# Patient Record
Sex: FEMALE | Race: WHITE | NOT HISPANIC OR LATINO | Employment: OTHER | URBAN - METROPOLITAN AREA
[De-identification: names, ages, dates, MRNs, and addresses within clinical notes are randomized per-mention and may not be internally consistent; named-entity substitution may affect disease eponyms.]

---

## 2017-01-02 ENCOUNTER — TREATMENT (OUTPATIENT)
Dept: CARDIAC REHAB | Facility: HOSPITAL | Age: 62
End: 2017-01-02

## 2017-01-02 DIAGNOSIS — Z95.5 S/P DRUG ELUTING CORONARY STENT PLACEMENT: Primary | ICD-10-CM

## 2017-01-02 PROCEDURE — 93798 PHYS/QHP OP CAR RHAB W/ECG: CPT

## 2017-01-04 ENCOUNTER — TREATMENT (OUTPATIENT)
Dept: CARDIAC REHAB | Facility: HOSPITAL | Age: 62
End: 2017-01-04

## 2017-01-04 ENCOUNTER — APPOINTMENT (OUTPATIENT)
Dept: CARDIAC REHAB | Facility: HOSPITAL | Age: 62
End: 2017-01-04

## 2017-01-04 DIAGNOSIS — Z95.5 S/P DRUG ELUTING CORONARY STENT PLACEMENT: Primary | ICD-10-CM

## 2017-01-04 PROCEDURE — 93798 PHYS/QHP OP CAR RHAB W/ECG: CPT

## 2017-01-06 ENCOUNTER — APPOINTMENT (OUTPATIENT)
Dept: CARDIAC REHAB | Facility: HOSPITAL | Age: 62
End: 2017-01-06

## 2017-01-06 ENCOUNTER — TREATMENT (OUTPATIENT)
Dept: CARDIAC REHAB | Facility: HOSPITAL | Age: 62
End: 2017-01-06

## 2017-01-06 DIAGNOSIS — Z95.5 S/P DRUG ELUTING CORONARY STENT PLACEMENT: Primary | ICD-10-CM

## 2017-01-06 PROCEDURE — 93798 PHYS/QHP OP CAR RHAB W/ECG: CPT

## 2017-01-06 RX ORDER — OMEPRAZOLE 40 MG/1
40 CAPSULE, DELAYED RELEASE ORAL DAILY
Qty: 90 CAPSULE | Refills: 1 | Status: SHIPPED | OUTPATIENT
Start: 2017-01-06 | End: 2017-01-09

## 2017-01-09 ENCOUNTER — TREATMENT (OUTPATIENT)
Dept: CARDIAC REHAB | Facility: HOSPITAL | Age: 62
End: 2017-01-09

## 2017-01-09 ENCOUNTER — OFFICE VISIT (OUTPATIENT)
Dept: ONCOLOGY | Facility: CLINIC | Age: 62
End: 2017-01-09
Attending: INTERNAL MEDICINE

## 2017-01-09 ENCOUNTER — LAB (OUTPATIENT)
Dept: LAB | Facility: HOSPITAL | Age: 62
End: 2017-01-09
Attending: INTERNAL MEDICINE

## 2017-01-09 ENCOUNTER — APPOINTMENT (OUTPATIENT)
Dept: CARDIAC REHAB | Facility: HOSPITAL | Age: 62
End: 2017-01-09

## 2017-01-09 VITALS
WEIGHT: 147.6 LBS | TEMPERATURE: 97.8 F | RESPIRATION RATE: 14 BRPM | OXYGEN SATURATION: 99 % | HEIGHT: 65 IN | DIASTOLIC BLOOD PRESSURE: 68 MMHG | SYSTOLIC BLOOD PRESSURE: 118 MMHG | BODY MASS INDEX: 24.59 KG/M2 | HEART RATE: 73 BPM

## 2017-01-09 DIAGNOSIS — E78.2 MIXED HYPERLIPIDEMIA: Primary | ICD-10-CM

## 2017-01-09 DIAGNOSIS — Z95.5 S/P DRUG ELUTING CORONARY STENT PLACEMENT: Primary | ICD-10-CM

## 2017-01-09 DIAGNOSIS — C50.212 MALIGNANT NEOPLASM OF UPPER-INNER QUADRANT OF LEFT FEMALE BREAST (HCC): Primary | ICD-10-CM

## 2017-01-09 LAB
BASOPHILS # BLD AUTO: 0.04 10*3/MM3 (ref 0–0.1)
BASOPHILS NFR BLD AUTO: 0.7 % (ref 0–1.1)
DEPRECATED RDW RBC AUTO: 43.2 FL (ref 37–49)
EOSINOPHIL # BLD AUTO: 0.12 10*3/MM3 (ref 0–0.36)
EOSINOPHIL NFR BLD AUTO: 2.2 % (ref 1–5)
ERYTHROCYTE [DISTWIDTH] IN BLOOD BY AUTOMATED COUNT: 13.6 % (ref 11.7–14.5)
HCT VFR BLD AUTO: 38.1 % (ref 34–45)
HGB BLD-MCNC: 12.7 G/DL (ref 11.5–14.9)
IMM GRANULOCYTES # BLD: 0.04 10*3/MM3 (ref 0–0.03)
IMM GRANULOCYTES NFR BLD: 0.7 % (ref 0–0.5)
LYMPHOCYTES # BLD AUTO: 1.24 10*3/MM3 (ref 1–3.5)
LYMPHOCYTES NFR BLD AUTO: 22.9 % (ref 20–49)
MCH RBC QN AUTO: 28.9 PG (ref 27–33)
MCHC RBC AUTO-ENTMCNC: 33.3 G/DL (ref 32–35)
MCV RBC AUTO: 86.8 FL (ref 83–97)
MONOCYTES # BLD AUTO: 0.47 10*3/MM3 (ref 0.25–0.8)
MONOCYTES NFR BLD AUTO: 8.7 % (ref 4–12)
NEUTROPHILS # BLD AUTO: 3.51 10*3/MM3 (ref 1.5–7)
NEUTROPHILS NFR BLD AUTO: 64.8 % (ref 39–75)
NRBC BLD MANUAL-RTO: 0 /100 WBC (ref 0–0)
PLATELET # BLD AUTO: 232 10*3/MM3 (ref 150–375)
PMV BLD AUTO: 9.6 FL (ref 8.9–12.1)
RBC # BLD AUTO: 4.39 10*6/MM3 (ref 3.9–5)
WBC NRBC COR # BLD: 5.42 10*3/MM3 (ref 4–10)

## 2017-01-09 PROCEDURE — 85025 COMPLETE CBC W/AUTO DIFF WBC: CPT

## 2017-01-09 PROCEDURE — 36415 COLL VENOUS BLD VENIPUNCTURE: CPT | Performed by: INTERNAL MEDICINE

## 2017-01-09 PROCEDURE — 99214 OFFICE O/P EST MOD 30 MIN: CPT | Performed by: INTERNAL MEDICINE

## 2017-01-09 PROCEDURE — 93798 PHYS/QHP OP CAR RHAB W/ECG: CPT

## 2017-01-09 NOTE — PROGRESS NOTES
Subjective:     Reason for follow up:   1. Stage 1 (T1bN0), left breast invasive ductal carcinoma, ER+ (90%), ME+, Sax0mti negative   * status post lumpectomy with SLNB    * Plan for radiation therapy   * Arimidex started 6/2016     History of Present Ilness: Debra S Sandifer presents for follow-up of breast cancer. Since I last saw her she was admitted for evaluation of chest pain that was initially felt to be GI in origin and she underwent EGD and initiated on GI medications, but chest pain returned and she had left heart cath with stenting of multiple coronary arteries and is now on lifelong aspirin and at least one year of Ticagrelor. She also was noted to have an EF of 40%. Her chest pains have resolved and she's participating in cardiac rehab. She remains on Arimidex for breast cancer and is tolerating it well. No fevers, chills, night sweats. No abnormal breast concerns.     Past Medical   Past Medical History   Diagnosis Date   • Acute sinus infection    • Arthralgia of both hands    • Arthritis      hand   • Atypical chest pain    • Breast cancer    • Chest pain    • Coronary artery disease involving native coronary artery 11/23/2016   • Cough    • Dyspareunia    • H/O bronchitis    • H/O infectious mononucleosis    • Headache    • High cholesterol    • Hot flashes    • Hot flashes, menopausal    • Hyperlipidemia    • Polyp of sigmoid colon    • Stye      LEFT EYE   • UTI (urinary tract infection)      ON ANTIBIOTICS   • Vitamin D deficiency      Patient Active Problem List   Diagnosis   • Mixed hyperlipidemia   • Vitamin D deficiency   • Malignant neoplasm of upper-inner quadrant of left female breast   • Coronary artery disease involving native coronary artery   • Ischemic cardiomyopathy     Social History   Social History     Social History   • Marital status:      Spouse name: Van   • Number of children: N/A   • Years of education: College     Occupational History   • Retired   [employer]     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 Traveled to [place] and [place] in 07/2015 and [place] [facility] 03/2016      Family History  Family History   Problem Relation Age of Onset   • Coronary artery disease Mother    • Dementia Mother    • Heart disease Mother      Heart attack w/2 stents   • Heart attack Mother      had heart attack. Heart catheter -2 stents   • Hypertension Father    • Heart disease Father      Coronary heart disease   • Stroke Father      Ischemic   • Diabetes type II Father    • Diabetes Father    • Hyperlipidemia Father    • Prostate cancer Brother 51   • Alcohol abuse Maternal Grandfather    • Rheum arthritis Paternal Grandmother    • Alcohol abuse Paternal Grandfather    • Stroke Paternal Grandfather      Ischemic   • Rheum arthritis Paternal Grandfather    • Diabetes type II Paternal Grandfather    • Diabetes Paternal Grandfather    • Hyperlipidemia Paternal Grandfather    • Hypertension Paternal Grandfather      Allergies  Allergies   Allergen Reactions   • Codeine Rash   • Latex Rash       Medications: The current medication list was reviewed in the EMR.    Review of Systems  GENERAL: No change in appetite or weight; No fevers, chills, sweats.    SKIN: No nonhealing lesions. No rashes.  HEME/LYMPH: No easy bruising, bleeding. No swollen nodes.   EYES: No vision changes or diplopia.   ENT: No  "tinnitus, hearing loss, gum bleeding, epistaxis, hoarseness or dysphagia.   RESPIRATORY: No cough, shortness of breath, hemoptysis or wheezing.   CVS: recent cardiac stents  GI: No melena or hematochezia. No abdominal pain.No nausea, vomiting, constipation, diarrhea  PSYCHIATRIC: No increased nervousness, mood changes or depression.        Objective:     Vitals:    01/09/17 0950   BP: 118/68   Pulse: 73   Resp: 14   Temp: 97.8 °F (36.6 °C)   TempSrc: Oral   SpO2: 99%   Weight: 147 lb 9.6 oz (67 kg)   Height: 65\" (165.1 cm)   PainSc: 0-No pain     Current Status 1/9/2017   ECOG score 0     GENERAL:  Well-developed, well-nourished female in no acute distress.   SKIN:  Warm, dry without rashes, purpura or petechiae.  LYMPHATICS:  No cervical, supraclavicular, axillary adenopathy.  CHEST:  Lungs clear to percussion and auscultation. Good airflow.  CARDIAC:  Regular rate and rhythm without murmurs, rubs or gallops. Normal S1,S2. No edema  EXTREMITIES:  No clubbing, cyanosis or edema.  PSYCHIATRIC:  Normal affect and mood.    BREASTS: No palpable masses, skin changes, nipple discharge, axillary or supraclavicular lymphadenopathy on left.      Labs and Imaging  Lab Results   Component Value Date    WBC 5.42 01/09/2017    HGB 12.7 01/09/2017    HCT 38.1 01/09/2017    MCV 86.8 01/09/2017     01/09/2017       Breast imaging: none      Assessment/Plan     Assessment:   1. Stage 1 (T1bN0), left breast invasive ductal carcinoma, ER+ (90%), MD+, Xie9tez negative   * status post lumpectomy with SLNB 4/13/16   * Oncotype Dx 20 (low intermediate). No chemotherapy indicated.   * status post radiation therapy   * Arimidex started June 2016.  Tolerating well. Continue.   2. Hot flashes. Tolerable.   3. CAD with recent cardiac stenting. Recovering nicely. Participating in cardiac rehab.   Plan:     1. Continue Arimidex  2. Annual mammogram due January - scheduled for later this month.   3. DEXA scan due May 2018 at Dr Morales's " office.  4. Patient was instructed on the importance of physical activity, healthy diet, and self breast exams.  Patient will continue calcium and vitamin D supplementation.      Follow-up in 4 months. I asked the patient to call for any questions, concerns, or new symptoms.

## 2017-01-11 ENCOUNTER — OFFICE VISIT (OUTPATIENT)
Dept: GASTROENTEROLOGY | Facility: CLINIC | Age: 62
End: 2017-01-11

## 2017-01-11 ENCOUNTER — APPOINTMENT (OUTPATIENT)
Dept: CARDIAC REHAB | Facility: HOSPITAL | Age: 62
End: 2017-01-11

## 2017-01-11 ENCOUNTER — TREATMENT (OUTPATIENT)
Dept: CARDIAC REHAB | Facility: HOSPITAL | Age: 62
End: 2017-01-11

## 2017-01-11 VITALS
WEIGHT: 148.8 LBS | DIASTOLIC BLOOD PRESSURE: 68 MMHG | HEIGHT: 65 IN | SYSTOLIC BLOOD PRESSURE: 118 MMHG | BODY MASS INDEX: 24.79 KG/M2

## 2017-01-11 DIAGNOSIS — Z95.5 S/P DRUG ELUTING CORONARY STENT PLACEMENT: Primary | ICD-10-CM

## 2017-01-11 DIAGNOSIS — K63.5 COLON POLYPS: Primary | ICD-10-CM

## 2017-01-11 PROCEDURE — 93798 PHYS/QHP OP CAR RHAB W/ECG: CPT

## 2017-01-11 PROCEDURE — 99213 OFFICE O/P EST LOW 20 MIN: CPT | Performed by: INTERNAL MEDICINE

## 2017-01-11 NOTE — MR AVS SNAPSHOT
Debra S Sandifer   1/11/2017 10:45 AM   Office Visit    Dept Phone:  [phone]   Encounter #:  [id number]    Provider:  Mehdi Guardado MD   Department:  [facility] GASTROENTEROLOGY                Your Full Care Plan              Your Updated Medication List          This list is accurate as of: 1/11/17 12:00 PM.  Always use your most recent med list.                anastrozole 1 MG tablet   Commonly known as:  ARIMIDEX   Take 1 tablet by mouth daily.       aspirin 81 MG EC tablet   Take 1 tablet by mouth Daily.       atorvastatin 40 MG tablet   Commonly known as:  LIPITOR   Take 1 tablet by mouth Every Night.       carvedilol 3.125 MG tablet   Commonly known as:  COREG   Take 1 tablet by mouth Every 12 (Twelve) Hours.       naproxen sodium 220 MG tablet   Commonly known as:  ALEVE       PROBIOTIC DAILY PO       ticagrelor 90 MG tablet tablet   Commonly known as:  BRILINTA   Take 1 tablet by mouth 2 (Two) Times a Day.       TYLENOL PO       vitamin D3 5000 UNITS capsule capsule               You Were Diagnosed With        Codes Comments    Colon polyps    -  Primary ICD-10-CM: K63.5  ICD-9-CM: 211.3       Instructions     None    Patient Instructions History      Upcoming Appointments     Visit Type Date Time Department    FOLLOW UP 1/11/2017 10:45 AM [facility]    PHASE II - CARD 1/11/2017  8:00 AM [facility] CARD REHAB    PHASE II - CARD 1/13/2017  8:00 AM [facility] CARD REHAB    PHASE II - CARD 1/16/2017  8:00 AM [facility] CARD REHAB    PHASE II - CARD 1/18/2017  8:00 AM [facility] CARD REHAB    PHASE II - CARD 1/20/2017  8:00 AM [facility] CARD REHAB    PHASE II - CARD 1/23/2017  8:00 AM [facility] CARD REHAB    PHASE II - CARD 1/25/2017  8:00 AM [facility] CARD REHAB    MAMMO [facility] DIAG [place] BILATERAL 1/27/2017  9:30 AM [facility] MAMMO    PHASE II - CARD 1/27/2017  8:00 AM [facility] CARD REHAB    PHASE II - CARD 1/30/2017  8:00 AM [facility] CARD REHAB    PHASE II - CARD 2/1/2017  8:00 AM [facility]  CARD REHAB    PHASE II - CARD 2/3/2017  8:00 AM [facility] CARD REHAB    PHASE II - CARD 2/6/2017  8:00 AM [facility] CARD REHAB    OFFICE VISIT 2/7/2017 10:30 AM [facility] BRE[facility]    PHASE II - CARD 2/8/2017  8:00 AM [facility] CARD REHAB    PHASE II - CARD 2/10/2017  8:00 AM [facility] CARD REHAB    PHASE II - CARD 2/13/2017  8:00 AM [facility] CARD REHAB    [facility] 2/15/2017  8:20 AM [facility] PC [facility]AVILION    PHASE II - CARD 2/15/2017  7:00 AM [facility] CARD REHAB    PHASE II - CARD 2/17/2017  8:00 AM [facility] CARD REHAB    PHASE II - CARD 2/20/2017  8:00 AM [facility] CARD REHAB    FOLLOW UP 2/22/2017  9:00 AM [facility]IL[facility]    VITALS ONLY 5/12/2017  9:00 AM [facility] ONC [facility] LAB [facility]    FOLLOW UP 1 UNIT 5/12/2017  9:20 AM [facility] ONC CBC [place]    FOLLOW UP 6/26/2017  9:20 AM [facility] [place]    [facility] 10/10/2017  8:10 AM [facility]    PHYSICAL 10/17/2017  7:45 AM [facility]      MyChart Signup     Our records indicate that you have an active [employer] account.    You can view your After Visit Summary by going to [employer] and logging in with your [facility] username and password.  If you don't have a [employer] username and password but a parent or guardian has access to your record, the parent or guardian should login with their own [employer] username and password and access your record to view the After Visit Summary.    If you have questions, you can email [email or web address] or call [phone] to talk to our [employer] staff.  Remember, [employer] is NOT to be used for urgent needs.  For medical emergencies, dial 911.               Other Info from Your Visit           Your Appointments     Jan 13, 2017  8:00 AM EST   PHASE II CARDIAC REHAB with TELEMETRY MONITOR - [facility] CARD REHAB ([place])    [address] [facility] [address]   [phone]            Jan 16, 2017  8:00 AM EST   PHASE II CARDIAC REHAB with TELEMETRY MONITOR - [facility]  [facility]    [address] [facility] [address]   [phone]            Jan 18, 2017  8:00 AM EST   [facility]    [address] [facility] [address]   [phone]            Jan 20, 2017  8:00 AM EST   PHASE II CARDIAC REHAB [facility] TE[facility]MET[facility]IT[facility]    [address] [facility] [address]   [phone]            Jan 23, 2017  8:00 AM EST   PHASE [facility]ARDI[facility] REHAB [facility] TE[facility]MET[facility]    [address] [facility] [address]   [phone]              Allergies     Codeine  Rash    Latex  Rash      Reason for Visit     Follow-up post scope      Vital Signs     Blood Pressure Height Weight Body Mass Index Smoking Status       118/68 65\" (165.1 cm) 148 lb 12.8 oz (67.5 kg) 24.76 kg/m2 Former Smoker       Problems and Diagnoses Noted     Colon polyp    -  Primary        "

## 2017-01-11 NOTE — PROGRESS NOTES
Chief Complaint   Patient presents with   • Follow-up     post scope       History of Present Illness: We reviewed the results of the 11/11/16 EGD. After the EGD she was admitted to [facility] with chest pain. She had a cath and 2 heart stents placed.  No chest pain now. No heartburn now. No abdominal pain.  No nausea or vomting. NO fevers, chills. No diarrhea or constipation. No rectal bleeding or melena. Last colonoscopy 2014, h/o colon polyps in the past.     Past Medical History   Diagnosis Date   • Acute sinus infection    • Arthralgia of both hands    • Arthritis      hand   • Atypical chest pain    • Breast cancer    • Chest pain    • Coronary artery disease involving native coronary artery 11/23/2016   • Cough    • Dyspareunia    • H/O bronchitis    • H/O infectious mononucleosis    • Headache    • High cholesterol    • Hot flashes    • Hot flashes, menopausal    • Hyperlipidemia    • Polyp of sigmoid colon    • Stye      LEFT EYE   • UTI (urinary tract infection)      ON ANTIBIOTICS   • Vitamin D deficiency        Past Surgical History   Procedure Laterality Date   • Back surgery  01/13/2005     Herniated Disk repair (Done by Dr Jurgen Reddy)   • Lumbar discectomy     • [place] tooth extraction     • Breast biopsy     • Breast lumpectomy with sentinel node biopsy Left 4/13/2016     Procedure: LEFT BREAST MAMMO NEEDLE LOC LUMPECTOMY WITH LEFT AXILLA SENTINEL NODE BIOPSY;  Surgeon: Aminata Estrella MD;  Location: [facility] OR;  Service:    • Endoscopy N/A 11/11/2016     Procedure: ESOPHAGOGASTRODUODENOSCOPY WITH BIOPSY;  Surgeon: Mehdi Guardado MD;  Location: [facility] ENDOSCOPY;  Service:    • Cardiac catheterization N/A 11/15/2016     Procedure: Left Heart Cath;  Surgeon: Sanjiv Dykes MD;  Location: [facility] CATH INVASIVE LOCATION;  Service:    • Cardiac catheterization N/A 11/15/2016     Procedure: Left ventriculography;  Surgeon: Sanjiv Dykes MD;  Location: [facility] CATH INVASIVE LOCATION;  Service:           Current Outpatient Prescriptions:   •  Acetaminophen (TYLENOL PO), Take 650 mg by mouth 4 (four) times a day as needed., Disp: , Rfl:   •  anastrozole (ARIMIDEX) 1 MG tablet, Take 1 tablet by mouth daily., Disp: 90 tablet, Rfl: 3  •  aspirin 81 MG EC tablet, Take 1 tablet by mouth Daily., Disp: 30 tablet, Rfl: 12  •  atorvastatin (LIPITOR) 40 MG tablet, Take 1 tablet by mouth Every Night., Disp: 30 tablet, Rfl: 6  •  carvedilol (COREG) 3.125 MG tablet, Take 1 tablet by mouth Every 12 (Twelve) Hours., Disp: 60 tablet, Rfl: 6  •  Cholecalciferol (VITAMIN D3) 5000 UNITS capsule capsule, Take 1,000 Units by mouth Daily., Disp: , Rfl:   •  Probiotic Product (PROBIOTIC DAILY PO), Take 1 tablet by mouth daily., Disp: , Rfl:   •  ticagrelor (BRILINTA) 90 MG tablet tablet, Take 1 tablet by mouth 2 (Two) Times a Day., Disp: 60 tablet, Rfl: 11  •  naproxen sodium (ALEVE) 220 MG tablet, Take 220 mg by mouth 2 (two) times a day as needed for mild pain (1-3)., Disp: , Rfl:     Allergies   Allergen Reactions   • Codeine Rash   • Latex Rash       Family History   Problem Relation Age of Onset   • Coronary artery disease Mother    • Dementia Mother    • Heart disease Mother      Heart attack w/2 stents   • Heart attack Mother      had heart attack. Heart catheter -2 stents   • Hypertension Father    • Heart disease Father      Coronary heart disease   • Stroke Father      Ischemic   • Diabetes type II Father    • Diabetes Father    • Hyperlipidemia Father    • Prostate cancer Brother 51   • Alcohol abuse Maternal Grandfather    • Rheum arthritis Paternal Grandmother    • Alcohol abuse Paternal Grandfather    • Stroke Paternal Grandfather      Ischemic   • Rheum arthritis Paternal Grandfather    • Diabetes type II Paternal Grandfather    • Diabetes Paternal Grandfather    • Hyperlipidemia Paternal Grandfather    • Hypertension Paternal Grandfather        Social History     Social History   • Marital status:      Spouse  name: Van   • Number of children: N/A   • Years of education: College     Occupational History   • Retired  [employer]     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 Traveled to [place] and [place] in 07/2015 and [facility] 03/2016       Review of Systems   All other systems reviewed and are negative.      Vitals:    01/11/17 1128   BP: 118/68       Physical Exam   Constitutional: She is oriented to person, place, and time. She appears well-developed and well-nourished. No distress.   HENT:   Head: Normocephalic and atraumatic. Hair is normal.   Right Ear: Hearing, tympanic membrane, external ear and ear canal normal. No drainage. No decreased hearing is noted.   Left Ear: Hearing, tympanic membrane, external ear and ear canal normal. No decreased hearing is noted.   Nose: No nasal deformity.   Mouth/Throat: Oropharynx is clear and moist.   Eyes: Conjunctivae, EOM and lids are normal. Pupils are equal, round, and reactive to light. Right eye exhibits no discharge. Left eye exhibits no discharge.   Neck: Normal range of motion. Neck supple. No JVD present. No tracheal deviation present. No thyromegaly present.   Cardiovascular: Normal rate, regular rhythm, normal heart sounds, intact distal pulses and normal pulses.  Exam reveals no gallop and no friction rub.    No murmur heard.  Pulmonary/Chest: Effort normal and  breath sounds normal. No respiratory distress. She has no wheezes. She has no rales. She exhibits no tenderness.   Abdominal: Soft. Bowel sounds are normal. She exhibits no distension and no mass. There is no tenderness. There is no rebound and no guarding. No hernia.   Musculoskeletal: Normal range of motion. She exhibits no edema, tenderness or deformity.   Lymphadenopathy:     She has no cervical adenopathy.   Neurological: She is alert and oriented to person, place, and time. She has normal reflexes. She displays normal reflexes. No cranial nerve deficit. She exhibits normal muscle tone. Coordination normal.   Skin: Skin is warm and dry. No rash noted. She is not diaphoretic. No erythema.   Psychiatric: She has a normal mood and affect. Her behavior is normal. Judgment and thought content normal.   Vitals reviewed.      There are no diagnoses linked to this encounter.   Assessment:  1) h//o colon polyps.  2) Chest pain, s/p MI - all resolved after angioplasty and stent placement.    Recommendations:  1) Repeat c/s in 2019  2) f/u prn      No Follow-up on file.

## 2017-01-13 ENCOUNTER — APPOINTMENT (OUTPATIENT)
Dept: CARDIAC REHAB | Facility: HOSPITAL | Age: 62
End: 2017-01-13

## 2017-01-13 ENCOUNTER — TREATMENT (OUTPATIENT)
Dept: CARDIAC REHAB | Facility: HOSPITAL | Age: 62
End: 2017-01-13

## 2017-01-13 DIAGNOSIS — Z95.5 S/P DRUG ELUTING CORONARY STENT PLACEMENT: Primary | ICD-10-CM

## 2017-01-13 PROCEDURE — 93798 PHYS/QHP OP CAR RHAB W/ECG: CPT

## 2017-01-16 ENCOUNTER — APPOINTMENT (OUTPATIENT)
Dept: CARDIAC REHAB | Facility: HOSPITAL | Age: 62
End: 2017-01-16

## 2017-01-16 ENCOUNTER — TREATMENT (OUTPATIENT)
Dept: CARDIAC REHAB | Facility: HOSPITAL | Age: 62
End: 2017-01-16

## 2017-01-16 DIAGNOSIS — Z95.5 S/P DRUG ELUTING CORONARY STENT PLACEMENT: Primary | ICD-10-CM

## 2017-01-16 PROCEDURE — 93798 PHYS/QHP OP CAR RHAB W/ECG: CPT

## 2017-01-17 RX ORDER — ATORVASTATIN CALCIUM 40 MG/1
40 TABLET, FILM COATED ORAL NIGHTLY
Qty: 90 TABLET | Refills: 3 | Status: SHIPPED | OUTPATIENT
Start: 2017-01-17 | End: 2017-05-09 | Stop reason: SDUPTHER

## 2017-01-17 RX ORDER — ASPIRIN 81 MG/1
81 TABLET ORAL DAILY
Qty: 90 TABLET | Refills: 3 | Status: SHIPPED | OUTPATIENT
Start: 2017-01-17 | End: 2018-02-26 | Stop reason: SDUPTHER

## 2017-01-17 RX ORDER — CARVEDILOL 3.12 MG/1
3.12 TABLET ORAL EVERY 12 HOURS SCHEDULED
Qty: 180 TABLET | Refills: 3 | Status: SHIPPED | OUTPATIENT
Start: 2017-01-17 | End: 2017-05-09 | Stop reason: SDUPTHER

## 2017-01-18 ENCOUNTER — APPOINTMENT (OUTPATIENT)
Dept: CARDIAC REHAB | Facility: HOSPITAL | Age: 62
End: 2017-01-18

## 2017-01-18 ENCOUNTER — TREATMENT (OUTPATIENT)
Dept: CARDIAC REHAB | Facility: HOSPITAL | Age: 62
End: 2017-01-18

## 2017-01-18 DIAGNOSIS — Z95.5 S/P DRUG ELUTING CORONARY STENT PLACEMENT: Primary | ICD-10-CM

## 2017-01-18 PROCEDURE — 93798 PHYS/QHP OP CAR RHAB W/ECG: CPT

## 2017-01-20 ENCOUNTER — TREATMENT (OUTPATIENT)
Dept: CARDIAC REHAB | Facility: HOSPITAL | Age: 62
End: 2017-01-20

## 2017-01-20 ENCOUNTER — APPOINTMENT (OUTPATIENT)
Dept: CARDIAC REHAB | Facility: HOSPITAL | Age: 62
End: 2017-01-20

## 2017-01-20 DIAGNOSIS — Z95.5 S/P DRUG ELUTING CORONARY STENT PLACEMENT: Primary | ICD-10-CM

## 2017-01-20 PROCEDURE — 93798 PHYS/QHP OP CAR RHAB W/ECG: CPT

## 2017-01-23 ENCOUNTER — APPOINTMENT (OUTPATIENT)
Dept: CARDIAC REHAB | Facility: HOSPITAL | Age: 62
End: 2017-01-23

## 2017-01-23 ENCOUNTER — TREATMENT (OUTPATIENT)
Dept: CARDIAC REHAB | Facility: HOSPITAL | Age: 62
End: 2017-01-23

## 2017-01-23 DIAGNOSIS — Z95.5 S/P DRUG ELUTING CORONARY STENT PLACEMENT: Primary | ICD-10-CM

## 2017-01-23 PROCEDURE — 93798 PHYS/QHP OP CAR RHAB W/ECG: CPT

## 2017-01-25 ENCOUNTER — TREATMENT (OUTPATIENT)
Dept: CARDIAC REHAB | Facility: HOSPITAL | Age: 62
End: 2017-01-25

## 2017-01-25 ENCOUNTER — APPOINTMENT (OUTPATIENT)
Dept: CARDIAC REHAB | Facility: HOSPITAL | Age: 62
End: 2017-01-25

## 2017-01-25 DIAGNOSIS — Z95.5 S/P DRUG ELUTING CORONARY STENT PLACEMENT: Primary | ICD-10-CM

## 2017-01-25 PROCEDURE — 93798 PHYS/QHP OP CAR RHAB W/ECG: CPT

## 2017-01-27 ENCOUNTER — HOSPITAL ENCOUNTER (OUTPATIENT)
Dept: MAMMOGRAPHY | Facility: HOSPITAL | Age: 62
Discharge: HOME | End: 2017-01-27
Attending: SURGERY | Admitting: SURGERY

## 2017-01-27 ENCOUNTER — APPOINTMENT (OUTPATIENT)
Dept: CARDIAC REHAB | Facility: HOSPITAL | Age: 62
End: 2017-01-27

## 2017-01-27 ENCOUNTER — TREATMENT (OUTPATIENT)
Dept: CARDIAC REHAB | Facility: HOSPITAL | Age: 62
End: 2017-01-27

## 2017-01-27 DIAGNOSIS — C50.212 MALIGNANT NEOPLASM OF UPPER-INNER QUADRANT OF LEFT FEMALE BREAST (HCC): ICD-10-CM

## 2017-01-27 DIAGNOSIS — Z95.5 S/P DRUG ELUTING CORONARY STENT PLACEMENT: Primary | ICD-10-CM

## 2017-01-27 PROCEDURE — 93798 PHYS/QHP OP CAR RHAB W/ECG: CPT

## 2017-01-27 PROCEDURE — G0279 TOMOSYNTHESIS, MAMMO: HCPCS

## 2017-01-27 PROCEDURE — G0204 DX MAMMO INCL CAD BI: HCPCS

## 2017-01-30 ENCOUNTER — TREATMENT (OUTPATIENT)
Dept: CARDIAC REHAB | Facility: HOSPITAL | Age: 62
End: 2017-01-30

## 2017-01-30 ENCOUNTER — APPOINTMENT (OUTPATIENT)
Dept: CARDIAC REHAB | Facility: HOSPITAL | Age: 62
End: 2017-01-30

## 2017-01-30 DIAGNOSIS — Z95.5 S/P DRUG ELUTING CORONARY STENT PLACEMENT: Primary | ICD-10-CM

## 2017-01-30 PROCEDURE — 93798 PHYS/QHP OP CAR RHAB W/ECG: CPT

## 2017-02-01 ENCOUNTER — TREATMENT (OUTPATIENT)
Dept: CARDIAC REHAB | Facility: HOSPITAL | Age: 62
End: 2017-02-01

## 2017-02-01 ENCOUNTER — APPOINTMENT (OUTPATIENT)
Dept: CARDIAC REHAB | Facility: HOSPITAL | Age: 62
End: 2017-02-01

## 2017-02-01 DIAGNOSIS — Z95.5 S/P DRUG ELUTING CORONARY STENT PLACEMENT: Primary | ICD-10-CM

## 2017-02-01 PROCEDURE — 93798 PHYS/QHP OP CAR RHAB W/ECG: CPT

## 2017-02-03 ENCOUNTER — TREATMENT (OUTPATIENT)
Dept: CARDIAC REHAB | Facility: HOSPITAL | Age: 62
End: 2017-02-03

## 2017-02-03 ENCOUNTER — APPOINTMENT (OUTPATIENT)
Dept: CARDIAC REHAB | Facility: HOSPITAL | Age: 62
End: 2017-02-03

## 2017-02-03 DIAGNOSIS — Z95.5 S/P DRUG ELUTING CORONARY STENT PLACEMENT: Primary | ICD-10-CM

## 2017-02-03 PROCEDURE — 93798 PHYS/QHP OP CAR RHAB W/ECG: CPT

## 2017-02-06 ENCOUNTER — TREATMENT (OUTPATIENT)
Dept: CARDIAC REHAB | Facility: HOSPITAL | Age: 62
End: 2017-02-06

## 2017-02-06 ENCOUNTER — APPOINTMENT (OUTPATIENT)
Dept: CARDIAC REHAB | Facility: HOSPITAL | Age: 62
End: 2017-02-06

## 2017-02-06 DIAGNOSIS — Z95.5 S/P DRUG ELUTING CORONARY STENT PLACEMENT: Primary | ICD-10-CM

## 2017-02-06 PROCEDURE — 93798 PHYS/QHP OP CAR RHAB W/ECG: CPT

## 2017-02-07 ENCOUNTER — OFFICE VISIT (OUTPATIENT)
Dept: SURGERY | Facility: CLINIC | Age: 62
End: 2017-02-07

## 2017-02-07 VITALS
DIASTOLIC BLOOD PRESSURE: 69 MMHG | HEIGHT: 66 IN | SYSTOLIC BLOOD PRESSURE: 111 MMHG | BODY MASS INDEX: 23.78 KG/M2 | TEMPERATURE: 99.4 F | HEART RATE: 65 BPM | OXYGEN SATURATION: 98 % | WEIGHT: 148 LBS

## 2017-02-07 DIAGNOSIS — C50.212 MALIGNANT NEOPLASM OF UPPER-INNER QUADRANT OF LEFT FEMALE BREAST (HCC): Primary | ICD-10-CM

## 2017-02-07 PROCEDURE — 99214 OFFICE O/P EST MOD 30 MIN: CPT | Performed by: SURGERY
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
## 2017-02-08 ENCOUNTER — TREATMENT (OUTPATIENT)
Dept: CARDIAC REHAB | Facility: HOSPITAL | Age: 62
End: 2017-02-08

## 2017-02-08 ENCOUNTER — APPOINTMENT (OUTPATIENT)
Dept: CARDIAC REHAB | Facility: HOSPITAL | Age: 62
End: 2017-02-08

## 2017-02-08 DIAGNOSIS — Z95.5 S/P DRUG ELUTING CORONARY STENT PLACEMENT: Primary | ICD-10-CM

## 2017-02-08 PROCEDURE — 93798 PHYS/QHP OP CAR RHAB W/ECG: CPT

## 2017-02-10 ENCOUNTER — TREATMENT (OUTPATIENT)
Dept: CARDIAC REHAB | Facility: HOSPITAL | Age: 62
End: 2017-02-10

## 2017-02-10 ENCOUNTER — APPOINTMENT (OUTPATIENT)
Dept: CARDIAC REHAB | Facility: HOSPITAL | Age: 62
End: 2017-02-10

## 2017-02-10 DIAGNOSIS — Z95.5 S/P DRUG ELUTING CORONARY STENT PLACEMENT: Primary | ICD-10-CM

## 2017-02-10 PROCEDURE — 93798 PHYS/QHP OP CAR RHAB W/ECG: CPT

## 2017-02-13 ENCOUNTER — TREATMENT (OUTPATIENT)
Dept: CARDIAC REHAB | Facility: HOSPITAL | Age: 62
End: 2017-02-13

## 2017-02-13 ENCOUNTER — APPOINTMENT (OUTPATIENT)
Dept: CARDIAC REHAB | Facility: HOSPITAL | Age: 62
End: 2017-02-13

## 2017-02-13 DIAGNOSIS — Z95.5 S/P DRUG ELUTING CORONARY STENT PLACEMENT: Primary | ICD-10-CM

## 2017-02-13 PROCEDURE — 93798 PHYS/QHP OP CAR RHAB W/ECG: CPT

## 2017-02-15 ENCOUNTER — APPOINTMENT (OUTPATIENT)
Dept: CARDIAC REHAB | Facility: HOSPITAL | Age: 62
End: 2017-02-15

## 2017-02-15 ENCOUNTER — TREATMENT (OUTPATIENT)
Dept: CARDIAC REHAB | Facility: HOSPITAL | Age: 62
End: 2017-02-15

## 2017-02-15 DIAGNOSIS — Z95.5 S/P DRUG ELUTING CORONARY STENT PLACEMENT: Primary | ICD-10-CM

## 2017-02-15 DIAGNOSIS — E78.2 MIXED HYPERLIPIDEMIA: Primary | ICD-10-CM

## 2017-02-15 LAB
ALBUMIN SERPL-MCNC: 4.2 G/DL (ref 3.5–5.2)
ALBUMIN/GLOB SERPL: 1.6 G/DL
ALP SERPL-CCNC: 97 U/L (ref 39–117)
ALT SERPL-CCNC: 29 U/L (ref 1–33)
AST SERPL-CCNC: 34 U/L (ref 1–32)
BILIRUB SERPL-MCNC: 0.6 MG/DL (ref 0.1–1.2)
BUN SERPL-MCNC: 14 MG/DL (ref 8–23)
BUN/CREAT SERPL: 16.9 (ref 7–25)
CALCIUM SERPL-MCNC: 9.4 MG/DL (ref 8.6–10.5)
CHLORIDE SERPL-SCNC: 105 MMOL/L (ref 98–107)
CHOLEST SERPL-MCNC: 161 MG/DL (ref 0–200)
CO2 SERPL-SCNC: 26.4 MMOL/L (ref 22–29)
CREAT SERPL-MCNC: 0.83 MG/DL (ref 0.57–1)
GLOBULIN SER CALC-MCNC: 2.7 GM/DL
GLUCOSE SERPL-MCNC: 96 MG/DL (ref 65–99)
HDLC SERPL-MCNC: 96 MG/DL (ref 40–60)
LDLC SERPL CALC-MCNC: 52 MG/DL (ref 0–100)
POTASSIUM SERPL-SCNC: 4.6 MMOL/L (ref 3.5–5.2)
PROT SERPL-MCNC: 6.9 G/DL (ref 6–8.5)
SODIUM SERPL-SCNC: 144 MMOL/L (ref 136–145)
TRIGL SERPL-MCNC: 67 MG/DL (ref 0–150)
VLDLC SERPL CALC-MCNC: 13.4 MG/DL (ref 5–40)

## 2017-02-15 PROCEDURE — 93798 PHYS/QHP OP CAR RHAB W/ECG: CPT

## 2017-02-17 ENCOUNTER — TREATMENT (OUTPATIENT)
Dept: CARDIAC REHAB | Facility: HOSPITAL | Age: 62
End: 2017-02-17

## 2017-02-17 ENCOUNTER — APPOINTMENT (OUTPATIENT)
Dept: CARDIAC REHAB | Facility: HOSPITAL | Age: 62
End: 2017-02-17

## 2017-02-17 DIAGNOSIS — Z95.5 S/P DRUG ELUTING CORONARY STENT PLACEMENT: Primary | ICD-10-CM

## 2017-02-17 PROCEDURE — 93798 PHYS/QHP OP CAR RHAB W/ECG: CPT

## 2017-02-20 ENCOUNTER — APPOINTMENT (OUTPATIENT)
Dept: CARDIAC REHAB | Facility: HOSPITAL | Age: 62
End: 2017-02-20

## 2017-02-20 ENCOUNTER — TREATMENT (OUTPATIENT)
Dept: CARDIAC REHAB | Facility: HOSPITAL | Age: 62
End: 2017-02-20

## 2017-02-20 DIAGNOSIS — Z95.5 S/P DRUG ELUTING CORONARY STENT PLACEMENT: Primary | ICD-10-CM

## 2017-02-20 PROCEDURE — 93798 PHYS/QHP OP CAR RHAB W/ECG: CPT

## 2017-02-22 ENCOUNTER — APPOINTMENT (OUTPATIENT)
Dept: CARDIAC REHAB | Facility: HOSPITAL | Age: 62
End: 2017-02-22

## 2017-02-22 ENCOUNTER — OFFICE VISIT (OUTPATIENT)
Dept: INTERNAL MEDICINE | Facility: CLINIC | Age: 62
End: 2017-02-22

## 2017-02-22 ENCOUNTER — TREATMENT (OUTPATIENT)
Dept: CARDIAC REHAB | Facility: HOSPITAL | Age: 62
End: 2017-02-22

## 2017-02-22 VITALS
SYSTOLIC BLOOD PRESSURE: 104 MMHG | WEIGHT: 147 LBS | BODY MASS INDEX: 23.63 KG/M2 | DIASTOLIC BLOOD PRESSURE: 64 MMHG | OXYGEN SATURATION: 97 % | HEIGHT: 66 IN | HEART RATE: 81 BPM

## 2017-02-22 DIAGNOSIS — E78.2 MIXED HYPERLIPIDEMIA: Primary | ICD-10-CM

## 2017-02-22 DIAGNOSIS — Z95.5 S/P DRUG ELUTING CORONARY STENT PLACEMENT: Primary | ICD-10-CM

## 2017-02-22 DIAGNOSIS — I25.10 CORONARY ARTERY DISEASE INVOLVING NATIVE CORONARY ARTERY OF NATIVE HEART WITHOUT ANGINA PECTORIS: ICD-10-CM

## 2017-02-22 PROCEDURE — 99213 OFFICE O/P EST LOW 20 MIN: CPT | Performed by: INTERNAL MEDICINE

## 2017-02-22 PROCEDURE — 93798 PHYS/QHP OP CAR RHAB W/ECG: CPT

## 2017-02-22 PROCEDURE — 90715 TDAP VACCINE 7 YRS/> IM: CPT | Performed by: INTERNAL MEDICINE

## 2017-02-22 PROCEDURE — 90471 IMMUNIZATION ADMIN: CPT | Performed by: INTERNAL MEDICINE

## 2017-02-22 NOTE — PROGRESS NOTES
Subjective     Debra S Sandifer is a 61 y.o. female who presents with   Chief Complaint   Patient presents with   • Hyperlipidemia   • Coronary Artery Disease       History of Present Illness     HLD.  Excellent control with atorvastatin.   CAD.  She is doing well in cardiac rehab.     Review of Systems   Respiratory: Negative.    Cardiovascular: Negative.        The following portions of the patient's history were reviewed and updated as appropriate: allergies, current medications and problem list.    Patient Active Problem List    Diagnosis Date Noted   • Ischemic cardiomyopathy 12/22/2016   • Coronary artery disease involving native coronary artery 11/23/2016     Note Last Updated: 11/23/2016 11/2016  PTCA of the first diagonal branch with a 2.5 x 12 mm trek Rx balloon.  PCI of the distal LAD with a 2.75 x 18 mm Xience Alpine drug-eluting stent  PCI of the mid LAD with a 3.0 x 28 mm Xience Alpine drug-eluting stent     • Malignant neoplasm of upper-inner quadrant of left female breast 04/27/2016     Note Last Updated: 10/14/2016     S/p lumpectomy and radiation in 2016     • Mixed hyperlipidemia 02/10/2016   • Vitamin D deficiency 02/10/2016       Current Outpatient Prescriptions on File Prior to Visit   Medication Sig Dispense Refill   • Acetaminophen (TYLENOL PO) Take 650 mg by mouth 4 (four) times a day as needed.     • anastrozole (ARIMIDEX) 1 MG tablet Take 1 tablet by mouth daily. 90 tablet 3   • aspirin 81 MG EC tablet Take 1 tablet by mouth Daily. 90 tablet 3   • atorvastatin (LIPITOR) 40 MG tablet Take 1 tablet by mouth Every Night. 90 tablet 3   • carvedilol (COREG) 3.125 MG tablet Take 1 tablet by mouth Every 12 (Twelve) Hours. 180 tablet 3   • Cholecalciferol (VITAMIN D3) 5000 UNITS capsule capsule Take 1,000 Units by mouth Daily.     • Probiotic Product (PROBIOTIC DAILY PO) Take 1 tablet by mouth daily.     • ticagrelor (BRILINTA) 90 MG tablet tablet Take 1 tablet by mouth 2 (Two) Times a Day.  "180 tablet 3   • [DISCONTINUED] naproxen sodium (ALEVE) 220 MG tablet Take 220 mg by mouth 2 (two) times a day as needed for mild pain (1-3).       No current facility-administered medications on file prior to visit.        Objective     Visit Vitals   • /64   • Pulse 81   • Ht 66\" (167.6 cm)   • Wt 147 lb (66.7 kg)   • SpO2 97%   • BMI 23.73 kg/m2       Physical Exam   Constitutional: She is oriented to person, place, and time. She appears well-developed and well-nourished.   HENT:   Head: Normocephalic and atraumatic.   Pulmonary/Chest: Effort normal.   Neurological: She is alert and oriented to person, place, and time.   Psychiatric: She has a normal mood and affect. Her behavior is normal.       Assessment/Plan   Elin was seen today for hyperlipidemia and coronary artery disease.    Diagnoses and all orders for this visit:    Mixed hyperlipidemia    Coronary artery disease involving native coronary artery of native heart without angina pectoris    Other orders  -     Tdap Vaccine Greater Than or Equal To 6yo IM        Discussion  HLD.  Continue atorvastatin.   CAD.  Continue med management and cardiac rehab.         Future Appointments  Date Time Provider Department Center   2/24/2017 8:00 AM TELEMETRY MONITOR - [facility]   2/27/2017 8:00 AM TELEMETRY MONITOR - [facility]   3/1/2017 8:00 AM TELEMETRY MONITOR - [facility]   5/12/2017 9:00 AM VITALS ONLY [facility]   5/12/2017 9:20 AM Mechelle Garcia MD [facility]   6/26/2017 9:20 AM Sanjiv Dykes MD [facility] None   10/10/2017 8:10 AM [facility] None   10/17/2017 7:45 AM Alisa Morales MD [facility] None         "

## 2017-02-24 ENCOUNTER — TREATMENT (OUTPATIENT)
Dept: CARDIAC REHAB | Facility: HOSPITAL | Age: 62
End: 2017-02-24

## 2017-02-24 ENCOUNTER — APPOINTMENT (OUTPATIENT)
Dept: CARDIAC REHAB | Facility: HOSPITAL | Age: 62
End: 2017-02-24

## 2017-02-24 DIAGNOSIS — Z95.5 S/P DRUG ELUTING CORONARY STENT PLACEMENT: Primary | ICD-10-CM

## 2017-02-24 PROCEDURE — 93798 PHYS/QHP OP CAR RHAB W/ECG: CPT

## 2017-02-27 ENCOUNTER — TREATMENT (OUTPATIENT)
Dept: CARDIAC REHAB | Facility: HOSPITAL | Age: 62
End: 2017-02-27

## 2017-02-27 DIAGNOSIS — Z95.5 S/P DRUG ELUTING CORONARY STENT PLACEMENT: Primary | ICD-10-CM

## 2017-02-27 PROCEDURE — 93798 PHYS/QHP OP CAR RHAB W/ECG: CPT

## 2017-03-01 ENCOUNTER — TREATMENT (OUTPATIENT)
Dept: CARDIAC REHAB | Facility: HOSPITAL | Age: 62
End: 2017-03-01

## 2017-03-01 DIAGNOSIS — Z95.5 S/P DRUG ELUTING CORONARY STENT PLACEMENT: Primary | ICD-10-CM

## 2017-03-01 PROCEDURE — 93798 PHYS/QHP OP CAR RHAB W/ECG: CPT

## 2017-03-03 ENCOUNTER — TREATMENT (OUTPATIENT)
Dept: CARDIAC REHAB | Facility: HOSPITAL | Age: 62
End: 2017-03-03

## 2017-03-03 DIAGNOSIS — Z95.5 S/P DRUG ELUTING CORONARY STENT PLACEMENT: Primary | ICD-10-CM

## 2017-03-03 PROCEDURE — 93798 PHYS/QHP OP CAR RHAB W/ECG: CPT

## 2017-05-09 RX ORDER — CARVEDILOL 3.12 MG/1
3.12 TABLET ORAL EVERY 12 HOURS SCHEDULED
Qty: 180 TABLET | Refills: 1 | Status: SHIPPED | OUTPATIENT
Start: 2017-05-09 | End: 2017-11-27 | Stop reason: SDUPTHER

## 2017-05-09 RX ORDER — ATORVASTATIN CALCIUM 40 MG/1
40 TABLET, FILM COATED ORAL NIGHTLY
Qty: 90 TABLET | Refills: 1 | Status: SHIPPED | OUTPATIENT
Start: 2017-05-09 | End: 2017-11-30 | Stop reason: SDUPTHER

## 2017-05-12 ENCOUNTER — APPOINTMENT (OUTPATIENT)
Dept: LAB | Facility: HOSPITAL | Age: 62
End: 2017-05-12

## 2017-05-12 ENCOUNTER — APPOINTMENT (OUTPATIENT)
Dept: ONCOLOGY | Facility: CLINIC | Age: 62
End: 2017-05-12

## 2017-05-18 ENCOUNTER — OFFICE VISIT (OUTPATIENT)
Dept: ONCOLOGY | Facility: CLINIC | Age: 62
End: 2017-05-18

## 2017-05-18 ENCOUNTER — APPOINTMENT (OUTPATIENT)
Dept: LAB | Facility: HOSPITAL | Age: 62
End: 2017-05-18

## 2017-05-18 VITALS
TEMPERATURE: 97.9 F | DIASTOLIC BLOOD PRESSURE: 78 MMHG | SYSTOLIC BLOOD PRESSURE: 116 MMHG | WEIGHT: 148 LBS | OXYGEN SATURATION: 98 % | HEART RATE: 56 BPM | BODY MASS INDEX: 24.66 KG/M2 | RESPIRATION RATE: 12 BRPM | HEIGHT: 65 IN

## 2017-05-18 DIAGNOSIS — C50.212 MALIGNANT NEOPLASM OF UPPER-INNER QUADRANT OF LEFT FEMALE BREAST (HCC): Primary | ICD-10-CM

## 2017-05-18 DIAGNOSIS — M25.542 ARTHRALGIA OF BOTH HANDS: ICD-10-CM

## 2017-05-18 DIAGNOSIS — M25.541 ARTHRALGIA OF BOTH HANDS: ICD-10-CM

## 2017-05-18 PROCEDURE — G0463 HOSPITAL OUTPT CLINIC VISIT: HCPCS | Performed by: INTERNAL MEDICINE

## 2017-05-18 PROCEDURE — 99214 OFFICE O/P EST MOD 30 MIN: CPT | Performed by: INTERNAL MEDICINE

## 2017-05-18 RX ORDER — EXEMESTANE 25 MG/1
25 TABLET ORAL DAILY
Qty: 90 TABLET | Refills: 3 | Status: SHIPPED | OUTPATIENT
Start: 2017-05-18 | End: 2018-05-14 | Stop reason: SDUPTHER

## 2017-05-31 ENCOUNTER — OFFICE VISIT (OUTPATIENT)
Dept: INTERNAL MEDICINE | Facility: CLINIC | Age: 62
End: 2017-05-31

## 2017-05-31 VITALS
SYSTOLIC BLOOD PRESSURE: 104 MMHG | WEIGHT: 148 LBS | HEIGHT: 66 IN | BODY MASS INDEX: 23.78 KG/M2 | DIASTOLIC BLOOD PRESSURE: 68 MMHG | HEART RATE: 74 BPM | OXYGEN SATURATION: 98 %

## 2017-05-31 DIAGNOSIS — M79.645 THUMB PAIN, LEFT: Primary | ICD-10-CM

## 2017-05-31 DIAGNOSIS — M18.12 PRIMARY OSTEOARTHRITIS OF FIRST CARPOMETACARPAL JOINT OF LEFT HAND: ICD-10-CM

## 2017-05-31 PROCEDURE — 99213 OFFICE O/P EST LOW 20 MIN: CPT | Performed by: INTERNAL MEDICINE

## 2017-06-26 ENCOUNTER — OFFICE VISIT (OUTPATIENT)
Dept: CARDIOLOGY | Facility: CLINIC | Age: 62
End: 2017-06-26

## 2017-06-26 VITALS
HEIGHT: 66 IN | HEART RATE: 52 BPM | WEIGHT: 147 LBS | DIASTOLIC BLOOD PRESSURE: 82 MMHG | BODY MASS INDEX: 23.63 KG/M2 | SYSTOLIC BLOOD PRESSURE: 132 MMHG

## 2017-06-26 DIAGNOSIS — I25.10 CORONARY ARTERY DISEASE INVOLVING NATIVE CORONARY ARTERY OF NATIVE HEART WITHOUT ANGINA PECTORIS: Primary | ICD-10-CM

## 2017-06-26 DIAGNOSIS — E78.2 MIXED HYPERLIPIDEMIA: ICD-10-CM

## 2017-06-26 DIAGNOSIS — I25.5 ISCHEMIC CARDIOMYOPATHY: ICD-10-CM

## 2017-06-26 PROCEDURE — 93000 ELECTROCARDIOGRAM COMPLETE: CPT | Performed by: INTERNAL MEDICINE

## 2017-06-26 PROCEDURE — 99214 OFFICE O/P EST MOD 30 MIN: CPT | Performed by: INTERNAL MEDICINE

## 2017-06-26 NOTE — PROGRESS NOTES
Debra S Sandifer  1955  Date of Office Visit: 12/22/2016  Encounter Provider: Sanjiv Dykes MD  Place of Service: [facility] CARDIOLOGY    Chief complaints:   CAD  Mixed hyperlipidemia  Ischemic cardiomyopathy    HPI:  Dr. Morales,   I had the pleasure of seeing your patient back in followup. As you well know, she is a pleasant  62-year-old female with no significant history of cardiovascular disease but a history of gastroesophageal reflux disease who presented to the ER in October with the complaint of chest pain after dinner. During that time she had a exercise stress test with perfusion and went 13 minutes with no electrocardiographic changes or evidence of ischemia. She presented back on 11/14/16 and stated that she had intermittent chest discomfort still since October. The day prior to admission she was on a 3 mile walk and had severe central chest discomfort that brought her into the ER. She was noted to have negative cardiac biomarkers, however secondary to her pain but also ST-T wave abnormalities in the anterior lead she was sent for coronary angiography. She was noted to have an occluded LAD and underwent revascularization along with PTCA of the diagonal branch. She had one drug eluting stent in the mid LAD and one in the distal LAD with no difficulties. She tolerated all this well. She had an echocardiogram with a mildly depressed ejection fraction and was  started on carvedilol as well.       Since her last visit, she has actually been doing very well.  [facility] and is able to tolerate her pre-heart attack regimen without significant dyspnea or chest pain.  She has no orthopnea, paroxysmal nocturnal dyspnea, or lower extremity edema.  She is tolerating her medical regimen very well.  She has no bleeding complications on her dual-antiplatelet therapy.          Review of Systems   Constitution: Negative for fever, weakness and  malaise/fatigue.   HENT: Negative for nosebleeds and sore throat.    Eyes: Negative for blurred vision and double vision.   Cardiovascular: Negative for chest pain, claudication, palpitations and syncope.   Respiratory: Negative for cough, shortness of breath and snoring.    Endocrine: Negative for cold intolerance, heat intolerance and polydipsia.   Skin: Negative for itching, poor wound healing and rash.   Musculoskeletal: Negative for joint pain, joint swelling, muscle weakness and myalgias.   Gastrointestinal: Negative for abdominal pain, melena, nausea and vomiting.   Neurological: Negative for light-headedness, loss of balance, seizures and vertigo.   Psychiatric/Behavioral: Negative for altered mental status and depression.          Past Medical History:   Diagnosis Date   • Acute sinus infection    • Arthralgia of both hands    • Arthritis     hand   • Atypical chest pain    • Breast cancer     Left   • Chest pain    • Coronary artery disease involving native coronary artery 11/23/2016   • Cough    • Dyspareunia    • H/O bronchitis    • H/O infectious mononucleosis    • Headache    • High cholesterol    • Hot flashes    • Hot flashes, menopausal    • Hx of radiation therapy    • Hyperlipidemia    • Polyp of sigmoid colon    • Stye     LEFT EYE   • UTI (urinary tract infection)     ON ANTIBIOTICS   • Vitamin D deficiency        The following portions of the patient's history were reviewed and updated as appropriate: Social history , Family history and Surgical history     Current Outpatient Prescriptions on File Prior to Visit   Medication Sig Dispense Refill   • Acetaminophen (TYLENOL PO) Take 650 mg by mouth 4 (four) times a day as needed.     • aspirin 81 MG EC tablet Take 1 tablet by mouth Daily. 90 tablet 3   • atorvastatin (LIPITOR) 40 MG tablet Take 1 tablet by mouth Every Night. 90 tablet 1   • carvedilol (COREG) 3.125 MG tablet Take 1 tablet by mouth Every 12 (Twelve) Hours. 180 tablet 1   •  "Cholecalciferol (VITAMIN D3) 5000 UNITS capsule capsule Take 1,000 Units by mouth Daily.     • diclofenac (VOLTAREN) 1 % gel gel Apply 2 g topically 4 (Four) Times a Day As Needed (pain). 100 g 0   • exemestane (AROMASIN) 25 MG chemo tablet Take 1 tablet by mouth Daily for 90 days. 90 tablet 3   • Probiotic Product (PROBIOTIC DAILY PO) Take 1 tablet by mouth daily.     • ticagrelor (BRILINTA) 90 MG tablet tablet Take 1 tablet by mouth 2 (Two) Times a Day. 180 tablet 1     No current facility-administered medications on file prior to visit.        Allergies   Allergen Reactions   • Codeine Rash   • Latex Rash       Vitals:    06/26/17 0921   BP: 132/82   Pulse: 52   Weight: 147 lb (66.7 kg)   Height: 66\" (167.6 cm)     Physical Exam   Constitutional: She is oriented to person, place, and time. She appears well-developed and well-nourished.   HENT:   Head: Normocephalic and atraumatic.   Eyes: Conjunctivae and EOM are normal. No scleral icterus.   Neck: Normal range of motion. Neck supple. Normal carotid pulses, no hepatojugular reflux and no JVD present. Carotid bruit is not present. No tracheal deviation present. No thyromegaly present.   Cardiovascular: Normal rate and regular rhythm.  Exam reveals no gallop and no friction rub.    No murmur heard.  Pulses:       Carotid pulses are 2+ on the right side, and 2+ on the left side.       Radial pulses are 2+ on the right side, and 2+ on the left side.        Femoral pulses are 2+ on the right side, and 2+ on the left side.       Dorsalis pedis pulses are 2+ on the right side, and 2+ on the left side.        Posterior tibial pulses are 2+ on the right side, and 2+ on the left side.   Pulmonary/Chest: Breath sounds normal. No respiratory distress. She has no decreased breath sounds. She has no wheezes. She has no rhonchi. She has no rales. She exhibits no tenderness.   Abdominal: Soft. Bowel sounds are normal. She exhibits no distension. There is no tenderness. There is " no rebound.   Musculoskeletal: She exhibits no edema or deformity.   Neurological: She is alert and oriented to person, place, and time. She has normal strength. No sensory deficit.   Skin: No rash noted. No erythema.   Psychiatric: She has a normal mood and affect. Her behavior is normal.     No components found for: CBC  No results found for: CMP  No components found for: LIPID  No results found for: BMP      ECG 12 Lead  Date/Time: 6/26/2017 9:26 AM  Performed by: LENNOX PHAM  Authorized by: LENNOX PHAM   Comparison: compared with previous ECG from 12/22/2016  Similar to previous ECG  Rhythm: sinus bradycardia  Rate: bradycardic  Conduction: conduction normal  ST Segments: ST segments normal  T Waves: T waves normal  QRS axis: left  Other findings: PRWP  Clinical impression: abnormal ECG             Procedures Performed  Procedure(s): 11/15/16  Left Heart Cath  Left ventriculography      Conclusions:   1. Left main: Normal  2. LAD: Proximal segment is normal. Occluded in the mid segment. Tubular 70% distal stenosis.  3. LCX: 30% mid vessel stenosis  4. RCA: Normal  5. Mildly depressed systolic function. Apical to and distal inferior left ventricular wall hypokinesis. Ejection fraction 40%.  6. PTCA of the first diagonal branch with a 2.5 x 12 mm trek Rx balloon.  7. PCI of the distal LAD with a 2.75 x 18 mm Xience Alpine drug-eluting stent  8. PCI of the mid LAD with a 3.0 x 28 mm Xience Alpine drug-eluting stent      11/16/16  · Mild mitral valve regurgitation is present  · Left ventricular function is mildly decreased.  · The left ventricular cavity is borderline dilated.  · Left ventricular wall segments contract abnormally. Refer to wall scoring diagram for more information.  · Calculated EF = 46.2%.        DISCUSSION/SUMMARY  62-year-old female with a medical history as documented above, including unstable angina presentation and then a myocardial infarction, including an occluded  mid-LAD, along with evidence of mildly depressed ejection fraction and a very mild ischemic cardiomyopathy with an ejection fraction of 46%.   She underwent drug-eluting stent placement of the distal LAD with a 2.75 x 18 mm Xience Alpine drug-eluting stent along with a LAKESHA at 3 mm x 28 mm in the mid-LAD.  She has been doing very well.  She has no clinical evidence of heart failure and has no angina with moderate to high levels of exertion.    1. CAD.  Prior PCI to the distal LAD, along with proximal LAD with drug-eluting stents as documented above.  PTCA of the diagonal branch.   No angina    -Continue aspirin life-long.      -Continue Brilinta therapy for one year.  We will assess DAPT at that time with possible movement to Plavix therapy.      -Continue Atorvastatin at the current dose.  Lipid panel and LFTs from February have been reviewed.  Well controlled.    -Continue Carvedilol 3.125 mg p.o. b.i.d.       2. Ischemic cardiomyopathy.   LVEF> 40% Euvolemic.  New York Heart Association 1 symptoms.       -Continue Carvedilol at the current dose.   The patient is not an ACE inhibitor as above.  I would not recommend repeat echocardiogram unless she has symptomatic changes.  3. Hyperlipidemia.  Continue atorvastatin.         Coronary Artery Disease  Assessment  • The patient has no angina    Plan  • Lifestyle modifications discussed include adhering to a heart healthy diet, avoidance of tobacco products, maintenance of a healthy weight and medication compliance    Subjective - Objective  • There has been a previous stent procedure using LAKESHA on or around 11/15/2016  • Current antiplatelet therapy includes aspirin 81 mg and ticagrelor 90 mg

## 2017-08-02 ENCOUNTER — OFFICE VISIT (OUTPATIENT)
Dept: RADIATION ONCOLOGY | Facility: CLINIC | Age: 62
End: 2017-08-02

## 2017-08-02 VITALS
DIASTOLIC BLOOD PRESSURE: 66 MMHG | BODY MASS INDEX: 25.34 KG/M2 | HEART RATE: 56 BPM | WEIGHT: 157 LBS | SYSTOLIC BLOOD PRESSURE: 104 MMHG | OXYGEN SATURATION: 96 %

## 2017-08-02 DIAGNOSIS — C50.212 MALIGNANT NEOPLASM OF UPPER-INNER QUADRANT OF LEFT FEMALE BREAST (HCC): Primary | ICD-10-CM

## 2017-08-02 PROCEDURE — 99214 OFFICE O/P EST MOD 30 MIN: CPT | Performed by: RADIOLOGY

## 2017-08-02 NOTE — PROGRESS NOTES
Subjective     No ref. provider found     Diagnosis Plan   1. Malignant neoplasm of upper-inner quadrant of left female breast       CC: [id number] left breast cancer                                Dear Dr. Estrella:    I had the pleasure of seeing Debra Sandifer  today in the Radiation Center at [facility].  She returns today for follow up now 1 year out from completion of her radiation therapy.  She completed her radiation on 7/5/16 and received a dose of 4256cGy in 16 fractions followed by a boost to the tumor bed for an additional 1000cGy.  She has been doing well from her breast cancer since I last saw her.  She denies any pain.  She had a bilateral mammogram in January 2017 which was negative. She has recently switched from arimidex to aromasin due to arthalgias and is tolerating well.  She did suffer a heart attack in November 2016 and underwent heart cath which showed 100% blockage of one vessel.  She states she is feeling well overall.       Review of Systems   Constitutional: Negative.    HENT: Negative.    Respiratory: Negative.    Cardiovascular: Negative.    Gastrointestinal: Negative.    Genitourinary: Negative.    Musculoskeletal: Negative.          Past Medical History:   Diagnosis Date   • Acute sinus infection    • Arthralgia of both hands    • Arthritis     hand   • Atypical chest pain    • Breast cancer     Left   • Chest pain    • Coronary artery disease involving native coronary artery 11/23/2016   • Cough    • Dyspareunia    • H/O bronchitis    • H/O infectious mononucleosis    • Headache    • High cholesterol    • Hot flashes    • Hot flashes, menopausal    • Hx of radiation therapy    • Hyperlipidemia    • Polyp of sigmoid colon    • Stye     LEFT EYE   • UTI (urinary tract infection)     ON ANTIBIOTICS   • Vitamin D deficiency          Past Surgical History:   Procedure Laterality Date   • BACK SURGERY  01/13/2005    Herniated Disk repair (Done by Dr Jurgen Reddy)    • BREAST BIOPSY     • BREAST LUMPECTOMY WITH SENTINEL NODE BIOPSY Left 4/13/2016    Procedure: LEFT BREAST MAMMO NEEDLE LOC LUMPECTOMY WITH LEFT AXILLA SENTINEL NODE BIOPSY;  Surgeon: Aminata Estrella MD;  Location: [facility] MAIN OR;  Service:    • CARDIAC CATHETERIZATION N/A 11/15/2016    Procedure: Left Heart Cath;  Surgeon: Sanjiv Dykes MD;  Location: [facility] CATH INVASIVE LOCATION;  Service:    • CARDIAC CATHETERIZATION N/A 11/15/2016    Procedure: Left ventriculography;  Surgeon: Sanjiv Dykes MD;  Location: [facility] CATH INVASIVE LOCATION;  Service:    • ENDOSCOPY N/A 11/11/2016    Procedure: ESOPHAGOGASTRODUODENOSCOPY WITH BIOPSY;  Surgeon: Mehdi Guardado MD;  Location: [facility] ENDOSCOPY;  Service:    • LUMBAR DISCECTOMY     • WISDOM TOOTH EXTRACTION           Social History     Social History   • Marital status:      Spouse name: Van   • Number of children: N/A   • Years of education: College     Occupational History   • Retired  [employer]     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 Traveled to [place] and [place] in 07/2015 and [facility] 03/2016         Family History   Problem Relation Age of Onset   • Coronary artery disease Mother    • Dementia Mother    • Heart disease Mother      Heart attack w/2 stents   • Heart attack Mother      had heart attack. Heart catheter -2 stents   •  Hypertension Father    • Heart disease Father      Coronary heart disease   • Stroke Father      Ischemic   • Diabetes type II Father    • Diabetes Father    • Hyperlipidemia Father    • Prostate cancer Brother 51   • Alcohol abuse Maternal Grandfather    • Rheum arthritis Paternal Grandmother    • Alcohol abuse Paternal Grandfather    • Stroke Paternal Grandfather      Ischemic   • Rheum arthritis Paternal Grandfather    • Diabetes type II Paternal Grandfather    • Diabetes Paternal Grandfather    • Hyperlipidemia Paternal Grandfather    • Hypertension Paternal Grandfather           Objective    Physical Exam   Constitutional: She appears well-developed and well-nourished.   HENT:   Head: Normocephalic and atraumatic.   Pulmonary/Chest:             Current Outpatient Prescriptions on File Prior to Visit   Medication Sig Dispense Refill   • Acetaminophen (TYLENOL PO) Take 650 mg by mouth 4 (four) times a day as needed.     • aspirin 81 MG EC tablet Take 1 tablet by mouth Daily. 90 tablet 3   • atorvastatin (LIPITOR) 40 MG tablet Take 1 tablet by mouth Every Night. 90 tablet 1   • carvedilol (COREG) 3.125 MG tablet Take 1 tablet by mouth Every 12 (Twelve) Hours. 180 tablet 1   • Cholecalciferol (VITAMIN D3) 5000 UNITS capsule capsule Take 1,000 Units by mouth Daily.     • diclofenac (VOLTAREN) 1 % gel gel Apply 2 g topically 4 (Four) Times a Day As Needed (pain). 100 g 0   • exemestane (AROMASIN) 25 MG chemo tablet Take 1 tablet by mouth Daily for 90 days. 90 tablet 3   • Probiotic Product (PROBIOTIC DAILY PO) Take 1 tablet by mouth daily.     • ticagrelor (BRILINTA) 90 MG tablet tablet Take 1 tablet by mouth 2 (Two) Times a Day. 180 tablet 1     No current facility-administered medications on file prior to visit.        ALLERGIES:    Allergies   Allergen Reactions   • Codeine Rash   • Latex Rash       /66  Pulse 56  Wt 157 lb (71.2 kg)  SpO2 96%  BMI 25.34 kg/m2     Current Status 5/18/2017   ECOG score 0          Assessment/Plan     62 year old female with history of T1bN0 left breast cancer, ER positive, now 1 year from radiation.  She is doing well with no evidence of disease on exam today.  She tells me she is scheduled for mammogram in January 2018 and follow up with Dr. Estrella 1-2 weeks later.  She knows to continue the aromasin and has a follow up with Dr. Garcia in September.  I have not scheduled her for a follow up with me but asked her to call with any questions or concerns in the future.           Thank you very much for allowing me to participate in the care of this very pleasant patient.    Sincerely,      Sheila Wahl MD

## 2017-09-06 ENCOUNTER — TELEPHONE (OUTPATIENT)
Dept: SURGERY | Facility: CLINIC | Age: 62
End: 2017-09-06

## 2017-09-06 NOTE — TELEPHONE ENCOUNTER
Left msg to inform pt of appts:    [facility] 2-7-18 9:45 Mammo Screening  Dr. Estrella: 2-14-18 1:15 arrival

## 2017-09-07 ENCOUNTER — OFFICE VISIT (OUTPATIENT)
Dept: ONCOLOGY | Facility: CLINIC | Age: 62
End: 2017-09-07

## 2017-09-07 ENCOUNTER — APPOINTMENT (OUTPATIENT)
Dept: LAB | Facility: HOSPITAL | Age: 62
End: 2017-09-07

## 2017-09-07 VITALS
TEMPERATURE: 97.5 F | HEIGHT: 65 IN | SYSTOLIC BLOOD PRESSURE: 110 MMHG | RESPIRATION RATE: 16 BRPM | WEIGHT: 146.4 LBS | DIASTOLIC BLOOD PRESSURE: 60 MMHG | HEART RATE: 62 BPM | BODY MASS INDEX: 24.39 KG/M2

## 2017-09-07 DIAGNOSIS — C50.212 MALIGNANT NEOPLASM OF UPPER-INNER QUADRANT OF LEFT FEMALE BREAST (HCC): Primary | ICD-10-CM

## 2017-09-07 PROCEDURE — G0463 HOSPITAL OUTPT CLINIC VISIT: HCPCS | Performed by: INTERNAL MEDICINE

## 2017-09-07 PROCEDURE — 99213 OFFICE O/P EST LOW 20 MIN: CPT | Performed by: INTERNAL MEDICINE

## 2017-09-07 RX ORDER — EXEMESTANE 25 MG/1
TABLET ORAL
Refills: 3 | COMMUNITY
Start: 2017-08-20 | End: 2018-05-24 | Stop reason: SDUPTHER

## 2017-09-07 NOTE — PROGRESS NOTES
Subjective:     Reason for follow up:   1. Stage 1 (T1bN0), left breast invasive ductal carcinoma, ER+ (90%), MT+, Qji7php negative   * status post lumpectomy with SLNB    * status post radiation therapy   * Arimidex started 6/2016 - changed to Aromasin due to arthalgias     History of Present Ilness: Debra S Sandifer presents for follow-up of breast cancer. We transition the Arimidex to Aromasin because of arthralgias.  She is uncertain if the transition to Aromasin has helped her arthralgias.  She feels like they're fairly stable.  Her arthralgias didn't have any improvement when she did a three-week washout from Arimidex either.  She does have some mild fatigue but she remains physically active.  She exercises at still.  She does do regular self breast exams and has been set up for a mammogram and Dr. Estrella in February.  Past Medical   Past Medical History:   Diagnosis Date   • Acute sinus infection    • Arthralgia of both hands    • Arthritis     hand   • Atypical chest pain    • Breast cancer     Left   • Chest pain    • Coronary artery disease involving native coronary artery 11/23/2016   • Cough    • Dyspareunia    • H/O bronchitis    • H/O infectious mononucleosis    • Headache    • High cholesterol    • Hot flashes    • Hot flashes, menopausal    • Hx of radiation therapy    • Hyperlipidemia    • Polyp of sigmoid colon    • Stye     LEFT EYE   • UTI (urinary tract infection)     ON ANTIBIOTICS   • Vitamin D deficiency      Patient Active Problem List   Diagnosis   • Mixed hyperlipidemia   • Vitamin D deficiency   • Malignant neoplasm of upper-inner quadrant of left female breast   • Coronary artery disease involving native coronary artery   • Ischemic cardiomyopathy     Social History   Social History     Social History   • Marital status:      Spouse name: Van   • Number of children: N/A   • Years of education: College     Occupational History   • Retired  [employer]      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 Traveled to [place] and [place] in 07/2015 and [place] [facility] 03/2016      Family History  Family History   Problem Relation Age of Onset   • Coronary artery disease Mother    • Dementia Mother    • Heart disease Mother      Heart attack w/2 stents   • Heart attack Mother      had heart attack. Heart catheter -2 stents   • Hypertension Father    • Heart disease Father      Coronary heart disease   • Stroke Father      Ischemic   • Diabetes type II Father    • Diabetes Father    • Hyperlipidemia Father    • Prostate cancer Brother 51   • Alcohol abuse Maternal Grandfather    • Rheum arthritis Paternal Grandmother    • Alcohol abuse Paternal Grandfather    • Stroke Paternal Grandfather      Ischemic   • Rheum arthritis Paternal Grandfather    • Diabetes type II Paternal Grandfather    • Diabetes Paternal Grandfather    • Hyperlipidemia Paternal Grandfather    • Hypertension Paternal Grandfather      Allergies  Allergies   Allergen Reactions   • Codeine Rash   • Latex Rash       Medications: The current medication list was reviewed in the EMR.    Review of Systems  Review of Systems   Constitutional: Negative for activity change, appetite change, chills, diaphoresis, fatigue, fever and unexpected weight change.   Respiratory: Negative for cough, chest tightness and shortness of breath.   "  Cardiovascular: Negative for chest pain, palpitations and leg swelling.   Gastrointestinal: Negative for abdominal pain, blood in stool, constipation, diarrhea, nausea and vomiting.   Musculoskeletal: Positive for arthralgias and joint swelling. Negative for myalgias.   Hematological: Negative for adenopathy. Does not bruise/bleed easily.       Objective:     Vitals:    09/07/17 1152   BP: 110/60   Pulse: 62   Resp: 16   Temp: 97.5 °F (36.4 °C)   Weight: 146 lb 6.4 oz (66.4 kg)   Height: 65.04\" (165.2 cm)   PainSc: 0-No pain     Current Status 9/7/2017   ECOG score 0     GENERAL:  Well-developed, well-nourished female in no acute distress.   SKIN:  Warm, dry without rashes, purpura or petechiae.  HEAD:  Normocephalic.  EYES:  EOMs intact.  Conjunctivae normal.  EARS:  Hearing intact.  LYMPHATICS:  No cervical, supraclavicular, axillary adenopathy.  CHEST:  Lungs clear to percussion and auscultation. Good airflow.  Bilateral breast exam without palpable masses, skin changes or nipple discharge.  CARDIAC:  Regular rate and rhythm without murmurs, rubs or gallops. Normal S1,S2.  ABDOMEN:  Soft, nontender, normal bowel sounds  EXTREMITIES:  No clubbing, cyanosis or edema.  PSYCHIATRIC:  Normal affect and mood.      Labs and Imaging  Lab Results   Component Value Date    WBC 5.42 01/09/2017    HGB 12.7 01/09/2017    HCT 38.1 01/09/2017    MCV 86.8 01/09/2017     01/09/2017       Breast imaging: none      Assessment/Plan     Assessment:   1. Stage 1 (T1bN0), left breast invasive ductal carcinoma, ER+ (90%), WI+, [id number] negative   * status post lumpectomy with SLNB 4/13/16   * Oncotype Dx 20 (low intermediate). No chemotherapy indicated.   * status post radiation therapy   * Arimidex started June 2016. Change to Aromasin due to arthralgias 5/2017.  Currently no evidence of disease.    * Uncertain if the Aromasin is helping her arthralgias as a feel very similar to the Arimidex.  She just recently got a refill and " that she will complete those 3 months and will let us know at that point if she wants to transition back to Arimidex because it is she presented in the Aromasin.  If she feels like the Aromasin is helping and we will look into medication assistance.  2. Hot flashes. Tolerable.   3. Joint arthralgias. Has known hand arthritis, but I suspect exacerbated by AI     Plan:     1. Continue Aromasin.   2. Annual mammogram due February 2018.   3. DEXA scan due May 2018 at Dr Morales's office.  4. Patient was instructed on the importance of physical activity, healthy diet, and self breast exams.  Patient will continue calcium and vitamin D supplementation.      Follow-up in 4 months. I asked the patient to call for any questions, concerns, or new symptoms.

## 2017-10-10 ENCOUNTER — LAB (OUTPATIENT)
Dept: INTERNAL MEDICINE | Facility: CLINIC | Age: 62
End: 2017-10-10

## 2017-10-10 DIAGNOSIS — E55.9 VITAMIN D DEFICIENCY: ICD-10-CM

## 2017-10-10 DIAGNOSIS — E78.2 MIXED HYPERLIPIDEMIA: Primary | ICD-10-CM

## 2017-10-10 DIAGNOSIS — I25.10 CORONARY ARTERY DISEASE INVOLVING NATIVE CORONARY ARTERY OF NATIVE HEART WITHOUT ANGINA PECTORIS: ICD-10-CM

## 2017-10-10 DIAGNOSIS — Z00.00 HEALTHCARE MAINTENANCE: ICD-10-CM

## 2017-10-10 RX ORDER — TICAGRELOR 90 MG/1
TABLET ORAL
Qty: 180 TABLET | Refills: 1 | Status: SHIPPED | OUTPATIENT
Start: 2017-10-10 | End: 2018-01-04

## 2017-10-11 LAB
25(OH)D3+25(OH)D2 SERPL-MCNC: 45.3 NG/ML (ref 30–100)
ALBUMIN SERPL-MCNC: 4.3 G/DL (ref 3.5–5.2)
ALBUMIN/GLOB SERPL: 1.7 G/DL
ALP SERPL-CCNC: 88 U/L (ref 39–117)
ALT SERPL-CCNC: 26 U/L (ref 1–33)
APPEARANCE UR: CLEAR
AST SERPL-CCNC: 32 U/L (ref 1–32)
BACTERIA #/AREA URNS HPF: NORMAL /HPF
BASOPHILS # BLD AUTO: 0.03 10*3/MM3 (ref 0–0.2)
BASOPHILS NFR BLD AUTO: 0.6 % (ref 0–1.5)
BILIRUB SERPL-MCNC: 0.8 MG/DL (ref 0.1–1.2)
BILIRUB UR QL STRIP: NEGATIVE
BUN SERPL-MCNC: 17 MG/DL (ref 8–23)
BUN/CREAT SERPL: 17.7 (ref 7–25)
CALCIUM SERPL-MCNC: 9.4 MG/DL (ref 8.6–10.5)
CHLORIDE SERPL-SCNC: 106 MMOL/L (ref 98–107)
CHOLEST SERPL-MCNC: 155 MG/DL (ref 0–200)
CO2 SERPL-SCNC: 27.3 MMOL/L (ref 22–29)
COLOR UR: YELLOW
CREAT SERPL-MCNC: 0.96 MG/DL (ref 0.57–1)
EOSINOPHIL # BLD AUTO: 0.18 10*3/MM3 (ref 0–0.7)
EOSINOPHIL NFR BLD AUTO: 3.7 % (ref 0.3–6.2)
EPI CELLS #/AREA URNS HPF: NORMAL /HPF
ERYTHROCYTE [DISTWIDTH] IN BLOOD BY AUTOMATED COUNT: 14.7 % (ref 11.7–13)
GLOBULIN SER CALC-MCNC: 2.5 GM/DL
GLUCOSE SERPL-MCNC: 92 MG/DL (ref 65–99)
GLUCOSE UR QL: NEGATIVE
HBA1C MFR BLD: 5.7 % (ref 4.8–5.6)
HCT VFR BLD AUTO: 40.1 % (ref 35.6–45.5)
HDLC SERPL-MCNC: 84 MG/DL (ref 40–60)
HGB BLD-MCNC: 13 G/DL (ref 11.9–15.5)
HGB UR QL STRIP: NEGATIVE
IMM GRANULOCYTES # BLD: 0 10*3/MM3 (ref 0–0.03)
IMM GRANULOCYTES NFR BLD: 0 % (ref 0–0.5)
KETONES UR QL STRIP: NEGATIVE
LDLC SERPL CALC-MCNC: 58 MG/DL (ref 0–100)
LEUKOCYTE ESTERASE UR QL STRIP: NEGATIVE
LYMPHOCYTES # BLD AUTO: 1.36 10*3/MM3 (ref 0.9–4.8)
LYMPHOCYTES NFR BLD AUTO: 27.8 % (ref 19.6–45.3)
MCH RBC QN AUTO: 30.1 PG (ref 26.9–32)
MCHC RBC AUTO-ENTMCNC: 32.4 G/DL (ref 32.4–36.3)
MCV RBC AUTO: 92.8 FL (ref 80.5–98.2)
MICRO URNS: NORMAL
MICRO URNS: NORMAL
MONOCYTES # BLD AUTO: 0.5 10*3/MM3 (ref 0.2–1.2)
MONOCYTES NFR BLD AUTO: 10.2 % (ref 5–12)
MUCOUS THREADS URNS QL MICRO: PRESENT /HPF
NEUTROPHILS # BLD AUTO: 2.83 10*3/MM3 (ref 1.9–8.1)
NEUTROPHILS NFR BLD AUTO: 57.7 % (ref 42.7–76)
NITRITE UR QL STRIP: NEGATIVE
PH UR STRIP: 6 [PH] (ref 5–7.5)
PLATELET # BLD AUTO: 247 10*3/MM3 (ref 140–500)
POTASSIUM SERPL-SCNC: 4.5 MMOL/L (ref 3.5–5.2)
PROT SERPL-MCNC: 6.8 G/DL (ref 6–8.5)
PROT UR QL STRIP: NEGATIVE
RBC # BLD AUTO: 4.32 10*6/MM3 (ref 3.9–5.2)
RBC #/AREA URNS HPF: NORMAL /HPF
SODIUM SERPL-SCNC: 144 MMOL/L (ref 136–145)
SP GR UR: 1.02 (ref 1–1.03)
TRIGL SERPL-MCNC: 63 MG/DL (ref 0–150)
TSH SERPL DL<=0.005 MIU/L-ACNC: 2.11 MIU/ML (ref 0.27–4.2)
URINALYSIS REFLEX: NORMAL
UROBILINOGEN UR STRIP-MCNC: 0.2 MG/DL (ref 0.2–1)
VLDLC SERPL CALC-MCNC: 12.6 MG/DL (ref 5–40)
WBC # BLD AUTO: 4.9 10*3/MM3 (ref 4.5–10.7)
WBC #/AREA URNS HPF: NORMAL /HPF

## 2017-10-17 ENCOUNTER — OFFICE VISIT (OUTPATIENT)
Dept: INTERNAL MEDICINE | Facility: CLINIC | Age: 62
End: 2017-10-17

## 2017-10-17 VITALS
OXYGEN SATURATION: 99 % | HEART RATE: 57 BPM | HEIGHT: 65 IN | BODY MASS INDEX: 24.49 KG/M2 | SYSTOLIC BLOOD PRESSURE: 110 MMHG | DIASTOLIC BLOOD PRESSURE: 66 MMHG | RESPIRATION RATE: 18 BRPM | WEIGHT: 147 LBS

## 2017-10-17 DIAGNOSIS — Z00.00 WELL ADULT EXAM: Primary | ICD-10-CM

## 2017-10-17 PROBLEM — R73.03 PREDIABETES: Status: ACTIVE | Noted: 2017-10-17

## 2017-10-17 PROCEDURE — 90656 IIV3 VACC NO PRSV 0.5 ML IM: CPT | Performed by: INTERNAL MEDICINE

## 2017-10-17 PROCEDURE — 90471 IMMUNIZATION ADMIN: CPT | Performed by: INTERNAL MEDICINE

## 2017-10-17 PROCEDURE — 99396 PREV VISIT EST AGE 40-64: CPT | Performed by: INTERNAL MEDICINE

## 2017-10-17 NOTE — PROGRESS NOTES
Subjective     Debra S Sandifer is a 62 y.o. female who presents for a complete physical exam.      History of Present Illness     Prediabetes.  Sugar is well controlled.    HLD.  Excellent control of lipids.     Review of Systems   Constitutional: Negative.    HENT: Negative.    Eyes: Negative.    Respiratory: Negative.    Cardiovascular: Negative.    Gastrointestinal: Negative.    Endocrine: Negative.    Genitourinary: Negative.    Musculoskeletal: Negative.    Skin: Negative.    Allergic/Immunologic: Negative.    Neurological: Negative.    Hematological: Negative.    Psychiatric/Behavioral: Negative.        The following portions of the patient's history were reviewed and updated as appropriate: allergies, current medications, past family history, past medical history, past social history, past surgical history and problem list.  Health maintenance tab was reviewed and updated with the patient.       Patient Active Problem List    Diagnosis Date Noted   • Prediabetes 10/17/2017   • Ischemic cardiomyopathy 12/22/2016   • Coronary artery disease involving native coronary artery 11/23/2016     Note Last Updated: 11/23/2016 11/2016  PTCA of the first diagonal branch with a 2.5 x 12 mm trek Rx balloon.  PCI of the distal LAD with a 2.75 x 18 mm Xience Alpine drug-eluting stent  PCI of the mid LAD with a 3.0 x 28 mm Xience Alpine drug-eluting stent     • Malignant neoplasm of upper-inner quadrant of left female breast 04/27/2016     Note Last Updated: 10/14/2016     S/p lumpectomy and radiation in 2016     • Mixed hyperlipidemia 02/10/2016   • Vitamin D deficiency 02/10/2016       Past Medical History:   Diagnosis Date   • Acute sinus infection    • Allergic     codeine and latex   • Arthralgia of both hands    • Arthritis     hand   • Atypical chest pain    • Breast cancer     Left   • Chest pain    • Coronary artery disease involving native coronary artery 11/23/2016   • Cough    • Dyspareunia    • H/O bronchitis     • H/O infectious mononucleosis    • Headache    • High cholesterol    • Hot flashes    • Hot flashes, menopausal    • Hx of radiation therapy    • Hyperlipidemia    • Polyp of sigmoid colon    • Stye     LEFT EYE   • UTI (urinary tract infection)     ON ANTIBIOTICS   • Vitamin D deficiency        Past Surgical History:   Procedure Laterality Date   • BACK SURGERY  01/13/2005    Herniated Disk repair (Done by Dr Jurgen Reddy)   • BREAST BIOPSY     • BREAST LUMPECTOMY WITH SENTINEL NODE BIOPSY Left 4/13/2016    Procedure: LEFT BREAST MAMMO NEEDLE LOC LUMPECTOMY WITH LEFT AXILLA SENTINEL NODE BIOPSY;  Surgeon: Aminata Estrella MD;  Location: [facility] MAIN OR;  Service:    • CARDIAC CATHETERIZATION N/A 11/15/2016    Procedure: Left Heart Cath;  Surgeon: Sanjiv Dykes MD;  Location: [facility] CATH INVASIVE LOCATION;  Service:    • CARDIAC CATHETERIZATION N/A 11/15/2016    Procedure: Left ventriculography;  Surgeon: Sanjiv Dykes MD;  Location: [facility] CATH INVASIVE LOCATION;  Service:    • CARDIAC SURGERY  11/15/2016   • COLONOSCOPY  2014   • CORONARY STENT PLACEMENT  11/15/16    two   • ENDOSCOPY N/A 11/11/2016    Procedure: ESOPHAGOGASTRODUODENOSCOPY WITH BIOPSY;  Surgeon: Mehdi Guardado MD;  Location: [facility] ENDOSCOPY;  Service:    • LUMBAR DISCECTOMY     • LYMPH NODE BIOPSY  4/13/16    two   • SPINE SURGERY  2005    herniated disc   • WISDOM TOOTH EXTRACTION         Family History   Problem Relation Age of Onset   • Coronary artery disease Mother    • Dementia Mother    • Heart disease Mother      Heart attack w/2 stents   • Heart attack Mother      had heart attack. Heart catheter -2 stents   • Hypertension Father    • Heart disease Father      Coronary heart disease   • Stroke Father      Ischemic   • Diabetes type II Father    • Diabetes Father    • Hyperlipidemia Father    • Prostate cancer Brother 51   • Alcohol abuse Maternal Grandfather    • Rheum arthritis Paternal Grandmother    • Arthritis  Paternal Grandmother    • Alcohol abuse Paternal Grandfather    • Stroke Paternal Grandfather      Ischemic   • Rheum arthritis Paternal Grandfather    • Diabetes type II Paternal Grandfather    • Diabetes Paternal Grandfather    • Hyperlipidemia Paternal Grandfather    • Hypertension Paternal Grandfather    • Cancer Brother      Prostate       Social History     Social History   • Marital status:      Spouse name: Van   • Number of children: N/A   • Years of education: College     Occupational History   • Retired  [employer]     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[place], [place], [place], [place]     Social History Main Topics   • Smoking status: Light Tobacco Smoker     Packs/day: 0.25     Years: 5.00     Start date: 1972     Last attempt to quit: 1977   • Smokeless tobacco: Former User      Comment: smoked no more than 1 pack/week   • Alcohol use 0.6 oz/week     2 Glasses of wine per week      Comment: social drinker/weekends   • Drug use: No   • Sexual activity: Yes     Partners: Male     Birth control/ protection: Post-menopausal     Other Topics Concern   • Not on file     Social History Narrative    Enjoys working out, golf, walking, running, grandchildren's activities. Traveled to [place] and [place] in 07/2015 and [facility] 03/2016       Current Outpatient Prescriptions on File Prior to Visit   Medication Sig Dispense Refill   • Acetaminophen (TYLENOL PO) Take 650 mg by mouth 4 (four) times a day as needed.     • aspirin 81 MG EC tablet Take 1 tablet by mouth Daily. 90 tablet 3   • atorvastatin (LIPITOR) 40 MG tablet Take 1 tablet by mouth Every Night. 90 tablet 1   • BRILINTA 90 MG tablet tablet TAKE 1 TABLET BY MOUTH 2 (TWO) TIMES A DAY. 180 tablet 1   • carvedilol (COREG) 3.125 MG tablet Take 1 tablet by mouth Every 12 (Twelve) Hours. 180 tablet 1   • Cholecalciferol (VITAMIN D3) 5000 UNITS capsule capsule Take 1,000 Units by mouth Daily.     • diclofenac (VOLTAREN) 1 % gel gel  "Apply 2 g topically 4 (Four) Times a Day As Needed (pain). 100 g 0   • exemestane (AROMASIN) 25 MG chemo tablet TAKE 1 TABLET BY MOUTH DAILY  3   • Probiotic Product (PROBIOTIC DAILY PO) Take 1 tablet by mouth daily.       No current facility-administered medications on file prior to visit.        Allergies   Allergen Reactions   • Codeine Rash   • Latex Rash       Immunization History   Administered Date(s) Administered   • Flu Vaccine Quad PF >18YRS 10/14/2016   • Influenza, Quadrivalent 10/13/2015   • Tdap 04/15/2005, 02/22/2017   • Zoster 10/04/2013       Objective     /66  Pulse 57  Resp 18  Ht 65.4\" (166.1 cm)  Wt 147 lb (66.7 kg)  SpO2 99%  BMI 24.16 kg/m2    Physical Exam   Constitutional: She is oriented to person, place, and time. She appears well-developed and well-nourished.   HENT:   Head: Normocephalic and atraumatic.   Right Ear: Hearing, tympanic membrane and external ear normal.   Left Ear: Hearing, tympanic membrane and external ear normal.   Nose: Nose normal.   Mouth/Throat: Oropharynx is clear and moist.   Neck: Neck supple. No thyromegaly present.   Cardiovascular: Normal rate, regular rhythm and normal heart sounds.    No murmur heard.  Pulmonary/Chest: Effort normal and breath sounds normal. Right breast exhibits no mass. Left breast exhibits no mass.   Abdominal: Soft. She exhibits no distension. There is no hepatosplenomegaly. There is no tenderness.   Genitourinary: No breast tenderness.   Lymphadenopathy:     She has no cervical adenopathy.   Neurological: She is alert and oriented to person, place, and time.   Skin: Skin is warm and dry.   Psychiatric: She has a normal mood and affect. Her speech is normal and behavior is normal. Judgment and thought content normal. Cognition and memory are normal.       Assessment/Plan   Elin was seen today for annual exam.    Diagnoses and all orders for this visit:    Well adult exam    Other orders  -     Flu Vaccine Quad PF " >18YR      Discussion    Patient presents today for a CPE.      Patient follows a healthy diet.   Patient follows an adequate exercise regimen. Mammogram is up to date.   Colon cancer screening is up to date.   Pap smear performed every 2-3 years.   Immunizations are as per orders.   DEXA is up to date.      Health Maintenance   Topic Date Due   • PNEUMOCOCCAL VACCINE (19-64 MEDIUM RISK) (1 of 1 - PPSV23) 03/13/1975   • LIPID PANEL  10/10/2018   • MAMMOGRAM  01/27/2019   • COLONOSCOPY  02/04/2019   • PAP SMEAR  10/14/2019   • TDAP/TD VACCINES (3 - Td) 02/22/2027   • HEPATITIS C SCREENING  Addressed   • INFLUENZA VACCINE  Completed   • ZOSTER VACCINE  Completed            Future Appointments  Date Time Provider Department Center   1/4/2018 9:40 AM Sanjiv Dykes MD [facility] None   1/11/2018 10:40 AM VITALS ONLY [facility] LAB [facility]   1/11/2018 11:00 AM Mechelle Garcia MD [facility]   2/7/2018 10:00 AM [facility] 2 [facility]   2/14/2018 1:30 PM Aminata Estrella MD [facility] None   4/17/2018 9:00 AM [facility] None   10/16/2018 8:00 AM [facility] None   10/23/2018 7:45 AM Alisa Morales MD [facility] None

## 2017-11-27 RX ORDER — CARVEDILOL 3.12 MG/1
TABLET ORAL
Qty: 180 TABLET | Refills: 1 | Status: SHIPPED | OUTPATIENT
Start: 2017-11-27 | End: 2018-05-15 | Stop reason: SDUPTHER

## 2017-11-30 RX ORDER — ATORVASTATIN CALCIUM 40 MG/1
TABLET, FILM COATED ORAL
Qty: 90 TABLET | Refills: 1 | Status: SHIPPED | OUTPATIENT
Start: 2017-11-30 | End: 2018-05-15 | Stop reason: SDUPTHER

## 2018-01-04 ENCOUNTER — OFFICE VISIT (OUTPATIENT)
Dept: CARDIOLOGY | Facility: CLINIC | Age: 63
End: 2018-01-04

## 2018-01-04 VITALS
SYSTOLIC BLOOD PRESSURE: 104 MMHG | BODY MASS INDEX: 23.3 KG/M2 | HEIGHT: 66 IN | DIASTOLIC BLOOD PRESSURE: 60 MMHG | WEIGHT: 145 LBS | HEART RATE: 59 BPM

## 2018-01-04 DIAGNOSIS — I25.5 ISCHEMIC CARDIOMYOPATHY: Primary | ICD-10-CM

## 2018-01-04 DIAGNOSIS — E78.2 MIXED HYPERLIPIDEMIA: ICD-10-CM

## 2018-01-04 DIAGNOSIS — I25.10 CORONARY ARTERY DISEASE INVOLVING NATIVE CORONARY ARTERY OF NATIVE HEART WITHOUT ANGINA PECTORIS: ICD-10-CM

## 2018-01-04 PROCEDURE — 93000 ELECTROCARDIOGRAM COMPLETE: CPT | Performed by: INTERNAL MEDICINE

## 2018-01-04 PROCEDURE — 99214 OFFICE O/P EST MOD 30 MIN: CPT | Performed by: INTERNAL MEDICINE

## 2018-01-04 RX ORDER — CLOPIDOGREL BISULFATE 75 MG/1
75 TABLET ORAL DAILY
Qty: 30 TABLET | Refills: 11 | Status: SHIPPED | OUTPATIENT
Start: 2018-01-04 | End: 2018-02-14 | Stop reason: HOSPADM

## 2018-01-04 NOTE — PROGRESS NOTES
Debra S Sandifer  1955  Date of Office Visit: 1/4/18  Encounter Provider: Sanjiv Dykes MD  Place of Service: [facility] CARDIOLOGY    Chief complaints:   CAD  Mixed hyperlipidemia  Ischemic cardiomyopathy    HPI:  Dr. Morales,   I had the pleasure of seeing your patient back in followup. As you well know, she is a pleasant  62-year-old female with no significant history of cardiovascular disease but a history of gastroesophageal reflux disease who presented to the ER in October with the complaint of chest pain after dinner. During that time she had a exercise stress test with perfusion and went 13 minutes with no electrocardiographic changes or evidence of ischemia. She presented back on 11/14/16 and stated that she had intermittent chest discomfort still since October. The day prior to admission she was on a 3 mile walk and had severe central chest discomfort that brought her into the ER. She was noted to have negative cardiac biomarkers, however secondary to her pain but also ST-T wave abnormalities in the anterior lead she was sent for coronary angiography. She was noted to have an occluded LAD and underwent revascularization along with PTCA of the diagonal branch. She had one drug eluting stent in the mid LAD and one in the distal LAD with no difficulties. She tolerated all this well. She had an echocardiogram with a mildly depressed ejection fraction and was  started on carvedilol as well.      She really has had no significant changes since our last visit.  She continues to maintain at least a moderate level of activity without any dyspnea on exertion or chest pain.  She works out at [employer] three to four days a week.  She is tolerating her medical regimen.  She has had no bleeding complications on her dual antiplatelet therapy.          Review of Systems   Constitution: Negative for fever, weakness and malaise/fatigue.   HENT: Negative for nosebleeds and sore  throat.    Eyes: Negative for blurred vision and double vision.   Cardiovascular: Negative for chest pain, claudication, orthopnea, palpitations and syncope.   Respiratory: Negative for cough, shortness of breath and snoring.    Endocrine: Negative for cold intolerance, heat intolerance and polydipsia.   Skin: Negative for itching, poor wound healing and rash.   Musculoskeletal: Negative for joint pain, joint swelling, muscle weakness and myalgias.   Gastrointestinal: Negative for abdominal pain, melena, nausea and vomiting.   Neurological: Negative for light-headedness, loss of balance, seizures and vertigo.   Psychiatric/Behavioral: Negative for altered mental status and depression.          Past Medical History:   Diagnosis Date   • Acute sinus infection    • Allergic     codeine and latex   • Arthralgia of both hands    • Arthritis     hand   • Atypical chest pain    • Breast cancer     Left   • Chest pain    • Coronary artery disease involving native coronary artery 11/23/2016   • Cough    • Dyspareunia    • H/O bronchitis    • H/O infectious mononucleosis    • Headache    • High cholesterol    • Hot flashes    • Hot flashes, menopausal    • Hx of radiation therapy    • Hyperlipidemia    • Polyp of sigmoid colon    • Stye     LEFT EYE   • UTI (urinary tract infection)     ON ANTIBIOTICS   • Vitamin D deficiency        The following portions of the patient's history were reviewed and updated as appropriate: Social history , Family history and Surgical history     Current Outpatient Prescriptions on File Prior to Visit   Medication Sig Dispense Refill   • Acetaminophen (TYLENOL PO) Take 650 mg by mouth 4 (four) times a day as needed.     • aspirin 81 MG EC tablet Take 1 tablet by mouth Daily. 90 tablet 3   • atorvastatin (LIPITOR) 40 MG tablet TAKE 1 TABLET BY MOUTH EVERY NIGHT. 90 tablet 1   • BRILINTA 90 MG tablet tablet TAKE 1 TABLET BY MOUTH 2 (TWO) TIMES A DAY. 180 tablet 1   • carvedilol (COREG) 3.125 MG  "tablet TAKE 1 TABLET BY MOUTH EVERY 12 (TWELVE) HOURS. 180 tablet 1   • Cholecalciferol (VITAMIN D3) 5000 UNITS capsule capsule Take 1,000 Units by mouth Daily.     • diclofenac (VOLTAREN) 1 % gel gel Apply 2 g topically 4 (Four) Times a Day As Needed (pain). 100 g 0   • exemestane (AROMASIN) 25 MG chemo tablet TAKE 1 TABLET BY MOUTH DAILY  3   • Probiotic Product (PROBIOTIC DAILY PO) Take 1 tablet by mouth daily.       No current facility-administered medications on file prior to visit.        Allergies   Allergen Reactions   • Codeine Rash   • Latex Rash       Vitals:    01/04/18 0942   BP: 104/60   Pulse: 59   Weight: 65.8 kg (145 lb)   Height: 166.4 cm (65.5\")     Physical Exam   Constitutional: She is oriented to person, place, and time. She appears well-developed and well-nourished.   HENT:   Head: Normocephalic and atraumatic.   Eyes: Conjunctivae and EOM are normal. No scleral icterus.   Neck: Normal range of motion. Neck supple. Normal carotid pulses, no hepatojugular reflux and no JVD present. Carotid bruit is not present. No tracheal deviation present. No thyromegaly present.   Cardiovascular: Normal rate and regular rhythm.  Exam reveals no friction rub.    No murmur heard.  Pulses:       Carotid pulses are 2+ on the right side, and 2+ on the left side.       Radial pulses are 2+ on the right side, and 2+ on the left side.        Femoral pulses are 2+ on the right side, and 2+ on the left side.       Dorsalis pedis pulses are 2+ on the right side, and 2+ on the left side.        Posterior tibial pulses are 2+ on the right side, and 2+ on the left side.   Pulmonary/Chest: Breath sounds normal. No respiratory distress. She has no decreased breath sounds. She has no wheezes. She has no rhonchi. She has no rales. She exhibits no tenderness.   Abdominal: Soft. Bowel sounds are normal. She exhibits no distension. There is no tenderness. There is no rebound.   Musculoskeletal: She exhibits no edema or " deformity.   Neurological: She is alert and oriented to person, place, and time. She has normal strength. No sensory deficit.   Skin: No rash noted. No erythema.   Psychiatric: She has a normal mood and affect. Her behavior is normal.     No components found for: CBC  No results found for: CMP  No components found for: LIPID  No results found for: BMP      ECG 12 Lead  Date/Time: 1/4/2018 10:17 AM  Performed by: LENNOX PHAM  Authorized by: LENNOX PHAM   Comparison: compared with previous ECG from 6/26/2017  Similar to previous ECG  Rhythm: sinus rhythm  Rate: normal  Conduction: conduction normal  ST Segments: ST segments normal  T Waves: T waves normal  QRS axis: left  Other findings: PRWP  Clinical impression: non-specific ECG             Procedures Performed  Procedure(s): 11/15/16  Left Heart Cath  Left ventriculography      Conclusions:   1. Left main: Normal  2. LAD: Proximal segment is normal. Occluded in the mid segment. Tubular 70% distal stenosis.  3. LCX: 30% mid vessel stenosis  4. RCA: Normal  5. Mildly depressed systolic function. Apical to and distal inferior left ventricular wall hypokinesis. Ejection fraction 40%.  6. PTCA of the first diagonal branch with a 2.5 x 12 mm trek Rx balloon.  7. PCI of the distal LAD with a 2.75 x 18 mm Xience Alpine drug-eluting stent  8. PCI of the mid LAD with a 3.0 x 28 mm Xience Alpine drug-eluting stent      11/16/16  · Mild mitral valve regurgitation is present  · Left ventricular function is mildly decreased.  · The left ventricular cavity is borderline dilated.  · Left ventricular wall segments contract abnormally. Refer to wall scoring diagram for more information.  · Calculated EF = 46.2%.        DISCUSSION/SUMMARY  62-year-old female with a medical history as documented above, including unstable angina presentation and then a myocardial infarction, including an occluded mid-LAD, along with evidence of mildly depressed ejection fraction and  a very mild ischemic cardiomyopathy with an ejection fraction of 46%.   She underwent drug-eluting stent placement of the distal LAD with a 2.75 x 18 mm Xience Alpine drug-eluting stent along with a LAKESHA at 3 mm x 28 mm in the mid-LAD.  She has been doing very well.  She is exercising without any dyspnea on exertion or angina.  She is euvolemic.    1. CAD.  Prior PCI to the distal LAD, along with proximal LAD with drug-eluting stents as documented above.  PTCA of the diagonal branch.   No angina    -Continue aspirin life-long.      - DAPT score is 3. Move to Plavix for additional year.     -Continue Atorvastatin at the current dose.      -Continue Carvedilol 3.125 mg p.o. b.i.d.       2. Ischemic cardiomyopathy.   LVEF> 40% Euvolemic.  New York Heart Association 1 symptoms.       -Continue Carvedilol at the current dose.       -The patient is not an ACE inhibitor as above.  I would not recommend starting with a near-normal ejection fraction  3. Hyperlipidemia.  Continue atorvastatin at current dose.    -LDL 58 10/2017.  LFTs normal    Coronary Artery Disease  Assessment  • The patient has no angina    Plan  • Lifestyle modifications discussed include adhering to a heart healthy diet, avoidance of tobacco products, maintenance of a healthy weight and medication compliance    Subjective - Objective  • There has been a previous stent procedure using LAKESHA on or around 11/15/2016  • Current antiplatelet therapy includes aspirin 81 mg and ticagrelor 90 mg      Heart Failure  Assessment  • NYHA class I - There is no limitation of physical activity. Physical activity does not cause fatigue, palpitations or shortness of breath.  • ACE inhibitor not prescribed for medical reasons  • Beta blocker prescribed  • Diuretics not prescribed for medical reasons  • Angiotensin receptor blocker (ARB) not prescribed for medical reasons  • Left ventricular function is mildly reduced by qualitative assessment    Plan  • The patient has  received heart failure education on the following topics: dietary sodium restriction, medication instructions and weight monitoring  • The heart failure care plan was discussed with the patient today including: continuing the current program    Subjective/Objective    • Physical exam findings negative for rales, peripheral edema and elevated JVP.

## 2018-01-11 ENCOUNTER — OFFICE VISIT (OUTPATIENT)
Dept: ONCOLOGY | Facility: CLINIC | Age: 63
End: 2018-01-11

## 2018-01-11 ENCOUNTER — APPOINTMENT (OUTPATIENT)
Dept: LAB | Facility: HOSPITAL | Age: 63
End: 2018-01-11

## 2018-01-11 VITALS
BODY MASS INDEX: 23.56 KG/M2 | WEIGHT: 146.6 LBS | HEART RATE: 55 BPM | SYSTOLIC BLOOD PRESSURE: 92 MMHG | DIASTOLIC BLOOD PRESSURE: 60 MMHG | RESPIRATION RATE: 18 BRPM | OXYGEN SATURATION: 99 % | TEMPERATURE: 97.6 F | HEIGHT: 66 IN

## 2018-01-11 DIAGNOSIS — C50.212 MALIGNANT NEOPLASM OF UPPER-INNER QUADRANT OF LEFT BREAST IN FEMALE, ESTROGEN RECEPTOR POSITIVE (HCC): Primary | ICD-10-CM

## 2018-01-11 DIAGNOSIS — Z17.0 MALIGNANT NEOPLASM OF UPPER-INNER QUADRANT OF LEFT BREAST IN FEMALE, ESTROGEN RECEPTOR POSITIVE (HCC): Primary | ICD-10-CM

## 2018-01-11 PROCEDURE — 99213 OFFICE O/P EST LOW 20 MIN: CPT | Performed by: INTERNAL MEDICINE

## 2018-01-11 PROCEDURE — G0463 HOSPITAL OUTPT CLINIC VISIT: HCPCS | Performed by: INTERNAL MEDICINE

## 2018-01-11 NOTE — PROGRESS NOTES
Subjective:     Reason for follow up:   1. Stage 1 (T1bN0), left breast invasive ductal carcinoma, ER+ (90%), VT+, Enq0ltk negative   * status post lumpectomy with SLNB    * status post radiation therapy   * Arimidex started 6/2016 - changed to Aromasin due to arthalgias     History of Present Ilness: Debra S Sandifer presents for follow-up of breast cancer. Her mammogram is scheduled for February and she sees Dr. Estrella after that. She's tolerating Aromasin well and feels like it is easier than Arimidex in regards to arthralgias.  She has seen her cardiologist and her primary care physician recently and I reviewed labs from October. She is on Plavix now.  She continues to be physically active.  She denies any fevers, chills, night sweats.     Past Medical   Past Medical History:   Diagnosis Date   • Acute sinus infection    • Allergic     codeine and latex   • Arthralgia of both hands    • Arthritis     hand   • Atypical chest pain    • Breast cancer     Left   • Chest pain    • Coronary artery disease involving native coronary artery 11/23/2016   • Cough    • Dyspareunia    • H/O bronchitis    • H/O infectious mononucleosis    • Headache    • High cholesterol    • Hot flashes    • Hot flashes, menopausal    • Hx of radiation therapy    • Hyperlipidemia    • Polyp of sigmoid colon    • Stye     LEFT EYE   • UTI (urinary tract infection)     ON ANTIBIOTICS   • Vitamin D deficiency      Patient Active Problem List   Diagnosis   • Mixed hyperlipidemia   • Vitamin D deficiency   • Malignant neoplasm of upper-inner quadrant of left breast in female, estrogen receptor positive   • Coronary artery disease involving native coronary artery   • Ischemic cardiomyopathy   • Prediabetes     Social History   Social History     Social History   • Marital status:      Spouse name: Van   • Number of children: N/A   • Years of education: College     Occupational History   • Retired  [employer]     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 Traveled to [place] and [place] in 07/2015 and [place] [facility] [place] 03/2016      Family History  Family History   Problem Relation Age of Onset   • Coronary artery disease Mother    • Dementia Mother    • Heart disease Mother      Heart attack w/2 stents   • Heart attack Mother      had heart attack. Heart catheter -2 stents   • Hypertension Father    • Heart disease Father      Coronary heart disease   • Stroke Father      Ischemic   • Diabetes type II Father    • Diabetes Father    • Hyperlipidemia Father    • Prostate cancer Brother 51   • Alcohol abuse Maternal Grandfather    • Rheum arthritis Paternal Grandmother    • Arthritis Paternal Grandmother    • Alcohol abuse Paternal Grandfather    • Stroke Paternal Grandfather      Ischemic   • Rheum arthritis Paternal Grandfather    • Diabetes type II Paternal Grandfather    • Diabetes Paternal Grandfather    • Hyperlipidemia Paternal Grandfather    • Hypertension Paternal Grandfather    • Cancer Brother      Prostate     Allergies  Allergies   Allergen Reactions   • Codeine Rash   • Latex Rash       Medications: The current medication list was reviewed in the EMR.    Review of Systems  Review of Systems   Constitutional: Negative for activity change, appetite change, chills, diaphoresis, fatigue, fever and unexpected weight change.   Respiratory: Negative for  "cough, chest tightness and shortness of breath.    Cardiovascular: Negative for chest pain, palpitations and leg swelling.   Gastrointestinal: Negative for abdominal pain, blood in stool, constipation, diarrhea, nausea and vomiting.   Musculoskeletal: Positive for arthralgias and joint swelling. Negative for myalgias.   Hematological: Negative for adenopathy. Does not bruise/bleed easily.       Objective:     Vitals:    01/11/18 1039   BP: 92/60   Pulse: 55   Resp: 18   Temp: 97.6 °F (36.4 °C)   SpO2: 99%   Weight: 66.5 kg (146 lb 9.6 oz)   Height: 166.4 cm (65.51\")   PainSc: 0-No pain     Current Status 9/7/2017   ECOG score 0     GENERAL: female comfortable, no acute distress  SKIN:  Warm, without rashes, purpura or petechiae.   EYES:  EOMs intact.  Conjunctivae normal. Pupils equal and reactive to light.   EARS:  Hearing intact.  LYMPHATICS:  No cervical, supraclavicular, axillary adenopathy.  RESP:  Lungs clear to percussion and auscultation. Good airflow. Normal effort.   CHEST: Mediport -No; Breast exam - Yes, describe: Bilateral breast exam performed without palpable masses, skin changes, nipple discharge.  She has a well-healed surgical incision in the left breast  CARDIAC:  Regular rate and rhythm without murmurs, rubs or gallops. Normal S1,S2. Lower extremity edema:  No  GI:  Soft, nontender, normal bowel sounds, no hepatosplenomegaly  PSYCHIATRIC:  Normal affect and mood; alert and oriented x 3; Insight and judgement appropriate        Labs and Imaging  Lab Results   Component Value Date    WBC 4.90 10/10/2017    HGB 13.0 10/10/2017    HCT 40.1 10/10/2017    MCV 92.8 10/10/2017     10/10/2017     Labs from 10/2017 reviewed.     Breast imaging: none      Assessment/Plan     Assessment:   1. Stage 1 (T1bN0), left breast invasive ductal carcinoma, ER+ (90%), VA+, [id number] negative   * status post lumpectomy with SLNB 4/13/16   * Oncotype Dx 20 (low intermediate). No chemotherapy indicated.   * status " post radiation therapy   * Arimidex started June 2016. Change to Aromasin due to arthralgias 5/2017 which she tolerates well.    * Remains no evidence of disease.   2. Hot flashes. Tolerable.   3. Joint arthralgias. Has known hand arthritis, but exacerbated by AI     Plan:     1. Continue Aromasin.   2. Annual mammogram due February 2018.   3. DEXA scan due May 2018 at Dr Morales's office.  4. Patient was instructed on the importance of physical activity, healthy diet, and self breast exams.  Patient will continue calcium and vitamin D supplementation.      Follow-up in 5 months. I asked the patient to call for any questions, concerns, or new symptoms.

## 2018-01-26 ENCOUNTER — TELEPHONE (OUTPATIENT)
Dept: CARDIOLOGY | Facility: CLINIC | Age: 63
End: 2018-01-26

## 2018-01-26 NOTE — TELEPHONE ENCOUNTER
Spoke with pt and informed her to stop Plavix and start back on Brilinta,.    She is going to call back next week if problem continues

## 2018-01-26 NOTE — TELEPHONE ENCOUNTER
Tell her to stop the Plavix and go back on her Brilinta.  See if this helps.  She is to call us next week if the discomfort continues

## 2018-01-26 NOTE — TELEPHONE ENCOUNTER
Pt called stating that she was started on Plavix 1/4/18  1 week later she started getting headaches regularly and feels more fatigued than usual.  She has had some pain in her chest that lasted about 5 min that felt the same way as when she had her stent placed in 2016.    She would like to know what she should do    Please Advise   Thank You :)

## 2018-01-31 NOTE — TELEPHONE ENCOUNTER
Pt called back and said she did fine on the Brilinta and will start it again. But she would like to speak with you about the Plavix. She had a couple of questions about when she was taking it. She wanted another appt, but I thought calling her back and answering her questions might be preferable. She can be reache cate #[phone].    Thanks,  Lucía

## 2018-02-07 ENCOUNTER — HOSPITAL ENCOUNTER (OUTPATIENT)
Dept: MAMMOGRAPHY | Facility: HOSPITAL | Age: 63
Discharge: HOME | End: 2018-02-07
Attending: SURGERY | Admitting: SURGERY

## 2018-02-07 DIAGNOSIS — C50.212 MALIGNANT NEOPLASM OF UPPER-INNER QUADRANT OF LEFT FEMALE BREAST (HCC): ICD-10-CM

## 2018-02-07 PROCEDURE — 77067 SCR MAMMO BI INCL CAD: CPT

## 2018-02-07 PROCEDURE — 77063 BREAST TOMOSYNTHESIS BI: CPT

## 2018-02-08 ENCOUNTER — TELEPHONE (OUTPATIENT)
Dept: SURGERY | Facility: CLINIC | Age: 63
End: 2018-02-08

## 2018-02-08 NOTE — TELEPHONE ENCOUNTER
[facility] August 6, 2018 screening mammogram with 3-D: Scattered fiber glandular density symmetric.  Small focal asymmetric density in her left breast at lumpectomy site.  It appears slightly more prominent than on previous but appears to relate overlying structures on maxim synthesis.  As precautions short-term left-sided mammogram in 6 months.  BI-RADS 3.

## 2018-02-14 ENCOUNTER — OFFICE VISIT (OUTPATIENT)
Dept: SURGERY | Facility: CLINIC | Age: 63
End: 2018-02-14

## 2018-02-14 VITALS
DIASTOLIC BLOOD PRESSURE: 71 MMHG | HEIGHT: 66 IN | BODY MASS INDEX: 23.11 KG/M2 | SYSTOLIC BLOOD PRESSURE: 116 MMHG | WEIGHT: 143.8 LBS | OXYGEN SATURATION: 96 % | HEART RATE: 75 BPM

## 2018-02-14 DIAGNOSIS — C50.212 MALIGNANT NEOPLASM OF UPPER-INNER QUADRANT OF LEFT FEMALE BREAST, UNSPECIFIED ESTROGEN RECEPTOR STATUS (HCC): Primary | ICD-10-CM

## 2018-02-14 PROCEDURE — 99213 OFFICE O/P EST LOW 20 MIN: CPT | Performed by: SURGERY
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
## 2018-02-21 ENCOUNTER — OFFICE VISIT (OUTPATIENT)
Dept: INTERNAL MEDICINE | Facility: CLINIC | Age: 63
End: 2018-02-21

## 2018-02-21 VITALS
HEART RATE: 77 BPM | SYSTOLIC BLOOD PRESSURE: 120 MMHG | BODY MASS INDEX: 23.4 KG/M2 | OXYGEN SATURATION: 98 % | TEMPERATURE: 97.9 F | WEIGHT: 145 LBS | DIASTOLIC BLOOD PRESSURE: 78 MMHG

## 2018-02-21 DIAGNOSIS — I25.10 CORONARY ARTERY DISEASE INVOLVING NATIVE CORONARY ARTERY OF NATIVE HEART WITHOUT ANGINA PECTORIS: ICD-10-CM

## 2018-02-21 DIAGNOSIS — J20.8 ACUTE BRONCHITIS DUE TO OTHER SPECIFIED ORGANISMS: ICD-10-CM

## 2018-02-21 DIAGNOSIS — J01.90 ACUTE SINUSITIS, RECURRENCE NOT SPECIFIED, UNSPECIFIED LOCATION: Primary | ICD-10-CM

## 2018-02-21 PROCEDURE — 99214 OFFICE O/P EST MOD 30 MIN: CPT | Performed by: INTERNAL MEDICINE

## 2018-02-21 RX ORDER — AMOXICILLIN AND CLAVULANATE POTASSIUM 875; 125 MG/1; MG/1
1 TABLET, FILM COATED ORAL EVERY 12 HOURS SCHEDULED
Qty: 20 TABLET | Refills: 0 | Status: SHIPPED | OUTPATIENT
Start: 2018-02-21 | End: 2018-03-03

## 2018-02-21 NOTE — PROGRESS NOTES
Subjective     Debra S Sandifer is a 62 y.o. female who presents with   Chief Complaint   Patient presents with   • Sinus Pressure       History of Present Illness     Going on for three weeks.  Started as a cold but symptoms have progressed.  Sinus pain and pressure.  Severe head pressure.  Discharge is yellow.  Cough and congestion with production.  No SOA/wheezing.  She tried delsym and tylenol severe cold and flu with little help.      Review of Systems   Constitutional: Negative for fever.   Respiratory: Negative for shortness of breath and wheezing.    Cardiovascular: Negative for chest pain.   Musculoskeletal: Positive for arthralgias.       The following portions of the patient's history were reviewed and updated as appropriate: allergies, current medications and problem list.    Patient Active Problem List    Diagnosis Date Noted   • Prediabetes 10/17/2017   • Ischemic cardiomyopathy 12/22/2016   • Coronary artery disease involving native coronary artery 11/23/2016     Note Last Updated: 11/23/2016 11/2016  PTCA of the first diagonal branch with a 2.5 x 12 mm trek Rx balloon.  PCI of the distal LAD with a 2.75 x 18 mm Xience Alpine drug-eluting stent  PCI of the mid LAD with a 3.0 x 28 mm Xience Alpine drug-eluting stent     • Malignant neoplasm of upper-inner quadrant of left breast in female, estrogen receptor positive 04/27/2016     Note Last Updated: 10/14/2016     S/p lumpectomy and radiation in 2016     • Mixed hyperlipidemia 02/10/2016   • Vitamin D deficiency 02/10/2016       Current Outpatient Prescriptions on File Prior to Visit   Medication Sig Dispense Refill   • Acetaminophen (TYLENOL PO) Take 650 mg by mouth 4 (four) times a day as needed.     • aspirin 81 MG EC tablet Take 1 tablet by mouth Daily. 90 tablet 3   • atorvastatin (LIPITOR) 40 MG tablet TAKE 1 TABLET BY MOUTH EVERY NIGHT. 90 tablet 1   • carvedilol (COREG) 3.125 MG tablet TAKE 1 TABLET BY MOUTH EVERY 12 (TWELVE) HOURS. 180  tablet 1   • Cholecalciferol (VITAMIN D3) 5000 UNITS capsule capsule Take 1,000 Units by mouth Daily.     • diclofenac (VOLTAREN) 1 % gel gel Apply 2 g topically 4 (Four) Times a Day As Needed (pain). 100 g 0   • exemestane (AROMASIN) 25 MG chemo tablet TAKE 1 TABLET BY MOUTH DAILY  3   • Probiotic Product (PROBIOTIC DAILY PO) Take 1 tablet by mouth daily.     • ticagrelor (BRILINTA) 90 MG tablet tablet        No current facility-administered medications on file prior to visit.        Objective     /78  Pulse 77  Temp 97.9 °F (36.6 °C)  Wt 65.8 kg (145 lb)  SpO2 98%  BMI 23.4 kg/m2    Physical Exam   Constitutional: She is oriented to person, place, and time. She appears well-developed and well-nourished.   HENT:   Head: Normocephalic and atraumatic.   Right Ear: Hearing and tympanic membrane normal.   Left Ear: Hearing and tympanic membrane normal.   Mouth/Throat: No oropharyngeal exudate or posterior oropharyngeal erythema.   Cardiovascular: Normal rate, regular rhythm and normal heart sounds.    Pulmonary/Chest: Effort normal and breath sounds normal.   Neurological: She is alert and oriented to person, place, and time.   Skin: Skin is warm and dry.   Psychiatric: She has a normal mood and affect. Her behavior is normal.       Assessment/Plan   Elin was seen today for sinus pressure.    Diagnoses and all orders for this visit:    Acute sinusitis, recurrence not specified, unspecified location    Acute bronchitis due to other specified organisms    Coronary artery disease involving native coronary artery of native heart without angina pectoris    Other orders  -     amoxicillin-clavulanate (AUGMENTIN) 875-125 MG per tablet; Take 1 tablet by mouth Every 12 (Twelve) Hours for 10 days.        Discussion  Patient presents with an acute sinus infection and acute bronchitis.  Rx for antibiotics are called in.  The patient is encouraged to take with OTC Mucinex and Flonase.  Avoid decongestants with her heart  disease.  Let me know if not feeling better over the next 3 days or if there is any change in symptoms.           Future Appointments  Date Time Provider Department Center   5/24/2018 8:40 AM LAB CHAIR 6 [facility]  [facility]   5/24/2018 9:20 AM Mechelle Garcia MD [facility]  [facility]   8/14/2018 10:30 AM [facility] 1  [facility]   8/21/2018 1:00 PM Aminata Estrella MD [facility] None   10/23/2018 7:45 AM Alisa Morales MD [facility] None   1/4/2019 9:40 AM Sanjiv Dykes MD [facility] None

## 2018-02-27 RX ORDER — SALICYLIC ACID 40 %
ADHESIVE PATCH, MEDICATED TOPICAL
Qty: 90 TABLET | Refills: 1 | Status: SHIPPED | OUTPATIENT
Start: 2018-02-27 | End: 2018-08-12 | Stop reason: SDUPTHER

## 2018-04-25 ENCOUNTER — OFFICE VISIT (OUTPATIENT)
Dept: INTERNAL MEDICINE | Facility: CLINIC | Age: 63
End: 2018-04-25

## 2018-04-25 VITALS
HEART RATE: 68 BPM | SYSTOLIC BLOOD PRESSURE: 114 MMHG | OXYGEN SATURATION: 98 % | BODY MASS INDEX: 23.57 KG/M2 | WEIGHT: 146 LBS | DIASTOLIC BLOOD PRESSURE: 74 MMHG

## 2018-04-25 DIAGNOSIS — R21 RASH: Primary | ICD-10-CM

## 2018-04-25 PROCEDURE — 99213 OFFICE O/P EST LOW 20 MIN: CPT | Performed by: INTERNAL MEDICINE

## 2018-04-25 NOTE — PROGRESS NOTES
Subjective     Debra S Sandifer is a 63 y.o. female who presents with   Chief Complaint   Patient presents with   • Rash       History of Present Illness     C/o rash under the left breast.  Started in January and went away.  Came back in March.  It is itchy.  OTC topicals some help.      Review of Systems   Cardiovascular: Negative for chest pain.       The following portions of the patient's history were reviewed and updated as appropriate: allergies, current medications and problem list.    Patient Active Problem List    Diagnosis Date Noted   • Prediabetes 10/17/2017   • Ischemic cardiomyopathy 12/22/2016   • Coronary artery disease involving native coronary artery 11/23/2016     Note Last Updated: 11/23/2016 11/2016  PTCA of the first diagonal branch with a 2.5 x 12 mm trek Rx balloon.  PCI of the distal LAD with a 2.75 x 18 mm Xience Alpine drug-eluting stent  PCI of the mid LAD with a 3.0 x 28 mm Xience Alpine drug-eluting stent     • Malignant neoplasm of upper-inner quadrant of left breast in female, estrogen receptor positive 04/27/2016     Note Last Updated: 10/14/2016     S/p lumpectomy and radiation in 2016     • Mixed hyperlipidemia 02/10/2016   • Vitamin D deficiency 02/10/2016       Current Outpatient Prescriptions on File Prior to Visit   Medication Sig Dispense Refill   • Acetaminophen (TYLENOL PO) Take 650 mg by mouth 4 (four) times a day as needed.     • atorvastatin (LIPITOR) 40 MG tablet TAKE 1 TABLET BY MOUTH EVERY NIGHT. 90 tablet 1   • carvedilol (COREG) 3.125 MG tablet TAKE 1 TABLET BY MOUTH EVERY 12 (TWELVE) HOURS. 180 tablet 1   • Cholecalciferol (VITAMIN D3) 5000 UNITS capsule capsule Take 1,000 Units by mouth Daily.     • [facility] ASPIRIN LOW DOSE 81 MG EC tablet TAKE 1 TABLET BY MOUTH DAILY. 90 tablet 1   • diclofenac (VOLTAREN) 1 % gel gel Apply 2 g topically 4 (Four) Times a Day As Needed (pain). 100 g 0   • exemestane (AROMASIN) 25 MG chemo tablet TAKE 1 TABLET BY MOUTH DAILY  3   •  Probiotic Product (PROBIOTIC DAILY PO) Take 1 tablet by mouth daily.     • ticagrelor (BRILINTA) 90 MG tablet tablet        No current facility-administered medications on file prior to visit.        Objective     /74   Pulse 68   Wt 66.2 kg (146 lb)   SpO2 98%   BMI 23.57 kg/m²     Physical Exam   Constitutional: She is oriented to person, place, and time. She appears well-developed and well-nourished.   HENT:   Head: Normocephalic and atraumatic.   Pulmonary/Chest: Effort normal.   Neurological: She is alert and oriented to person, place, and time.   Skin:   Subtle erythematous rash under the left breast and in axilla.   Psychiatric: She has a normal mood and affect. Her behavior is normal.       Assessment/Plan   Elin was seen today for rash.    Diagnoses and all orders for this visit:    Rash        Discussion    Patient presents with a new rash that is likely fungal in etiology.  Trial of OTC Lamisil or Lortrimen.  Let me know if not feeling better over the next 7 days or if there is any change in symptoms.             Future Appointments  Date Time Provider Department Center   5/24/2018 8:40 AM LAB CHAIR 6 [facility]  [facility]   5/24/2018 9:20 AM Mechelle Garcia MD [facility]  [facility]   8/14/2018 10:30 AM [facility] M[facility]M 1  [facility]   8/21/2018 1:00 PM Aminata Estrella MD [facility] None   10/23/2018 7:45 AM Alisa Morales MD [facility] None   1/4/2019 9:40 AM Sanjiv Dykes MD [facility] None

## 2018-04-30 ENCOUNTER — OFFICE VISIT (OUTPATIENT)
Dept: INTERNAL MEDICINE | Facility: CLINIC | Age: 63
End: 2018-04-30

## 2018-04-30 VITALS
OXYGEN SATURATION: 98 % | SYSTOLIC BLOOD PRESSURE: 112 MMHG | WEIGHT: 146 LBS | BODY MASS INDEX: 23.57 KG/M2 | HEART RATE: 74 BPM | DIASTOLIC BLOOD PRESSURE: 58 MMHG

## 2018-04-30 DIAGNOSIS — M25.561 ACUTE PAIN OF RIGHT KNEE: Primary | ICD-10-CM

## 2018-04-30 PROCEDURE — 73560 X-RAY EXAM OF KNEE 1 OR 2: CPT | Performed by: INTERNAL MEDICINE

## 2018-04-30 PROCEDURE — 99213 OFFICE O/P EST LOW 20 MIN: CPT | Performed by: INTERNAL MEDICINE

## 2018-04-30 NOTE — PROGRESS NOTES
Subjective     Debra S Sandifer is a 63 y.o. female who presents with   Chief Complaint   Patient presents with   • Knee Pain       History of Present Illness     Knee pain started on Friday night after prolonged standing.  No specific injury.  Saturday morning much worse.  Very swollen and tender.  Tried tylenol.  On crutches.  Getting worse.      Review of Systems   Respiratory: Negative.    Cardiovascular: Negative.        The following portions of the patient's history were reviewed and updated as appropriate: allergies, current medications and problem list.    Patient Active Problem List    Diagnosis Date Noted   • Prediabetes 10/17/2017   • Ischemic cardiomyopathy 12/22/2016   • Coronary artery disease involving native coronary artery 11/23/2016     Note Last Updated: 11/23/2016 11/2016  PTCA of the first diagonal branch with a 2.5 x 12 mm trek Rx balloon.  PCI of the distal LAD with a 2.75 x 18 mm Xience Alpine drug-eluting stent  PCI of the mid LAD with a 3.0 x 28 mm Xience Alpine drug-eluting stent     • Malignant neoplasm of upper-inner quadrant of left breast in female, estrogen receptor positive 04/27/2016     Note Last Updated: 10/14/2016     S/p lumpectomy and radiation in 2016     • Mixed hyperlipidemia 02/10/2016   • Vitamin D deficiency 02/10/2016       Current Outpatient Prescriptions on File Prior to Visit   Medication Sig Dispense Refill   • Acetaminophen (TYLENOL PO) Take 650 mg by mouth 4 (four) times a day as needed.     • atorvastatin (LIPITOR) 40 MG tablet TAKE 1 TABLET BY MOUTH EVERY NIGHT. 90 tablet 1   • carvedilol (COREG) 3.125 MG tablet TAKE 1 TABLET BY MOUTH EVERY 12 (TWELVE) HOURS. 180 tablet 1   • Cholecalciferol (VITAMIN D3) 5000 UNITS capsule capsule Take 1,000 Units by mouth Daily.     • CVS ASPIRIN LOW DOSE 81 MG EC tablet TAKE 1 TABLET BY MOUTH DAILY. 90 tablet 1   • diclofenac (VOLTAREN) 1 % gel gel Apply 2 g topically 4 (Four) Times a Day As Needed (pain). 100 g 0   •  exemestane (AROMASIN) 25 MG chemo tablet TAKE 1 TABLET BY MOUTH DAILY  3   • Probiotic Product (PROBIOTIC DAILY PO) Take 1 tablet by mouth daily.     • ticagrelor (BRILINTA) 90 MG tablet tablet        No current facility-administered medications on file prior to visit.        Objective     /58   Pulse 74   Wt 66.2 kg (146 lb)   SpO2 98%   BMI 23.57 kg/m²     Physical Exam   Constitutional: She is oriented to person, place, and time. She appears well-developed and well-nourished.   HENT:   Head: Normocephalic and atraumatic.   Pulmonary/Chest: Effort normal.   Musculoskeletal:        Right knee: She exhibits decreased range of motion, swelling and effusion. Tenderness found.   Neurological: She is alert and oriented to person, place, and time.   Psychiatric: She has a normal mood and affect. Her behavior is normal.     X-rays of the right knee  performed today for following indication:   pain.  X-ray reveal nad.  There is no available x-ray for comparison.  X-ray sent to radiology for official interpretation and findings.        Assessment/Plan   Elin was seen today for knee pain.    Diagnoses and all orders for this visit:    Acute pain of right knee  -     XR Knee 1 or 2 View Right  -     Ambulatory Referral to Orthopedic Surgery        Discussion  Patient presents with acute severe pain of the right knee.  She is going to take tylenol and ice until we can get her in with Orthopedics.  I think likely she needs to be drained and have a steroid shot.         Future Appointments  Date Time Provider Department Center   5/24/2018 8:40 AM LAB CHAIR 6 [facility]  [facility]   5/24/2018 9:20 AM Mechelle Garcia MD [facility]  [facility]   8/14/2018 10:30 AM [facility]M 1  [facility]   8/21/2018 1:00 PM Aminata Estrella MD [facility] None   10/23/2018 7:45 AM Alisa Morales MD [facility] None   1/4/2019 9:40 AM Sanjiv Dykes MD [facility] None

## 2018-05-14 RX ORDER — TICAGRELOR 90 MG/1
TABLET ORAL
Qty: 180 TABLET | Refills: 1 | Status: SHIPPED | OUTPATIENT
Start: 2018-05-14 | End: 2018-08-22 | Stop reason: SDUPTHER

## 2018-05-14 RX ORDER — EXEMESTANE 25 MG/1
TABLET ORAL
Qty: 90 TABLET | Refills: 3 | Status: SHIPPED | OUTPATIENT
Start: 2018-05-14 | End: 2019-05-08 | Stop reason: SDUPTHER

## 2018-05-15 RX ORDER — ATORVASTATIN CALCIUM 40 MG/1
TABLET, FILM COATED ORAL
Qty: 90 TABLET | Refills: 1 | Status: SHIPPED | OUTPATIENT
Start: 2018-05-15 | End: 2018-11-12 | Stop reason: SDUPTHER

## 2018-05-15 RX ORDER — CARVEDILOL 3.12 MG/1
TABLET ORAL
Qty: 180 TABLET | Refills: 1 | Status: SHIPPED | OUTPATIENT
Start: 2018-05-15 | End: 2018-08-22 | Stop reason: SDUPTHER

## 2018-05-22 NOTE — PROGRESS NOTES
Subjective:     Reason for follow up:   1. Stage 1 (T1bN0), left breast invasive ductal carcinoma, ER+ (90%), KS+, Icl8nsn negative   * status post lumpectomy with SLNB    * status post radiation therapy   * Arimidex started 6/2016 - changed to Aromasin due to arthalgias     History of Present Ilness: Debra S Sandifer presents for follow-up of breast cancer. She remains on Aromiasin. She had a mammogram in February and a diagnostic of left breast repeat study is due in August. I reviewed Dr. Estrella's note from February 2018.  At her [place] ordered her follow-up imaging of the left breast and encouragement but self breast exams. Her arthralgias are better than they were on Arimidex but they are still present. Her hot flashes are present and tolerable. She had right knee swelling and she saw [facility] and notes that she'll need a knee replacement at some point.     Past Medical   Past Medical History:   Diagnosis Date   • Acute sinus infection    • Allergic     codeine and latex   • Arthralgia of both hands    • Arthritis     hand   • Atypical chest pain    • Breast cancer     Left   • Chest pain    • Coronary artery disease involving native coronary artery 11/23/2016   • Cough    • Dyspareunia    • H/O bronchitis    • H/O infectious mononucleosis    • Headache    • High cholesterol    • Hot flashes    • Hot flashes, menopausal    • Hx of radiation therapy    • Hyperlipidemia    • Polyp of sigmoid colon    • Stye     LEFT EYE   • UTI (urinary tract infection)     ON ANTIBIOTICS   • Vitamin D deficiency      Patient Active Problem List   Diagnosis   • Mixed hyperlipidemia   • Vitamin D deficiency   • Malignant neoplasm of upper-inner quadrant of left breast in female, estrogen receptor positive   • Coronary artery disease involving native coronary artery   • Ischemic cardiomyopathy   • Prediabetes     Social History   Social History     Social History   • Marital status:      Spouse name: Van   •  Number of children: N/A   • Years of education: College     Occupational History   • Retired  [employer]     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 Traveled to [place] and [place] in 07/2015 and [facility] 03/2016      Family History  Family History   Problem Relation Age of Onset   • Coronary artery disease Mother    • Dementia Mother    • Heart disease Mother         Heart attack w/2 stents   • Heart attack Mother         had heart attack. Heart catheter -2 stents   • Hypertension Father    • Heart disease Father         Coronary heart disease   • Stroke Father         Ischemic   • Diabetes type II Father    • Diabetes Father    • Hyperlipidemia Father    • Prostate cancer Brother 51   • Alcohol abuse Maternal Grandfather    • Rheum arthritis Paternal Grandmother    • Arthritis Paternal Grandmother    • Alcohol abuse Paternal Grandfather    • Stroke Paternal Grandfather         Ischemic   • Rheum arthritis Paternal Grandfather    • Diabetes type II Paternal Grandfather    • Diabetes Paternal Grandfather    • Hyperlipidemia Paternal Grandfather    • Hypertension Paternal Grandfather    • Cancer Brother         Prostate     Allergies  Allergies   Allergen Reactions   • Codeine Rash   • Latex Rash       Medications: The current medication list was reviewed in the EMR.    Review of Systems  Review of Systems  "  Constitutional: Negative for activity change, appetite change, chills, diaphoresis, fatigue, fever and unexpected weight change.   HENT: Negative.    Eyes: Negative.    Respiratory: Negative for cough, chest tightness and shortness of breath.    Cardiovascular: Negative for chest pain, palpitations and leg swelling.   Gastrointestinal: Negative for abdominal pain, blood in stool, constipation, diarrhea, nausea and vomiting.   Musculoskeletal: Positive for arthralgias and joint swelling. Negative for myalgias.   Hematological: Negative for adenopathy. Does not bruise/bleed easily.       Objective:     Vitals:    05/24/18 0951   BP: 124/86   Pulse: 55   Resp: 12   Temp: 98.1 °F (36.7 °C)   TempSrc: Oral   SpO2: 98%   Weight: 63.9 kg (140 lb 12.8 oz)   Height: 166 cm (65.35\")  Comment: new height   PainSc: 0-No pain     Current Status 5/24/2018   ECOG score 0   GENERAL:  Well-developed, well-nourished female in no acute distress.   SKIN:  Warm, dry without rashes, purpura or petechiae.  HENT: Hearing: normal, Lips normal. Dentition: good  LYMPHATICS:  No cervical, supraclavicular, axillary adenopathy.  RESPIRATORY:  Lungs clear to percussion and auscultation. Good airflow. Normal respiratory effort  CARDIAC:  Regular rate and rhythm without murmurs, rubs or gallops. Normal S1,S2. Edema -  No  GI: Soft, nontender, non-distended, no hernia present  PSYCHIATRIC:  Normal affect and mood.  Oriented to person/place/time.  MSK: Gait: Normal; No clubbing, cyanosis. No tenderness or swelling in left knee, right knee  BREASTS: Left breast smaller than right breast, bilateral breast exam without palpable masses, skin changes or nipple discharge      Labs and Imaging  Lab Results   Component Value Date    WBC 6.29 05/24/2018    HGB 13.3 05/24/2018    HCT 40.1 05/24/2018    MCV 89.7 05/24/2018     05/24/2018     Labs from 10/2017 reviewed.     Breast imaging: Mammogram 2/2018  CONCLUSION: Probable benign mammogram with " small area of focal  asymmetric density at site of previous lumpectomy in upper inner left  breast and best seen in the CC projection. A short-term follow-up  left-sided mammogram in 6 months is recommended.      BI-RADS CATEGORY 3: Probably benign. Short interval follow-up suggested.    Assessment/Plan     Assessment:   1. Stage 1 (T1bN0), left breast invasive ductal carcinoma, ER+ (90%), [place]+, Uzy1cgb negative   * status post lumpectomy with SLNB 4/13/16   * Oncotype Dx 20 (low intermediate). No chemotherapy indicated.   * status post radiation therapy   * Arimidex started June 2016. Change to Aromasin due to arthralgias 5/2017. Tolerating well.    * Mammo abnormal 2/2018 and repeat in 6 months has been ordered.     2. Hot flashes. Tolerable. Stable.   3. Joint arthralgias. 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 Present but improved with Aromasin compared to AI  4. Abnormal mammogram. Repeat in August.     Plan:     1. Continue Aromasin.   2. Annual bilateral mammogram due February 2019; left diagnostic due in August.    3. DEXA scan due May 2018 at Dr Morales's office.  4. Patient was instructed on the importance of physical activity, healthy diet, and self breast exams.  Patient will continue calcium and vitamin D supplementation.      Follow-up in 6 months. I asked the patient to call for any questions, concerns, or new symptoms.    Reviewed and summarized notes, reviewed imaging, reviewed labs (4)

## 2018-05-24 ENCOUNTER — OFFICE VISIT (OUTPATIENT)
Dept: ONCOLOGY | Facility: CLINIC | Age: 63
End: 2018-05-24

## 2018-05-24 ENCOUNTER — LAB (OUTPATIENT)
Dept: LAB | Facility: HOSPITAL | Age: 63
End: 2018-05-24

## 2018-05-24 VITALS
RESPIRATION RATE: 12 BRPM | OXYGEN SATURATION: 98 % | DIASTOLIC BLOOD PRESSURE: 86 MMHG | HEART RATE: 55 BPM | SYSTOLIC BLOOD PRESSURE: 124 MMHG | TEMPERATURE: 98.1 F | HEIGHT: 65 IN | WEIGHT: 140.8 LBS | BODY MASS INDEX: 23.46 KG/M2

## 2018-05-24 DIAGNOSIS — Z17.0 MALIGNANT NEOPLASM OF UPPER-INNER QUADRANT OF LEFT BREAST IN FEMALE, ESTROGEN RECEPTOR POSITIVE (HCC): ICD-10-CM

## 2018-05-24 DIAGNOSIS — C50.212 MALIGNANT NEOPLASM OF UPPER-INNER QUADRANT OF LEFT BREAST IN FEMALE, ESTROGEN RECEPTOR POSITIVE (HCC): ICD-10-CM

## 2018-05-24 DIAGNOSIS — C50.212 MALIGNANT NEOPLASM OF UPPER-INNER QUADRANT OF LEFT BREAST IN FEMALE, ESTROGEN RECEPTOR POSITIVE (HCC): Primary | ICD-10-CM

## 2018-05-24 DIAGNOSIS — Z17.0 MALIGNANT NEOPLASM OF UPPER-INNER QUADRANT OF LEFT BREAST IN FEMALE, ESTROGEN RECEPTOR POSITIVE (HCC): Primary | ICD-10-CM

## 2018-05-24 LAB
BASOPHILS # BLD AUTO: 0.05 10*3/MM3 (ref 0–0.1)
BASOPHILS NFR BLD AUTO: 0.8 % (ref 0–1.1)
DEPRECATED RDW RBC AUTO: 48.9 FL (ref 37–49)
EOSINOPHIL # BLD AUTO: 0.19 10*3/MM3 (ref 0–0.36)
EOSINOPHIL NFR BLD AUTO: 3 % (ref 1–5)
ERYTHROCYTE [DISTWIDTH] IN BLOOD BY AUTOMATED COUNT: 14.9 % (ref 11.7–14.5)
HCT VFR BLD AUTO: 40.1 % (ref 34–45)
HGB BLD-MCNC: 13.3 G/DL (ref 11.5–14.9)
IMM GRANULOCYTES # BLD: 0.03 10*3/MM3 (ref 0–0.03)
IMM GRANULOCYTES NFR BLD: 0.5 % (ref 0–0.5)
LYMPHOCYTES # BLD AUTO: 1.19 10*3/MM3 (ref 1–3.5)
LYMPHOCYTES NFR BLD AUTO: 18.9 % (ref 20–49)
MCH RBC QN AUTO: 29.8 PG (ref 27–33)
MCHC RBC AUTO-ENTMCNC: 33.2 G/DL (ref 32–35)
MCV RBC AUTO: 89.7 FL (ref 83–97)
MONOCYTES # BLD AUTO: 0.67 10*3/MM3 (ref 0.25–0.8)
MONOCYTES NFR BLD AUTO: 10.7 % (ref 4–12)
NEUTROPHILS # BLD AUTO: 4.16 10*3/MM3 (ref 1.5–7)
NEUTROPHILS NFR BLD AUTO: 66.1 % (ref 39–75)
NRBC BLD MANUAL-RTO: 0 /100 WBC (ref 0–0)
PLATELET # BLD AUTO: 214 10*3/MM3 (ref 150–375)
PMV BLD AUTO: 9.8 FL (ref 8.9–12.1)
RBC # BLD AUTO: 4.47 10*6/MM3 (ref 3.9–5)
WBC NRBC COR # BLD: 6.29 10*3/MM3 (ref 4–10)

## 2018-05-24 PROCEDURE — 85025 COMPLETE CBC W/AUTO DIFF WBC: CPT | Performed by: INTERNAL MEDICINE

## 2018-05-24 PROCEDURE — 99214 OFFICE O/P EST MOD 30 MIN: CPT | Performed by: INTERNAL MEDICINE

## 2018-05-24 PROCEDURE — 36415 COLL VENOUS BLD VENIPUNCTURE: CPT | Performed by: INTERNAL MEDICINE

## 2018-06-13 ENCOUNTER — TELEPHONE (OUTPATIENT)
Dept: CARDIOLOGY | Facility: CLINIC | Age: 63
End: 2018-06-13

## 2018-06-13 NOTE — TELEPHONE ENCOUNTER
The Patient called and is having symptoms of short of breath,dizzy and feels like she is going to pass out. She did get to a point on Sunday that she dropped to the ground and she said she blacked out but never lost conscious. She is really concerned that it is her heart, and wants to be seen as soon as she can.    She is scheduled for her next F/U January 4th, 2019.    I have looked at your up coming schedule its completely  booked until 7/5/2018.    Please advise,  Pt # [phone]  Cell phone is [phone]    Thank you   Татьяна CAUSEY

## 2018-06-18 ENCOUNTER — OFFICE VISIT (OUTPATIENT)
Dept: CARDIOLOGY | Facility: CLINIC | Age: 63
End: 2018-06-18

## 2018-06-18 VITALS
OXYGEN SATURATION: 100 % | HEART RATE: 65 BPM | DIASTOLIC BLOOD PRESSURE: 70 MMHG | SYSTOLIC BLOOD PRESSURE: 118 MMHG | BODY MASS INDEX: 23.16 KG/M2 | WEIGHT: 139 LBS | HEIGHT: 65 IN

## 2018-06-18 DIAGNOSIS — I50.20 SYSTOLIC CONGESTIVE HEART FAILURE, UNSPECIFIED CONGESTIVE HEART FAILURE CHRONICITY: ICD-10-CM

## 2018-06-18 DIAGNOSIS — Z95.5 HISTORY OF CORONARY ARTERY STENT PLACEMENT: ICD-10-CM

## 2018-06-18 DIAGNOSIS — R06.02 SHORTNESS OF BREATH: ICD-10-CM

## 2018-06-18 DIAGNOSIS — R55 NEAR SYNCOPE: ICD-10-CM

## 2018-06-18 DIAGNOSIS — I25.10 CORONARY ARTERY DISEASE INVOLVING NATIVE CORONARY ARTERY OF NATIVE HEART WITHOUT ANGINA PECTORIS: Primary | ICD-10-CM

## 2018-06-18 PROCEDURE — 99214 OFFICE O/P EST MOD 30 MIN: CPT | Performed by: PHYSICIAN ASSISTANT

## 2018-06-18 PROCEDURE — 93000 ELECTROCARDIOGRAM COMPLETE: CPT | Performed by: PHYSICIAN ASSISTANT

## 2018-06-18 NOTE — PROGRESS NOTES
Date of Office Visit: 2018  Encounter Provider: CRISTOBAL Bonilla  Place of Service: [facility] CARDIOLOGY  Patient Name: Debra S Sandifer  :1955    Chief Complaint   Patient presents with   • Shortness of Breath     6 month follow up   • Coronary Artery Disease   :     HPI: Debra S Sandifer is a 63 y.o. female who presents today for Shortness of breath.  Old records have been obtained and reviewed by me.  She is a patient of Dr. Dykes's with a past cardiac history significant for coronary artery disease.  In 2016 she had continued chest pain and some ST/T-wave abnormalities in her anterior leads and was sent for cardiac catheterization.  She underwent drug-eluting stent placement to her mid and distal LAD.  She also had balloon angioplasty of her diagonal.  At that time she was noted to have mildly reduced LV function with an EF of 46%, and was started on carvedilol.  She was last in our office to see Dr. Dykes on 2018.  At that visit she was doing well without complaints of angina or heart failure.  She was exercising regularly without difficulty.  Recommendations were for dual antiplatelet therapy for an additional year.  It was recommended that she follow-up within 1 year with Dr. Dykes.  I'm seeing her today with complaints of shortness of breath or dizziness.   She states that 2 weeks ago she was on vacation and walking on the beach with her son.  She became near syncopal and says that she blacked out for about 2 seconds and fell to her knee.  She took it easy for the rest of the day.  She denies being dehydrated, and she really did not drink that much alcohol on vacation.  Ever since she has been home she has felt that something is off.  She has occasional lightheadedness that is not necessarily positional.  She is also noticed some palpitations at night when she is laying down to bed.  She denies any chest pain, edema, orthopnea, or PND.  She had  1 episode of shortness of breath when she was climbing the stairs, but she has climbed up the stairs since then and has done fine.  She is still exercising regularly without difficulty.  There have been no new medication changes.      Past Medical History:   Diagnosis Date   • Acute sinus infection    • Allergic     codeine and latex   • Arthralgia of both hands    • Arthritis     hand   • Atypical chest pain    • Breast cancer     Left   • Chest pain    • Coronary artery disease involving native coronary artery 11/23/2016   • Cough    • Dyspareunia    • H/O bronchitis    • H/O infectious mononucleosis    • Headache    • High cholesterol    • Hot flashes    • Hot flashes, menopausal    • Hx of radiation therapy    • Hyperlipidemia    • Polyp of sigmoid colon    • Stye     LEFT EYE   • UTI (urinary tract infection)     ON ANTIBIOTICS   • Vitamin D deficiency        Past Surgical History:   Procedure Laterality Date   • BACK SURGERY  01/13/2005    Herniated Disk repair (Done by Dr Jurgen Reddy)   • BREAST BIOPSY     • BREAST LUMPECTOMY WITH SENTINEL NODE BIOPSY Left 4/13/2016    Procedure: LEFT BREAST MAMMO NEEDLE LOC LUMPECTOMY WITH LEFT AXILLA SENTINEL NODE BIOPSY;  Surgeon: Aminata Estrella MD;  Location: [facility] MAIN OR;  Service:    • CARDIAC CATHETERIZATION N/A 11/15/2016    Procedure: Left Heart Cath;  Surgeon: Sanjiv Dykes MD;  Location: [facility] CATH INVASIVE LOCATION;  Service:    • CARDIAC CATHETERIZATION N/A 11/15/2016    Procedure: Left ventriculography;  Surgeon: Sanjiv Dykes MD;  Location: [facility] CATH INVASIVE LOCATION;  Service:    • CARDIAC SURGERY  11/15/2016   • COLONOSCOPY  2014   • CORONARY ANGIOPLASTY     • CORONARY STENT PLACEMENT  11/15/16    two   • ENDOSCOPY N/A 11/11/2016    Procedure: ESOPHAGOGASTRODUODENOSCOPY WITH BIOPSY;  Surgeon: Mehdi Guardado MD;  Location: [facility] ENDOSCOPY;  Service:    • LUMBAR DISCECTOMY     • LYMPH NODE BIOPSY  4/13/16    two   • SPINE SURGERY  2005     herniated disc   • WISDOM TOOTH EXTRACTION         Social History     Social History   • Marital status:      Spouse name: Van   • Number of children: N/A   • Years of education: College     Occupational History   • Retired  [employer]     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 Traveled to [place] and [place] in 07/2015 and [place] [facility] 03/2016       Family History   Problem Relation Age of Onset   • Coronary artery disease Mother    • Dementia Mother    • Heart disease Mother         Heart attack w/2 stents   • Heart attack Mother         had heart attack. Heart catheter -2 stents   • Hypertension Father    • Heart disease Father         Coronary heart disease   • Stroke Father         Ischemic   • Diabetes type II Father    • Diabetes Father    • Hyperlipidemia Father    • Prostate cancer Brother 51   • Alcohol abuse Maternal Grandfather    • Rheum arthritis Paternal Grandmother    • Arthritis Paternal Grandmother    • Alcohol abuse Paternal Grandfather    • Stroke Paternal Grandfather         Ischemic   • Rheum arthritis Paternal Grandfather    • Diabetes type II Paternal Grandfather    • Diabetes Paternal Grandfather    • Hyperlipidemia Paternal Grandfather    • Hypertension Paternal Grandfather    • Cancer Brother         Prostate   • No Known Problems Maternal Grandmother        Review of Systems  "  Constitution: Negative for chills, fever and malaise/fatigue.   Cardiovascular: Positive for near-syncope and palpitations. Negative for chest pain, dyspnea on exertion, leg swelling, orthopnea, paroxysmal nocturnal dyspnea and syncope.   Respiratory: Negative for cough and shortness of breath.    Musculoskeletal: Negative for joint pain, joint swelling and myalgias.   Gastrointestinal: Negative for abdominal pain, diarrhea, melena, nausea and vomiting.   Genitourinary: Negative for frequency and hematuria.   Neurological: Negative for light-headedness, numbness, paresthesias and seizures.   Allergic/Immunologic: Negative.    All other systems reviewed and are negative.      Allergies   Allergen Reactions   • Codeine Rash   • Latex Rash         Current Outpatient Prescriptions:   •  Acetaminophen (TYLENOL PO), Take 650 mg by mouth 4 (four) times a day as needed., Disp: , Rfl:   •  atorvastatin (LIPITOR) 40 MG tablet, TAKE 1 TABLET BY MOUTH EVERY NIGHT., Disp: 90 tablet, Rfl: 1  •  BRILINTA 90 MG tablet tablet, TAKE 1 TABLET BY MOUTH 2 (TWO) TIMES A DAY., Disp: 180 tablet, Rfl: 1  •  carvedilol (COREG) 3.125 MG tablet, TAKE 1 TABLET BY MOUTH EVERY 12 (TWELVE) HOURS., Disp: 180 tablet, Rfl: 1  •  Cholecalciferol (VITAMIN D3) 5000 UNITS capsule capsule, Take 1,000 Units by mouth Daily., Disp: , Rfl:   •  [employer] ASPIRIN LOW DOSE 81 MG EC tablet, TAKE 1 TABLET BY MOUTH DAILY., Disp: 90 tablet, Rfl: 1  •  diclofenac (VOLTAREN) 1 % gel gel, Apply 2 g topically 4 (Four) Times a Day As Needed (pain)., Disp: 100 g, Rfl: 0  •  exemestane (AROMASIN) 25 MG chemo tablet, TAKE 1 TABLET BY MOUTH DAILY, Disp: 90 tablet, Rfl: 3  •  Probiotic Product (PROBIOTIC DAILY PO), Take 1 tablet by mouth daily., Disp: , Rfl:       Objective:     Vitals:    06/18/18 0828   BP: 118/70   BP Location: Right arm   Pulse: 65   SpO2: 100%   Weight: 63 kg (139 lb)   Height: 165.1 cm (65\")     Body mass index is 23.13 kg/m².    PHYSICAL EXAM:    Physical " Exam   Constitutional: She is oriented to person, place, and time. She appears well-developed and well-nourished. No distress.   HENT:   Head: Normocephalic and atraumatic.   Eyes: Pupils are equal, round, and reactive to light.   Neck: No JVD present. No thyromegaly present.   Cardiovascular: Normal rate, regular rhythm, normal heart sounds and intact distal pulses.    No murmur heard.  Pulmonary/Chest: Effort normal and breath sounds normal. No respiratory distress. She has no rales.   Abdominal: Soft. Bowel sounds are normal. She exhibits no distension and no ascites. There is no splenomegaly or hepatomegaly. There is no tenderness.   Musculoskeletal: Normal range of motion. She exhibits no edema.   Neurological: She is alert and oriented to person, place, and time.   Skin: Skin is warm and dry. She is not diaphoretic. No erythema.   Psychiatric: She has a normal mood and affect. Her behavior is normal. Judgment normal.         ECG 12 Lead  Date/Time: 6/18/2018 8:47 AM  Performed by: JUAN MORE  Authorized by: JUAN MORE   Comparison: compared with previous ECG from 1/4/2018  Similar to previous ECG  Rhythm: sinus rhythm  BPM: 55  Q waves: V1 and V2  Clinical impression: abnormal ECG  Comments: Indication: Coronary disease, status post stent placement.              Assessment:       Diagnosis Plan   1. Coronary artery disease involving native coronary artery of native heart without angina pectoris  ECG 12 Lead    Holter Monitor - 72 Hour Up To 21 Days    Adult Transthoracic Echo Complete W/ Cont if Necessary Per Protocol   2. History of coronary artery stent placement  ECG 12 Lead    Holter Monitor - 72 Hour Up To 21 Days    Adult Transthoracic Echo Complete W/ Cont if Necessary Per Protocol   3. Shortness of breath  ECG 12 Lead    Holter Monitor - 72 Hour Up To 21 Days    Adult Transthoracic Echo Complete W/ Cont if Necessary Per Protocol   4. Near syncope  Holter Monitor - 72 Hour Up To 21 Days     Adult Transthoracic Echo Complete W/ Cont if Necessary Per Protocol   5. Systolic congestive heart failure, unspecified congestive heart failure chronicity  Holter Monitor - 72 Hour Up To 21 Days    Adult Transthoracic Echo Complete W/ Cont if Necessary Per Protocol     Orders Placed This Encounter   Procedures   • Holter Monitor - 72 Hour Up To 21 Days     Standing Status:   Future     Standing Expiration Date:   6/18/2019     Order Specific Question:   Reason for exam?     Answer:   Presyncope or Syncope   • ECG 12 Lead     This order was created via procedure documentation   • Adult Transthoracic Echo Complete W/ Cont if Necessary Per Protocol     Standing Status:   Future     Order Specific Question:   Reason for exam?     Answer:   Heart Failure, Cardiomyopathy, or Sytemic or Pulmonary Hypertension     Order Specific Question:   Hypertension, Heart Failure, or Cardiomyopathy specification?     Answer:   Known Heart Failure     Order Specific Question:   Change in clinical status, cardiac exam, or medical therapy?     Answer:   Yes          Plan:       1.  Coronary Artery Disease  Assessment  • The patient has no angina    Subjective - Objective  • There is a history of past MI  • There has been a previous stent procedure using LAKESHA  • Current antiplatelet therapy includes aspirin 81 mg and ticagrelor 90 mg  • She is not having any chest pain, which was her presenting symptom at the time of her stent placement.  She is exercising without difficulty.  She is still on dual antiplatelet therapy.  Continue current medical regimen.    2.  Heart Failure  Assessment  • NYHA class I - There is no limitation of physical activity. Physical activity does not cause fatigue, palpitations or shortness of breath.  • Beta blocker prescribed  • The most recent ejection fraction is 46%  • Left ventricular function is mildly reduced by qualitative assessment    Subjective/Objective    • Physical exam findings negative for rales,  peripheral edema, elevated JVP, hepatomegaly and ascites.  • Overall she is stable and doing well.  It has been almost 2 years since we last checked her EF, and with her new symptoms I'm going to check an echocardiogram.    3.  Palpitations and near-syncope.  At this point I'm recommending a Zio patch to check for dysrhythmias.  It sounds like she is having either PACs or PVCs at night, however I'm having a hard time explaining the near-syncope and lightheadedness.  I've also asked her to obtain a blood pressure cuff so she can check her blood pressure when she is having these symptoms.  Certainly hypotension is on my differential.    Overall I think she stable and doing well.  Further recommendations will be made pending the results of the Zio patch and echocardiogram.    As always, it has been a pleasure to participate in your patient's care.      Sincerely,         Shawanda Galan PA-C

## 2018-06-20 ENCOUNTER — HOSPITAL ENCOUNTER (OUTPATIENT)
Dept: CARDIOLOGY | Facility: HOSPITAL | Age: 63
Discharge: HOME | End: 2018-06-20
Admitting: PHYSICIAN ASSISTANT

## 2018-06-20 VITALS
BODY MASS INDEX: 23.16 KG/M2 | HEART RATE: 76 BPM | HEIGHT: 65 IN | WEIGHT: 139 LBS | SYSTOLIC BLOOD PRESSURE: 120 MMHG | DIASTOLIC BLOOD PRESSURE: 50 MMHG

## 2018-06-20 DIAGNOSIS — Z95.5 HISTORY OF CORONARY ARTERY STENT PLACEMENT: ICD-10-CM

## 2018-06-20 DIAGNOSIS — R06.02 SHORTNESS OF BREATH: ICD-10-CM

## 2018-06-20 DIAGNOSIS — I25.10 CORONARY ARTERY DISEASE INVOLVING NATIVE CORONARY ARTERY OF NATIVE HEART WITHOUT ANGINA PECTORIS: ICD-10-CM

## 2018-06-20 DIAGNOSIS — R55 NEAR SYNCOPE: ICD-10-CM

## 2018-06-20 DIAGNOSIS — I50.20 SYSTOLIC CONGESTIVE HEART FAILURE, UNSPECIFIED CONGESTIVE HEART FAILURE CHRONICITY: ICD-10-CM

## 2018-06-20 LAB
ASCENDING AORTA: 2.8 CM
BH CV ECHO MEAS - ACS: 1.6 CM
BH CV ECHO MEAS - AO MAX PG (FULL): 3.2 MMHG
BH CV ECHO MEAS - AO MAX PG: 7.1 MMHG
BH CV ECHO MEAS - AO MEAN PG (FULL): 1.2 MMHG
BH CV ECHO MEAS - AO MEAN PG: 3.4 MMHG
BH CV ECHO MEAS - AO ROOT AREA (BSA CORRECTED): 1.8
BH CV ECHO MEAS - AO ROOT AREA: 7.2 CM^2
BH CV ECHO MEAS - AO ROOT DIAM: 3 CM
BH CV ECHO MEAS - AO V2 MAX: 133 CM/SEC
BH CV ECHO MEAS - AO V2 MEAN: 83.6 CM/SEC
BH CV ECHO MEAS - AO V2 VTI: 27.4 CM
BH CV ECHO MEAS - AVA(I,A): 2.4 CM^2
BH CV ECHO MEAS - AVA(I,D): 2.4 CM^2
BH CV ECHO MEAS - AVA(V,A): 2.2 CM^2
BH CV ECHO MEAS - AVA(V,D): 2.2 CM^2
BH CV ECHO MEAS - BSA(HAYCOCK): 1.7 M^2
BH CV ECHO MEAS - BSA: 1.7 M^2
BH CV ECHO MEAS - BZI_BMI: 23.1 KILOGRAMS/M^2
BH CV ECHO MEAS - BZI_METRIC_HEIGHT: 165.1 CM
BH CV ECHO MEAS - BZI_METRIC_WEIGHT: 63.1 KG
BH CV ECHO MEAS - CONTRAST EF (2CH): 62.2 ML/M^2
BH CV ECHO MEAS - CONTRAST EF 4CH: 62 ML/M^2
BH CV ECHO MEAS - EDV(MOD-SP2): 98 ML
BH CV ECHO MEAS - EDV(MOD-SP4): 100 ML
BH CV ECHO MEAS - EDV(TEICH): 96.9 ML
BH CV ECHO MEAS - EF(CUBED): 65.3 %
BH CV ECHO MEAS - EF(MOD-BP): 62 %
BH CV ECHO MEAS - EF(MOD-SP2): 62.2 %
BH CV ECHO MEAS - EF(MOD-SP4): 62 %
BH CV ECHO MEAS - EF(TEICH): 56.9 %
BH CV ECHO MEAS - ESV(MOD-SP2): 37 ML
BH CV ECHO MEAS - ESV(MOD-SP4): 38 ML
BH CV ECHO MEAS - ESV(TEICH): 41.8 ML
BH CV ECHO MEAS - FS: 29.7 %
BH CV ECHO MEAS - IVS/LVPW: 1
BH CV ECHO MEAS - IVSD: 0.93 CM
BH CV ECHO MEAS - LAT PEAK E' VEL: 12 CM/SEC
BH CV ECHO MEAS - LV DIASTOLIC VOL/BSA (35-75): 59 ML/M^2
BH CV ECHO MEAS - LV MASS(C)D: 143.8 GRAMS
BH CV ECHO MEAS - LV MASS(C)DI: 84.9 GRAMS/M^2
BH CV ECHO MEAS - LV MAX PG: 3.9 MMHG
BH CV ECHO MEAS - LV MEAN PG: 2.2 MMHG
BH CV ECHO MEAS - LV SYSTOLIC VOL/BSA (12-30): 22.4 ML/M^2
BH CV ECHO MEAS - LV V1 MAX: 99 CM/SEC
BH CV ECHO MEAS - LV V1 MEAN: 70.4 CM/SEC
BH CV ECHO MEAS - LV V1 VTI: 22.1 CM
BH CV ECHO MEAS - LVIDD: 4.6 CM
BH CV ECHO MEAS - LVIDS: 3.2 CM
BH CV ECHO MEAS - LVLD AP2: 7.4 CM
BH CV ECHO MEAS - LVLD AP4: 7.5 CM
BH CV ECHO MEAS - LVLS AP2: 6.5 CM
BH CV ECHO MEAS - LVLS AP4: 6.4 CM
BH CV ECHO MEAS - LVOT AREA (M): 2.8 CM^2
BH CV ECHO MEAS - LVOT AREA: 2.9 CM^2
BH CV ECHO MEAS - LVOT DIAM: 1.9 CM
BH CV ECHO MEAS - LVPWD: 0.93 CM
BH CV ECHO MEAS - MED PEAK E' VEL: 9 CM/SEC
BH CV ECHO MEAS - MR MAX PG: 60.7 MMHG
BH CV ECHO MEAS - MR MAX VEL: 389.6 CM/SEC
BH CV ECHO MEAS - MV A DUR: 0.13 SEC
BH CV ECHO MEAS - MV A MAX VEL: 71.8 CM/SEC
BH CV ECHO MEAS - MV DEC SLOPE: 349.7 CM/SEC^2
BH CV ECHO MEAS - MV DEC TIME: 0.23 SEC
BH CV ECHO MEAS - MV E MAX VEL: 80 CM/SEC
BH CV ECHO MEAS - MV E/A: 1.1
BH CV ECHO MEAS - MV MAX PG: 4 MMHG
BH CV ECHO MEAS - MV MEAN PG: 1.8 MMHG
BH CV ECHO MEAS - MV P1/2T MAX VEL: 79.1 CM/SEC
BH CV ECHO MEAS - MV P1/2T: 66.2 MSEC
BH CV ECHO MEAS - MV V2 MAX: 100.4 CM/SEC
BH CV ECHO MEAS - MV V2 MEAN: 63.7 CM/SEC
BH CV ECHO MEAS - MV V2 VTI: 36.6 CM
BH CV ECHO MEAS - MVA P1/2T LCG: 2.8 CM^2
BH CV ECHO MEAS - MVA(P1/2T): 3.3 CM^2
BH CV ECHO MEAS - MVA(VTI): 1.8 CM^2
BH CV ECHO MEAS - PA ACC TIME: 0.17 SEC
BH CV ECHO MEAS - PA MAX PG (FULL): 2.4 MMHG
BH CV ECHO MEAS - PA MAX PG: 3.7 MMHG
BH CV ECHO MEAS - PA PR(ACCEL): 2.9 MMHG
BH CV ECHO MEAS - PA V2 MAX: 96.4 CM/SEC
BH CV ECHO MEAS - PULM A REVS DUR: 0.11 SEC
BH CV ECHO MEAS - PULM A REVS VEL: 29.6 CM/SEC
BH CV ECHO MEAS - PULM DIAS VEL: 37.3 CM/SEC
BH CV ECHO MEAS - PULM S/D: 1.1
BH CV ECHO MEAS - PULM SYS VEL: 42.2 CM/SEC
BH CV ECHO MEAS - PVA(V,A): 1.9 CM^2
BH CV ECHO MEAS - PVA(V,D): 1.9 CM^2
BH CV ECHO MEAS - QP/QS: 0.66
BH CV ECHO MEAS - RAP SYSTOLE: 8 MMHG
BH CV ECHO MEAS - RV MAX PG: 1.3 MMHG
BH CV ECHO MEAS - RV MEAN PG: 0.76 MMHG
BH CV ECHO MEAS - RV V1 MAX: 56.3 CM/SEC
BH CV ECHO MEAS - RV V1 MEAN: 41.5 CM/SEC
BH CV ECHO MEAS - RV V1 VTI: 13.4 CM
BH CV ECHO MEAS - RVOT AREA: 3.2 CM^2
BH CV ECHO MEAS - RVOT DIAM: 2 CM
BH CV ECHO MEAS - RVSP: 23 MMHG
BH CV ECHO MEAS - SI(AO): 115.9 ML/M^2
BH CV ECHO MEAS - SI(CUBED): 37.3 ML/M^2
BH CV ECHO MEAS - SI(LVOT): 38.3 ML/M^2
BH CV ECHO MEAS - SI(MOD-SP2): 36 ML/M^2
BH CV ECHO MEAS - SI(MOD-SP4): 36.6 ML/M^2
BH CV ECHO MEAS - SI(TEICH): 32.5 ML/M^2
BH CV ECHO MEAS - SUP REN AO DIAM: 1.9 CM
BH CV ECHO MEAS - SV(AO): 196.5 ML
BH CV ECHO MEAS - SV(CUBED): 63.2 ML
BH CV ECHO MEAS - SV(LVOT): 64.9 ML
BH CV ECHO MEAS - SV(MOD-SP2): 61 ML
BH CV ECHO MEAS - SV(MOD-SP4): 62 ML
BH CV ECHO MEAS - SV(RVOT): 43.1 ML
BH CV ECHO MEAS - SV(TEICH): 55.1 ML
BH CV ECHO MEAS - TAPSE (>1.6): 2.9 CM2
BH CV ECHO MEAS - TR MAX VEL: 194.1 CM/SEC
BH CV ECHO MEASUREMENTS AVERAGE E/E' RATIO: 7.62
BH CV XLRA - RV BASE: 2.2 CM
BH CV XLRA - TDI S': 15 CM/SEC
LEFT ATRIUM VOLUME INDEX: 21 ML/M2
MAXIMAL PREDICTED HEART RATE: 158 BPM
SINUS: 2.6 CM
STJ: 2.6 CM
STRESS TARGET HR: 134 BPM

## 2018-06-20 PROCEDURE — 93306 TTE W/DOPPLER COMPLETE: CPT

## 2018-06-20 PROCEDURE — 93306 TTE W/DOPPLER COMPLETE: CPT | Performed by: INTERNAL MEDICINE

## 2018-06-21 ENCOUNTER — TELEPHONE (OUTPATIENT)
Dept: CARDIOLOGY | Facility: CLINIC | Age: 63
End: 2018-06-21

## 2018-06-21 NOTE — TELEPHONE ENCOUNTER
Left a message on her cell phone at.  Her echocardiogram is totally normal and her EF is back to normal.

## 2018-07-20 ENCOUNTER — TELEPHONE (OUTPATIENT)
Dept: CARDIOLOGY | Facility: CLINIC | Age: 63
End: 2018-07-20

## 2018-08-13 RX ORDER — SALICYLIC ACID 40 %
ADHESIVE PATCH, MEDICATED TOPICAL
Qty: 90 TABLET | Refills: 1 | Status: SHIPPED | OUTPATIENT
Start: 2018-08-13 | End: 2019-01-21 | Stop reason: SDUPTHER

## 2018-08-14 ENCOUNTER — HOSPITAL ENCOUNTER (OUTPATIENT)
Dept: MAMMOGRAPHY | Facility: HOSPITAL | Age: 63
Discharge: HOME | End: 2018-08-14
Attending: SURGERY | Admitting: SURGERY

## 2018-08-14 DIAGNOSIS — C50.212 MALIGNANT NEOPLASM OF UPPER-INNER QUADRANT OF LEFT FEMALE BREAST, UNSPECIFIED ESTROGEN RECEPTOR STATUS (HCC): ICD-10-CM

## 2018-08-14 PROCEDURE — G0279 TOMOSYNTHESIS, MAMMO: HCPCS

## 2018-08-14 PROCEDURE — 77065 DX MAMMO INCL CAD UNI: CPT

## 2018-08-16 ENCOUNTER — TELEPHONE (OUTPATIENT)
Dept: SURGERY | Facility: CLINIC | Age: 63
End: 2018-08-16

## 2018-08-16 NOTE — TELEPHONE ENCOUNTER
Left diagnostic mammogram with 3-D dated August 14, 2018: Scattered fiber glandular densities.  No suspicious findings for malignancy in the left breast.  Benign post lumpectomy changes are seen BI-RADS 2

## 2018-08-17 ENCOUNTER — TELEPHONE (OUTPATIENT)
Dept: SURGERY | Facility: CLINIC | Age: 63
End: 2018-08-17

## 2018-08-21 ENCOUNTER — TRANSCRIBE ORDERS (OUTPATIENT)
Dept: ADMINISTRATIVE | Facility: HOSPITAL | Age: 63
End: 2018-08-21

## 2018-08-21 ENCOUNTER — OFFICE VISIT (OUTPATIENT)
Dept: SURGERY | Facility: CLINIC | Age: 63
End: 2018-08-21

## 2018-08-21 VITALS
HEIGHT: 66 IN | OXYGEN SATURATION: 98 % | DIASTOLIC BLOOD PRESSURE: 70 MMHG | WEIGHT: 139.8 LBS | BODY MASS INDEX: 22.47 KG/M2 | SYSTOLIC BLOOD PRESSURE: 120 MMHG | HEART RATE: 77 BPM

## 2018-08-21 DIAGNOSIS — C50.212 MALIGNANT NEOPLASM OF UPPER-INNER QUADRANT OF LEFT BREAST IN FEMALE, ESTROGEN RECEPTOR POSITIVE (HCC): Primary | ICD-10-CM

## 2018-08-21 DIAGNOSIS — Z17.0 MALIGNANT NEOPLASM OF UPPER-INNER QUADRANT OF LEFT BREAST IN FEMALE, ESTROGEN RECEPTOR POSITIVE (HCC): Primary | ICD-10-CM

## 2018-08-21 DIAGNOSIS — Z12.31 VISIT FOR SCREENING MAMMOGRAM: Primary | ICD-10-CM

## 2018-08-21 PROCEDURE — 99212 OFFICE O/P EST SF 10 MIN: CPT | Performed by: SURGERY
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
## 2018-08-23 RX ORDER — TICAGRELOR 90 MG/1
TABLET ORAL
Qty: 180 TABLET | Refills: 1 | Status: SHIPPED | OUTPATIENT
Start: 2018-08-23 | End: 2019-05-06

## 2018-08-23 RX ORDER — CARVEDILOL 3.12 MG/1
TABLET ORAL
Qty: 180 TABLET | Refills: 1 | Status: SHIPPED | OUTPATIENT
Start: 2018-08-23 | End: 2019-05-09 | Stop reason: SDUPTHER

## 2018-10-01 ENCOUNTER — OFFICE VISIT (OUTPATIENT)
Dept: INTERNAL MEDICINE | Facility: CLINIC | Age: 63
End: 2018-10-01

## 2018-10-01 VITALS
WEIGHT: 141 LBS | HEART RATE: 74 BPM | DIASTOLIC BLOOD PRESSURE: 60 MMHG | BODY MASS INDEX: 22.76 KG/M2 | OXYGEN SATURATION: 98 % | SYSTOLIC BLOOD PRESSURE: 120 MMHG

## 2018-10-01 DIAGNOSIS — L30.9 DERMATITIS: ICD-10-CM

## 2018-10-01 DIAGNOSIS — J01.00 ACUTE NON-RECURRENT MAXILLARY SINUSITIS: Primary | ICD-10-CM

## 2018-10-01 PROCEDURE — 99213 OFFICE O/P EST LOW 20 MIN: CPT | Performed by: INTERNAL MEDICINE

## 2018-10-01 RX ORDER — CLOTRIMAZOLE AND BETAMETHASONE DIPROPIONATE 10; .64 MG/G; MG/G
CREAM TOPICAL EVERY 12 HOURS SCHEDULED
Qty: 30 G | Refills: 0 | Status: SHIPPED | OUTPATIENT
Start: 2018-10-01 | End: 2018-10-23

## 2018-10-01 RX ORDER — TRIAMCINOLONE ACETONIDE 55 UG/1
2 SPRAY, METERED NASAL DAILY
Qty: 16.5 G | Refills: 11 | Status: SHIPPED | OUTPATIENT
Start: 2018-10-01 | End: 2018-10-23

## 2018-10-01 RX ORDER — AMOXICILLIN 875 MG/1
875 TABLET, COATED ORAL 2 TIMES DAILY
Qty: 20 TABLET | Refills: 0 | Status: SHIPPED | OUTPATIENT
Start: 2018-10-01 | End: 2018-10-23

## 2018-10-01 NOTE — PROGRESS NOTES
"Chief Complaint   Patient presents with   • Sinus Problem   • URI   • Rash     under R armpit       History of Present Illness   Debra S Sandifer is a 63 y.o. female presents for acute care. Patient is new to me with new issues. She reports a 3 week history of sinus congestion w/ drainage and sore throat. She has tried tylenol and cold sinus. Symptoms improved but remained persistent. She has not ever been on allergy medications but does get the same symptoms annually at this time.   Patient also has a \"rash\" under her right arm. She had a rash that was similar on the left side in the spring. She used lotrimin w/ benefit in the past and has used for 2 days at this time.       The following portions of the patient's history were reviewed and updated as appropriate: allergies, current medications, past family history, past medical history, past social history, past surgical history and problem list.  Current Outpatient Prescriptions on File Prior to Visit   Medication Sig Dispense Refill   • atorvastatin (LIPITOR) 40 MG tablet TAKE 1 TABLET BY MOUTH EVERY NIGHT. 90 tablet 1   • BRILINTA 90 MG tablet tablet TAKE 1 TABLET BY MOUTH 2 (TWO) TIMES A DAY. 180 tablet 1   • carvedilol (COREG) 3.125 MG tablet TAKE 1 TABLET BY MOUTH EVERY 12 (TWELVE) HOURS. 180 tablet 1   • Cholecalciferol (VITAMIN D3) 5000 UNITS capsule capsule Take 1,000 Units by mouth Daily.     • [facility]S ASPIRIN LOW DOSE 81 MG EC tablet TAKE 1 TABLET BY MOUTH DAILY. 90 tablet 1   • diclofenac (VOLTAREN) 1 % gel gel Apply 2 g topically 4 (Four) Times a Day As Needed (pain). 100 g 0   • exemestane (AROMASIN) 25 MG chemo tablet TAKE 1 TABLET BY MOUTH DAILY 90 tablet 3   • Probiotic Product (PROBIOTIC DAILY PO) Take 1 tablet by mouth daily.     • Acetaminophen (TYLENOL PO) Take 650 mg by mouth 4 (four) times a day as needed.       No current facility-administered medications on file prior to visit.      Review of Systems   Constitutional: Negative.    HENT: " Positive for ear pain, postnasal drip, rhinorrhea, sinus pain, sinus pressure and sore throat. Negative for ear discharge.    Eyes: Negative.    Respiratory: Positive for cough. Negative for wheezing.    Cardiovascular: Negative.    Gastrointestinal: Negative.    Endocrine: Negative.    Genitourinary: Negative.    Musculoskeletal: Negative.    Skin: Positive for rash.   Allergic/Immunologic: Negative.    Neurological: Positive for dizziness.   Hematological: Negative.    Psychiatric/Behavioral: Negative.        Objective   Physical Exam   Constitutional: She is oriented to person, place, and time. She appears well-developed and well-nourished.   HENT:   Head: Normocephalic and atraumatic.   Right Ear: External ear normal.   Left Ear: External ear normal.   Post nasal drainage. Boggy red sinus passage. Sinus tender to external palpation frontal and maxillary.    Eyes: Pupils are equal, round, and reactive to light. Conjunctivae and EOM are normal.   Neck: Normal range of motion. Neck supple.   Cardiovascular: Normal rate, regular rhythm, normal heart sounds and intact distal pulses.    Pulmonary/Chest: Effort normal and breath sounds normal.   Abdominal: Soft. Bowel sounds are normal.   Musculoskeletal: Normal range of motion.   Neurological: She is alert and oriented to person, place, and time.   Skin: Skin is warm and dry.   Psychiatric: She has a normal mood and affect. Her behavior is normal. Judgment and thought content normal.   Nursing note and vitals reviewed.       /60   Pulse 74   Wt 64 kg (141 lb)   SpO2 98%   BMI 22.76 kg/m²     Assessment/Plan   Diagnoses and all orders for this visit:    Acute non-recurrent maxillary sinusitis    Dermatitis    Other orders  -     clotrimazole-betamethasone (LOTRISONE) 1-0.05 % cream; Apply  topically to the appropriate area as directed Every 12 (Twelve) Hours.  -     Triamcinolone Acetonide (NASACORT) 55 MCG/ACT nasal inhaler; 2 sprays into the nostril(s) as  directed by provider Daily.  -     amoxicillin (AMOXIL) 875 MG tablet; Take 1 tablet by mouth 2 (Two) Times a Day.      Patient w/ acute sinusitis. She will start amoxil bid x 10 d. She will use nasal saline rinse and nasacort 2 spray each nostril daily. She will try lotrisone to her axillary rash. F/u dr. Morales this month as scheduled.

## 2018-10-09 ENCOUNTER — TELEPHONE (OUTPATIENT)
Dept: INTERNAL MEDICINE | Facility: CLINIC | Age: 63
End: 2018-10-09

## 2018-10-09 NOTE — TELEPHONE ENCOUNTER
Has appt with with you for CPE but insurance is requiring her to go to [facility] for labs.    She is asking if she can please have an order to take to [facility] for labs? TW    Orders written.  RICKY

## 2018-10-15 ENCOUNTER — TELEPHONE (OUTPATIENT)
Dept: ONCOLOGY | Facility: HOSPITAL | Age: 63
End: 2018-10-15

## 2018-10-15 NOTE — TELEPHONE ENCOUNTER
----- Message from Daphne Gutierrez sent at 10/15/2018  2:19 PM EDT -----  [phone]  Needs to talk to Dr. Garcia about the  Nov. appt.      Pt wanted to see if she could come in to say hi and goodbye to Dr. Garcia before she leaves. D/W Dr. Garcia and per Dr. Garcia, pt can come in anytime she is here to see her. No apt needed. Informed pt of the dates Dr. Garcia was here. She v/u.

## 2018-10-23 ENCOUNTER — OFFICE VISIT (OUTPATIENT)
Dept: INTERNAL MEDICINE | Facility: CLINIC | Age: 63
End: 2018-10-23

## 2018-10-23 VITALS
SYSTOLIC BLOOD PRESSURE: 120 MMHG | HEART RATE: 62 BPM | HEIGHT: 65 IN | OXYGEN SATURATION: 99 % | BODY MASS INDEX: 23.82 KG/M2 | DIASTOLIC BLOOD PRESSURE: 64 MMHG | WEIGHT: 143 LBS

## 2018-10-23 DIAGNOSIS — Z00.00 WELL ADULT EXAM: Primary | ICD-10-CM

## 2018-10-23 DIAGNOSIS — Z12.4 ENCOUNTER FOR SCREENING FOR CERVICAL CANCER: ICD-10-CM

## 2018-10-23 DIAGNOSIS — E78.2 MIXED HYPERLIPIDEMIA: ICD-10-CM

## 2018-10-23 PROCEDURE — 90471 IMMUNIZATION ADMIN: CPT | Performed by: INTERNAL MEDICINE

## 2018-10-23 PROCEDURE — 90674 CCIIV4 VAC NO PRSV 0.5 ML IM: CPT | Performed by: INTERNAL MEDICINE

## 2018-10-23 PROCEDURE — 99396 PREV VISIT EST AGE 40-64: CPT | Performed by: INTERNAL MEDICINE

## 2018-10-23 PROCEDURE — 90472 IMMUNIZATION ADMIN EACH ADD: CPT | Performed by: INTERNAL MEDICINE

## 2018-10-23 PROCEDURE — 90632 HEPA VACCINE ADULT IM: CPT | Performed by: INTERNAL MEDICINE

## 2018-10-23 NOTE — PROGRESS NOTES
Subjective     Debra S Sandifer is a 63 y.o. female who presents for a complete physical exam.      History of Present Illness     HLD.  Excellent control of lipids.   CAD.  She is on medical management.      Review of Systems   Constitutional: Negative.    HENT: Negative.    Eyes: Negative.    Respiratory: Negative.    Cardiovascular: Negative.    Gastrointestinal: Negative.    Endocrine: Negative.    Genitourinary: Negative.    Musculoskeletal: Negative.    Skin: Negative.    Allergic/Immunologic: Negative.    Neurological: Negative.    Hematological: Negative.    Psychiatric/Behavioral: Negative.        The following portions of the patient's history were reviewed and updated as appropriate: allergies, current medications, past family history, past medical history, past social history, past surgical history and problem list.  Health maintenance tab was reviewed and updated with the patient.       Patient Active Problem List    Diagnosis Date Noted   • History of coronary artery stent placement 06/18/2018   • Prediabetes 10/17/2017   • Ischemic cardiomyopathy 12/22/2016   • Coronary artery disease involving native coronary artery 11/23/2016     Note Last Updated: 11/23/2016 11/2016  PTCA of the first diagonal branch with a 2.5 x 12 mm trek Rx balloon.  PCI of the distal LAD with a 2.75 x 18 mm Xience Alpine drug-eluting stent  PCI of the mid LAD with a 3.0 x 28 mm Xience Alpine drug-eluting stent     • Malignant neoplasm of upper-inner quadrant of left breast in female, estrogen receptor positive (CMS/HCC) 04/27/2016     Note Last Updated: 10/14/2016     S/p lumpectomy and radiation in 2016     • Mixed hyperlipidemia 02/10/2016       Past Medical History:   Diagnosis Date   • Acute sinus infection    • Allergic     codeine and latex   • Arthralgia of both hands    • Arthritis     hand   • Atypical chest pain    • Breast cancer (CMS/HCC)     Left   • Chest pain    • Coronary artery disease involving native  coronary artery 11/23/2016   • Cough    • Dyspareunia    • H/O bronchitis    • H/O infectious mononucleosis    • Headache    • High cholesterol    • Hot flashes    • Hot flashes, menopausal    • Hx of radiation therapy    • Hyperlipidemia    • Polyp of sigmoid colon    • Stye     LEFT EYE   • UTI (urinary tract infection)     ON ANTIBIOTICS   • Vitamin D deficiency        Past Surgical History:   Procedure Laterality Date   • BACK SURGERY  01/13/2005    Herniated Disk repair (Done by Dr Jurgen Reddy)   • BREAST BIOPSY     • BREAST LUMPECTOMY WITH SENTINEL NODE BIOPSY Left 4/13/2016    Procedure: LEFT BREAST MAMMO NEEDLE LOC LUMPECTOMY WITH LEFT AXILLA SENTINEL NODE BIOPSY;  Surgeon: Aminata Estrella MD;  Location: [facility] MAIN OR;  Service:    • CARDIAC CATHETERIZATION N/A 11/15/2016    Procedure: Left Heart Cath;  Surgeon: Sanjiv Dykes MD;  Location: [facility] CATH INVASIVE LOCATION;  Service:    • CARDIAC CATHETERIZATION N/A 11/15/2016    Procedure: Left ventriculography;  Surgeon: Sanjiv Dykes MD;  Location: [facility] CATH INVASIVE LOCATION;  Service:    • CARDIAC SURGERY  11/15/2016   • COLONOSCOPY  2014   • CORONARY ANGIOPLASTY     • CORONARY STENT PLACEMENT  11/15/16    two   • ENDOSCOPY N/A 11/11/2016    Procedure: ESOPHAGOGASTRODUODENOSCOPY WITH BIOPSY;  Surgeon: Mehdi Guardado MD;  Location: [facility] ENDOSCOPY;  Service:    • LUMBAR DISCECTOMY     • LYMPH NODE BIOPSY  4/13/16    two   • SPINE SURGERY  2005    herniated disc   • WISDOM TOOTH EXTRACTION         Family History   Problem Relation Age of Onset   • Coronary artery disease Mother    • Dementia Mother    • Heart disease Mother         Heart attack w/2 stents   • Heart attack Mother         had heart attack. Heart catheter -2 stents   • Hypertension Father    • Heart disease Father         Coronary heart disease   • Stroke Father         Ischemic   • Diabetes type II Father    • Diabetes Father    • Hyperlipidemia Father    • Prostate  cancer Brother 51   • Alcohol abuse Maternal Grandfather    • Rheum arthritis Paternal Grandmother    • Arthritis Paternal Grandmother    • Alcohol abuse Paternal Grandfather    • Stroke Paternal Grandfather         Ischemic   • Rheum arthritis Paternal Grandfather    • Diabetes type II Paternal Grandfather    • Diabetes Paternal Grandfather    • Hyperlipidemia Paternal Grandfather    • Hypertension Paternal Grandfather    • Cancer Brother         Prostate   • No Known Problems Maternal Grandmother        Social History     Social History   • Marital status:      Spouse name: Van   • Number of children: N/A   • Years of education: College     Occupational History   • Retired  [employer]     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 Traveled to [place] and [place] in 07/2015 and [place] [facility] 03/2016       Current Outpatient Prescriptions on File Prior to Visit   Medication Sig Dispense Refill   • Acetaminophen (TYLENOL PO) Take 650 mg by mouth 4 (four) times a day as needed.     • atorvastatin (LIPITOR) 40 MG tablet TAKE 1 TABLET BY MOUTH EVERY NIGHT. 90 tablet 1   • BRILINTA 90 MG tablet tablet TAKE 1 TABLET BY MOUTH 2 (TWO) TIMES A DAY. 180 tablet 1   • carvedilol (COREG) 3.125 MG tablet TAKE 1 TABLET BY MOUTH EVERY 12 (TWELVE) HOURS. 180 tablet 1   • Cholecalciferol (VITAMIN D3) 5000 UNITS capsule capsule  "Take 1,000 Units by mouth Daily.     • CVS ASPIRIN LOW DOSE 81 MG EC tablet TAKE 1 TABLET BY MOUTH DAILY. 90 tablet 1   • exemestane (AROMASIN) 25 MG chemo tablet TAKE 1 TABLET BY MOUTH DAILY 90 tablet 3   • Probiotic Product (PROBIOTIC DAILY PO) Take 1 tablet by mouth daily.     • [DISCONTINUED] clotrimazole-betamethasone (LOTRISONE) 1-0.05 % cream Apply  topically to the appropriate area as directed Every 12 (Twelve) Hours. 30 g 0   • [DISCONTINUED] diclofenac (VOLTAREN) 1 % gel gel Apply 2 g topically 4 (Four) Times a Day As Needed (pain). 100 g 0   • [DISCONTINUED] Triamcinolone Acetonide (NASACORT) 55 MCG/ACT nasal inhaler 2 sprays into the nostril(s) as directed by provider Daily. 16.5 g 11   • [DISCONTINUED] amoxicillin (AMOXIL) 875 MG tablet Take 1 tablet by mouth 2 (Two) Times a Day. 20 tablet 0     No current facility-administered medications on file prior to visit.        Allergies   Allergen Reactions   • Codeine Rash   • Latex Rash       Immunization History   Administered Date(s) Administered   • Flu Mist 10/13/2015   • Flu Vaccine Quad PF >18YRS 10/14/2016, 10/17/2017   • Tdap 04/15/2005, 02/22/2017   • Zostavax 10/04/2013       Objective     /64   Pulse 62   Ht 164.5 cm (64.75\")   Wt 64.9 kg (143 lb)   SpO2 99%   BMI 23.98 kg/m²     Physical Exam   Constitutional: She is oriented to person, place, and time. She appears well-developed and well-nourished.   HENT:   Head: Normocephalic and atraumatic.   Right Ear: Hearing, tympanic membrane and external ear normal.   Left Ear: Hearing, tympanic membrane and external ear normal.   Nose: Nose normal.   Mouth/Throat: Oropharynx is clear and moist.   Neck: Neck supple. No thyromegaly present.   Cardiovascular: Normal rate, regular rhythm and normal heart sounds.    No murmur heard.  Pulmonary/Chest: Effort normal and breath sounds normal. Right breast exhibits no mass. Left breast exhibits no mass.   Abdominal: Soft. She exhibits no distension. " There is no hepatosplenomegaly. There is no tenderness.   Genitourinary: No breast tenderness.   Lymphadenopathy:     She has no cervical adenopathy.   Neurological: She is alert and oriented to person, place, and time.   Skin: Skin is warm and dry.   Psychiatric: She has a normal mood and affect. Her speech is normal and behavior is normal. Judgment and thought content normal. Cognition and memory are normal.       Assessment/Plan   Elin was seen today for annual exam.    Diagnoses and all orders for this visit:    Well adult exam    Encounter for screening for cervical cancer   -     Pap IG, Rfx HPV ASCU    Mixed hyperlipidemia    Other orders  -     Hepatitis A Vaccine Adult IM  -     Flucelvax Quad=>4Years (PFS)        Discussion    Patient presents today for a CPE.      Patient follows a healthy diet.   Patient follows an adequate exercise regimen. Mammogram is up to date.   Colon cancer screening is up to date.   Pap smear obtained today. Immunizations are as per orders.  I have recommended that the patient get the following immunizations:  Shingrix.      Health Maintenance   Topic Date Due   • ZOSTER VACCINE (2 of 3) 11/29/2013   • INFLUENZA VACCINE  08/01/2018   • COLONOSCOPY  02/04/2019   • PAP SMEAR  10/14/2019   • LIPID PANEL  10/22/2019   • ANNUAL PHYSICAL  10/24/2019   • MAMMOGRAM  08/14/2020   • TDAP/TD VACCINES (3 - Td) 02/22/2027   • HEPATITIS C SCREENING  Addressed            Future Appointments  Date Time Provider Department Center   11/12/2018 8:30 AM LAB CHAIR [facility] None   11/12/2018 9:00 AM Andrea Akhtar MD [facility]   1/4/2019 9:40 AM Sanjiv Dykes MD [facility] None   2/12/2019 9:15 AM [facility] 2  [facility]

## 2018-10-24 LAB
CONV .: NORMAL
CYTOLOGIST CVX/VAG CYTO: NORMAL
CYTOLOGY CVX/VAG DOC THIN PREP: NORMAL
DX ICD CODE: NORMAL
HIV 1 & 2 AB SER-IMP: NORMAL
OTHER STN SPEC: NORMAL
PATH REPORT.FINAL DX SPEC: NORMAL
STAT OF ADQ CVX/VAG CYTO-IMP: NORMAL

## 2018-11-12 ENCOUNTER — OFFICE VISIT (OUTPATIENT)
Dept: ONCOLOGY | Facility: CLINIC | Age: 63
End: 2018-11-12

## 2018-11-12 ENCOUNTER — LAB (OUTPATIENT)
Dept: OTHER | Facility: HOSPITAL | Age: 63
End: 2018-11-12

## 2018-11-12 VITALS
DIASTOLIC BLOOD PRESSURE: 72 MMHG | HEART RATE: 57 BPM | BODY MASS INDEX: 23.54 KG/M2 | RESPIRATION RATE: 14 BRPM | OXYGEN SATURATION: 99 % | WEIGHT: 141.3 LBS | HEIGHT: 65 IN | TEMPERATURE: 97.6 F | SYSTOLIC BLOOD PRESSURE: 120 MMHG

## 2018-11-12 DIAGNOSIS — C50.212 MALIGNANT NEOPLASM OF UPPER-INNER QUADRANT OF LEFT BREAST IN FEMALE, ESTROGEN RECEPTOR POSITIVE (HCC): Primary | ICD-10-CM

## 2018-11-12 DIAGNOSIS — Z17.0 MALIGNANT NEOPLASM OF UPPER-INNER QUADRANT OF LEFT BREAST IN FEMALE, ESTROGEN RECEPTOR POSITIVE (HCC): Primary | ICD-10-CM

## 2018-11-12 LAB
ALBUMIN SERPL-MCNC: 4 G/DL (ref 3.5–5.2)
ALBUMIN/GLOB SERPL: 1.3 G/DL
ALP SERPL-CCNC: 90 U/L (ref 39–117)
ALT SERPL W P-5'-P-CCNC: 27 U/L (ref 1–33)
ANION GAP SERPL CALCULATED.3IONS-SCNC: 8.2 MMOL/L
AST SERPL-CCNC: 34 U/L (ref 1–32)
BASOPHILS # BLD AUTO: 0.05 10*3/MM3 (ref 0–0.2)
BASOPHILS NFR BLD AUTO: 0.9 % (ref 0–1.5)
BILIRUB SERPL-MCNC: 0.7 MG/DL (ref 0.1–1.2)
BUN BLD-MCNC: 14 MG/DL (ref 8–23)
BUN/CREAT SERPL: 15.9 (ref 7–25)
CALCIUM SPEC-SCNC: 9.5 MG/DL (ref 8.6–10.5)
CHLORIDE SERPL-SCNC: 105 MMOL/L (ref 98–107)
CO2 SERPL-SCNC: 28.8 MMOL/L (ref 22–29)
CREAT BLD-MCNC: 0.88 MG/DL (ref 0.57–1)
DEPRECATED RDW RBC AUTO: 49.3 FL (ref 37–54)
EOSINOPHIL # BLD AUTO: 0.15 10*3/MM3 (ref 0–0.7)
EOSINOPHIL NFR BLD AUTO: 2.7 % (ref 0.3–6.2)
ERYTHROCYTE [DISTWIDTH] IN BLOOD BY AUTOMATED COUNT: 14.6 % (ref 11.7–13)
GFR SERPL CREATININE-BSD FRML MDRD: 65 ML/MIN/1.73
GLOBULIN UR ELPH-MCNC: 3.1 GM/DL
GLUCOSE BLD-MCNC: 99 MG/DL (ref 65–99)
HCT VFR BLD AUTO: 38.7 % (ref 35.6–45.5)
HGB BLD-MCNC: 12.9 G/DL (ref 11.9–15.5)
IMM GRANULOCYTES # BLD: 0.02 10*3/MM3 (ref 0–0.03)
IMM GRANULOCYTES NFR BLD: 0.4 % (ref 0–0.5)
LYMPHOCYTES # BLD AUTO: 1.28 10*3/MM3 (ref 0.9–4.8)
LYMPHOCYTES NFR BLD AUTO: 23 % (ref 19.6–45.3)
MCH RBC QN AUTO: 30.4 PG (ref 26.9–32)
MCHC RBC AUTO-ENTMCNC: 33.3 G/DL (ref 32.4–36.3)
MCV RBC AUTO: 91.3 FL (ref 80.5–98.2)
MONOCYTES # BLD AUTO: 0.63 10*3/MM3 (ref 0.2–1.2)
MONOCYTES NFR BLD AUTO: 11.3 % (ref 5–12)
NEUTROPHILS # BLD AUTO: 3.43 10*3/MM3 (ref 1.9–8.1)
NEUTROPHILS NFR BLD AUTO: 61.7 % (ref 42.7–76)
NRBC BLD MANUAL-RTO: 0 /100 WBC (ref 0–0)
PLATELET # BLD AUTO: 244 10*3/MM3 (ref 140–500)
PMV BLD AUTO: 10.2 FL (ref 6–12)
POTASSIUM BLD-SCNC: 4.5 MMOL/L (ref 3.5–5.2)
PROT SERPL-MCNC: 7.1 G/DL (ref 6–8.5)
RBC # BLD AUTO: 4.24 10*6/MM3 (ref 3.9–5.2)
SODIUM BLD-SCNC: 142 MMOL/L (ref 136–145)
WBC NRBC COR # BLD: 5.56 10*3/MM3 (ref 4.5–10.7)

## 2018-11-12 PROCEDURE — 36415 COLL VENOUS BLD VENIPUNCTURE: CPT

## 2018-11-12 PROCEDURE — 85025 COMPLETE CBC W/AUTO DIFF WBC: CPT | Performed by: INTERNAL MEDICINE

## 2018-11-12 PROCEDURE — 99213 OFFICE O/P EST LOW 20 MIN: CPT | Performed by: INTERNAL MEDICINE

## 2018-11-12 PROCEDURE — 80053 COMPREHEN METABOLIC PANEL: CPT | Performed by: INTERNAL MEDICINE

## 2018-11-12 NOTE — PROGRESS NOTES
Subjective:     Reason for follow up:   1. Stage 1 (T1bN0), left breast invasive ductal carcinoma, ER+ (90%), RI+, [id number] negative   * status post lumpectomy with SLNB    * status post radiation therapy   * Arimidex started 6/2016 - changed to Aromasin due to arthalgias     History of Present Ilness: Debra S Sandifer presents for follow-up of breast cancer. She remains on Aromiasin. She had a mammogram in February and a diagnostic of left breast repeat study is due in August.thankfully repeat mammogram showed no abnormality and she is due for her next one again in February   Her arthralgias are better than they were on Arimidex but they are still present. Her hot flashes are present and tolerable.   She is due for repeat colonoscopy next year because of polyps  Her bone density was normal when we started treatment on a think we can wait until her next visit which will be 3 years to do the bone density  Her right knee is better after an injection but now she is found to be prediabetic despite exercising and keeping her weight down and she is very frustrated this appears to be familial    Past Medical   Past Medical History:   Diagnosis Date   • Acute sinus infection    • Allergic     codeine and latex   • Arthralgia of both hands    • Arthritis     hand   • Atypical chest pain    • Breast cancer (CMS/HCC)     Left   • Chest pain    • Coronary artery disease involving native coronary artery 11/23/2016   • Cough    • Dyspareunia    • H/O bronchitis    • H/O infectious mononucleosis    • Headache    • High cholesterol    • Hot flashes    • Hot flashes, menopausal    • Hx of radiation therapy    • Hyperlipidemia    • Polyp of sigmoid colon    • Stye     LEFT EYE   • UTI (urinary tract infection)     ON ANTIBIOTICS   • Vitamin D deficiency      Patient Active Problem List   Diagnosis   • Mixed hyperlipidemia   • Malignant neoplasm of upper-inner quadrant of left breast in female, estrogen receptor positive (CMS/HCC)   •  Coronary artery disease involving native coronary artery   • Ischemic cardiomyopathy   • Prediabetes   • History of coronary artery stent placement     Social History   Social History     Socioeconomic History   • Marital status:      Spouse name: Van   • Number of children: Not on file   • Years of education: College   • Highest education level: Not on file   Social Needs   • Financial resource strain: Not on file   • Food insecurity - worry: Not on file   • Food insecurity - inability: Not on file   • Transportation needs - medical: Not on file   • Transportation needs - non-medical: Not on file   Occupational History   • Occupation: Retired      Employer: [employer]     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[place], [place], [place], [place]   Tobacco Use   • Smoking status: Former Smoker     Packs/day: 0.25     Years: 5.00     Pack years: 1.25     Start date:      Last attempt to quit:      Years since quittin.8   • Smokeless tobacco: Former User   • Tobacco comment: smoked no more than 1 pack/week   Substance and Sexual Activity   • Alcohol use: Yes     Alcohol/week: 0.6 oz     Types: 2 Glasses of wine per week     Comment: social drinker/weekends   • Drug use: No   • Sexual activity: Yes     Partners: Male     Birth control/protection: Post-menopausal   Other Topics Concern   • Not on file   Social History Narrative    Enjoys working out, golf, walking, running, grandchildren's activities. Traveled to [place] and [place] in 2015 and [place] [facility] 2016      Family History  Family History   Problem Relation Age of Onset   • Coronary artery disease Mother    • Dementia Mother    • Heart disease Mother         Heart attack w/2 stents   • Heart attack Mother         had heart attack. Heart catheter -2 stents   • Hypertension Father    • Heart disease Father         Coronary heart disease   • Stroke Father         Ischemic   • Diabetes type II Father    • Diabetes Father   "  • Hyperlipidemia Father    • Prostate cancer Brother 51   • Alcohol abuse Maternal Grandfather    • Rheum arthritis Paternal Grandmother    • Arthritis Paternal Grandmother    • Alcohol abuse Paternal Grandfather    • Stroke Paternal Grandfather         Ischemic   • Rheum arthritis Paternal Grandfather    • Diabetes type II Paternal Grandfather    • Diabetes Paternal Grandfather    • Hyperlipidemia Paternal Grandfather    • Hypertension Paternal Grandfather    • Cancer Brother         Prostate   • No Known Problems Maternal Grandmother      Allergies  Allergies   Allergen Reactions   • Codeine Rash   • Latex Rash       Medications: The current medication list was reviewed in the EMR.    Review of Systems  Review of Systems   Constitutional: Negative for activity change, appetite change, chills, diaphoresis, fatigue (11/12/2018), fever and unexpected weight change.   Eyes: Negative.    Respiratory: Negative.  Negative for cough, chest tightness and shortness of breath.    Cardiovascular: Negative.  Negative for chest pain, palpitations and leg swelling.   Gastrointestinal: Negative.  Negative for abdominal pain, blood in stool, constipation, diarrhea, nausea and vomiting.   Genitourinary: Negative.    Musculoskeletal: Positive for arthralgias (better 11/12/2018) and joint swelling (had injection better in the right knee 11/12/2018). Negative for myalgias.   Neurological: Positive for light-headedness (11/12/2018) and headaches (11/12/2018). Negative for dizziness and numbness.   Hematological: Negative for adenopathy. Bruises/bleeds easily (11/12/2018).   Psychiatric/Behavioral: Negative.  Negative for confusion. The patient is not nervous/anxious.        Objective:     Vitals:    11/12/18 0856   BP: 120/72   Pulse: 57   Resp: 14   Temp: 97.6 °F (36.4 °C)   SpO2: 99%  Comment: at rest   Weight: 64.1 kg (141 lb 4.8 oz)   Height: 165 cm (64.96\")  Comment: new ht. without shoes   PainSc: 0-No pain     Current Status " 11/12/2018   ECOG score 0   GENERAL:  Well-developed, well-nourished female in no acute distress.   SKIN:  Warm, dry without rashes, purpura or petechiae.  HENT: Hearing: normal, Lips normal. Dentition: good  LYMPHATICS:  No cervical, supraclavicular, axillary adenopathy.  RESPIRATORY:  Lungs clear to percussion and auscultation. Good airflow. Normal respiratory effort  CARDIAC:  Regular rate and rhythm without murmurs, rubs or gallops. Normal S1,S2. Edema -  No  GI: Soft, nontender, non-distended, no hernia present  PSYCHIATRIC:  Normal affect and mood.  Oriented to person/place/time.  MSK: Gait: Normal; No clubbing, cyanosis. No tenderness or swelling in left knee, right knee  BREASTS: Left breast smaller than right breast, bilateral breast exam without palpable masses, skin changes or nipple discharge      Labs and Imaging  Lab Results   Component Value Date    WBC 6.29 05/24/2018    HGB 13.3 05/24/2018    HCT 40.1 05/24/2018    MCV 89.7 05/24/2018     05/24/2018     Labs from 10/2017 reviewed.     Breast imaging: Mammogram 2/2018  CONCLUSION: Probable benign mammogram with small area of focal  asymmetric density at site of previous lumpectomy in upper inner left  breast and best seen in the CC projection. A short-term follow-up  left-sided mammogram in 6 months is recommended.      BI-RADS CATEGORY 3: Probably benign. Short interval follow-up suggested.    Assessment/Plan     Assessment:   1. Stage 1 (T1bN0), left breast invasive ductal carcinoma, ER+ (90%), PA+, [id number] negative   * status post lumpectomy with SLNB 4/13/16   * Oncotype Dx 20 (low intermediate). No chemotherapy indicated.   * status post radiation therapy   * Arimidex started June 2016. Change to Aromasin due to arthralgias 5/2017. Tolerating well.    * Mammo abnormal 2/2018 and repeat in 6 months has been ordered.     2. Hot flashes. Tolerable. Stable.   3. Joint arthralgias. 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 Present but  improved with Aromasin compared to AI  4. Abnormal mammogram. Repeat in August.   5.  Coronary artery disease with stents    Plan:     1. Continue Aromasin.   2. Annual bilateral mammogram due February 2019;   3. DEXA scan due May 2019   4. Patient was instructed on the importance of physical activity, healthy diet, and self breast exams.  Patient will continue calcium and vitamin D supplementation.      Follow-up in 6 months. I asked the patient to call for any questions, concerns, or new symptoms.    Reviewed and summarized notes, reviewed imaging, reviewed labs (4)

## 2018-11-13 RX ORDER — ATORVASTATIN CALCIUM 40 MG/1
TABLET, FILM COATED ORAL
Qty: 90 TABLET | Refills: 1 | Status: SHIPPED | OUTPATIENT
Start: 2018-11-13 | End: 2019-05-09 | Stop reason: SDUPTHER

## 2019-01-04 ENCOUNTER — OFFICE VISIT (OUTPATIENT)
Dept: CARDIOLOGY | Facility: CLINIC | Age: 64
End: 2019-01-04

## 2019-01-04 VITALS
DIASTOLIC BLOOD PRESSURE: 60 MMHG | BODY MASS INDEX: 23.32 KG/M2 | HEART RATE: 62 BPM | HEIGHT: 65 IN | WEIGHT: 140 LBS | SYSTOLIC BLOOD PRESSURE: 92 MMHG

## 2019-01-04 DIAGNOSIS — I25.10 CORONARY ARTERY DISEASE INVOLVING NATIVE CORONARY ARTERY OF NATIVE HEART WITHOUT ANGINA PECTORIS: Primary | ICD-10-CM

## 2019-01-04 PROCEDURE — 99214 OFFICE O/P EST MOD 30 MIN: CPT | Performed by: INTERNAL MEDICINE

## 2019-01-04 PROCEDURE — 93000 ELECTROCARDIOGRAM COMPLETE: CPT | Performed by: INTERNAL MEDICINE

## 2019-01-04 NOTE — PROGRESS NOTES
Debra S Sandifer  1955  Date of Office Visit: 1/4/19  Encounter Provider: Sanjiv Dykes MD  Place of Service: [facility]    Chief complaints:   CAD  Mixed hyperlipidemia  Ischemic cardiomyopathy    HPI:  Dr. Morales,   I had the pleasure of seeing your patient back in followup. As you well know, she is a pleasant  63-year-old female with no significant history of cardiovascular disease but a history of gastroesophageal reflux disease who presented to the ER in October with the complaint of chest pain after dinner. During that time she had a exercise stress test with perfusion and went 13 minutes with no electrocardiographic changes or evidence of ischemia. She presented back on 11/14/16 and stated that she had intermittent chest discomfort still since October. The day prior to admission she was on a 3 mile walk and had severe central chest discomfort that brought her into the ER. She was noted to have negative cardiac biomarkers, however secondary to her pain but also ST-T wave abnormalities in the anterior lead she was sent for coronary angiography. She was noted to have an occluded LAD and underwent revascularization along with PTCA of the diagonal branch. She had one drug eluting stent in the mid LAD and one in the distal LAD with no difficulties. She tolerated all this well. She had an echocardiogram with a mildly depressed ejection fraction and was  started on carvedilol as well.      It has been a while since we have seen her. She was last seen by CRISTOBAL Wilson, in 06/2018 and had near-syncope to possible syncope while she was walking on a beach. She came in and underwent evaluation including a Zio patch and a transthoracic echocardiogram. Her transthoracic echocardiogram showed her ejection fraction normalized which was an improvement from prior. She had a Zio patch which had 2 very short runs of NSVT and rare PACs and PVCs. No additional intervention  was undertaken.           Review of Systems   Constitution: Negative for fever, weakness and malaise/fatigue.   HENT: Negative for nosebleeds and sore throat.    Eyes: Negative for blurred vision and double vision.   Cardiovascular: Negative for chest pain, claudication, palpitations and syncope.   Respiratory: Negative for cough, shortness of breath and snoring.    Endocrine: Negative for cold intolerance, heat intolerance and polydipsia.   Skin: Negative for itching, poor wound healing and rash.   Musculoskeletal: Negative for joint pain, joint swelling, muscle weakness and myalgias.   Gastrointestinal: Negative for abdominal pain, melena, nausea and vomiting.   Neurological: Negative for light-headedness, loss of balance, seizures and vertigo.   Psychiatric/Behavioral: Negative for altered mental status and depression.          Past Medical History:   Diagnosis Date   • Acute sinus infection    • Allergic     codeine and latex   • Arthralgia of both hands    • Arthritis     hand   • Atypical chest pain    • Breast cancer (CMS/HCC)     Left   • Chest pain    • Coronary artery disease involving native coronary artery 11/23/2016   • Cough    • Dyspareunia    • H/O bronchitis    • H/O infectious mononucleosis    • Headache    • High cholesterol    • Hot flashes    • Hot flashes, menopausal    • Hx of radiation therapy    • Hyperlipidemia    • Polyp of sigmoid colon    • Stye     LEFT EYE   • UTI (urinary tract infection)     ON ANTIBIOTICS   • Vitamin D deficiency        The following portions of the patient's history were reviewed and updated as appropriate: Social history , Family history and Surgical history     Current Outpatient Medications on File Prior to Visit   Medication Sig Dispense Refill   • Acetaminophen (TYLENOL PO) Take 650 mg by mouth 4 (four) times a day as needed.     • atorvastatin (LIPITOR) 40 MG tablet TAKE 1 TABLET BY MOUTH EVERY NIGHT. 90 tablet 1   • BRILINTA 90 MG tablet tablet TAKE 1 TABLET  "BY MOUTH 2 (TWO) TIMES A DAY. 180 tablet 1   • carvedilol (COREG) 3.125 MG tablet TAKE 1 TABLET BY MOUTH EVERY 12 (TWELVE) HOURS. 180 tablet 1   • Cholecalciferol (VITAMIN D3) 5000 UNITS capsule capsule Take 1,000 Units by mouth Daily.     • [facility] ASPIRIN LOW DOSE 81 MG EC tablet TAKE 1 TABLET BY MOUTH DAILY. 90 tablet 1   • exemestane (AROMASIN) 25 MG chemo tablet TAKE 1 TABLET BY MOUTH DAILY 90 tablet 3   • Probiotic Product (PROBIOTIC DAILY PO) Take 1 tablet by mouth daily.       No current facility-administered medications on file prior to visit.        Allergies   Allergen Reactions   • Codeine Rash   • Latex Rash       Vitals:    01/04/19 0944   BP: 92/60   Pulse: 62   Weight: 63.5 kg (140 lb)   Height: 164.5 cm (64.75\")     Physical Exam   Constitutional: She is oriented to person, place, and time. She appears well-developed and well-nourished.   HENT:   Head: Normocephalic and atraumatic.   Eyes: Conjunctivae and EOM are normal. No scleral icterus.   Neck: Normal range of motion. Neck supple. Normal carotid pulses, no hepatojugular reflux and no JVD present. Carotid bruit is not present. No tracheal deviation present. No thyromegaly present.   Cardiovascular: Normal rate and regular rhythm. Exam reveals no gallop and no friction rub.   No murmur heard.  Pulses:       Carotid pulses are 2+ on the right side, and 2+ on the left side.       Radial pulses are 2+ on the right side, and 2+ on the left side.        Femoral pulses are 2+ on the right side, and 2+ on the left side.       Dorsalis pedis pulses are 2+ on the right side, and 2+ on the left side.        Posterior tibial pulses are 2+ on the right side, and 2+ on the left side.   Pulmonary/Chest: Breath sounds normal. No respiratory distress. She has no decreased breath sounds. She has no wheezes. She has no rhonchi. She has no rales. She exhibits no tenderness.   Abdominal: Soft. Bowel sounds are normal. She exhibits no distension. There is no tenderness. " There is no rebound.   Musculoskeletal: She exhibits no edema or deformity.   Neurological: She is alert and oriented to person, place, and time. She has normal strength. No sensory deficit.   Skin: No rash noted. No erythema.   Psychiatric: She has a normal mood and affect. Her behavior is normal.     No components found for: CBC  No results found for: CMP  No components found for: LIPID  No results found for: BMP      ECG 12 Lead  Date/Time: 1/4/2019 10:16 AM  Performed by: Sanjiv Dykes MD  Authorized by: Sanjiv Dykes MD   Comparison: compared with previous ECG from 6/18/2018  Similar to previous ECG  Rhythm: sinus rhythm  Rate: normal  QRS axis: left  Clinical impression: abnormal ECG  Comments: Anteroseptal infarct               Procedures Performed  Procedure(s): 11/15/16  Left Heart Cath  Left ventriculography      Conclusions:   1. Left main: Normal  2. LAD: Proximal segment is normal. Occluded in the mid segment. Tubular 70% distal stenosis.  3. LCX: 30% mid vessel stenosis  4. RCA: Normal  5. Mildly depressed systolic function. Apical to and distal inferior left ventricular wall hypokinesis. Ejection fraction 40%.  6. PTCA of the first diagonal branch with a 2.5 x 12 mm trek Rx balloon.  7. PCI of the distal LAD with a 2.75 x 18 mm Xience Alpine drug-eluting stent  8. PCI of the mid LAD with a 3.0 x 28 mm Xience Alpine drug-eluting stent      6/20/2018  · Left ventricular systolic function is normal. Calculated EF = 62%.  · Left ventricular diastolic function is normal.  · Normal right ventricular cavity size and systolic function noted.  · Mild mitral valve regurgitation is present.  · Calculated right ventricular systolic pressure from tricuspid regurgitation is 23 mmHg.  · There is no evidence of pericardial effusion.    DISCUSSION/SUMMARY  63-year-old female with a medical history as documented above, including unstable angina presentation and then a myocardial infarction, including  an occluded mid-LAD, along with evidence of mildly depressed ejection fraction and ischemic cardiomyopathy, now with normal ejection fraction.   She underwent drug-eluting stent placement of the distal LAD with a 2.75 x 18 mm Xience Alpine drug-eluting stent along with a LAKESHA at 3 mm x 28 mm in the mid-LAD.  She's been doing well since our last visit.  She did have a syncopal episode which was evaluated by Shawanda.  She had short runs of NSVT however there are less than 7 beats and would not lead to syncope.  She is not had any issues since that time other than occasional lightheadedness.    1. CAD.  Prior PCI to the distal LAD, along with proximal LAD with drug-eluting stents as documented above.  PTCA of the diagonal branch.   No angina    -Continue aspirin life-long.      -Stop Brilinta    -Continue Atorvastatin at the current dose.  Lipid panel late last year with high HDL and well controlled LDL.    -Continue Carvedilol 3.125 mg p.o. b.i.d.         2. Ischemic cardiomyopathy.   Ejection fraction now normalized.  Euvolemic.  New York Heart Association 1 symptoms.       -Continue Carvedilol at the current dose.       3. Hyperlipidemia.  Continue atorvastatin at current dose    4.  Lightheadedness: Her blood pressure is low normal.  If this increases I would recommend that she come off of carvedilol and moved to low-dose of Toprol therapy as this should have a blood pressure effect.    Coronary Artery Disease  Assessment  • The patient has no angina    Plan  .• Lifestyle modifications discussed include adhering to a heart healthy diet, avoidance of tobacco products, maintenance of a healthy weight and medication compliance    Subjective - Objective  • There has been a previous stent procedure using LAKESHA on or around 11/15/2016  • Current antiplatelet therapy includes aspirin 81 mg and ticagrelor 90 mg

## 2019-01-21 RX ORDER — SALICYLIC ACID 40 %
ADHESIVE PATCH, MEDICATED TOPICAL
Qty: 90 TABLET | Refills: 1 | Status: SHIPPED | OUTPATIENT
Start: 2019-01-21 | End: 2019-08-14 | Stop reason: SDUPTHER

## 2019-02-12 ENCOUNTER — HOSPITAL ENCOUNTER (OUTPATIENT)
Dept: MAMMOGRAPHY | Facility: HOSPITAL | Age: 64
Discharge: HOME | End: 2019-02-12
Attending: INTERNAL MEDICINE | Admitting: INTERNAL MEDICINE

## 2019-02-12 DIAGNOSIS — Z12.31 VISIT FOR SCREENING MAMMOGRAM: ICD-10-CM

## 2019-02-12 PROCEDURE — 77067 SCR MAMMO BI INCL CAD: CPT

## 2019-02-12 PROCEDURE — 77063 BREAST TOMOSYNTHESIS BI: CPT

## 2019-03-19 ENCOUNTER — TELEPHONE (OUTPATIENT)
Dept: INTERNAL MEDICINE | Facility: CLINIC | Age: 64
End: 2019-03-19

## 2019-03-19 NOTE — TELEPHONE ENCOUNTER
Can she have Dexa done her, Per oncologist? CLC    Yes go ahead and schedule. SLW    Left detailed message

## 2019-03-25 ENCOUNTER — OFFICE (AMBULATORY)
Dept: URBAN - METROPOLITAN AREA CLINIC 75 | Facility: CLINIC | Age: 64
End: 2019-03-25

## 2019-03-25 VITALS
WEIGHT: 143 LBS | DIASTOLIC BLOOD PRESSURE: 50 MMHG | HEART RATE: 78 BPM | SYSTOLIC BLOOD PRESSURE: 100 MMHG | HEIGHT: 65 IN

## 2019-03-25 DIAGNOSIS — Z86.010 PERSONAL HISTORY OF COLONIC POLYPS: ICD-10-CM

## 2019-03-25 PROCEDURE — 99204 OFFICE O/P NEW MOD 45 MIN: CPT | Performed by: NURSE PRACTITIONER

## 2019-04-15 VITALS
DIASTOLIC BLOOD PRESSURE: 80 MMHG | SYSTOLIC BLOOD PRESSURE: 135 MMHG | HEART RATE: 63 BPM | DIASTOLIC BLOOD PRESSURE: 62 MMHG | HEART RATE: 67 BPM | OXYGEN SATURATION: 98 % | SYSTOLIC BLOOD PRESSURE: 146 MMHG | DIASTOLIC BLOOD PRESSURE: 45 MMHG | TEMPERATURE: 96.8 F | OXYGEN SATURATION: 97 % | RESPIRATION RATE: 26 BRPM | SYSTOLIC BLOOD PRESSURE: 86 MMHG | DIASTOLIC BLOOD PRESSURE: 52 MMHG | SYSTOLIC BLOOD PRESSURE: 145 MMHG | RESPIRATION RATE: 11 BRPM | WEIGHT: 143 LBS | RESPIRATION RATE: 19 BRPM | OXYGEN SATURATION: 99 % | RESPIRATION RATE: 14 BRPM | SYSTOLIC BLOOD PRESSURE: 93 MMHG | HEART RATE: 66 BPM | DIASTOLIC BLOOD PRESSURE: 40 MMHG | SYSTOLIC BLOOD PRESSURE: 101 MMHG | SYSTOLIC BLOOD PRESSURE: 124 MMHG | HEART RATE: 58 BPM | HEART RATE: 57 BPM | RESPIRATION RATE: 20 BRPM | TEMPERATURE: 97.4 F | HEART RATE: 69 BPM | SYSTOLIC BLOOD PRESSURE: 92 MMHG | DIASTOLIC BLOOD PRESSURE: 46 MMHG | HEART RATE: 65 BPM | DIASTOLIC BLOOD PRESSURE: 66 MMHG | HEART RATE: 64 BPM | RESPIRATION RATE: 17 BRPM | RESPIRATION RATE: 12 BRPM | OXYGEN SATURATION: 100 % | HEART RATE: 87 BPM | HEIGHT: 65 IN | DIASTOLIC BLOOD PRESSURE: 56 MMHG | HEART RATE: 62 BPM | DIASTOLIC BLOOD PRESSURE: 70 MMHG | SYSTOLIC BLOOD PRESSURE: 105 MMHG | SYSTOLIC BLOOD PRESSURE: 123 MMHG

## 2019-04-16 ENCOUNTER — AMBULATORY SURGICAL CENTER (AMBULATORY)
Dept: URBAN - METROPOLITAN AREA SURGERY 17 | Facility: SURGERY | Age: 64
End: 2019-04-16

## 2019-04-16 DIAGNOSIS — Z86.010 PERSONAL HISTORY OF COLONIC POLYPS: ICD-10-CM

## 2019-04-16 DIAGNOSIS — K64.8 OTHER HEMORRHOIDS: ICD-10-CM

## 2019-04-16 PROCEDURE — 45378 DIAGNOSTIC COLONOSCOPY: CPT | Mod: 33 | Performed by: INTERNAL MEDICINE

## 2019-04-24 ENCOUNTER — LAB (OUTPATIENT)
Dept: INTERNAL MEDICINE | Facility: CLINIC | Age: 64
End: 2019-04-24

## 2019-04-24 PROCEDURE — 90471 IMMUNIZATION ADMIN: CPT | Performed by: INTERNAL MEDICINE

## 2019-04-24 PROCEDURE — 90632 HEPA VACCINE ADULT IM: CPT | Performed by: INTERNAL MEDICINE

## 2019-05-06 ENCOUNTER — LAB (OUTPATIENT)
Dept: OTHER | Facility: HOSPITAL | Age: 64
End: 2019-05-06

## 2019-05-06 ENCOUNTER — OFFICE VISIT (OUTPATIENT)
Dept: ONCOLOGY | Facility: CLINIC | Age: 64
End: 2019-05-06

## 2019-05-06 VITALS
RESPIRATION RATE: 16 BRPM | HEART RATE: 60 BPM | DIASTOLIC BLOOD PRESSURE: 51 MMHG | TEMPERATURE: 97.7 F | SYSTOLIC BLOOD PRESSURE: 105 MMHG | OXYGEN SATURATION: 98 % | HEIGHT: 65 IN | BODY MASS INDEX: 23.41 KG/M2 | WEIGHT: 140.5 LBS

## 2019-05-06 DIAGNOSIS — Z17.0 MALIGNANT NEOPLASM OF UPPER-INNER QUADRANT OF LEFT BREAST IN FEMALE, ESTROGEN RECEPTOR POSITIVE (HCC): Primary | ICD-10-CM

## 2019-05-06 DIAGNOSIS — C50.212 MALIGNANT NEOPLASM OF UPPER-INNER QUADRANT OF LEFT BREAST IN FEMALE, ESTROGEN RECEPTOR POSITIVE (HCC): ICD-10-CM

## 2019-05-06 DIAGNOSIS — C50.212 MALIGNANT NEOPLASM OF UPPER-INNER QUADRANT OF LEFT BREAST IN FEMALE, ESTROGEN RECEPTOR POSITIVE (HCC): Primary | ICD-10-CM

## 2019-05-06 DIAGNOSIS — Z17.0 MALIGNANT NEOPLASM OF UPPER-INNER QUADRANT OF LEFT BREAST IN FEMALE, ESTROGEN RECEPTOR POSITIVE (HCC): ICD-10-CM

## 2019-05-06 LAB
ALBUMIN SERPL-MCNC: 4 G/DL (ref 3.5–5.2)
ALBUMIN/GLOB SERPL: 1.5 G/DL
ALP SERPL-CCNC: 86 U/L (ref 39–117)
ALT SERPL W P-5'-P-CCNC: 20 U/L (ref 1–33)
ANION GAP SERPL CALCULATED.3IONS-SCNC: 9.1 MMOL/L
AST SERPL-CCNC: 27 U/L (ref 1–32)
BASOPHILS # BLD AUTO: 0.05 10*3/MM3 (ref 0–0.2)
BASOPHILS NFR BLD AUTO: 0.9 % (ref 0–1.5)
BILIRUB SERPL-MCNC: 0.7 MG/DL (ref 0.1–1.2)
BUN BLD-MCNC: 13 MG/DL (ref 8–23)
BUN/CREAT SERPL: 15.7 (ref 7–25)
CALCIUM SPEC-SCNC: 9.3 MG/DL (ref 8.6–10.5)
CHLORIDE SERPL-SCNC: 104 MMOL/L (ref 98–107)
CO2 SERPL-SCNC: 27.9 MMOL/L (ref 22–29)
CREAT BLD-MCNC: 0.83 MG/DL (ref 0.57–1)
DEPRECATED RDW RBC AUTO: 46.3 FL (ref 37–54)
EOSINOPHIL # BLD AUTO: 0.14 10*3/MM3 (ref 0–0.4)
EOSINOPHIL NFR BLD AUTO: 2.4 % (ref 0.3–6.2)
ERYTHROCYTE [DISTWIDTH] IN BLOOD BY AUTOMATED COUNT: 13.6 % (ref 12.3–15.4)
GFR SERPL CREATININE-BSD FRML MDRD: 69 ML/MIN/1.73
GLOBULIN UR ELPH-MCNC: 2.6 GM/DL
GLUCOSE BLD-MCNC: 89 MG/DL (ref 65–99)
HCT VFR BLD AUTO: 40.7 % (ref 34–46.6)
HGB BLD-MCNC: 13.1 G/DL (ref 12–15.9)
IMM GRANULOCYTES # BLD AUTO: 0.03 10*3/MM3 (ref 0–0.05)
IMM GRANULOCYTES NFR BLD AUTO: 0.5 % (ref 0–0.5)
LYMPHOCYTES # BLD AUTO: 1.54 10*3/MM3 (ref 0.7–3.1)
LYMPHOCYTES NFR BLD AUTO: 26.2 % (ref 19.6–45.3)
MCH RBC QN AUTO: 29.5 PG (ref 26.6–33)
MCHC RBC AUTO-ENTMCNC: 32.2 G/DL (ref 31.5–35.7)
MCV RBC AUTO: 91.7 FL (ref 79–97)
MONOCYTES # BLD AUTO: 0.6 10*3/MM3 (ref 0.1–0.9)
MONOCYTES NFR BLD AUTO: 10.2 % (ref 5–12)
NEUTROPHILS # BLD AUTO: 3.51 10*3/MM3 (ref 1.7–7)
NEUTROPHILS NFR BLD AUTO: 59.8 % (ref 42.7–76)
NRBC BLD AUTO-RTO: 0 /100 WBC (ref 0–0.2)
PLATELET # BLD AUTO: 239 10*3/MM3 (ref 140–450)
PMV BLD AUTO: 9.4 FL (ref 6–12)
POTASSIUM BLD-SCNC: 4.3 MMOL/L (ref 3.5–5.2)
PROT SERPL-MCNC: 6.6 G/DL (ref 6–8.5)
RBC # BLD AUTO: 4.44 10*6/MM3 (ref 3.77–5.28)
SODIUM BLD-SCNC: 141 MMOL/L (ref 136–145)
WBC NRBC COR # BLD: 5.87 10*3/MM3 (ref 3.4–10.8)

## 2019-05-06 PROCEDURE — 85025 COMPLETE CBC W/AUTO DIFF WBC: CPT | Performed by: INTERNAL MEDICINE

## 2019-05-06 PROCEDURE — 99214 OFFICE O/P EST MOD 30 MIN: CPT | Performed by: INTERNAL MEDICINE

## 2019-05-06 PROCEDURE — 36415 COLL VENOUS BLD VENIPUNCTURE: CPT

## 2019-05-06 PROCEDURE — 80053 COMPREHEN METABOLIC PANEL: CPT | Performed by: INTERNAL MEDICINE

## 2019-05-06 NOTE — PROGRESS NOTES
Subjective:     Reason for follow up:   1. Stage 1 (T1bN0), left breast invasive ductal carcinoma, ER+ (90%), CO+, [id number] negative   * status post lumpectomy with SLNB    * status post radiation therapy   * Arimidex started 6/2016 - changed to Aromasin due to arthalgias     History of Present Ilness: Debra S Sandifer presents for follow-up of breast cancer. She remains on Aromiasin. She had a mammogram in February which showed no abnormality an her bone density was totally normal   Her arthralgias are better than they were on Arimidex but they are still present. Her hot flashes are present and tolerable.   She had her repeat colonoscopy  because of polyps and it was totally n negative    She is off her Brilinta and overall is doing well continues on aspirin for heart disease and is exercising.  Her cholesterol and lipids much improved    Her co-pay for Aromasin is $50 and she is able to afford this     she has noted a bony nodule on her left wrist and wants me to evaluate this        Past Medical   Past Medical History:   Diagnosis Date   • Acute sinus infection    • Allergic     codeine and latex   • Arthralgia of both hands    • Arthritis     hand   • Atypical chest pain    • Breast cancer (CMS/HCC)     Left   • Chest pain    • Coronary artery disease involving native coronary artery 11/23/2016   • Cough    • Dyspareunia    • H/O bronchitis    • H/O infectious mononucleosis    • Headache    • High cholesterol    • Hot flashes    • Hot flashes, menopausal    • Hx of radiation therapy    • Hyperlipidemia    • Polyp of sigmoid colon    • Stye     LEFT EYE   • UTI (urinary tract infection)     ON ANTIBIOTICS   • Vitamin D deficiency      Patient Active Problem List   Diagnosis   • Mixed hyperlipidemia   • Malignant neoplasm of upper-inner quadrant of left breast in female, estrogen receptor positive (CMS/HCC)   • Coronary artery disease involving native coronary artery   • Ischemic cardiomyopathy   • Prediabetes   •  History of coronary artery stent placement     Social History   Social History     Socioeconomic History   • Marital status:      Spouse name: Van   • Number of children: Not on file   • Years of education: College   • Highest education level: Not on file   Occupational History   • Occupation: Retired      Employer: [employer]     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[place], [place], [place], [place]   Tobacco Use   • Smoking status: Former Smoker     Packs/day: 0.25     Years: 5.00     Pack years: 1.25     Start date:      Last attempt to quit:      Years since quittin.3   • Smokeless tobacco: Former User   • Tobacco comment: smoked no more than 1 pack/week   Substance and Sexual Activity   • Alcohol use: Yes     Alcohol/week: 0.6 oz     Types: 2 Glasses of wine per week     Comment: social drinker/weekends   • Drug use: No   • Sexual activity: Yes     Partners: Male     Birth control/protection: Post-menopausal   Social History Narrative    Enjoys working out, golf, walking, running, grandchildren's activities. Traveled to [place] and [place] in 2015 and [facility] 2016      Family History  Family History   Problem Relation Age of Onset   • Coronary artery disease Mother    • Dementia Mother    • Heart disease Mother         Heart attack w/2 stents   • Heart attack Mother         had heart attack. Heart catheter -2 stents   • Hypertension Father    • Heart disease Father         Coronary heart disease   • Stroke Father         Ischemic   • Diabetes type II Father    • Diabetes Father    • Hyperlipidemia Father    • Prostate cancer Brother 51   • Alcohol abuse Maternal Grandfather    • Rheum arthritis Paternal Grandmother    • Arthritis Paternal Grandmother    • Alcohol abuse Paternal Grandfather    • Stroke Paternal Grandfather         Ischemic   • Rheum arthritis Paternal Grandfather    • Diabetes type II Paternal Grandfather    • Diabetes Paternal Grandfather    •  "Hyperlipidemia Paternal Grandfather    • Hypertension Paternal Grandfather    • Cancer Brother         Prostate   • No Known Problems Maternal Grandmother      Allergies  Allergies   Allergen Reactions   • Codeine Rash   • Latex Rash       Medications: The current medication list was reviewed in the EMR.    Review of Systems  Review of Systems   Constitutional: Negative for activity change, appetite change, chills, diaphoresis, fatigue (11/12/2018), fever and unexpected weight change.   Eyes: Negative.    Respiratory: Negative.  Negative for cough, chest tightness and shortness of breath.    Cardiovascular: Negative.  Negative for chest pain, palpitations and leg swelling.   Gastrointestinal: Negative.  Negative for abdominal pain, blood in stool, constipation, diarrhea, nausea and vomiting.   Genitourinary: Negative.    Musculoskeletal: Positive for arthralgias (same 5/6/19) and joint swelling (same right knee 5/6/19). Negative for myalgias.   Neurological: Positive for light-headedness (same 5/6/19) and headaches (same 5/6/19). Negative for dizziness and numbness.   Hematological: Negative for adenopathy. Does not bruise/bleed easily (stable since going off medication 5/6/19).   Psychiatric/Behavioral: Negative.  Negative for confusion. The patient is not nervous/anxious.        Objective:     Vitals:    05/06/19 0854   BP: 105/51   Pulse: 60   Resp: 16   Temp: 97.7 °F (36.5 °C)   SpO2: 98%   Weight: 63.7 kg (140 lb 8 oz)   Height: 165 cm (64.96\")   PainSc: 0-No pain     Current Status 5/6/2019   ECOG score 0   GENERAL:  Well-developed, well-nourished female in no acute distress.   SKIN:  Warm, dry without rashes, purpura or petechiae.  HENT: Hearing: normal, Lips normal. Dentition: good  LYMPHATICS:  No cervical, supraclavicular, axillary adenopathy.  RESPIRATORY:  Lungs clear to percussion and auscultation. Good airflow. Normal respiratory effort  CARDIAC:  Regular rate and rhythm without murmurs, rubs or " gallops. Normal S1,S2. Edema -  No  GI: Soft, nontender, non-distended, no hernia present  PSYCHIATRIC:  Normal affect and mood.  Oriented to person/place/time.  MSK: Gait: Normal; No clubbing, cyanosis. No tenderness or swelling in left knee, right knee-bony nodule left radius at the wrist recommend x-ray  BREASTS: Left breast smaller than right breast, bilateral breast exam without palpable masses, skin changes or nipple discharge-fibrocystic nodularity bilaterally      Labs and Imaging  Lab Results   Component Value Date    WBC 5.87 05/06/2019    HGB 13.1 05/06/2019    HCT 40.7 05/06/2019    MCV 91.7 05/06/2019     05/06/2019     Labs from 10/2017 reviewed.     Breast imaging: Mammogram 2/2018  CONCLUSION: Probable benign mammogram with small area of focal  asymmetric density at site of previous lumpectomy in upper inner left  breast and best seen in the CC projection. A short-term follow-up  left-sided mammogram in 6 months is recommended.      BI-RADS CATEGORY 3: Probably benign. Short interval follow-up suggested.    MAMMO SCREENING DIGITAL TOMOSYNTHESIS BILATERAL W CAD-     CONCLUSION: There is no evidence for malignancy nor significant change  in either breast. BI-RADS Category 2, benign.     Recommendation: Monthly self-examination and annual mammography.     This report was finalized on 2/12/2019                   Assessment/Plan     Assessment:   1. Stage 1 (T1bN0), left breast invasive ductal carcinoma, ER+ (90%), MT+, [id number] negative   * status post lumpectomy with SLNB 4/13/16   * Oncotype Dx 20 (low intermediate). No chemotherapy indicated.   * status post radiation therapy   * Arimidex started June 2016. Change to Aromasin due to arthralgias 5/2017. Tolerating well.    * Mammo abnormal 2/2018 and repeat in 6 months has been ordered.     2. Hot flashes. Tolerable. Stable.   3. Joint arthralgias. 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 Present but improved with Aromasin compared to  AI  4. Abnormal mammogram. Repeat in August.   5.  Coronary artery disease with stents  6 bony prominence left distal radius.   Plan:     1. Continue Aromasin.   2. Annual bilateral mammogram due February 2020;   3. . Patient was instructed on the importance of physical activity, healthy diet, and self breast exams.  Patient will continue calcium and vitamin D supplementation.    4.  Recommend x-ray and hand surgery evaluation of her bony prominence in the left wrist  Follow-up in 6 months. I asked the patient to call for any questions, concerns, or new symptoms.    Reviewed and summarized notes, reviewed imaging, reviewed labs (4) including labs from her family doctor's office

## 2019-05-08 RX ORDER — EXEMESTANE 25 MG/1
25 TABLET ORAL DAILY
Qty: 90 TABLET | Refills: 1 | Status: SHIPPED | OUTPATIENT
Start: 2019-05-08 | End: 2019-11-04 | Stop reason: SDUPTHER

## 2019-05-09 RX ORDER — CARVEDILOL 3.12 MG/1
TABLET ORAL
Qty: 180 TABLET | Refills: 1 | Status: SHIPPED | OUTPATIENT
Start: 2019-05-09 | End: 2019-09-10

## 2019-05-09 RX ORDER — ATORVASTATIN CALCIUM 40 MG/1
TABLET, FILM COATED ORAL
Qty: 90 TABLET | Refills: 1 | Status: SHIPPED | OUTPATIENT
Start: 2019-05-09 | End: 2019-09-10

## 2019-05-09 NOTE — PROGRESS NOTES
Subjective:     Reason for follow up:   1. Stage 1 (T1bN0), left breast invasive ductal carcinoma, ER+ (90%), TN+, [id number] negative   * status post lumpectomy with SLNB    * status post radiation therapy   * Arimidex started 6/2016 - changed to Aromasin due to arthalgias     History of Present Ilness: Debra S Sandifer presents for follow-up of breast cancer. She remains on Aromiasin. She had a mammogram in February which showed no abnormality an her bone density was totally normal   Her arthralgias are better than they were on Arimidex but they are still present. Her hot flashes are present and tolerable.   She had her repeat colonoscopy  because of polyps and it was totally n negative    She is off her Brilinta and overall is doing well continues on aspirin for heart disease and is exercising.  Her cholesterol and lipids much improved    Her co-pay for Aromasin is $50 and she is able to afford this     she has noted a bony nodule on her left wrist and wants me to evaluate this        Past Medical   Past Medical History:   Diagnosis Date   • Acute sinus infection    • Allergic     codeine and latex   • Arthralgia of both hands    • Arthritis     hand   • Atypical chest pain    • Breast cancer (CMS/HCC)     Left   • Chest pain    • Coronary artery disease involving native coronary artery 11/23/2016   • Cough    • Dyspareunia    • H/O bronchitis    • H/O infectious mononucleosis    • Headache    • High cholesterol    • Hot flashes    • Hot flashes, menopausal    • Hx of radiation therapy    • Hyperlipidemia    • Polyp of sigmoid colon    • Stye     LEFT EYE   • UTI (urinary tract infection)     ON ANTIBIOTICS   • Vitamin D deficiency      Patient Active Problem List   Diagnosis   • Mixed hyperlipidemia   • Malignant neoplasm of upper-inner quadrant of left breast in female, estrogen receptor positive (CMS/HCC)   • Coronary artery disease involving native coronary artery   • Ischemic cardiomyopathy   • Prediabetes   •  History of coronary artery stent placement     Social History   Social History     Socioeconomic History   • Marital status:      Spouse name: Van   • Number of children: Not on file   • Years of education: College   • Highest education level: Not on file   Occupational History   • Occupation: Retired      Employer: [employer]     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[place], [place], [place], [place]   Tobacco Use   • Smoking status: Former Smoker     Packs/day: 0.25     Years: 5.00     Pack years: 1.25     Start date:      Last attempt to quit:      Years since quittin.3   • Smokeless tobacco: Former User   • Tobacco comment: smoked no more than 1 pack/week   Substance and Sexual Activity   • Alcohol use: Yes     Alcohol/week: 0.6 oz     Types: 2 Glasses of wine per week     Comment: social drinker/weekends   • Drug use: No   • Sexual activity: Yes     Partners: Male     Birth control/protection: Post-menopausal   Social History Narrative    Enjoys working out, golf, walking, running, grandchildren's activities. Traveled to [place] and [place] in 2015 and [facility] 2016      Family History  Family History   Problem Relation Age of Onset   • Coronary artery disease Mother    • Dementia Mother    • Heart disease Mother         Heart attack w/2 stents   • Heart attack Mother         had heart attack. Heart catheter -2 stents   • Hypertension Father    • Heart disease Father         Coronary heart disease   • Stroke Father         Ischemic   • Diabetes type II Father    • Diabetes Father    • Hyperlipidemia Father    • Prostate cancer Brother 51   • Alcohol abuse Maternal Grandfather    • Rheum arthritis Paternal Grandmother    • Arthritis Paternal Grandmother    • Alcohol abuse Paternal Grandfather    • Stroke Paternal Grandfather         Ischemic   • Rheum arthritis Paternal Grandfather    • Diabetes type II Paternal Grandfather    • Diabetes Paternal Grandfather    •  "Hyperlipidemia Paternal Grandfather    • Hypertension Paternal Grandfather    • Cancer Brother         Prostate   • No Known Problems Maternal Grandmother      Allergies  Allergies   Allergen Reactions   • Codeine Rash   • Latex Rash       Medications: The current medication list was reviewed in the EMR.    Review of Systems  Review of Systems   Constitutional: Negative for activity change, appetite change, chills, diaphoresis, fatigue (11/12/2018), fever and unexpected weight change.   Eyes: Negative.    Respiratory: Negative.  Negative for cough, chest tightness and shortness of breath.    Cardiovascular: Negative.  Negative for chest pain, palpitations and leg swelling.   Gastrointestinal: Negative.  Negative for abdominal pain, blood in stool, constipation, diarrhea, nausea and vomiting.   Genitourinary: Negative.    Musculoskeletal: Positive for arthralgias (same 5/6/19) and joint swelling (same right knee 5/6/19). Negative for myalgias.   Neurological: Positive for light-headedness (same 5/6/19) and headaches (same 5/6/19). Negative for dizziness and numbness.   Hematological: Negative for adenopathy. Does not bruise/bleed easily (stable since going off medication 5/6/19).   Psychiatric/Behavioral: Negative.  Negative for confusion. The patient is not nervous/anxious.        Objective:     Vitals:    05/06/19 0854   BP: 105/51   Pulse: 60   Resp: 16   Temp: 97.7 °F (36.5 °C)   SpO2: 98%   Weight: 63.7 kg (140 lb 8 oz)   Height: 165 cm (64.96\")   PainSc: 0-No pain     Current Status 5/6/2019   ECOG score 0   GENERAL:  Well-developed, well-nourished female in no acute distress.   SKIN:  Warm, dry without rashes, purpura or petechiae.  HENT: Hearing: normal, Lips normal. Dentition: good  LYMPHATICS:  No cervical, supraclavicular, axillary adenopathy.  RESPIRATORY:  Lungs clear to percussion and auscultation. Good airflow. Normal respiratory effort  CARDIAC:  Regular rate and rhythm without murmurs, rubs or " gallops. Normal S1,S2. Edema -  No  GI: Soft, nontender, non-distended, no hernia present  PSYCHIATRIC:  Normal affect and mood.  Oriented to person/place/time.  MSK: Gait: Normal; No clubbing, cyanosis. No tenderness or swelling in left knee, right knee-bony nodule left radius at the wrist recommend x-ray  BREASTS: Left breast smaller than right breast, bilateral breast exam without palpable masses, skin changes or nipple discharge-fibrocystic nodularity bilaterally      Labs and Imaging  Lab Results   Component Value Date    WBC 5.87 05/06/2019    HGB 13.1 05/06/2019    HCT 40.7 05/06/2019    MCV 91.7 05/06/2019     05/06/2019     Labs from 10/2017 reviewed.     Breast imaging: Mammogram 2/2018  CONCLUSION: Probable benign mammogram with small area of focal  asymmetric density at site of previous lumpectomy in upper inner left  breast and best seen in the CC projection. A short-term follow-up  left-sided mammogram in 6 months is recommended.      BI-RADS CATEGORY 3: Probably benign. Short interval follow-up suggested.    MAMMO SCREENING DIGITAL TOMOSYNTHESIS BILATERAL W CAD-     CONCLUSION: There is no evidence for malignancy nor significant change  in either breast. BI-RADS Category 2, benign.     Recommendation: Monthly self-examination and annual mammography.     This report was finalized on 2/12/2019                   Assessment/Plan     Assessment:   1. Stage 1 (T1bN0), left breast invasive ductal carcinoma, ER+ (90%), CO+, [id number] negative   * status post lumpectomy with SLNB 4/13/16   * Oncotype Dx 20 (low intermediate). No chemotherapy indicated.   * status post radiation therapy   * Arimidex started June 2016. Change to Aromasin due to arthralgias 5/2017. Tolerating well.    * Mammo abnormal 2/2018 and repeat in 6 months has been ordered.     2. Hot flashes. Tolerable. Stable.   3. Joint arthralgias. 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 Present but improved with Aromasin compared to  AI  4. Abnormal mammogram. Repeat in August.   5.  Coronary artery disease with stents  6 bony prominence left distal radius.   Plan:     1. Continue Aromasin.   2. Annual bilateral mammogram due February 2020;   3. . Patient was instructed on the importance of physical activity, healthy diet, and self breast exams.  Patient will continue calcium and vitamin D supplementation.    4.  Recommend x-ray and hand surgery evaluation of her bony prominence in the left wrist  Follow-up in 6 months. I asked the patient to call for any questions, concerns, or new symptoms.    Reviewed and summarized notes, reviewed imaging, reviewed labs (4) including labs from her family doctor's office

## 2019-08-07 ENCOUNTER — TELEPHONE (OUTPATIENT)
Dept: CARDIOLOGY | Facility: CLINIC | Age: 64
End: 2019-08-07

## 2019-08-07 NOTE — TELEPHONE ENCOUNTER
Pt called because  She is needing clearance for knee surgery. It will be with Dr. Titus. It is scheduled for 9/26/19. Letter needs to be faxed to [phone] and put ATTN: Kassidy.........Kassidy

## 2019-08-14 RX ORDER — ASPIRIN 81 MG/1
TABLET ORAL
Qty: 90 TABLET | Refills: 1 | Status: SHIPPED | OUTPATIENT
Start: 2019-08-14 | End: 2019-09-10

## 2019-08-28 ENCOUNTER — TELEPHONE (OUTPATIENT)
Dept: CARDIOLOGY | Facility: CLINIC | Age: 64
End: 2019-08-28

## 2019-08-28 NOTE — TELEPHONE ENCOUNTER
Regarding: Prescription Question  Contact: [phone]  ----- Message from [employer], Generic sent at 2019  2:20 PM EDT -----    Dr. Dykes,  I am scheduled for a right full knee replacement surgery on 2019.  You have already given cardiac clearance to [facility] for Dr. Van Titus to do the surgery.  My question is: I have been told to be off Aspirin 2 weeks prior to this surgery.  Is there any issue on your end with me going off my 81 MG Aspirin for this period of time?  In addition to the Aspirin, the only other heart prescription meds I am on are 3.125MG Carvedilol and 40MG Atorvastatin.  Thank you,  Debra Sandifer  : [phone]  [email or web address]

## 2019-09-10 ENCOUNTER — APPOINTMENT (OUTPATIENT)
Dept: PREADMISSION TESTING | Facility: HOSPITAL | Age: 64
End: 2019-09-10

## 2019-09-10 ENCOUNTER — HOSPITAL ENCOUNTER (OUTPATIENT)
Dept: GENERAL RADIOLOGY | Facility: HOSPITAL | Age: 64
Discharge: HOME | End: 2019-09-10

## 2019-09-10 ENCOUNTER — HOSPITAL ENCOUNTER (OUTPATIENT)
Dept: GENERAL RADIOLOGY | Facility: HOSPITAL | Age: 64
Discharge: HOME | End: 2019-09-10
Admitting: ORTHOPAEDIC SURGERY

## 2019-09-10 VITALS
RESPIRATION RATE: 16 BRPM | HEART RATE: 67 BPM | WEIGHT: 140 LBS | HEIGHT: 65 IN | DIASTOLIC BLOOD PRESSURE: 70 MMHG | TEMPERATURE: 97.6 F | SYSTOLIC BLOOD PRESSURE: 118 MMHG | OXYGEN SATURATION: 97 % | BODY MASS INDEX: 23.32 KG/M2

## 2019-09-10 LAB
ALBUMIN SERPL-MCNC: 4.6 G/DL (ref 3.5–5.2)
ALBUMIN/GLOB SERPL: 1.9 G/DL
ALP SERPL-CCNC: 91 U/L (ref 39–117)
ALT SERPL W P-5'-P-CCNC: 24 U/L (ref 1–33)
ANION GAP SERPL CALCULATED.3IONS-SCNC: 9.9 MMOL/L (ref 5–15)
APTT PPP: 26.7 SECONDS (ref 22.7–35.4)
AST SERPL-CCNC: 31 U/L (ref 1–32)
BACTERIA UR QL AUTO: ABNORMAL /HPF
BASOPHILS # BLD AUTO: 0.06 10*3/MM3 (ref 0–0.2)
BASOPHILS NFR BLD AUTO: 1 % (ref 0–1.5)
BILIRUB SERPL-MCNC: 0.6 MG/DL (ref 0.2–1.2)
BILIRUB UR QL STRIP: NEGATIVE
BUN BLD-MCNC: 19 MG/DL (ref 8–23)
BUN/CREAT SERPL: 22.4 (ref 7–25)
CALCIUM SPEC-SCNC: 9.5 MG/DL (ref 8.6–10.5)
CHLORIDE SERPL-SCNC: 104 MMOL/L (ref 98–107)
CLARITY UR: CLEAR
CO2 SERPL-SCNC: 26.1 MMOL/L (ref 22–29)
COLOR UR: ABNORMAL
CREAT BLD-MCNC: 0.85 MG/DL (ref 0.57–1)
DEPRECATED RDW RBC AUTO: 49.6 FL (ref 37–54)
EOSINOPHIL # BLD AUTO: 0.15 10*3/MM3 (ref 0–0.4)
EOSINOPHIL NFR BLD AUTO: 2.5 % (ref 0.3–6.2)
ERYTHROCYTE [DISTWIDTH] IN BLOOD BY AUTOMATED COUNT: 14.5 % (ref 12.3–15.4)
GFR SERPL CREATININE-BSD FRML MDRD: 68 ML/MIN/1.73
GLOBULIN UR ELPH-MCNC: 2.4 GM/DL
GLUCOSE BLD-MCNC: 88 MG/DL (ref 65–99)
GLUCOSE UR STRIP-MCNC: NEGATIVE MG/DL
HCT VFR BLD AUTO: 42.7 % (ref 34–46.6)
HGB BLD-MCNC: 13.4 G/DL (ref 12–15.9)
HGB UR QL STRIP.AUTO: NEGATIVE
HYALINE CASTS UR QL AUTO: ABNORMAL /LPF
IMM GRANULOCYTES # BLD AUTO: 0.05 10*3/MM3 (ref 0–0.05)
IMM GRANULOCYTES NFR BLD AUTO: 0.8 % (ref 0–0.5)
INR PPP: 0.97 (ref 0.9–1.1)
KETONES UR QL STRIP: ABNORMAL
LEUKOCYTE ESTERASE UR QL STRIP.AUTO: ABNORMAL
LYMPHOCYTES # BLD AUTO: 2.03 10*3/MM3 (ref 0.7–3.1)
LYMPHOCYTES NFR BLD AUTO: 33.2 % (ref 19.6–45.3)
MCH RBC QN AUTO: 29.4 PG (ref 26.6–33)
MCHC RBC AUTO-ENTMCNC: 31.4 G/DL (ref 31.5–35.7)
MCV RBC AUTO: 93.6 FL (ref 79–97)
MONOCYTES # BLD AUTO: 0.65 10*3/MM3 (ref 0.1–0.9)
MONOCYTES NFR BLD AUTO: 10.6 % (ref 5–12)
NEUTROPHILS # BLD AUTO: 3.18 10*3/MM3 (ref 1.7–7)
NEUTROPHILS NFR BLD AUTO: 51.9 % (ref 42.7–76)
NITRITE UR QL STRIP: NEGATIVE
NRBC BLD AUTO-RTO: 0 /100 WBC (ref 0–0.2)
PH UR STRIP.AUTO: <=5 [PH] (ref 5–8)
PLATELET # BLD AUTO: 267 10*3/MM3 (ref 140–450)
PMV BLD AUTO: 9.7 FL (ref 6–12)
POTASSIUM BLD-SCNC: 4.2 MMOL/L (ref 3.5–5.2)
PROT SERPL-MCNC: 7 G/DL (ref 6–8.5)
PROT UR QL STRIP: NEGATIVE
PROTHROMBIN TIME: 12.5 SECONDS (ref 11.7–14.2)
RBC # BLD AUTO: 4.56 10*6/MM3 (ref 3.77–5.28)
RBC # UR: ABNORMAL /HPF
REF LAB TEST METHOD: ABNORMAL
SODIUM BLD-SCNC: 140 MMOL/L (ref 136–145)
SP GR UR STRIP: >=1.03 (ref 1–1.03)
SQUAMOUS #/AREA URNS HPF: ABNORMAL /HPF
UROBILINOGEN UR QL STRIP: ABNORMAL
WBC NRBC COR # BLD: 6.12 10*3/MM3 (ref 3.4–10.8)
WBC UR QL AUTO: ABNORMAL /HPF

## 2019-09-10 PROCEDURE — 85730 THROMBOPLASTIN TIME PARTIAL: CPT | Performed by: ORTHOPAEDIC SURGERY

## 2019-09-10 PROCEDURE — 80053 COMPREHEN METABOLIC PANEL: CPT | Performed by: ORTHOPAEDIC SURGERY

## 2019-09-10 PROCEDURE — 36415 COLL VENOUS BLD VENIPUNCTURE: CPT

## 2019-09-10 PROCEDURE — 71046 X-RAY EXAM CHEST 2 VIEWS: CPT

## 2019-09-10 PROCEDURE — 85610 PROTHROMBIN TIME: CPT | Performed by: ORTHOPAEDIC SURGERY

## 2019-09-10 PROCEDURE — 81001 URINALYSIS AUTO W/SCOPE: CPT | Performed by: ORTHOPAEDIC SURGERY

## 2019-09-10 PROCEDURE — 93010 ELECTROCARDIOGRAM REPORT: CPT | Performed by: INTERNAL MEDICINE

## 2019-09-10 PROCEDURE — 73560 X-RAY EXAM OF KNEE 1 OR 2: CPT

## 2019-09-10 PROCEDURE — 85025 COMPLETE CBC W/AUTO DIFF WBC: CPT | Performed by: ORTHOPAEDIC SURGERY

## 2019-09-10 PROCEDURE — 93005 ELECTROCARDIOGRAM TRACING: CPT

## 2019-09-10 RX ORDER — CARVEDILOL 3.12 MG/1
3.12 TABLET ORAL 2 TIMES DAILY WITH MEALS
COMMUNITY
End: 2019-11-04 | Stop reason: SDUPTHER

## 2019-09-10 RX ORDER — ATORVASTATIN CALCIUM 40 MG/1
40 TABLET, FILM COATED ORAL NIGHTLY
COMMUNITY
End: 2019-11-11 | Stop reason: SDUPTHER

## 2019-09-10 RX ORDER — CHLORHEXIDINE GLUCONATE 500 MG/1
CLOTH TOPICAL
Status: ON HOLD | COMMUNITY
End: 2019-09-26

## 2019-09-10 RX ORDER — MELATONIN
1000 EVERY MORNING
COMMUNITY

## 2019-09-10 ASSESSMENT — KOOS JR
KOOS JR SCORE: 68.284
KOOS JR SCORE: 7

## 2019-09-10 NOTE — DISCHARGE INSTRUCTIONS
Take the following medications the morning of surgery with a small sip of water:    ARRIVE AT 5:15    CARVEDILOL    General Instructions:  • Do not eat solid food after midnight the night before surgery.  • You may drink clear liquids day of surgery but must stop at least one hour before your hospital arrival time.  • It is beneficial for you to have a clear drink that contains carbohydrates the day of surgery.  We suggest a 12 to 20 ounce bottle of Gatorade or Powerade for non-diabetic patients or a 12 to 20 ounce bottle of G2 or Powerade Zero for diabetic patients. (Pediatric patients, are not advised to drink a 12 to 20 ounce carbohydrate drink)    Clear liquids are liquids you can see through.  Nothing red in color.     Plain water                               Sports drinks  Sodas                                   Gelatin (Jell-O)  Fruit juices without pulp such as white grape juice and apple juice  Popsicles that contain no fruit or yogurt  Tea or coffee (no cream or milk added)  Gatorade / Powerade  G2 / Powerade Zero    • Infants may have breast milk up to four hours before surgery.  • Infants drinking formula may drink formula up to six hours before surgery.   • Patients who avoid smoking, chewing tobacco and alcohol for 4 weeks prior to surgery have a reduced risk of post-operative complications.  Quit smoking as many days before surgery as you can.  • Do not smoke, use chewing tobacco or drink alcohol the day of surgery.   • If applicable bring your C-PAP/ BI-PAP machine.  • Bring any papers given to you in the doctor’s office.  • Wear clean comfortable clothes and socks.  • Do not wear contact lenses, false eyelashes or make-up.  Bring a case for your glasses.   • Bring crutches or walker if applicable.  • Remove all piercings.  Leave jewelry and any other valuables at home.  • Hair extensions with metal clips must be removed prior to surgery.  • The Pre-Admission Testing nurse will instruct you to bring  medications if unable to obtain an accurate list in Pre-Admission Testing.        If you were given a blood bank ID arm band remember to bring it with you the day of surgery.    Preventing a Surgical Site Infection:  • For 2 to 3 days before surgery, avoid shaving with a razor because the razor can irritate skin and make it easier to develop an infection.    • Any areas of open skin can increase the risk of a post-operative wound infection by allowing bacteria to enter and travel throughout the body.  Notify your surgeon if you have any skin wounds / rashes even if it is not near the expected surgical site.  The area will need assessed to determine if surgery should be delayed until it is healed.  • The night prior to surgery sleep in a clean bed with clean clothing.  Do not allow pets to sleep with you.  • Shower on the morning of surgery using a fresh bar of anti-bacterial soap (such as Dial) and clean washcloth.  Dry with a clean towel and dress in clean clothing.  • Ask your surgeon if you will be receiving antibiotics prior to surgery.  • Make sure you, your family, and all healthcare providers clean their hands with soap and water or an alcohol based hand  before caring for you or your wound.    Day of surgery:  Upon arrival, a Pre-op nurse and Anesthesiologist will review your health history, obtain vital signs, and answer questions you may have.  The only belongings needed at this time will be a list of your home medications and if applicable your C-PAP/BI-PAP machine.  If you are staying overnight your family can leave the rest of your belongings in the car and bring them to your room later.  A Pre-op nurse will start an IV and you may receive medication in preparation for surgery, including something to help you relax.  Your family will be able to see you in the Pre-op area.  While you are in surgery your family should notify the waiting room  if they leave the waiting room area and  provide a contact phone number.    Please be aware that surgery does come with discomfort.  We want to make every effort to control your discomfort so please discuss any uncontrolled symptoms with your nurse.   Your doctor will most likely have prescribed pain medications.      If you are going home after surgery you will receive individualized written care instructions before being discharged.  A responsible adult must drive you to and from the hospital on the day of your surgery and stay with you for 24 hours.    If you are staying overnight following surgery, you will be transported to your hospital room following the recovery period.  [facility] has all private rooms.    You have received a list of surgical assistants for your reference.  If you have any questions please call Pre-Admission Testing at [phone].  Deductibles and co-payments are collected on the day of service. Please be prepared to pay the required co-pay, deductible or deposit on the day of service as defined by your plan.    2% CHLORAHEXIDINE GLUCONATE* CLOTH  Preparing or “prepping” skin before surgery can reduce the risk of infection at the surgical site. To make the process easier, [facility] has chosen disposable cloths moistened with a rinse-free, 2% Chlorhexidine Gluconate (CHG) antiseptic solution. The steps below outline the prepping process and should be carefully followed.        Use the prep cloth on the area that is circled in the diagram             Directions Night before Surgery  1) Shower using a fresh bar of anti-bacterial soap (such as Dial) and clean washcloth.  Use a clean towel to completely dry your skin.  2) Do not use any lotions, oils or creams on your skin.  3) Open the package and remove 1 cloth, wipe your skin for 30 seconds in a circular motion.  Allow to dry for 3 minutes.  4) Repeat #3 with second cloth.  5) Do not touch your eyes, ears, or mouth with the prep cloth.  6) Allow the wet  prep solution to air dry.  7) Discard the prep cloth and wash your hands with soap and water.   8) Dress in clean bed clothes and sleep on fresh clean bed sheets.   9) You may experience some temporary itching after the prep.    Directions Day of Surgery  1) Repeat steps 1,2,3,4,5,6,7, and 9.   2) Dress in clean clothes before coming to the hospital.    BACTROBAN NASAL OINTMENT  There are many germs normally in your nose. Bactroban is an ointment that will help reduce these germs. Please follow these instructions for Bactroban use:      ____The day before surgery in the morning  Date________    ____The day before surgery in the evening              Date________    ____The day of surgery in the morning    Date________    **Squirt ½ package of Bactroban Ointment onto a cotton applicator and apply to inside of 1st nostril.  Squirt the remaining Bactroban and apply to the inside of the other nostril.    PERIDEX- ORAL:  Use only if your surgeon has ordered  Use the night before and morning of surgery - Swish, gargle, and spit - do not swallow.

## 2019-09-18 ENCOUNTER — OFFICE VISIT (OUTPATIENT)
Dept: INTERNAL MEDICINE | Facility: CLINIC | Age: 64
End: 2019-09-18

## 2019-09-18 VITALS
HEIGHT: 65 IN | WEIGHT: 140 LBS | OXYGEN SATURATION: 98 % | DIASTOLIC BLOOD PRESSURE: 76 MMHG | BODY MASS INDEX: 23.32 KG/M2 | SYSTOLIC BLOOD PRESSURE: 110 MMHG | HEART RATE: 77 BPM

## 2019-09-18 DIAGNOSIS — R39.9 UTI SYMPTOMS: Primary | ICD-10-CM

## 2019-09-18 DIAGNOSIS — R31.29 MICROHEMATURIA: ICD-10-CM

## 2019-09-18 PROCEDURE — 99213 OFFICE O/P EST LOW 20 MIN: CPT | Performed by: INTERNAL MEDICINE

## 2019-09-18 RX ORDER — SULFAMETHOXAZOLE AND TRIMETHOPRIM 800; 160 MG/1; MG/1
1 TABLET ORAL 2 TIMES DAILY
Qty: 14 TABLET | Refills: 0 | Status: SHIPPED | OUTPATIENT
Start: 2019-09-18 | End: 2019-10-25

## 2019-09-18 NOTE — PROGRESS NOTES
Subjective     Debra S Sandifer is a 64 y.o. female who presents with   Chief Complaint   Patient presents with   • Difficulty Urinating       History of Present Illness     Urine is more dark recently.  Vaginal discomfort is associated.  Yesterday she burning with urination.  No abd pain.  Urgency and frequency are associated.  No frequency.  No ABD pain.     Review of Systems   Respiratory: Negative.    Cardiovascular: Negative.    Gastrointestinal: Negative.        The following portions of the patient's history were reviewed and updated as appropriate: allergies, current medications and problem list.    Patient Active Problem List    Diagnosis Date Noted   • History of coronary artery stent placement 06/18/2018   • Prediabetes 10/17/2017   • Ischemic cardiomyopathy 12/22/2016   • Coronary artery disease involving native coronary artery 11/23/2016     Note Last Updated: 11/23/2016 11/2016  PTCA of the first diagonal branch with a 2.5 x 12 mm trek Rx balloon.  PCI of the distal LAD with a 2.75 x 18 mm Xience Alpine drug-eluting stent  PCI of the mid LAD with a 3.0 x 28 mm Xience Alpine drug-eluting stent     • Malignant neoplasm of upper-inner quadrant of left breast in female, estrogen receptor positive (CMS/HCC) 04/27/2016     Note Last Updated: 10/14/2016     S/p lumpectomy and radiation in 2016     • Mixed hyperlipidemia 02/10/2016       Current Outpatient Medications on File Prior to Visit   Medication Sig Dispense Refill   • Acetaminophen (TYLENOL PO) Take 650 mg by mouth 4 (four) times a day as needed.     • aspirin 81 MG tablet Take 81 mg by mouth Every Morning. INSTRUCTED BY CARDIO NOT TO HOLD FOR SURGERY     • atorvastatin (LIPITOR) 40 MG tablet Take 40 mg by mouth Every Night.     • carvedilol (COREG) 3.125 MG tablet Take 3.125 mg by mouth 2 (Two) Times a Day With Meals.     • Chlorhexidine Gluconate Cloth 2 % pads Apply  topically. As directed pre op     • cholecalciferol (VITAMIN D3) 1000 units  "tablet Take 1,000 Units by mouth Every Morning.     • exemestane (AROMASIN) 25 MG chemo tablet Take 1 tablet by mouth Daily. (Patient taking differently: Take 25 mg by mouth Every Morning.) 90 tablet 1   • mupirocin (BACTROBAN) 2 % nasal ointment into the nostril(s) as directed by provider. As directed pre op     • Probiotic Product (PROBIOTIC DAILY PO) Take 1 tablet by mouth Every Morning.       No current facility-administered medications on file prior to visit.        Objective     /76   Pulse 77   Ht 165.1 cm (65\")   Wt 63.5 kg (140 lb)   SpO2 98%   BMI 23.30 kg/m²     Physical Exam   Constitutional: She is oriented to person, place, and time. She appears well-developed and well-nourished.   HENT:   Head: Normocephalic and atraumatic.   Pulmonary/Chest: Effort normal.   Genitourinary: Vagina normal. There is no lesion on the right labia. There is no lesion on the left labia. Cervix exhibits no motion tenderness, no discharge and no friability. Right adnexum displays no mass and no tenderness. Left adnexum displays no mass and no tenderness.   Neurological: She is alert and oriented to person, place, and time.   Psychiatric: She has a normal mood and affect. Her behavior is normal.       Assessment/Plan   Elin was seen today for difficulty urinating.    Diagnoses and all orders for this visit:    UTI symptoms  -     Cancel: UA / M With / Rflx Culture([facility] ONLY) - Urine, Clean Catch  -     UA / M With / Rflx Culture([facility] ONLY) - Urine, Clean Catch    Microhematuria  -     Cancel: UA / M With / Rflx Culture([facility] ONLY) - Urine, Clean Catch    Other orders  -     sulfamethoxazole-trimethoprim (BACTRIM DS) 800-160 MG per tablet; Take 1 tablet by mouth 2 (Two) Times a Day.        Discussion    Patient presents with acute cystitis.  Urine dip is positive for blood.  Given symptoms a  prescription for antibiotics is given.  Urine is send for culture and we will follow up on that.  Let us know if she " is not feeling better of the next 48 hours.  Repeat UA will need to be done for blood in urine.         Future Appointments   Date Time Provider Department Center   10/28/2019  9:20 AM LAB CHAIR [facility] LAB [facility] None   10/28/2019 10:00 AM Kellen Akhtar MD [facility]   10/29/2019  7:45 AM Alisa Morales MD [facility] None   1/9/2020 11:20 AM Sanjiv Dykes MD [facility] None

## 2019-09-19 LAB
APPEARANCE UR: CLEAR
BACTERIA #/AREA URNS HPF: ABNORMAL /HPF
BILIRUB UR QL STRIP: NEGATIVE
CASTS URNS MICRO: ABNORMAL
CASTS URNS QL MICRO: PRESENT /LPF
COLOR UR: YELLOW
EPI CELLS #/AREA URNS HPF: ABNORMAL /HPF
GLUCOSE UR QL: NEGATIVE
HGB UR QL STRIP: NEGATIVE
KETONES UR QL STRIP: NEGATIVE
LEUKOCYTE ESTERASE UR QL STRIP: NEGATIVE
MICRO URNS: NORMAL
MICRO URNS: NORMAL
MUCOUS THREADS URNS QL MICRO: PRESENT /HPF
NITRITE UR QL STRIP: NEGATIVE
PH UR STRIP: 5.5 [PH] (ref 5–7.5)
PROT UR QL STRIP: NEGATIVE
RBC #/AREA URNS HPF: ABNORMAL /HPF
SP GR UR: 1.02 (ref 1–1.03)
URINALYSIS REFLEX: NORMAL
UROBILINOGEN UR STRIP-MCNC: 0.2 MG/DL (ref 0.2–1)
WBC #/AREA URNS HPF: ABNORMAL /HPF

## 2019-09-26 ENCOUNTER — APPOINTMENT (OUTPATIENT)
Dept: GENERAL RADIOLOGY | Facility: HOSPITAL | Age: 64
End: 2019-09-26

## 2019-09-26 ENCOUNTER — ANESTHESIA (OUTPATIENT)
Dept: PERIOP | Facility: HOSPITAL | Age: 64
End: 2019-09-26

## 2019-09-26 ENCOUNTER — ANESTHESIA EVENT (OUTPATIENT)
Dept: PERIOP | Facility: HOSPITAL | Age: 64
End: 2019-09-26

## 2019-09-26 ENCOUNTER — HOSPITAL ENCOUNTER (INPATIENT)
Facility: HOSPITAL | Age: 64
LOS: 1 days | Discharge: HOME | End: 2019-09-27
Attending: ORTHOPAEDIC SURGERY | Admitting: ORTHOPAEDIC SURGERY

## 2019-09-26 DIAGNOSIS — M17.11 PRIMARY OSTEOARTHRITIS OF RIGHT KNEE: Primary | ICD-10-CM

## 2019-09-26 PROBLEM — M17.9 DJD (DEGENERATIVE JOINT DISEASE) OF KNEE: Status: ACTIVE | Noted: 2019-09-26

## 2019-09-26 PROCEDURE — 25010000002 KETOROLAC TROMETHAMINE PER 15 MG: Performed by: ORTHOPAEDIC SURGERY

## 2019-09-26 PROCEDURE — 25010000002 MIDAZOLAM PER 1 MG: Performed by: ANESTHESIOLOGY

## 2019-09-26 PROCEDURE — 25010000002 NEOSTIGMINE PER 0.5 MG: Performed by: NURSE ANESTHETIST, CERTIFIED REGISTERED

## 2019-09-26 PROCEDURE — 97161 PT EVAL LOW COMPLEX 20 MIN: CPT

## 2019-09-26 PROCEDURE — 73560 X-RAY EXAM OF KNEE 1 OR 2: CPT

## 2019-09-26 PROCEDURE — 25010000002 HYDROMORPHONE PER 4 MG: Performed by: NURSE ANESTHETIST, CERTIFIED REGISTERED

## 2019-09-26 PROCEDURE — 25010000002 DEXAMETHASONE PER 1 MG: Performed by: NURSE ANESTHETIST, CERTIFIED REGISTERED

## 2019-09-26 PROCEDURE — 25010000003 CEFAZOLIN IN DEXTROSE 2-4 GM/100ML-% SOLUTION: Performed by: ORTHOPAEDIC SURGERY

## 2019-09-26 PROCEDURE — 0SRC0JZ REPLACEMENT OF RIGHT KNEE JOINT WITH SYNTHETIC SUBSTITUTE, OPEN APPROACH: ICD-10-PCS | Performed by: ORTHOPAEDIC SURGERY

## 2019-09-26 PROCEDURE — C1776 JOINT DEVICE (IMPLANTABLE): HCPCS | Performed by: ORTHOPAEDIC SURGERY

## 2019-09-26 PROCEDURE — 25010000002 ONDANSETRON PER 1 MG: Performed by: ORTHOPAEDIC SURGERY

## 2019-09-26 PROCEDURE — C1713 ANCHOR/SCREW BN/BN,TIS/BN: HCPCS | Performed by: ORTHOPAEDIC SURGERY

## 2019-09-26 PROCEDURE — 97110 THERAPEUTIC EXERCISES: CPT

## 2019-09-26 PROCEDURE — 25010000003 CEFAZOLIN IN DEXTROSE 2-4 GM/100ML-% SOLUTION: Performed by: NURSE ANESTHETIST, CERTIFIED REGISTERED

## 2019-09-26 PROCEDURE — 25010000002 VANCOMYCIN PER 500 MG: Performed by: ORTHOPAEDIC SURGERY

## 2019-09-26 PROCEDURE — 25010000002 PROPOFOL 10 MG/ML EMULSION: Performed by: NURSE ANESTHETIST, CERTIFIED REGISTERED

## 2019-09-26 PROCEDURE — C9290 INJ, BUPIVACAINE LIPOSOME: HCPCS | Performed by: ORTHOPAEDIC SURGERY

## 2019-09-26 PROCEDURE — 25010000003 BUPIVACAINE LIPOSOME 1.3 % SUSPENSION 20 ML VIAL: Performed by: ORTHOPAEDIC SURGERY

## 2019-09-26 PROCEDURE — 25010000002 FENTANYL CITRATE (PF) 100 MCG/2ML SOLUTION: Performed by: ANESTHESIOLOGY

## 2019-09-26 PROCEDURE — 25010000002 ONDANSETRON PER 1 MG: Performed by: NURSE ANESTHETIST, CERTIFIED REGISTERED

## 2019-09-26 DEVICE — JOURNEY II CR FEMORAL OXINIUM                                    NONPOROUS RIGHT SIZE 5
Type: IMPLANTABLE DEVICE | Site: KNEE | Status: FUNCTIONAL
Brand: JOURNEY

## 2019-09-26 DEVICE — IMPLANTABLE DEVICE: Type: IMPLANTABLE DEVICE | Site: KNEE | Status: FUNCTIONAL

## 2019-09-26 DEVICE — JOURNEY 7.5 ROUND RESURF PAT 35MM STANDARD
Type: IMPLANTABLE DEVICE | Site: KNEE | Status: FUNCTIONAL
Brand: JOURNEY

## 2019-09-26 DEVICE — JOURNEY TIBIAL BASEPLATE NONPOROUS                                    RIGHT SIZE 4
Type: IMPLANTABLE DEVICE | Site: KNEE | Status: FUNCTIONAL
Brand: JOURNEY

## 2019-09-26 DEVICE — PALACOS® R IS A FAST-CURING, RADIOPAQUE, POLY(METHYL METHACRYLATE)-BASED BONE CEMENT.PALACOS ® R CONTAINS THE X-RAY CONTRAST MEDIUM ZIRCONIUM DIOXIDE. TO IMPROVE VISIBILITY IN THE SURGICAL FIELD PALACOS ® R HAS BEEN COLOURED WITH CHLOROPHYLL (E141). THE BONE CEMENT IS PREPARED DIRECTLY BEFORE USE BY MIXING A POLYMER POWDER COMPONENT WITH A LIQUID MONOMER COMPONENT. A DUCTILE DOUGH FORMS WHICH CURES WITHIN A FEW MINUTES.
Type: IMPLANTABLE DEVICE | Site: KNEE | Status: FUNCTIONAL
Brand: PALACOS®

## 2019-09-26 DEVICE — JOURNEY II CR INSERT XLPE RIGHT SIZE 3-4 10MM
Type: IMPLANTABLE DEVICE | Site: KNEE | Status: FUNCTIONAL
Brand: JOURNEY

## 2019-09-26 RX ORDER — DEXAMETHASONE SODIUM PHOSPHATE 10 MG/ML
INJECTION INTRAMUSCULAR; INTRAVENOUS AS NEEDED
Status: DISCONTINUED | OUTPATIENT
Start: 2019-09-26 | End: 2019-09-26 | Stop reason: SURG

## 2019-09-26 RX ORDER — MIDAZOLAM HYDROCHLORIDE 1 MG/ML
1 INJECTION INTRAMUSCULAR; INTRAVENOUS
Status: DISCONTINUED | OUTPATIENT
Start: 2019-09-26 | End: 2019-09-26 | Stop reason: HOSPADM

## 2019-09-26 RX ORDER — SODIUM CHLORIDE, SODIUM LACTATE, POTASSIUM CHLORIDE, CALCIUM CHLORIDE 600; 310; 30; 20 MG/100ML; MG/100ML; MG/100ML; MG/100ML
100 INJECTION, SOLUTION INTRAVENOUS CONTINUOUS
Status: DISCONTINUED | OUTPATIENT
Start: 2019-09-26 | End: 2019-09-27 | Stop reason: HOSPADM

## 2019-09-26 RX ORDER — DIPHENHYDRAMINE HYDROCHLORIDE 50 MG/ML
12.5 INJECTION INTRAMUSCULAR; INTRAVENOUS
Status: DISCONTINUED | OUTPATIENT
Start: 2019-09-26 | End: 2019-09-26 | Stop reason: HOSPADM

## 2019-09-26 RX ORDER — SODIUM CHLORIDE 0.9 % (FLUSH) 0.9 %
3 SYRINGE (ML) INJECTION EVERY 12 HOURS SCHEDULED
Status: DISCONTINUED | OUTPATIENT
Start: 2019-09-26 | End: 2019-09-27 | Stop reason: HOSPADM

## 2019-09-26 RX ORDER — HYDRALAZINE HYDROCHLORIDE 20 MG/ML
5 INJECTION INTRAMUSCULAR; INTRAVENOUS
Status: DISCONTINUED | OUTPATIENT
Start: 2019-09-26 | End: 2019-09-26 | Stop reason: HOSPADM

## 2019-09-26 RX ORDER — KETOROLAC TROMETHAMINE 30 MG/ML
30 INJECTION, SOLUTION INTRAMUSCULAR; INTRAVENOUS EVERY 6 HOURS
Status: COMPLETED | OUTPATIENT
Start: 2019-09-26 | End: 2019-09-27

## 2019-09-26 RX ORDER — TRANEXAMIC ACID 100 MG/ML
INJECTION, SOLUTION INTRAVENOUS AS NEEDED
Status: DISCONTINUED | OUTPATIENT
Start: 2019-09-26 | End: 2019-09-26 | Stop reason: SURG

## 2019-09-26 RX ORDER — ONDANSETRON 2 MG/ML
4 INJECTION INTRAMUSCULAR; INTRAVENOUS ONCE AS NEEDED
Status: DISCONTINUED | OUTPATIENT
Start: 2019-09-26 | End: 2019-09-26 | Stop reason: HOSPADM

## 2019-09-26 RX ORDER — ONDANSETRON 2 MG/ML
4 INJECTION INTRAMUSCULAR; INTRAVENOUS EVERY 6 HOURS PRN
Status: DISCONTINUED | OUTPATIENT
Start: 2019-09-26 | End: 2019-09-27 | Stop reason: HOSPADM

## 2019-09-26 RX ORDER — FENTANYL CITRATE 50 UG/ML
50 INJECTION, SOLUTION INTRAMUSCULAR; INTRAVENOUS
Status: COMPLETED | OUTPATIENT
Start: 2019-09-26 | End: 2019-09-26

## 2019-09-26 RX ORDER — ATORVASTATIN CALCIUM 20 MG/1
40 TABLET, FILM COATED ORAL NIGHTLY
Status: DISCONTINUED | OUTPATIENT
Start: 2019-09-26 | End: 2019-09-27 | Stop reason: HOSPADM

## 2019-09-26 RX ORDER — PROMETHAZINE HYDROCHLORIDE 25 MG/1
25 SUPPOSITORY RECTAL ONCE AS NEEDED
Status: DISCONTINUED | OUTPATIENT
Start: 2019-09-26 | End: 2019-09-26 | Stop reason: HOSPADM

## 2019-09-26 RX ORDER — ACETAMINOPHEN 325 MG/1
325 TABLET ORAL EVERY 4 HOURS PRN
Status: DISCONTINUED | OUTPATIENT
Start: 2019-09-26 | End: 2019-09-27 | Stop reason: HOSPADM

## 2019-09-26 RX ORDER — HYDROMORPHONE HYDROCHLORIDE 1 MG/ML
1 INJECTION, SOLUTION INTRAMUSCULAR; INTRAVENOUS; SUBCUTANEOUS EVERY 4 HOURS PRN
Status: DISCONTINUED | OUTPATIENT
Start: 2019-09-26 | End: 2019-09-27 | Stop reason: HOSPADM

## 2019-09-26 RX ORDER — SODIUM CHLORIDE 0.9 % (FLUSH) 0.9 %
1-10 SYRINGE (ML) INJECTION AS NEEDED
Status: DISCONTINUED | OUTPATIENT
Start: 2019-09-26 | End: 2019-09-27 | Stop reason: HOSPADM

## 2019-09-26 RX ORDER — GLYCOPYRROLATE 0.2 MG/ML
INJECTION INTRAMUSCULAR; INTRAVENOUS AS NEEDED
Status: DISCONTINUED | OUTPATIENT
Start: 2019-09-26 | End: 2019-09-26 | Stop reason: SURG

## 2019-09-26 RX ORDER — CARVEDILOL 3.12 MG/1
3.12 TABLET ORAL 2 TIMES DAILY WITH MEALS
Status: DISCONTINUED | OUTPATIENT
Start: 2019-09-26 | End: 2019-09-27 | Stop reason: HOSPADM

## 2019-09-26 RX ORDER — ONDANSETRON 2 MG/ML
INJECTION INTRAMUSCULAR; INTRAVENOUS AS NEEDED
Status: DISCONTINUED | OUTPATIENT
Start: 2019-09-26 | End: 2019-09-26 | Stop reason: SURG

## 2019-09-26 RX ORDER — HYDROMORPHONE HYDROCHLORIDE 1 MG/ML
0.25 INJECTION, SOLUTION INTRAMUSCULAR; INTRAVENOUS; SUBCUTANEOUS
Status: DISCONTINUED | OUTPATIENT
Start: 2019-09-26 | End: 2019-09-26 | Stop reason: HOSPADM

## 2019-09-26 RX ORDER — EPHEDRINE SULFATE 50 MG/ML
INJECTION, SOLUTION INTRAVENOUS AS NEEDED
Status: DISCONTINUED | OUTPATIENT
Start: 2019-09-26 | End: 2019-09-26 | Stop reason: SURG

## 2019-09-26 RX ORDER — NALOXONE HCL 0.4 MG/ML
0.1 VIAL (ML) INJECTION
Status: DISCONTINUED | OUTPATIENT
Start: 2019-09-26 | End: 2019-09-27 | Stop reason: HOSPADM

## 2019-09-26 RX ORDER — FERROUS SULFATE 325(65) MG
325 TABLET ORAL
Status: DISCONTINUED | OUTPATIENT
Start: 2019-09-26 | End: 2019-09-27 | Stop reason: HOSPADM

## 2019-09-26 RX ORDER — BISACODYL 10 MG
10 SUPPOSITORY, RECTAL RECTAL DAILY PRN
Status: DISCONTINUED | OUTPATIENT
Start: 2019-09-26 | End: 2019-09-27 | Stop reason: HOSPADM

## 2019-09-26 RX ORDER — ONDANSETRON 4 MG/1
4 TABLET, FILM COATED ORAL EVERY 6 HOURS PRN
Status: DISCONTINUED | OUTPATIENT
Start: 2019-09-26 | End: 2019-09-27 | Stop reason: HOSPADM

## 2019-09-26 RX ORDER — ASPIRIN 325 MG
325 TABLET, DELAYED RELEASE (ENTERIC COATED) ORAL DAILY
Status: DISCONTINUED | OUTPATIENT
Start: 2019-09-26 | End: 2019-09-27 | Stop reason: HOSPADM

## 2019-09-26 RX ORDER — NALOXONE HCL 0.4 MG/ML
0.2 VIAL (ML) INJECTION AS NEEDED
Status: DISCONTINUED | OUTPATIENT
Start: 2019-09-26 | End: 2019-09-26 | Stop reason: HOSPADM

## 2019-09-26 RX ORDER — PROMETHAZINE HYDROCHLORIDE 25 MG/ML
6.25 INJECTION, SOLUTION INTRAMUSCULAR; INTRAVENOUS
Status: DISCONTINUED | OUTPATIENT
Start: 2019-09-26 | End: 2019-09-26 | Stop reason: HOSPADM

## 2019-09-26 RX ORDER — CEFAZOLIN SODIUM 2 G/100ML
2 INJECTION, SOLUTION INTRAVENOUS EVERY 8 HOURS
Status: COMPLETED | OUTPATIENT
Start: 2019-09-26 | End: 2019-09-27

## 2019-09-26 RX ORDER — MIDAZOLAM HYDROCHLORIDE 1 MG/ML
2 INJECTION INTRAMUSCULAR; INTRAVENOUS
Status: DISCONTINUED | OUTPATIENT
Start: 2019-09-26 | End: 2019-09-26 | Stop reason: HOSPADM

## 2019-09-26 RX ORDER — OXYCODONE HYDROCHLORIDE AND ACETAMINOPHEN 5; 325 MG/1; MG/1
2 TABLET ORAL EVERY 4 HOURS PRN
Status: DISCONTINUED | OUTPATIENT
Start: 2019-09-26 | End: 2019-09-27 | Stop reason: HOSPADM

## 2019-09-26 RX ORDER — MAGNESIUM HYDROXIDE 1200 MG/15ML
LIQUID ORAL AS NEEDED
Status: DISCONTINUED | OUTPATIENT
Start: 2019-09-26 | End: 2019-09-26 | Stop reason: HOSPADM

## 2019-09-26 RX ORDER — LIDOCAINE HYDROCHLORIDE 10 MG/ML
0.5 INJECTION, SOLUTION EPIDURAL; INFILTRATION; INTRACAUDAL; PERINEURAL ONCE AS NEEDED
Status: DISCONTINUED | OUTPATIENT
Start: 2019-09-26 | End: 2019-09-26 | Stop reason: HOSPADM

## 2019-09-26 RX ORDER — ACETAMINOPHEN 650 MG/1
650 SUPPOSITORY RECTAL EVERY 4 HOURS PRN
Status: DISCONTINUED | OUTPATIENT
Start: 2019-09-26 | End: 2019-09-27 | Stop reason: HOSPADM

## 2019-09-26 RX ORDER — ACETAMINOPHEN 325 MG/1
650 TABLET ORAL EVERY 4 HOURS PRN
Status: DISCONTINUED | OUTPATIENT
Start: 2019-09-26 | End: 2019-09-27 | Stop reason: HOSPADM

## 2019-09-26 RX ORDER — ROCURONIUM BROMIDE 10 MG/ML
INJECTION, SOLUTION INTRAVENOUS AS NEEDED
Status: DISCONTINUED | OUTPATIENT
Start: 2019-09-26 | End: 2019-09-26 | Stop reason: SURG

## 2019-09-26 RX ORDER — LIDOCAINE HYDROCHLORIDE 20 MG/ML
INJECTION, SOLUTION INFILTRATION; PERINEURAL AS NEEDED
Status: DISCONTINUED | OUTPATIENT
Start: 2019-09-26 | End: 2019-09-26 | Stop reason: SURG

## 2019-09-26 RX ORDER — PROPOFOL 10 MG/ML
VIAL (ML) INTRAVENOUS AS NEEDED
Status: DISCONTINUED | OUTPATIENT
Start: 2019-09-26 | End: 2019-09-26 | Stop reason: SURG

## 2019-09-26 RX ORDER — FLUMAZENIL 0.1 MG/ML
0.2 INJECTION INTRAVENOUS AS NEEDED
Status: DISCONTINUED | OUTPATIENT
Start: 2019-09-26 | End: 2019-09-26 | Stop reason: HOSPADM

## 2019-09-26 RX ORDER — EPHEDRINE SULFATE 50 MG/ML
5 INJECTION, SOLUTION INTRAVENOUS ONCE AS NEEDED
Status: DISCONTINUED | OUTPATIENT
Start: 2019-09-26 | End: 2019-09-26 | Stop reason: HOSPADM

## 2019-09-26 RX ORDER — ACETAMINOPHEN 325 MG/1
650 TABLET ORAL ONCE AS NEEDED
Status: DISCONTINUED | OUTPATIENT
Start: 2019-09-26 | End: 2019-09-26 | Stop reason: HOSPADM

## 2019-09-26 RX ORDER — PROMETHAZINE HYDROCHLORIDE 25 MG/ML
12.5 INJECTION, SOLUTION INTRAMUSCULAR; INTRAVENOUS ONCE AS NEEDED
Status: DISCONTINUED | OUTPATIENT
Start: 2019-09-26 | End: 2019-09-26 | Stop reason: HOSPADM

## 2019-09-26 RX ORDER — VANCOMYCIN HYDROCHLORIDE 1 G/200ML
1000 INJECTION, SOLUTION INTRAVENOUS ONCE
Status: COMPLETED | OUTPATIENT
Start: 2019-09-26 | End: 2019-09-26

## 2019-09-26 RX ORDER — ACETAMINOPHEN 500 MG
1000 TABLET ORAL ONCE
Status: COMPLETED | OUTPATIENT
Start: 2019-09-26 | End: 2019-09-26

## 2019-09-26 RX ORDER — PROMETHAZINE HYDROCHLORIDE 25 MG/1
25 TABLET ORAL ONCE AS NEEDED
Status: DISCONTINUED | OUTPATIENT
Start: 2019-09-26 | End: 2019-09-26 | Stop reason: HOSPADM

## 2019-09-26 RX ORDER — SENNA AND DOCUSATE SODIUM 50; 8.6 MG/1; MG/1
2 TABLET, FILM COATED ORAL ONCE
Status: COMPLETED | OUTPATIENT
Start: 2019-09-26 | End: 2019-09-26

## 2019-09-26 RX ORDER — SODIUM CHLORIDE, SODIUM LACTATE, POTASSIUM CHLORIDE, CALCIUM CHLORIDE 600; 310; 30; 20 MG/100ML; MG/100ML; MG/100ML; MG/100ML
9 INJECTION, SOLUTION INTRAVENOUS CONTINUOUS
Status: DISCONTINUED | OUTPATIENT
Start: 2019-09-26 | End: 2019-09-27 | Stop reason: HOSPADM

## 2019-09-26 RX ORDER — SODIUM CHLORIDE 0.9 % (FLUSH) 0.9 %
3 SYRINGE (ML) INJECTION EVERY 12 HOURS SCHEDULED
Status: DISCONTINUED | OUTPATIENT
Start: 2019-09-26 | End: 2019-09-26 | Stop reason: HOSPADM

## 2019-09-26 RX ORDER — BISACODYL 5 MG/1
10 TABLET, DELAYED RELEASE ORAL DAILY PRN
Status: DISCONTINUED | OUTPATIENT
Start: 2019-09-26 | End: 2019-09-27 | Stop reason: HOSPADM

## 2019-09-26 RX ORDER — OXYCODONE HYDROCHLORIDE AND ACETAMINOPHEN 5; 325 MG/1; MG/1
1 TABLET ORAL EVERY 4 HOURS PRN
Status: DISCONTINUED | OUTPATIENT
Start: 2019-09-26 | End: 2019-09-27 | Stop reason: HOSPADM

## 2019-09-26 RX ORDER — FENTANYL CITRATE 50 UG/ML
50 INJECTION, SOLUTION INTRAMUSCULAR; INTRAVENOUS
Status: DISCONTINUED | OUTPATIENT
Start: 2019-09-26 | End: 2019-09-26 | Stop reason: HOSPADM

## 2019-09-26 RX ORDER — MEPERIDINE HYDROCHLORIDE 25 MG/ML
12.5 INJECTION INTRAMUSCULAR; INTRAVENOUS; SUBCUTANEOUS
Status: DISCONTINUED | OUTPATIENT
Start: 2019-09-26 | End: 2019-09-26 | Stop reason: HOSPADM

## 2019-09-26 RX ORDER — CEFAZOLIN SODIUM 2 G/100ML
INJECTION, SOLUTION INTRAVENOUS AS NEEDED
Status: DISCONTINUED | OUTPATIENT
Start: 2019-09-26 | End: 2019-09-26 | Stop reason: SURG

## 2019-09-26 RX ORDER — SODIUM CHLORIDE 0.9 % (FLUSH) 0.9 %
3-10 SYRINGE (ML) INJECTION AS NEEDED
Status: DISCONTINUED | OUTPATIENT
Start: 2019-09-26 | End: 2019-09-26 | Stop reason: HOSPADM

## 2019-09-26 RX ORDER — ACETAMINOPHEN 160 MG/5ML
650 SOLUTION ORAL EVERY 4 HOURS PRN
Status: DISCONTINUED | OUTPATIENT
Start: 2019-09-26 | End: 2019-09-27 | Stop reason: HOSPADM

## 2019-09-26 RX ORDER — HYDROMORPHONE HCL 110MG/55ML
PATIENT CONTROLLED ANALGESIA SYRINGE INTRAVENOUS AS NEEDED
Status: DISCONTINUED | OUTPATIENT
Start: 2019-09-26 | End: 2019-09-26 | Stop reason: SURG

## 2019-09-26 RX ORDER — DIPHENHYDRAMINE HCL 25 MG
25 CAPSULE ORAL
Status: DISCONTINUED | OUTPATIENT
Start: 2019-09-26 | End: 2019-09-26 | Stop reason: HOSPADM

## 2019-09-26 RX ORDER — FAMOTIDINE 10 MG/ML
20 INJECTION, SOLUTION INTRAVENOUS ONCE
Status: COMPLETED | OUTPATIENT
Start: 2019-09-26 | End: 2019-09-26

## 2019-09-26 RX ADMIN — FENTANYL CITRATE 50 MCG: 50 INJECTION INTRAMUSCULAR; INTRAVENOUS at 06:57

## 2019-09-26 RX ADMIN — ROCURONIUM BROMIDE 35 MG: 10 INJECTION INTRAVENOUS at 06:57

## 2019-09-26 RX ADMIN — NEOSTIGMINE METHYLSULFATE 3 MG: 1 INJECTION INTRAMUSCULAR; INTRAVENOUS; SUBCUTANEOUS at 08:10

## 2019-09-26 RX ADMIN — ONDANSETRON 4 MG: 2 INJECTION INTRAMUSCULAR; INTRAVENOUS at 07:14

## 2019-09-26 RX ADMIN — OXYCODONE HYDROCHLORIDE AND ACETAMINOPHEN 1 TABLET: 5; 325 TABLET ORAL at 21:39

## 2019-09-26 RX ADMIN — SODIUM CHLORIDE, POTASSIUM CHLORIDE, SODIUM LACTATE AND CALCIUM CHLORIDE 9 ML/HR: 600; 310; 30; 20 INJECTION, SOLUTION INTRAVENOUS at 06:31

## 2019-09-26 RX ADMIN — HYDROMORPHONE HYDROCHLORIDE 0.25 MG: 2 INJECTION INTRAMUSCULAR; INTRAVENOUS; SUBCUTANEOUS at 08:14

## 2019-09-26 RX ADMIN — ACETAMINOPHEN 1000 MG: 500 TABLET, FILM COATED ORAL at 06:08

## 2019-09-26 RX ADMIN — SODIUM CHLORIDE, PRESERVATIVE FREE 3 ML: 5 INJECTION INTRAVENOUS at 21:34

## 2019-09-26 RX ADMIN — ATORVASTATIN CALCIUM 40 MG: 20 TABLET, FILM COATED ORAL at 21:33

## 2019-09-26 RX ADMIN — GLYCOPYRROLATE 0.4 MG: 0.2 INJECTION INTRAMUSCULAR; INTRAVENOUS at 08:10

## 2019-09-26 RX ADMIN — KETOROLAC TROMETHAMINE 30 MG: 30 INJECTION, SOLUTION INTRAMUSCULAR at 08:56

## 2019-09-26 RX ADMIN — FENTANYL CITRATE 50 MCG: 50 INJECTION INTRAMUSCULAR; INTRAVENOUS at 07:20

## 2019-09-26 RX ADMIN — SODIUM CHLORIDE, POTASSIUM CHLORIDE, SODIUM LACTATE AND CALCIUM CHLORIDE 100 ML/HR: 600; 310; 30; 20 INJECTION, SOLUTION INTRAVENOUS at 11:17

## 2019-09-26 RX ADMIN — KETOROLAC TROMETHAMINE 30 MG: 30 INJECTION, SOLUTION INTRAMUSCULAR at 14:43

## 2019-09-26 RX ADMIN — MIDAZOLAM 2 MG: 1 INJECTION INTRAMUSCULAR; INTRAVENOUS at 06:31

## 2019-09-26 RX ADMIN — CEFAZOLIN SODIUM 2 G: 2 INJECTION, SOLUTION INTRAVENOUS at 08:04

## 2019-09-26 RX ADMIN — EPHEDRINE SULFATE 10 MG: 50 INJECTION INTRAMUSCULAR; INTRAVENOUS; SUBCUTANEOUS at 07:09

## 2019-09-26 RX ADMIN — KETOROLAC TROMETHAMINE 30 MG: 30 INJECTION, SOLUTION INTRAMUSCULAR at 21:34

## 2019-09-26 RX ADMIN — HYDROMORPHONE HYDROCHLORIDE 0.25 MG: 1 INJECTION, SOLUTION INTRAMUSCULAR; INTRAVENOUS; SUBCUTANEOUS at 08:54

## 2019-09-26 RX ADMIN — PROPOFOL 120 MG: 10 INJECTION, EMULSION INTRAVENOUS at 06:57

## 2019-09-26 RX ADMIN — ASPIRIN 325 MG: 325 TABLET, DELAYED RELEASE ORAL at 11:17

## 2019-09-26 RX ADMIN — TRANEXAMIC ACID 1000 MG: 100 INJECTION, SOLUTION INTRAVENOUS at 07:56

## 2019-09-26 RX ADMIN — SODIUM CHLORIDE, POTASSIUM CHLORIDE, SODIUM LACTATE AND CALCIUM CHLORIDE: 600; 310; 30; 20 INJECTION, SOLUTION INTRAVENOUS at 08:15

## 2019-09-26 RX ADMIN — SODIUM CHLORIDE, PRESERVATIVE FREE 3 ML: 5 INJECTION INTRAVENOUS at 11:17

## 2019-09-26 RX ADMIN — FERROUS SULFATE TAB 325 MG (65 MG ELEMENTAL FE) 325 MG: 325 (65 FE) TAB at 11:16

## 2019-09-26 RX ADMIN — ONDANSETRON 4 MG: 2 INJECTION INTRAMUSCULAR; INTRAVENOUS at 11:53

## 2019-09-26 RX ADMIN — LIDOCAINE HYDROCHLORIDE 60 MG: 20 INJECTION, SOLUTION INFILTRATION; PERINEURAL at 06:57

## 2019-09-26 RX ADMIN — HYDROMORPHONE HYDROCHLORIDE 0.25 MG: 1 INJECTION, SOLUTION INTRAMUSCULAR; INTRAVENOUS; SUBCUTANEOUS at 09:07

## 2019-09-26 RX ADMIN — CARVEDILOL 3.12 MG: 3.12 TABLET, FILM COATED ORAL at 17:04

## 2019-09-26 RX ADMIN — SENNOSIDES AND DOCUSATE SODIUM 2 TABLET: 8.6; 5 TABLET ORAL at 11:16

## 2019-09-26 RX ADMIN — FAMOTIDINE 20 MG: 10 INJECTION, SOLUTION INTRAVENOUS at 06:31

## 2019-09-26 RX ADMIN — VANCOMYCIN HYDROCHLORIDE 1000 MG: 1 INJECTION, SOLUTION INTRAVENOUS at 06:09

## 2019-09-26 RX ADMIN — DEXAMETHASONE SODIUM PHOSPHATE 6 MG: 10 INJECTION INTRAMUSCULAR; INTRAVENOUS at 07:14

## 2019-09-26 RX ADMIN — CEFAZOLIN SODIUM 2 G: 2 INJECTION, SOLUTION INTRAVENOUS at 15:04

## 2019-09-26 NOTE — ANESTHESIA POSTPROCEDURE EVALUATION
"Patient: Debra S Sandifer    Procedure Summary     Date:  09/26/19 Room / Location:  [facility] OR [address] [place] [address] MAIN OR    Anesthesia Start:  0651 Anesthesia Stop:  0832    Procedure:  TOTAL KNEE ARTHROPLASTY (Right Knee) Diagnosis:      Surgeon:  Van Titus MD Provider:  Sanjiv Giles MD    Anesthesia Type:  general ASA Status:  2          Anesthesia Type: general  Last vitals  BP   118/79 (09/26/19 0900)   Temp   36.4 °C (97.5 °F) (09/26/19 0828)   Pulse   51 (09/26/19 0900)   Resp   18 (09/26/19 0900)     SpO2   97 % (09/26/19 0900)     Post Anesthesia Care and Evaluation    Patient location during evaluation: bedside  Patient participation: complete - patient participated  Level of consciousness: awake and alert  Pain management: adequate  Airway patency: patent  Anesthetic complications: No anesthetic complications    Cardiovascular status: acceptable  Respiratory status: acceptable  Hydration status: acceptable    Comments: /79   Pulse 51   Temp 36.4 °C (97.5 °F) (Oral)   Resp 18   Ht 165.1 cm (65\")   Wt 63.7 kg (140 lb 7 oz)   SpO2 97%   BMI 23.37 kg/m²           "

## 2019-09-26 NOTE — ANESTHESIA PREPROCEDURE EVALUATION
Anesthesia Evaluation     Patient summary reviewed   no history of anesthetic complications:  NPO Solid Status: > 8 hours  NPO Liquid Status: > 2 hours           Airway   Mallampati: I  TM distance: >3 FB  Neck ROM: full  Dental      Pulmonary     breath sounds clear to auscultation  (+) a smoker Former,   (-) shortness of breath  Cardiovascular   Exercise tolerance: good (4-7 METS)    ECG reviewed  Rhythm: regular  Rate: normal    (+) hypertension, CAD, cardiac stents more than 12 months ago hyperlipidemia,   (-) angina, ALEXIS    ROS comment: - ABNORMAL ECG -  Sinus rhythm  Borderline left axis deviation  Probable anterolateral infarct, old  No change from prior tracing    Echo: · Left ventricular systolic function is normal. Calculated EF = 62%.  · Left ventricular diastolic function is normal.  · Normal right ventricular cavity size and systolic function noted.  · Mild mitral valve regurgitation is present.  · Calculated right ventricular systolic pressure from tricuspid regurgitation is 23 mmHg.  · There is no evidence of pericardial effusion.    Neuro/Psych  GI/Hepatic/Renal/Endo      Musculoskeletal     Abdominal    Substance History      OB/GYN          Other   (+) arthritis   history of cancer                    Anesthesia Plan    ASA 2     general   (No block per surgeon preference)  intravenous induction   Anesthetic plan, all risks, benefits, and alternatives have been provided, discussed and informed consent has been obtained with: patient and spouse/significant other.

## 2019-09-26 NOTE — ANESTHESIA PROCEDURE NOTES
Airway  Urgency: elective    Date/Time: 9/26/2019 7:00 AM  Airway not difficult    General Information and Staff    Patient location during procedure: OR  CRNA: Sonia Verdugo CRNA    Indications and Patient Condition  Indications for airway management: airway protection    Preoxygenated: yes  Mask difficulty assessment: 1 - vent by mask    Final Airway Details  Final airway type: endotracheal airway      Successful airway: ETT  Cuffed: yes   Successful intubation technique: direct laryngoscopy  Endotracheal tube insertion site: oral  Blade: Parag  Blade size: 3  ETT size (mm): 7.0  Cormack-Lehane Classification: grade I - full view of glottis  Placement verified by: chest auscultation and capnometry   Cuff volume (mL): 6  Measured from: lips  ETT/EBT  to lips (cm): 20  Number of attempts at approach: 1    Additional Comments  Smooth IV induction. Trachea intubated. Cuff up. Ett secured. BEBS. Dentition intact without injury.

## 2019-09-27 VITALS
RESPIRATION RATE: 16 BRPM | SYSTOLIC BLOOD PRESSURE: 99 MMHG | WEIGHT: 140.44 LBS | TEMPERATURE: 97 F | BODY MASS INDEX: 23.4 KG/M2 | OXYGEN SATURATION: 98 % | DIASTOLIC BLOOD PRESSURE: 57 MMHG | HEIGHT: 65 IN | HEART RATE: 65 BPM

## 2019-09-27 LAB
ANION GAP SERPL CALCULATED.3IONS-SCNC: 8.8 MMOL/L (ref 5–15)
BUN BLD-MCNC: 13 MG/DL (ref 8–23)
BUN/CREAT SERPL: 16.5 (ref 7–25)
CALCIUM SPEC-SCNC: 8.5 MG/DL (ref 8.6–10.5)
CHLORIDE SERPL-SCNC: 106 MMOL/L (ref 98–107)
CO2 SERPL-SCNC: 24.2 MMOL/L (ref 22–29)
CREAT BLD-MCNC: 0.79 MG/DL (ref 0.57–1)
GFR SERPL CREATININE-BSD FRML MDRD: 74 ML/MIN/1.73
GLUCOSE BLD-MCNC: 102 MG/DL (ref 65–99)
HCT VFR BLD AUTO: 31 % (ref 34–46.6)
HGB BLD-MCNC: 10 G/DL (ref 12–15.9)
POTASSIUM BLD-SCNC: 4.6 MMOL/L (ref 3.5–5.2)
SODIUM BLD-SCNC: 139 MMOL/L (ref 136–145)

## 2019-09-27 PROCEDURE — 25010000002 KETOROLAC TROMETHAMINE PER 15 MG: Performed by: ORTHOPAEDIC SURGERY

## 2019-09-27 PROCEDURE — 25010000003 CEFAZOLIN IN DEXTROSE 2-4 GM/100ML-% SOLUTION: Performed by: ORTHOPAEDIC SURGERY

## 2019-09-27 PROCEDURE — 80048 BASIC METABOLIC PNL TOTAL CA: CPT | Performed by: ORTHOPAEDIC SURGERY

## 2019-09-27 PROCEDURE — 85014 HEMATOCRIT: CPT | Performed by: ORTHOPAEDIC SURGERY

## 2019-09-27 PROCEDURE — 97110 THERAPEUTIC EXERCISES: CPT

## 2019-09-27 PROCEDURE — 97150 GROUP THERAPEUTIC PROCEDURES: CPT

## 2019-09-27 PROCEDURE — 85018 HEMOGLOBIN: CPT | Performed by: ORTHOPAEDIC SURGERY

## 2019-09-27 PROCEDURE — 25010000002 FONDAPARINUX PER 0.5 MG: Performed by: ORTHOPAEDIC SURGERY

## 2019-09-27 RX ORDER — FONDAPARINUX SODIUM 2.5 MG/.5ML
2.5 INJECTION SUBCUTANEOUS
Status: DISCONTINUED | OUTPATIENT
Start: 2019-09-27 | End: 2019-09-27 | Stop reason: HOSPADM

## 2019-09-27 RX ADMIN — OXYCODONE HYDROCHLORIDE AND ACETAMINOPHEN 1 TABLET: 5; 325 TABLET ORAL at 06:07

## 2019-09-27 RX ADMIN — KETOROLAC TROMETHAMINE 30 MG: 30 INJECTION, SOLUTION INTRAMUSCULAR at 01:48

## 2019-09-27 RX ADMIN — SODIUM CHLORIDE, PRESERVATIVE FREE 3 ML: 5 INJECTION INTRAVENOUS at 07:48

## 2019-09-27 RX ADMIN — FONDAPARINUX SODIUM 2.5 MG: 2.5 INJECTION, SOLUTION SUBCUTANEOUS at 07:48

## 2019-09-27 RX ADMIN — FERROUS SULFATE TAB 325 MG (65 MG ELEMENTAL FE) 325 MG: 325 (65 FE) TAB at 07:48

## 2019-09-27 RX ADMIN — CEFAZOLIN SODIUM 2 G: 2 INJECTION, SOLUTION INTRAVENOUS at 00:30

## 2019-09-27 RX ADMIN — ASPIRIN 325 MG: 325 TABLET, DELAYED RELEASE ORAL at 07:48

## 2019-09-27 RX ADMIN — OXYCODONE HYDROCHLORIDE AND ACETAMINOPHEN 1 TABLET: 5; 325 TABLET ORAL at 01:48

## 2019-10-11 ENCOUNTER — TELEPHONE (OUTPATIENT)
Dept: CARDIOLOGY | Facility: CLINIC | Age: 64
End: 2019-10-11

## 2019-10-11 ENCOUNTER — PATIENT MESSAGE (OUTPATIENT)
Dept: CARDIOLOGY | Facility: CLINIC | Age: 64
End: 2019-10-11

## 2019-10-11 NOTE — TELEPHONE ENCOUNTER
Regarding: Prescription Question  Contact: [phone]  ----- Message from Mychart, Generic sent at 10/11/2019  1:15 PM EDT -----    Dr. Dykes,   1 question and 1 comment regarding current meds:    1) with knee replacement surgery on 9/26/19  Dr Titus put me on a script of 42 tablets of 325 mg EC  aspirin beginning 9/27/19. I had been on 81 mg Aspirin until that date. just an fyi.    2) With 325mg aspirin in place can I also take an Aleve with my pain med starting this week end? I am now taking 1 hydrocodone every 6 hours with promethazine. UP until Thursday Oct 10 at 4:30 am I had been taking 2 hydrocodone every 6 hours with promethazine.  Quit taking tylenol ex strength on Tuesday earlier this past week.

## 2019-10-25 ENCOUNTER — APPOINTMENT (OUTPATIENT)
Dept: PREADMISSION TESTING | Facility: HOSPITAL | Age: 64
End: 2019-10-25

## 2019-10-25 VITALS
OXYGEN SATURATION: 98 % | SYSTOLIC BLOOD PRESSURE: 117 MMHG | TEMPERATURE: 97.7 F | DIASTOLIC BLOOD PRESSURE: 70 MMHG | HEIGHT: 65 IN | RESPIRATION RATE: 18 BRPM | WEIGHT: 134 LBS | HEART RATE: 78 BPM | BODY MASS INDEX: 22.33 KG/M2

## 2019-10-25 LAB
ANION GAP SERPL CALCULATED.3IONS-SCNC: 11.5 MMOL/L (ref 5–15)
BUN BLD-MCNC: 15 MG/DL (ref 8–23)
BUN/CREAT SERPL: 19 (ref 7–25)
CALCIUM SPEC-SCNC: 9.3 MG/DL (ref 8.6–10.5)
CHLORIDE SERPL-SCNC: 104 MMOL/L (ref 98–107)
CO2 SERPL-SCNC: 26.5 MMOL/L (ref 22–29)
CREAT BLD-MCNC: 0.79 MG/DL (ref 0.57–1)
DEPRECATED RDW RBC AUTO: 42.8 FL (ref 37–54)
ERYTHROCYTE [DISTWIDTH] IN BLOOD BY AUTOMATED COUNT: 13.1 % (ref 12.3–15.4)
GFR SERPL CREATININE-BSD FRML MDRD: 74 ML/MIN/1.73
GLUCOSE BLD-MCNC: 80 MG/DL (ref 65–99)
HCT VFR BLD AUTO: 34.6 % (ref 34–46.6)
HGB BLD-MCNC: 11.1 G/DL (ref 12–15.9)
MCH RBC QN AUTO: 28.8 PG (ref 26.6–33)
MCHC RBC AUTO-ENTMCNC: 32.1 G/DL (ref 31.5–35.7)
MCV RBC AUTO: 89.9 FL (ref 79–97)
PLATELET # BLD AUTO: 359 10*3/MM3 (ref 140–450)
PMV BLD AUTO: 9.1 FL (ref 6–12)
POTASSIUM BLD-SCNC: 4.3 MMOL/L (ref 3.5–5.2)
RBC # BLD AUTO: 3.85 10*6/MM3 (ref 3.77–5.28)
SODIUM BLD-SCNC: 142 MMOL/L (ref 136–145)
WBC NRBC COR # BLD: 5.78 10*3/MM3 (ref 3.4–10.8)

## 2019-10-25 PROCEDURE — 36415 COLL VENOUS BLD VENIPUNCTURE: CPT

## 2019-10-25 PROCEDURE — 85027 COMPLETE CBC AUTOMATED: CPT | Performed by: ORTHOPAEDIC SURGERY

## 2019-10-25 PROCEDURE — 80048 BASIC METABOLIC PNL TOTAL CA: CPT | Performed by: ORTHOPAEDIC SURGERY

## 2019-10-25 RX ORDER — NAPROXEN SODIUM 220 MG
220 TABLET ORAL 2 TIMES DAILY PRN
COMMUNITY
End: 2019-11-11

## 2019-10-25 RX ORDER — ASPIRIN 81 MG/1
81 TABLET ORAL DAILY
COMMUNITY
End: 2020-02-07

## 2019-10-25 NOTE — DISCHARGE INSTRUCTIONS
Take the following medications the morning of surgery with a small sip of water: COREG  PLEASE ARRIVE AT THE  HOSPITAL AT: 0515 AM      General Instructions:  • Do not eat solid food after midnight the night before surgery.  • You may drink clear liquids day of surgery but must stop at least one hour before your hospital arrival time.  • It is beneficial for you to have a clear drink that contains carbohydrates the day of surgery.  We suggest a 12 to 20 ounce bottle of Gatorade or Powerade for non-diabetic patients or a 12 to 20 ounce bottle of G2 or Powerade Zero for diabetic patients. (Pediatric patients, are not advised to drink a 12 to 20 ounce carbohydrate drink)    Clear liquids are liquids you can see through.  Nothing red in color.     Plain water                               Sports drinks  Sodas                                   Gelatin (Jell-O)  Fruit juices without pulp such as white grape juice and apple juice  Popsicles that contain no fruit or yogurt  Tea or coffee (no cream or milk added)  Gatorade / Powerade  G2 / Powerade Zero    • Infants may have breast milk up to four hours before surgery.  • Infants drinking formula may drink formula up to six hours before surgery.   • Patients who avoid smoking, chewing tobacco and alcohol for 4 weeks prior to surgery have a reduced risk of post-operative complications.  Quit smoking as many days before surgery as you can.  • Do not smoke, use chewing tobacco or drink alcohol the day of surgery.   • If applicable bring your C-PAP/ BI-PAP machine.  • Bring any papers given to you in the doctor’s office.  • Wear clean comfortable clothes.  • Do not wear contact lenses, false eyelashes or make-up.  Bring a case for your glasses.   • Bring crutches or walker if applicable.  • Remove all piercings.  Leave jewelry and any other valuables at home.  • Hair extensions with metal clips must be removed prior to surgery.  • The Pre-Admission Testing nurse will instruct  you to bring medications if unable to obtain an accurate list in Pre-Admission Testing.        If you were given a blood bank ID arm band remember to bring it with you the day of surgery.    Preventing a Surgical Site Infection:  • For 2 to 3 days before surgery, avoid shaving with a razor because the razor can irritate skin and make it easier to develop an infection.    • Any areas of open skin can increase the risk of a post-operative wound infection by allowing bacteria to enter and travel throughout the body.  Notify your surgeon if you have any skin wounds / rashes even if it is not near the expected surgical site.  The area will need assessed to determine if surgery should be delayed until it is healed.  • The night prior to surgery sleep in a clean bed with clean clothing.  Do not allow pets to sleep with you.  • Shower on the morning of surgery using a fresh bar of anti-bacterial soap (such as Dial) and clean washcloth.  Dry with a clean towel and dress in clean clothing.  • Ask your surgeon if you will be receiving antibiotics prior to surgery.  • Make sure you, your family, and all healthcare providers clean their hands with soap and water or an alcohol based hand  before caring for you or your wound.    Day of surgery:  Your arrival time is approximately two hours before your scheduled surgery time.  Upon arrival, a Pre-op nurse and Anesthesiologist will review your health history, obtain vital signs, and answer questions you may have.  The only belongings needed at this time will be a list of your home medications and if applicable your C-PAP/BI-PAP machine.  If you are staying overnight your family can leave the rest of your belongings in the car and bring them to your room later.  A Pre-op nurse will start an IV and you may receive medication in preparation for surgery, including something to help you relax.  Your family will be able to see you in the Pre-op area.  Two visitors at a time will  be allowed in the Pre-op room.  While you are in surgery your family should notify the waiting room  if they leave the waiting room area and provide a contact phone number.    Please be aware that surgery does come with discomfort.  We want to make every effort to control your discomfort so please discuss any uncontrolled symptoms with your nurse.   Your doctor will most likely have prescribed pain medications.      If you are going home after surgery you will receive individualized written care instructions before being discharged.  A responsible adult must drive you to and from the hospital on the day of your surgery and stay with you for 24 hours.    If you are staying overnight following surgery, you will be transported to your hospital room following the recovery period.  [facility] has all private rooms.    You have received a list of surgical assistants for your reference.  If you have any questions please call Pre-Admission Testing at [phone].  Deductibles and co-payments are collected on the day of service. Please be prepared to pay the required co-pay, deductible or deposit on the day of service as defined by your plan.

## 2019-10-28 ENCOUNTER — ANESTHESIA EVENT (OUTPATIENT)
Dept: PERIOP | Facility: HOSPITAL | Age: 64
End: 2019-10-28

## 2019-10-28 ENCOUNTER — APPOINTMENT (OUTPATIENT)
Dept: ONCOLOGY | Facility: CLINIC | Age: 64
End: 2019-10-28

## 2019-10-28 ENCOUNTER — APPOINTMENT (OUTPATIENT)
Dept: OTHER | Facility: HOSPITAL | Age: 64
End: 2019-10-28

## 2019-10-28 ENCOUNTER — HOSPITAL ENCOUNTER (OUTPATIENT)
Facility: HOSPITAL | Age: 64
Setting detail: HOSPITAL OUTPATIENT SURGERY
Discharge: HOME | End: 2019-10-28
Attending: ORTHOPAEDIC SURGERY | Admitting: ORTHOPAEDIC SURGERY

## 2019-10-28 ENCOUNTER — ANESTHESIA (OUTPATIENT)
Dept: PERIOP | Facility: HOSPITAL | Age: 64
End: 2019-10-28

## 2019-10-28 VITALS
HEIGHT: 65 IN | DIASTOLIC BLOOD PRESSURE: 79 MMHG | BODY MASS INDEX: 22.33 KG/M2 | TEMPERATURE: 97.6 F | WEIGHT: 134 LBS | OXYGEN SATURATION: 97 % | RESPIRATION RATE: 16 BRPM | SYSTOLIC BLOOD PRESSURE: 136 MMHG | HEART RATE: 71 BPM

## 2019-10-28 PROCEDURE — 25010000002 PROPOFOL 10 MG/ML EMULSION: Performed by: NURSE ANESTHETIST, CERTIFIED REGISTERED

## 2019-10-28 PROCEDURE — 25010000002 HYDROMORPHONE PER 4 MG: Performed by: NURSE ANESTHETIST, CERTIFIED REGISTERED

## 2019-10-28 PROCEDURE — 25010000002 MIDAZOLAM PER 1 MG: Performed by: ANESTHESIOLOGY

## 2019-10-28 PROCEDURE — 25010000002 FENTANYL CITRATE (PF) 100 MCG/2ML SOLUTION: Performed by: NURSE ANESTHETIST, CERTIFIED REGISTERED

## 2019-10-28 RX ORDER — EPHEDRINE SULFATE 50 MG/ML
5 INJECTION, SOLUTION INTRAVENOUS ONCE AS NEEDED
Status: DISCONTINUED | OUTPATIENT
Start: 2019-10-28 | End: 2019-10-28 | Stop reason: HOSPADM

## 2019-10-28 RX ORDER — LIDOCAINE HYDROCHLORIDE 20 MG/ML
INJECTION, SOLUTION INFILTRATION; PERINEURAL AS NEEDED
Status: DISCONTINUED | OUTPATIENT
Start: 2019-10-28 | End: 2019-10-28 | Stop reason: SURG

## 2019-10-28 RX ORDER — MIDAZOLAM HYDROCHLORIDE 1 MG/ML
1 INJECTION INTRAMUSCULAR; INTRAVENOUS
Status: DISCONTINUED | OUTPATIENT
Start: 2019-10-28 | End: 2019-10-28 | Stop reason: HOSPADM

## 2019-10-28 RX ORDER — PROMETHAZINE HYDROCHLORIDE 25 MG/1
25 TABLET ORAL ONCE AS NEEDED
Status: DISCONTINUED | OUTPATIENT
Start: 2019-10-28 | End: 2019-10-28 | Stop reason: HOSPADM

## 2019-10-28 RX ORDER — DIPHENHYDRAMINE HYDROCHLORIDE 50 MG/ML
12.5 INJECTION INTRAMUSCULAR; INTRAVENOUS
Status: DISCONTINUED | OUTPATIENT
Start: 2019-10-28 | End: 2019-10-28 | Stop reason: HOSPADM

## 2019-10-28 RX ORDER — FLUMAZENIL 0.1 MG/ML
0.2 INJECTION INTRAVENOUS AS NEEDED
Status: DISCONTINUED | OUTPATIENT
Start: 2019-10-28 | End: 2019-10-28 | Stop reason: HOSPADM

## 2019-10-28 RX ORDER — PROPOFOL 10 MG/ML
VIAL (ML) INTRAVENOUS AS NEEDED
Status: DISCONTINUED | OUTPATIENT
Start: 2019-10-28 | End: 2019-10-28 | Stop reason: SURG

## 2019-10-28 RX ORDER — FENTANYL CITRATE 50 UG/ML
50 INJECTION, SOLUTION INTRAMUSCULAR; INTRAVENOUS
Status: DISCONTINUED | OUTPATIENT
Start: 2019-10-28 | End: 2019-10-28 | Stop reason: HOSPADM

## 2019-10-28 RX ORDER — FENTANYL CITRATE 50 UG/ML
INJECTION, SOLUTION INTRAMUSCULAR; INTRAVENOUS AS NEEDED
Status: DISCONTINUED | OUTPATIENT
Start: 2019-10-28 | End: 2019-10-28 | Stop reason: SURG

## 2019-10-28 RX ORDER — SODIUM CHLORIDE 0.9 % (FLUSH) 0.9 %
3-10 SYRINGE (ML) INJECTION AS NEEDED
Status: DISCONTINUED | OUTPATIENT
Start: 2019-10-28 | End: 2019-10-28 | Stop reason: HOSPADM

## 2019-10-28 RX ORDER — IPRATROPIUM BROMIDE AND ALBUTEROL SULFATE 2.5; .5 MG/3ML; MG/3ML
3 SOLUTION RESPIRATORY (INHALATION) ONCE AS NEEDED
Status: DISCONTINUED | OUTPATIENT
Start: 2019-10-28 | End: 2019-10-28 | Stop reason: HOSPADM

## 2019-10-28 RX ORDER — FAMOTIDINE 10 MG/ML
20 INJECTION, SOLUTION INTRAVENOUS ONCE
Status: COMPLETED | OUTPATIENT
Start: 2019-10-28 | End: 2019-10-28

## 2019-10-28 RX ORDER — NALOXONE HCL 0.4 MG/ML
0.2 VIAL (ML) INJECTION AS NEEDED
Status: DISCONTINUED | OUTPATIENT
Start: 2019-10-28 | End: 2019-10-28 | Stop reason: HOSPADM

## 2019-10-28 RX ORDER — MIDAZOLAM HYDROCHLORIDE 1 MG/ML
2 INJECTION INTRAMUSCULAR; INTRAVENOUS
Status: DISCONTINUED | OUTPATIENT
Start: 2019-10-28 | End: 2019-10-28 | Stop reason: HOSPADM

## 2019-10-28 RX ORDER — ONDANSETRON 2 MG/ML
4 INJECTION INTRAMUSCULAR; INTRAVENOUS ONCE AS NEEDED
Status: DISCONTINUED | OUTPATIENT
Start: 2019-10-28 | End: 2019-10-28 | Stop reason: HOSPADM

## 2019-10-28 RX ORDER — ACETAMINOPHEN 325 MG/1
650 TABLET ORAL ONCE AS NEEDED
Status: DISCONTINUED | OUTPATIENT
Start: 2019-10-28 | End: 2019-10-28 | Stop reason: HOSPADM

## 2019-10-28 RX ORDER — LIDOCAINE HYDROCHLORIDE 10 MG/ML
0.5 INJECTION, SOLUTION EPIDURAL; INFILTRATION; INTRACAUDAL; PERINEURAL ONCE AS NEEDED
Status: DISCONTINUED | OUTPATIENT
Start: 2019-10-28 | End: 2019-10-28 | Stop reason: HOSPADM

## 2019-10-28 RX ORDER — DIPHENHYDRAMINE HCL 25 MG
25 CAPSULE ORAL
Status: DISCONTINUED | OUTPATIENT
Start: 2019-10-28 | End: 2019-10-28 | Stop reason: HOSPADM

## 2019-10-28 RX ORDER — LABETALOL HYDROCHLORIDE 5 MG/ML
5 INJECTION, SOLUTION INTRAVENOUS
Status: DISCONTINUED | OUTPATIENT
Start: 2019-10-28 | End: 2019-10-28 | Stop reason: HOSPADM

## 2019-10-28 RX ORDER — PROMETHAZINE HYDROCHLORIDE 25 MG/1
25 SUPPOSITORY RECTAL ONCE AS NEEDED
Status: DISCONTINUED | OUTPATIENT
Start: 2019-10-28 | End: 2019-10-28 | Stop reason: HOSPADM

## 2019-10-28 RX ORDER — SODIUM CHLORIDE 0.9 % (FLUSH) 0.9 %
3 SYRINGE (ML) INJECTION EVERY 12 HOURS SCHEDULED
Status: DISCONTINUED | OUTPATIENT
Start: 2019-10-28 | End: 2019-10-28 | Stop reason: HOSPADM

## 2019-10-28 RX ORDER — HYDROMORPHONE HYDROCHLORIDE 1 MG/ML
0.5 INJECTION, SOLUTION INTRAMUSCULAR; INTRAVENOUS; SUBCUTANEOUS
Status: DISCONTINUED | OUTPATIENT
Start: 2019-10-28 | End: 2019-10-28 | Stop reason: HOSPADM

## 2019-10-28 RX ORDER — PROMETHAZINE HYDROCHLORIDE 25 MG/ML
6.25 INJECTION, SOLUTION INTRAMUSCULAR; INTRAVENOUS
Status: DISCONTINUED | OUTPATIENT
Start: 2019-10-28 | End: 2019-10-28 | Stop reason: HOSPADM

## 2019-10-28 RX ORDER — PROMETHAZINE HYDROCHLORIDE 25 MG/ML
12.5 INJECTION, SOLUTION INTRAMUSCULAR; INTRAVENOUS ONCE AS NEEDED
Status: DISCONTINUED | OUTPATIENT
Start: 2019-10-28 | End: 2019-10-28 | Stop reason: HOSPADM

## 2019-10-28 RX ORDER — KETOROLAC TROMETHAMINE 10 MG/1
20 TABLET, FILM COATED ORAL ONCE
Status: COMPLETED | OUTPATIENT
Start: 2019-10-28 | End: 2019-10-28

## 2019-10-28 RX ORDER — HYDRALAZINE HYDROCHLORIDE 20 MG/ML
5 INJECTION INTRAMUSCULAR; INTRAVENOUS
Status: DISCONTINUED | OUTPATIENT
Start: 2019-10-28 | End: 2019-10-28 | Stop reason: HOSPADM

## 2019-10-28 RX ORDER — SODIUM CHLORIDE, SODIUM LACTATE, POTASSIUM CHLORIDE, CALCIUM CHLORIDE 600; 310; 30; 20 MG/100ML; MG/100ML; MG/100ML; MG/100ML
9 INJECTION, SOLUTION INTRAVENOUS CONTINUOUS
Status: DISCONTINUED | OUTPATIENT
Start: 2019-10-28 | End: 2019-10-28 | Stop reason: HOSPADM

## 2019-10-28 RX ADMIN — FAMOTIDINE 20 MG: 10 INJECTION INTRAVENOUS at 06:30

## 2019-10-28 RX ADMIN — HYDROMORPHONE HYDROCHLORIDE 0.5 MG: 1 INJECTION, SOLUTION INTRAMUSCULAR; INTRAVENOUS; SUBCUTANEOUS at 08:04

## 2019-10-28 RX ADMIN — FENTANYL CITRATE 100 MCG: 50 INJECTION INTRAMUSCULAR; INTRAVENOUS at 06:56

## 2019-10-28 RX ADMIN — KETOROLAC TROMETHAMINE 20 MG: 10 TABLET, FILM COATED ORAL at 08:19

## 2019-10-28 RX ADMIN — HYDROMORPHONE HYDROCHLORIDE 0.5 MG: 1 INJECTION, SOLUTION INTRAMUSCULAR; INTRAVENOUS; SUBCUTANEOUS at 08:22

## 2019-10-28 RX ADMIN — SODIUM CHLORIDE, POTASSIUM CHLORIDE, SODIUM LACTATE AND CALCIUM CHLORIDE 9 ML/HR: 600; 310; 30; 20 INJECTION, SOLUTION INTRAVENOUS at 06:30

## 2019-10-28 RX ADMIN — LIDOCAINE HYDROCHLORIDE 60 MG: 20 INJECTION, SOLUTION INFILTRATION; PERINEURAL at 07:04

## 2019-10-28 RX ADMIN — MIDAZOLAM 1 MG: 1 INJECTION INTRAMUSCULAR; INTRAVENOUS at 06:31

## 2019-10-28 RX ADMIN — PROPOFOL 50 MG: 10 INJECTION, EMULSION INTRAVENOUS at 07:05

## 2019-10-28 RX ADMIN — PROPOFOL 150 MG: 10 INJECTION, EMULSION INTRAVENOUS at 07:04

## 2019-10-28 NOTE — ANESTHESIA POSTPROCEDURE EVALUATION
Patient: Debra S Sandifer    Procedure Summary     Date:  10/28/19 Room / Location:  [facility] OR [address] [place] [address] MAIN OR    Anesthesia Start:  0654 Anesthesia Stop:  0716    Procedure:  RIGHT KNEE MANIPULATION (Right ) Diagnosis:      Surgeon:  Van Titus MD Provider:  Sanjiv Serrano MD    Anesthesia Type:  general ASA Status:  3          Anesthesia Type: general  Last vitals  BP   139/83 (10/28/19 0820)   Temp   36.4 °C (97.6 °F) (10/28/19 0712)   Pulse   71 (10/28/19 0825)   Resp   16 (10/28/19 0820)     SpO2   95 % (10/28/19 0825)     Post Anesthesia Care and Evaluation    Patient location during evaluation: PACU  Patient participation: complete - patient participated  Level of consciousness: awake and alert  Pain management: adequate  Airway patency: patent  Anesthetic complications: No anesthetic complications    Cardiovascular status: acceptable  Respiratory status: acceptable  Hydration status: acceptable    Comments: --------------------            10/28/19               0825     --------------------   BP:                  Pulse:      71       Resp:                Temp:                SpO2:      95%      --------------------

## 2019-10-28 NOTE — ANESTHESIA PREPROCEDURE EVALUATION
Anesthesia Evaluation     Patient summary reviewed and Nursing notes reviewed                Airway   Mallampati: II  No difficulty expected  Dental      Pulmonary - negative pulmonary ROS   Cardiovascular     ECG reviewed  Rhythm: regular  Rate: normal    (+) hypertension, past MI  >12 months, CAD, cardiac stents more than 12 months ago hyperlipidemia,       Neuro/Psych- negative ROS  GI/Hepatic/Renal/Endo - negative ROS     Musculoskeletal     Abdominal    Substance History - negative use     OB/GYN negative ob/gyn ROS         Other   (+) arthritis   history of cancer                    Anesthesia Plan    ASA 3     general     intravenous induction   Anesthetic plan, all risks, benefits, and alternatives have been provided, discussed and informed consent has been obtained with: patient.

## 2019-10-28 NOTE — OP NOTE
Preop  diagnosis: Arthrofibrosis right total knee    Postop diagnosis: Same    Anesthesia: General    Surgeon: Van Titus Sr., MD    Procedure: Manipulation right knee    Description of procedure: After anesthesia been induced I manipulated the knee 255 degrees of flexion and full extension.  The patella was manipulated.  This was done fairly easily with minimal force.  Peripatellar adhesions were felt to release.  There were no complications.

## 2019-10-29 ENCOUNTER — OFFICE VISIT (OUTPATIENT)
Dept: INTERNAL MEDICINE | Facility: CLINIC | Age: 64
End: 2019-10-29

## 2019-10-29 VITALS
OXYGEN SATURATION: 98 % | WEIGHT: 134 LBS | HEART RATE: 69 BPM | BODY MASS INDEX: 22.33 KG/M2 | DIASTOLIC BLOOD PRESSURE: 54 MMHG | HEIGHT: 65 IN | SYSTOLIC BLOOD PRESSURE: 100 MMHG

## 2019-10-29 DIAGNOSIS — Z00.00 WELL ADULT EXAM: Primary | ICD-10-CM

## 2019-10-29 PROCEDURE — 99396 PREV VISIT EST AGE 40-64: CPT | Performed by: INTERNAL MEDICINE

## 2019-10-29 PROCEDURE — 90674 CCIIV4 VAC NO PRSV 0.5 ML IM: CPT | Performed by: INTERNAL MEDICINE

## 2019-10-29 PROCEDURE — 90471 IMMUNIZATION ADMIN: CPT | Performed by: INTERNAL MEDICINE

## 2019-10-29 NOTE — PROGRESS NOTES
Subjective     Debra S Sandifer is a 64 y.o. female who presents for a complete physical exam.      History of Present Illness     HLD.  Excellent control of lipids.   CAD.  She is on medical management.      Review of Systems   Constitutional: Negative.    HENT: Negative.    Eyes: Negative.    Respiratory: Negative.    Cardiovascular: Negative.    Gastrointestinal: Negative.    Endocrine: Negative.    Genitourinary: Negative.    Musculoskeletal: Positive for arthralgias.   Skin: Negative.    Allergic/Immunologic: Negative.    Neurological: Negative.    Hematological: Negative.    Psychiatric/Behavioral: Negative.        The following portions of the patient's history were reviewed and updated as appropriate: allergies, current medications, past family history, past medical history, past social history, past surgical history and problem list.  Health maintenance tab was reviewed and updated with the patient.       Patient Active Problem List    Diagnosis Date Noted   • DJD (degenerative joint disease) of knee 09/26/2019   • History of coronary artery stent placement 06/18/2018   • Prediabetes 10/17/2017   • Ischemic cardiomyopathy 12/22/2016   • Coronary artery disease involving native coronary artery 11/23/2016     Note Last Updated: 11/23/2016 11/2016  PTCA of the first diagonal branch with a 2.5 x 12 mm trek Rx balloon.  PCI of the distal LAD with a 2.75 x 18 mm Xience Alpine drug-eluting stent  PCI of the mid LAD with a 3.0 x 28 mm Xience Alpine drug-eluting stent     • Malignant neoplasm of upper-inner quadrant of left breast in female, estrogen receptor positive (CMS/HCC) 04/27/2016     Note Last Updated: 10/14/2016     S/p lumpectomy and radiation in 2016     • Mixed hyperlipidemia 02/10/2016       Past Medical History:   Diagnosis Date   • Allergic codeine/latex   • Arthralgia of both hands    • Arthritis     hand   • Atypical chest pain    • Breast cancer (CMS/HCC)     Left   • Coronary artery disease  involving native coronary artery 11/23/2016   • Dyspareunia    • H/O bronchitis    • H/O infectious mononucleosis     COLLEGE AGE   • High cholesterol    • History of medical problems back surgery  2005    repair herniated disc   • Hot flashes, menopausal    • Hx of radiation therapy     JULY 2016   • Hyperlipidemia    • Hypertension    • Myocardial infarction (CMS/HCC) November 2016    2 stents   • Polyp of sigmoid colon    • Right knee pain    • Stye     LEFT EYE   • Urinary tract infection periodically   • Vitamin D deficiency        Past Surgical History:   Procedure Laterality Date   • BACK SURGERY  01/13/2005    Herniated Disk repair (Done by Dr Jurgen Reddy)   • BREAST BIOPSY     • BREAST LUMPECTOMY     • BREAST LUMPECTOMY WITH SENTINEL NODE BIOPSY Left 4/13/2016    Procedure: LEFT BREAST MAMMO NEEDLE LOC LUMPECTOMY WITH LEFT AXILLA SENTINEL NODE BIOPSY;  Surgeon: Aminata Estrella MD;  Location: [facility] OR;  Service:    • CARDIAC CATHETERIZATION N/A 11/15/2016    Procedure: Left Heart Cath;  Surgeon: Sanjiv Dykes MD;  Location: [facility] CATH INVASIVE LOCATION;  Service:    • CARDIAC CATHETERIZATION N/A 11/15/2016    Procedure: Left ventriculography;  Surgeon: Sanjiv Dykes MD;  Location: [facility] CATH INVASIVE LOCATION;  Service:    • CARDIAC SURGERY  11/15/2016   • COLONOSCOPY  2014   • CORONARY ANGIOPLASTY     • CORONARY STENT PLACEMENT  11/15/16    two   • ENDOSCOPY N/A 11/11/2016    Procedure: ESOPHAGOGASTRODUODENOSCOPY WITH BIOPSY;  Surgeon: Mehdi Guardado MD;  Location: [facility] ENDOSCOPY;  Service:    • LUMBAR DISCECTOMY     • LYMPH NODE BIOPSY  4/13/16    two   • SPINE SURGERY  2005    herniated disc   • TOTAL KNEE ARTHROPLASTY Right 9/26/2019    Procedure: TOTAL KNEE ARTHROPLASTY;  Surgeon: Van Titus MD;  Location: [facility] MAIN OR;  Service: Orthopedics   • WISDOM TOOTH EXTRACTION         Family History   Problem Relation Age of Onset   • Coronary artery disease Mother    •  Dementia Mother    • Heart disease Mother         Heart attack w/2 stents   • Heart attack Mother         had heart attack. Heart catheter -2 stents   • Hypertension Father    • Heart disease Father         Coronary heart disease   • Stroke Father         Ischemic   • Diabetes type II Father    • Diabetes Father    • Hyperlipidemia Father    • Prostate cancer Brother 51   • Alcohol abuse Maternal Grandfather    • Rheum arthritis Paternal Grandmother    • Arthritis Paternal Grandmother    • Alcohol abuse Paternal Grandfather    • Stroke Paternal Grandfather         Ischemic   • Rheum arthritis Paternal Grandfather    • Diabetes type II Paternal Grandfather    • Diabetes Paternal Grandfather    • Hyperlipidemia Paternal Grandfather    • Hypertension Paternal Grandfather    • Heart disease Paternal Grandfather    • Cancer Brother         Prostate   • No Known Problems Maternal Grandmother    • Malig Hyperthermia Neg Hx        Social History     Socioeconomic History   • Marital status:      Spouse name: Van   • Number of children: Not on file   • Years of education: College   • Highest education level: Not on file   Occupational History   • Occupation: Retired      Employer: [employer]     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[place], [place], [place], [place]   Tobacco Use   • Smoking status: Former Smoker     Packs/day: 0.25     Years: 5.00     Pack years: 1.25     Start date:      Last attempt to quit:      Years since quittin.3   • Smokeless tobacco: Former User   • Tobacco comment: smoked less than 1 pack per week   Substance and Sexual Activity   • Alcohol use: Yes     Alcohol/week: 0.6 oz     Types: 2 Glasses of wine per week     Comment: social drinker/weekends   • Drug use: No   • Sexual activity: Yes     Partners: Male     Birth control/protection: Post-menopausal   Social History Narrative    Enjoys working out, golf, walking, running, grandchildren's activities. Traveled to  [place] and [place] in 07/2015 and [place] [facility] 03/2016       Current Outpatient Medications on File Prior to Visit   Medication Sig Dispense Refill   • aspirin 81 MG EC tablet Take 81 mg by mouth Daily. TO CONTINUE PER DR PHAM     • atorvastatin (LIPITOR) 40 MG tablet Take 40 mg by mouth Every Night.     • carvedilol (COREG) 3.125 MG tablet Take 3.125 mg by mouth 2 (Two) Times a Day With Meals.     • cholecalciferol (VITAMIN D3) 1000 units tablet Take 1,000 Units by mouth Every Morning.     • exemestane (AROMASIN) 25 MG chemo tablet Take 1 tablet by mouth Daily. (Patient taking differently: Take 25 mg by mouth Every Morning.) 90 tablet 1   • naproxen sodium (ALEVE) 220 MG tablet Take 220 mg by mouth 2 (Two) Times a Day As Needed (LAST DOSE 10/22/19).     • Probiotic Product (PROBIOTIC DAILY PO) Take 1 tablet by mouth Every Morning.       Current Facility-Administered Medications on File Prior to Visit   Medication Dose Route Frequency Provider Last Rate Last Dose   • [DISCONTINUED] acetaminophen (TYLENOL) suppository 650 mg  650 mg Rectal Once PRN Amisha Medley CRNA       • [DISCONTINUED] acetaminophen (TYLENOL) tablet 650 mg  650 mg Oral Once PRN Amisha Medley CRNA       • [DISCONTINUED] diphenhydrAMINE (BENADRYL) capsule 25 mg  25 mg Oral Q30 Min PRN Amisha Medley CRNA       • [DISCONTINUED] diphenhydrAMINE (BENADRYL) injection 12.5 mg  12.5 mg Intravenous Q15 Min PRN Amisha Medley CRNA       • [DISCONTINUED] ePHEDrine injection 5 mg  5 mg Intravenous Once PRN Amisha Medley CRNA       • [DISCONTINUED] fentaNYL citrate (PF) (SUBLIMAZE) injection 50 mcg  50 mcg Intravenous Q5 Min PRN Amisha Medley CRNA       • [DISCONTINUED] flumazenil (ROMAZICON) injection 0.2 mg  0.2 mg Intravenous PRN Amisha Medley CRNA       • [DISCONTINUED] hydrALAZINE (APRESOLINE) injection 5 mg  5 mg Intravenous Q10 Min PRN Amisha Medley CRNA       • [DISCONTINUED] HYDROmorphone  "(DILAUDID) injection 0.5 mg  0.5 mg Intravenous Q5 Min PRN Amisha Medley CRNA   0.5 mg at 10/28/19 0822   • [DISCONTINUED] ipratropium-albuterol (DUO-NEB) nebulizer solution 3 mL  3 mL Nebulization Once PRN Amisha Medley CRNA       • [DISCONTINUED] labetalol (NORMODYNE,TRANDATE) injection 5 mg  5 mg Intravenous Q5 Min PRN Amisha Medley CRNA       • [DISCONTINUED] lactated ringers infusion  9 mL/hr Intravenous Continuous Sanjiv Serrano MD 9 mL/hr at 10/28/19 0630     • [DISCONTINUED] naloxone (NARCAN) injection 0.2 mg  0.2 mg Intravenous PRN Amisha Medley CRNA       • [DISCONTINUED] ondansetron (ZOFRAN) injection 4 mg  4 mg Intravenous Once PRN Amisha Medley CRNA       • [DISCONTINUED] promethazine (PHENERGAN) injection 12.5 mg  12.5 mg Intramuscular Once PRN Amisha Medley CRNA       • [DISCONTINUED] promethazine (PHENERGAN) injection 6.25 mg  6.25 mg Intravenous Q10 Min PRN Amisha Medley CRNA       • [DISCONTINUED] promethazine (PHENERGAN) suppository 25 mg  25 mg Rectal Once PRN Amisha Medley CRNA       • [DISCONTINUED] promethazine (PHENERGAN) tablet 25 mg  25 mg Oral Once PRN Amisha Medley CRNA           Allergies   Allergen Reactions   • Oxycodone-Acetaminophen Nausea And Vomiting   • Codeine Rash   • Latex Rash       Immunization History   Administered Date(s) Administered   • Flu Mist 10/13/2015   • Flu Vaccine Quad PF >18YRS 10/14/2016, 10/17/2017   • Hepatitis A 10/23/2018, 04/24/2019   • Tdap 04/15/2005, 02/22/2017   • Zostavax 10/04/2013   • flucelvax quad pfs =>4 YRS 10/23/2018       Objective     /54   Pulse 69   Ht 165.1 cm (65\")   Wt 60.8 kg (134 lb)   SpO2 98%   BMI 22.30 kg/m²     Physical Exam   Constitutional: She is oriented to person, place, and time. She appears well-developed and well-nourished.   HENT:   Head: Normocephalic and atraumatic.   Right Ear: Hearing, tympanic membrane and external ear normal.   Left Ear: " Hearing, tympanic membrane and external ear normal.   Nose: Nose normal.   Mouth/Throat: Oropharynx is clear and moist.   Neck: Neck supple. No thyromegaly present.   Cardiovascular: Normal rate, regular rhythm and normal heart sounds.   No murmur heard.  Pulmonary/Chest: Effort normal and breath sounds normal. Right breast exhibits no mass. Left breast exhibits no mass.   Abdominal: Soft. She exhibits no distension. There is no hepatosplenomegaly. There is no tenderness.   Genitourinary: No breast tenderness.   Lymphadenopathy:     She has no cervical adenopathy.   Neurological: She is alert and oriented to person, place, and time.   Skin: Skin is warm and dry.   Psychiatric: She has a normal mood and affect. Her speech is normal and behavior is normal. Judgment and thought content normal. Cognition and memory are normal.       Assessment/Plan   Elin was seen today for annual exam.    Diagnoses and all orders for this visit:    Well adult exam    Other orders  -     Flucelvax Quad=>4Years (PFS)        Discussion    Patient presents today for a CPE.      Patient follows a healthy diet.   Patient follows an adequate exercise regimen. Mammogram is up to date.   Colon cancer screening is up to date.   Pap smear performed every 2-3 years.   DEXA is up to date.  I have recommended that the patient get the following immunizations:  Shingrix.      Health Maintenance   Topic Date Due   • ZOSTER VACCINE (2 of 3) 11/29/2013   • INFLUENZA VACCINE  08/01/2019   • ANNUAL PHYSICAL  10/24/2019   • LIPID PANEL  10/29/2020   • MAMMOGRAM  02/12/2021   • PAP SMEAR  10/23/2021   • COLONOSCOPY  04/16/2024   • TDAP/TD VACCINES (3 - Td) 02/22/2027   • HEPATITIS C SCREENING  Addressed            Future Appointments   Date Time Provider Department Center   11/11/2019  9:30 AM [facility] None   11/11/2019 10:00 AM Kellen Akhtar MD [facility]   1/9/2020 11:20 AM Sanjiv Dykes MD [facility]  [facility] None

## 2019-11-01 ENCOUNTER — TRANSCRIBE ORDERS (OUTPATIENT)
Dept: ADMINISTRATIVE | Facility: HOSPITAL | Age: 64
End: 2019-11-01

## 2019-11-01 DIAGNOSIS — Z12.31 VISIT FOR SCREENING MAMMOGRAM: Primary | ICD-10-CM

## 2019-11-04 RX ORDER — CARVEDILOL 3.12 MG/1
TABLET ORAL
Qty: 180 TABLET | Refills: 1 | Status: SHIPPED | OUTPATIENT
Start: 2019-11-04 | End: 2020-04-30

## 2019-11-04 RX ORDER — EXEMESTANE 25 MG/1
TABLET ORAL
Qty: 90 TABLET | Refills: 1 | Status: SHIPPED | OUTPATIENT
Start: 2019-11-04 | End: 2020-04-27

## 2019-11-08 NOTE — PROGRESS NOTES
Subjective:     Reason for follow up:   1. Stage 1 (T1bN0), left breast invasive ductal carcinoma, ER+ (90%), CA+, [id number] negative   * status post lumpectomy with SLNB    * status post radiation therapy   * Arimidex started 6/2016 - changed to Aromasin due to arthalgias     History of Present Ilness: Debra S Sandifer presents for follow-up of breast cancer. She remains on Aromiasin.   Her arthralgias are better than they were on Arimidex but they are still present. Her hot flashes are present and tolerable.   She had her repeat colonoscopy  because of polyps and it was totally n negative  She had her right knee replaced 6 weeks ago and is slowly recovering from the surgery and remains mildly anemic as expected        Past Medical   Past Medical History:   Diagnosis Date   • Allergic codeine/latex   • Arthralgia of both hands    • Arthritis     hand   • Atypical chest pain    • Breast cancer (CMS/HCC)     Left   • Coronary artery disease involving native coronary artery 11/23/2016   • Dyspareunia    • H/O bronchitis    • H/O infectious mononucleosis     COLLEGE AGE   • High cholesterol    • History of medical problems back surgery  2005    repair herniated disc   • Hot flashes, menopausal    • Hx of radiation therapy     JULY 2016   • Hyperlipidemia    • Hypertension    • Myocardial infarction (CMS/HCC) November 2016    2 stents   • Polyp of sigmoid colon    • Right knee pain    • Stye     LEFT EYE   • Urinary tract infection periodically   • Vitamin D deficiency      Patient Active Problem List   Diagnosis   • Mixed hyperlipidemia   • Malignant neoplasm of upper-inner quadrant of left breast in female, estrogen receptor positive (CMS/HCC)   • Coronary artery disease involving native coronary artery   • Ischemic cardiomyopathy   • Prediabetes   • History of coronary artery stent placement   • DJD (degenerative joint disease) of knee     Social History   Social History     Socioeconomic History   • Marital status:       Spouse name: Van   • Number of children: Not on file   • Years of education: College   • Highest education level: Not on file   Occupational History   • Occupation: Retired      Employer: [employer]     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[place], [place], [place], [place]   Tobacco Use   • Smoking status: Former Smoker     Packs/day: 0.25     Years: 5.00     Pack years: 1.25     Start date:      Last attempt to quit:      Years since quittin.3   • Smokeless tobacco: Former User   • Tobacco comment: smoked less than 1 pack per week   Substance and Sexual Activity   • Alcohol use: Yes     Alcohol/week: 0.6 oz     Types: 2 Glasses of wine per week     Comment: social drinker/weekends   • Drug use: No   • Sexual activity: Yes     Partners: Male     Birth control/protection: Post-menopausal   Social History Narrative    Enjoys working out, golf, walking, running, grandchildren's activities. Traveled to [place] and [place] in 2015 and [place] [facility] 2016      Family History  Family History   Problem Relation Age of Onset   • Coronary artery disease Mother    • Dementia Mother    • Heart disease Mother         Heart attack w/2 stents   • Heart attack Mother         had heart attack. Heart catheter -2 stents   • Hypertension Father    • Heart disease Father         Coronary heart disease   • Stroke Father         Ischemic   • Diabetes type II Father    • Diabetes Father    • Hyperlipidemia Father    • Prostate cancer Brother 51   • Alcohol abuse Maternal Grandfather    • Rheum arthritis Paternal Grandmother    • Arthritis Paternal Grandmother    • Alcohol abuse Paternal Grandfather    • Stroke Paternal Grandfather         Ischemic   • Rheum arthritis Paternal Grandfather    • Diabetes type II Paternal Grandfather    • Diabetes Paternal Grandfather    • Hyperlipidemia Paternal Grandfather    • Hypertension Paternal Grandfather    • Heart disease Paternal Grandfather    •  "Cancer Brother         Prostate   • No Known Problems Maternal Grandmother    • Malig Hyperthermia Neg Hx      Allergies  Allergies   Allergen Reactions   • Oxycodone-Acetaminophen Nausea And Vomiting   • Codeine Rash   • Latex Rash       Medications: The current medication list was reviewed in the EMR.    Review of Systems  Review of Systems   Constitutional: Negative for activity change, appetite change, chills, diaphoresis, fatigue (11/12/2018), fever and unexpected weight change.   Eyes: Negative.    Respiratory: Negative.  Negative for cough, chest tightness and shortness of breath.    Cardiovascular: Negative.  Negative for chest pain, palpitations and leg swelling.   Gastrointestinal: Negative.  Negative for abdominal pain, blood in stool, constipation, diarrhea, nausea and vomiting.   Genitourinary: Negative.    Musculoskeletal: Positive for arthralgias (hands better 11/11/19). Negative for gait problem (rigth knee better had replacement 11/11/19), joint swelling (stable 11/11/19) and myalgias.   Neurological: Positive for light-headedness (same 11/11/19). Negative for dizziness. Numbness: right knee numbness 11/11/19. Headaches: same 5/6/19.   Hematological: Negative for adenopathy. Does not bruise/bleed easily (stable since going off medication 5/6/19).   Psychiatric/Behavioral: Negative.  Negative for confusion. The patient is not nervous/anxious.        Objective:     Vitals:    11/11/19 0948   BP: 100/63   Pulse: 81   Resp: 16   Temp: 98 °F (36.7 °C)   SpO2: 98%   Weight: 59.7 kg (131 lb 11.2 oz)   Height: 165 cm (64.96\")   PainSc:   4   PainLoc: Knee  Comment: right knee replacement     Current Status 11/11/2019   ECOG score 0   GENERAL:  Well-developed, well-nourished female in no acute distress.   SKIN:  Warm, dry without rashes, purpura or petechiae.  HENT: Hearing: normal, Lips normal. Dentition: good  LYMPHATICS:  No cervical, supraclavicular, axillary adenopathy.  RESPIRATORY:  Lungs clear to " percussion and auscultation. Good airflow. Normal respiratory effort  BREASTS: Left breast smaller than right breast, bilateral breast exam without palpable masses, skin changes or nipple discharge-  CARDIAC:  Regular rate and rhythm without murmurs, rubs or gallops. Normal S1,S2. Edema -  No  GI: Soft, nontender, non-distended, no hernia present  PSYCHIATRIC:  Normal affect and mood.  Oriented to person/place/time.  MSK: Gait: Normal; No clubbing, cyanosis. No tenderness or swelling in left knee, right knee-    Labs and Imaging  Lab Results   Component Value Date    WBC 7.29 11/11/2019    HGB 11.6 (L) 11/11/2019    HCT 36.5 11/11/2019    MCV 91.3 11/11/2019     11/11/2019     Labs from 10/2017 reviewed.     Breast imaging: Mammogram 2/2018  CONCLUSION: Probable benign mammogram with small area of focal  asymmetric density at site of previous lumpectomy in upper inner left  breast and best seen in the CC projection. A short-term follow-up  left-sided mammogram in 6 months is recommended.      BI-RADS CATEGORY 3: Probably benign. Short interval follow-up suggested.    MAMMO SCREENING DIGITAL TOMOSYNTHESIS BILATERAL W CAD-     CONCLUSION: There is no evidence for malignancy nor significant change  in either breast. BI-RADS Category 2, benign.     Recommendation: Monthly self-examination and annual mammography.     This report was finalized on 2/12/2019                   Assessment/Plan     Assessment:   1. Stage 1 (T1bN0), left breast invasive ductal carcinoma, ER+ (90%), [place]+, [id number] negative   * status post lumpectomy with SLNB 4/13/16   * Oncotype Dx 20 (low intermediate). No chemotherapy indicated.   * status post radiation therapy   * Arimidex started June 2016. Change to Aromasin due to arthralgias 5/2017. Tolerating well.    * Mammo normal 2/2019     2. Hot flashes. Tolerable. Stable.   3. Joint arthralgias. 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 Present but improved with Aromasin compared to  AI  4. Coronary artery disease with stents  5.  Mild anemia due to knee replacement in 9/19    Plan:     1. Continue Aromasin.   2. Annual bilateral mammogram due February 2020;   3. . Patient was instructed on the importance of physical activity, healthy diet, and self breast exams.  Patient will continue calcium and vitamin D supplementation.    Follow-up in 6 months. I asked the patient to call for any questions, concerns, or new symptoms.

## 2019-11-11 ENCOUNTER — LAB (OUTPATIENT)
Dept: OTHER | Facility: HOSPITAL | Age: 64
End: 2019-11-11

## 2019-11-11 ENCOUNTER — OFFICE VISIT (OUTPATIENT)
Dept: ONCOLOGY | Facility: CLINIC | Age: 64
End: 2019-11-11

## 2019-11-11 VITALS
HEIGHT: 65 IN | DIASTOLIC BLOOD PRESSURE: 63 MMHG | WEIGHT: 131.7 LBS | RESPIRATION RATE: 16 BRPM | TEMPERATURE: 98 F | BODY MASS INDEX: 21.94 KG/M2 | HEART RATE: 81 BPM | SYSTOLIC BLOOD PRESSURE: 100 MMHG | OXYGEN SATURATION: 98 %

## 2019-11-11 DIAGNOSIS — Z17.0 MALIGNANT NEOPLASM OF UPPER-INNER QUADRANT OF LEFT BREAST IN FEMALE, ESTROGEN RECEPTOR POSITIVE (HCC): Primary | ICD-10-CM

## 2019-11-11 DIAGNOSIS — C50.212 MALIGNANT NEOPLASM OF UPPER-INNER QUADRANT OF LEFT BREAST IN FEMALE, ESTROGEN RECEPTOR POSITIVE (HCC): ICD-10-CM

## 2019-11-11 DIAGNOSIS — Z17.0 MALIGNANT NEOPLASM OF UPPER-INNER QUADRANT OF LEFT BREAST IN FEMALE, ESTROGEN RECEPTOR POSITIVE (HCC): ICD-10-CM

## 2019-11-11 DIAGNOSIS — C50.212 MALIGNANT NEOPLASM OF UPPER-INNER QUADRANT OF LEFT BREAST IN FEMALE, ESTROGEN RECEPTOR POSITIVE (HCC): Primary | ICD-10-CM

## 2019-11-11 LAB
ALBUMIN SERPL-MCNC: 4.2 G/DL (ref 3.5–5.2)
ALBUMIN/GLOB SERPL: 1.2 G/DL
ALP SERPL-CCNC: 98 U/L (ref 39–117)
ALT SERPL W P-5'-P-CCNC: 14 U/L (ref 1–33)
ANION GAP SERPL CALCULATED.3IONS-SCNC: 11.4 MMOL/L (ref 5–15)
AST SERPL-CCNC: 23 U/L (ref 1–32)
BASOPHILS # BLD AUTO: 0.06 10*3/MM3 (ref 0–0.2)
BASOPHILS NFR BLD AUTO: 0.8 % (ref 0–1.5)
BILIRUB SERPL-MCNC: 0.5 MG/DL (ref 0.1–1.2)
BUN BLD-MCNC: 16 MG/DL (ref 8–23)
BUN/CREAT SERPL: 19.3 (ref 7–25)
CALCIUM SPEC-SCNC: 10 MG/DL (ref 8.6–10.5)
CHLORIDE SERPL-SCNC: 103 MMOL/L (ref 98–107)
CO2 SERPL-SCNC: 26.6 MMOL/L (ref 22–29)
CREAT BLD-MCNC: 0.83 MG/DL (ref 0.57–1)
DEPRECATED RDW RBC AUTO: 46.1 FL (ref 37–54)
EOSINOPHIL # BLD AUTO: 0.13 10*3/MM3 (ref 0–0.4)
EOSINOPHIL NFR BLD AUTO: 1.8 % (ref 0.3–6.2)
ERYTHROCYTE [DISTWIDTH] IN BLOOD BY AUTOMATED COUNT: 13.6 % (ref 12.3–15.4)
GFR SERPL CREATININE-BSD FRML MDRD: 69 ML/MIN/1.73
GLOBULIN UR ELPH-MCNC: 3.4 GM/DL
GLUCOSE BLD-MCNC: 112 MG/DL (ref 65–99)
HCT VFR BLD AUTO: 36.5 % (ref 34–46.6)
HGB BLD-MCNC: 11.6 G/DL (ref 12–15.9)
IMM GRANULOCYTES # BLD AUTO: 0.03 10*3/MM3 (ref 0–0.05)
IMM GRANULOCYTES NFR BLD AUTO: 0.4 % (ref 0–0.5)
LYMPHOCYTES # BLD AUTO: 1.52 10*3/MM3 (ref 0.7–3.1)
LYMPHOCYTES NFR BLD AUTO: 20.9 % (ref 19.6–45.3)
MCH RBC QN AUTO: 29 PG (ref 26.6–33)
MCHC RBC AUTO-ENTMCNC: 31.8 G/DL (ref 31.5–35.7)
MCV RBC AUTO: 91.3 FL (ref 79–97)
MONOCYTES # BLD AUTO: 0.63 10*3/MM3 (ref 0.1–0.9)
MONOCYTES NFR BLD AUTO: 8.6 % (ref 5–12)
NEUTROPHILS # BLD AUTO: 4.92 10*3/MM3 (ref 1.7–7)
NEUTROPHILS NFR BLD AUTO: 67.5 % (ref 42.7–76)
NRBC BLD AUTO-RTO: 0 /100 WBC (ref 0–0.2)
PLATELET # BLD AUTO: 377 10*3/MM3 (ref 140–450)
PMV BLD AUTO: 9.3 FL (ref 6–12)
POTASSIUM BLD-SCNC: 5 MMOL/L (ref 3.5–5.2)
PROT SERPL-MCNC: 7.6 G/DL (ref 6–8.5)
RBC # BLD AUTO: 4 10*6/MM3 (ref 3.77–5.28)
SODIUM BLD-SCNC: 141 MMOL/L (ref 136–145)
WBC NRBC COR # BLD: 7.29 10*3/MM3 (ref 3.4–10.8)

## 2019-11-11 PROCEDURE — 85025 COMPLETE CBC W/AUTO DIFF WBC: CPT | Performed by: INTERNAL MEDICINE

## 2019-11-11 PROCEDURE — 80053 COMPREHEN METABOLIC PANEL: CPT | Performed by: INTERNAL MEDICINE

## 2019-11-11 PROCEDURE — 99214 OFFICE O/P EST MOD 30 MIN: CPT | Performed by: INTERNAL MEDICINE

## 2019-11-11 PROCEDURE — 36415 COLL VENOUS BLD VENIPUNCTURE: CPT

## 2019-11-11 RX ORDER — ATORVASTATIN CALCIUM 40 MG/1
TABLET, FILM COATED ORAL
Qty: 90 TABLET | Refills: 1 | Status: SHIPPED | OUTPATIENT
Start: 2019-11-11 | End: 2020-05-05

## 2020-01-09 ENCOUNTER — OFFICE VISIT (OUTPATIENT)
Dept: CARDIOLOGY | Facility: CLINIC | Age: 65
End: 2020-01-09

## 2020-01-09 VITALS
DIASTOLIC BLOOD PRESSURE: 60 MMHG | BODY MASS INDEX: 21.99 KG/M2 | SYSTOLIC BLOOD PRESSURE: 122 MMHG | WEIGHT: 132 LBS | HEIGHT: 65 IN | HEART RATE: 70 BPM

## 2020-01-09 DIAGNOSIS — I25.10 CORONARY ARTERY DISEASE INVOLVING NATIVE CORONARY ARTERY OF NATIVE HEART WITHOUT ANGINA PECTORIS: ICD-10-CM

## 2020-01-09 DIAGNOSIS — E78.2 MIXED HYPERLIPIDEMIA: Primary | ICD-10-CM

## 2020-01-09 DIAGNOSIS — Z95.5 HISTORY OF CORONARY ARTERY STENT PLACEMENT: ICD-10-CM

## 2020-01-09 PROCEDURE — 93000 ELECTROCARDIOGRAM COMPLETE: CPT | Performed by: INTERNAL MEDICINE

## 2020-01-09 PROCEDURE — 99214 OFFICE O/P EST MOD 30 MIN: CPT | Performed by: INTERNAL MEDICINE

## 2020-01-09 NOTE — PROGRESS NOTES
Debra S Sandifer  1955  Date of Office Visit: 01/09/20  Encounter Provider: Sanjiv Dykes MD  Place of Service: [facility] CARDIOLOGY    Chief complaints:   CAD  Mixed hyperlipidemia  Ischemic cardiomyopathy    HPI:  Dr. Morales,   I had the pleasure of seeing your patient back in followup. As you well know, she is a pleasant  64-year-old female with no significant history of cardiovascular disease but a history of gastroesophageal reflux disease who presented to the ER in October with the complaint of chest pain after dinner. During that time she had a exercise stress test with perfusion and went 13 minutes with no electrocardiographic changes or evidence of ischemia. She presented back on 11/14/16 and stated that she had intermittent chest discomfort still since October. The day prior to admission she was on a 3 mile walk and had severe central chest discomfort that brought her into the ER. She was noted to have negative cardiac biomarkers, however secondary to her pain but also ST-T wave abnormalities in the anterior lead she was sent for coronary angiography. She was noted to have an occluded LAD and underwent revascularization along with PTCA of the diagonal branch. She had one drug eluting stent in the mid LAD and one in the distal LAD with no difficulties. She tolerated all this well. She had an echocardiogram with a mildly depressed ejection fraction and was started on carvedilol as well.  Her ejection fraction has subsequently normalized.     She has been doing very well since our last visit. She denies any chest pain or dyspnea on exertion. No orthopnea or PND. Her lightheadedness that she previously had issues with has improved. It is not completely gone but infrequent.        Review of Systems   Constitution: Negative for fever and malaise/fatigue.   HENT: Negative for nosebleeds and sore throat.    Eyes: Negative for blurred vision and double vision.    Cardiovascular: Negative for chest pain, claudication, palpitations and syncope.   Respiratory: Negative for cough, shortness of breath and snoring.    Endocrine: Negative for cold intolerance, heat intolerance and polydipsia.   Skin: Negative for itching, poor wound healing and rash.   Musculoskeletal: Negative for joint pain, joint swelling, muscle weakness and myalgias.   Gastrointestinal: Negative for abdominal pain, melena, nausea and vomiting.   Neurological: Negative for light-headedness, loss of balance, seizures, vertigo and weakness.   Psychiatric/Behavioral: Negative for altered mental status and depression.          Past Medical History:   Diagnosis Date   • Allergic codeine/latex   • Arthralgia of both hands    • Arthritis     hand   • Atypical chest pain    • Breast cancer (CMS/HCC)     Left   • Coronary artery disease involving native coronary artery 11/23/2016   • Dyspareunia    • H/O bronchitis    • H/O infectious mononucleosis     COLLEGE AGE   • High cholesterol    • History of medical problems back surgery  2005    repair herniated disc   • Hot flashes, menopausal    • Hx of radiation therapy     JULY 2016   • Hyperlipidemia    • Hypertension    • Myocardial infarction (CMS/HCC) November 2016    2 stents   • Polyp of sigmoid colon    • Right knee pain    • Stye     LEFT EYE   • Urinary tract infection periodically   • Vitamin D deficiency        The following portions of the patient's history were reviewed and updated as appropriate: Social history , Family history and Surgical history     Current Outpatient Medications on File Prior to Visit   Medication Sig Dispense Refill   • aspirin 81 MG EC tablet Take 81 mg by mouth Daily. TO CONTINUE PER DR PHAM     • atorvastatin (LIPITOR) 40 MG tablet TAKE 1 TABLET BY MOUTH EVERY NIGHT. 90 tablet 1   • carvedilol (COREG) 3.125 MG tablet TAKE 1 TABLET BY MOUTH EVERY 12 (TWELVE) HOURS. 180 tablet 1   • cholecalciferol (VITAMIN D3) 1000 units tablet  "Take 1,000 Units by mouth Every Morning.     • exemestane (AROMASIN) 25 MG chemo tablet TAKE 1 TABLET BY MOUTH EVERY DAY 90 tablet 1   • Probiotic Product (PROBIOTIC DAILY PO) Take 1 tablet by mouth Every Morning.       No current facility-administered medications on file prior to visit.        Allergies   Allergen Reactions   • Oxycodone-Acetaminophen Nausea And Vomiting   • Codeine Rash   • Latex Rash       Vitals:    01/09/20 1141   BP: 122/60   Pulse: 70   Weight: 59.9 kg (132 lb)   Height: 165.1 cm (65\")     Physical Exam   Constitutional: She is oriented to person, place, and time. She appears well-developed and well-nourished.   HENT:   Head: Normocephalic and atraumatic.   Eyes: Conjunctivae and EOM are normal. No scleral icterus.   Neck: Normal range of motion. Neck supple. Normal carotid pulses, no hepatojugular reflux and no JVD present. Carotid bruit is not present. No tracheal deviation present. No thyromegaly present.   Cardiovascular: Normal rate and regular rhythm. Exam reveals no gallop and no friction rub.   No murmur heard.  Pulses:       Carotid pulses are 2+ on the right side, and 2+ on the left side.       Radial pulses are 2+ on the right side, and 2+ on the left side.        Femoral pulses are 2+ on the right side, and 2+ on the left side.       Dorsalis pedis pulses are 2+ on the right side, and 2+ on the left side.        Posterior tibial pulses are 2+ on the right side, and 2+ on the left side.   Pulmonary/Chest: Breath sounds normal. No respiratory distress. She has no decreased breath sounds. She has no wheezes. She has no rhonchi. She has no rales. She exhibits no tenderness.   Abdominal: Soft. Bowel sounds are normal. She exhibits no distension. There is no tenderness. There is no rebound.   Musculoskeletal: She exhibits no edema or deformity.   Neurological: She is alert and oriented to person, place, and time. She has normal strength. No sensory deficit.   Skin: No rash noted. No " erythema.   Psychiatric: She has a normal mood and affect. Her behavior is normal.     No results found for: CBC  No results found for: CMP  No components found for: LIPID  No results found for: BMP      ECG 12 Lead  Date/Time: 1/9/2020 11:56 AM  Performed by: Sanjiv Dykes MD  Authorized by: Sanjiv Dykes MD   Comparison: compared with previous ECG from 1/4/2019  Rhythm: sinus rhythm  Rate: normal  QRS axis: left    Clinical impression: non-specific ECG               Procedures Performed  Procedure(s): 11/15/16  Left Heart Cath  Left ventriculography      Conclusions:   1. Left main: Normal  2. LAD: Proximal segment is normal. Occluded in the mid segment. Tubular 70% distal stenosis.  3. LCX: 30% mid vessel stenosis  4. RCA: Normal  5. Mildly depressed systolic function. Apical to and distal inferior left ventricular wall hypokinesis. Ejection fraction 40%.  6. PTCA of the first diagonal branch with a 2.5 x 12 mm trek Rx balloon.  7. PCI of the distal LAD with a 2.75 x 18 mm Xience Alpine drug-eluting stent  8. PCI of the mid LAD with a 3.0 x 28 mm Xience Alpine drug-eluting stent      6/20/2018  · Left ventricular systolic function is normal. Calculated EF = 62%.  · Left ventricular diastolic function is normal.  · Normal right ventricular cavity size and systolic function noted.  · Mild mitral valve regurgitation is present.  · Calculated right ventricular systolic pressure from tricuspid regurgitation is 23 mmHg.  · There is no evidence of pericardial effusion.    DISCUSSION/SUMMARY  64-year-old female with a medical history as documented above, including unstable angina presentation and then a myocardial infarction, including an occluded mid-LAD, along with evidence of mildly depressed ejection fraction and ischemic cardiomyopathy, now with normal ejection fraction.   She underwent drug-eluting stent placement of the distal LAD with a 2.75 x 18 mm Xience Alpine drug-eluting stent along with a  LAKESHA at 3 mm x 28 mm in the mid-LAD.  She's been doing well since our last visit.  She states that her lightheadedness has improved.  Her blood pressure and heart rate are well controlled today.      1. CAD.  Prior PCI to the distal LAD, along with proximal LAD with drug-eluting stents as documented above.  PTCA of the diagonal branch.   No angina    -Continue aspirin life-long.      -Continue Atorvastatin at the current dose.  Lipid panel in October 2019 as below.  Reasonably controlled.    -Continue Carvedilol 3.125 mg p.o. b.i.d.          2. Ischemic cardiomyopathy.   Ejection fraction now normalized.  Euvolemic.  New [place] Heart Association 1 symptoms.       -Continue Carvedilol at the current dose.       3. Hyperlipidemia.  Continue atorvastatin at current dose   -Last lab work 10/21/2019: LDL 76 at that time.  HDL 55.  Transaminases normal    I will see her back in 1 year or earlier with problems.  I think that she is doing well currently.

## 2020-02-07 RX ORDER — ASPIRIN 81 MG/1
TABLET ORAL
Qty: 90 TABLET | Refills: 1 | Status: SHIPPED | OUTPATIENT
Start: 2020-02-07 | End: 2020-07-27

## 2020-02-14 ENCOUNTER — HOSPITAL ENCOUNTER (OUTPATIENT)
Dept: MAMMOGRAPHY | Facility: HOSPITAL | Age: 65
Discharge: HOME | End: 2020-02-14
Admitting: INTERNAL MEDICINE

## 2020-02-14 DIAGNOSIS — Z12.31 VISIT FOR SCREENING MAMMOGRAM: ICD-10-CM

## 2020-02-14 PROCEDURE — 77067 SCR MAMMO BI INCL CAD: CPT

## 2020-02-14 PROCEDURE — 77063 BREAST TOMOSYNTHESIS BI: CPT

## 2020-04-12 ENCOUNTER — TELEPHONE (OUTPATIENT)
Dept: INTERNAL MEDICINE | Facility: CLINIC | Age: 65
End: 2020-04-12

## 2020-04-12 NOTE — TELEPHONE ENCOUNTER
"----- Message from Elin S. Sandifer sent at 4/11/2020 10:15 PM EDT -----  Regarding: RE: Referral Request  Contact: [phone]  Dr. Morales,  Thank you so much for accepting my sister Lacie as a new patient.  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  Thank you again.  Debbie Sandifer  ----- Message -----  From: Alisa Morales MD  Sent: 4/10/2020  6:47 AM EDT  To: Elin CEBALLOS Sandifer  Subject: RE: Referral Request  Generally I am not taking new patients but I can take her.  What is her name and phone number.  We can call her. SLW    ----- Message -----     From: Elin S Sandifer     Sent: 4/9/2020  6:28 PM EDT       To: Alisa Morales MD  Subject: Referral Request    Dr. Morales,  My sister is moving back to [place] at the end of May and is looking for a PCP.   As always I recommend you, and wanted to check to see if you are taking new patients?  I hope you are well and adjusting to this \"new normal\" we are all in right now.    Sincerely,  Debbie Sandifer [email or web address]    "

## 2020-04-27 RX ORDER — EXEMESTANE 25 MG/1
TABLET ORAL
Qty: 90 TABLET | Refills: 0 | Status: SHIPPED | OUTPATIENT
Start: 2020-04-27 | End: 2020-07-22

## 2020-04-30 RX ORDER — CARVEDILOL 3.12 MG/1
TABLET ORAL
Qty: 180 TABLET | Refills: 1 | Status: SHIPPED | OUTPATIENT
Start: 2020-04-30 | End: 2020-10-21

## 2020-05-05 RX ORDER — ATORVASTATIN CALCIUM 40 MG/1
TABLET, FILM COATED ORAL
Qty: 90 TABLET | Refills: 1 | Status: SHIPPED | OUTPATIENT
Start: 2020-05-05 | End: 2020-10-26

## 2020-05-18 ENCOUNTER — APPOINTMENT (OUTPATIENT)
Dept: OTHER | Facility: HOSPITAL | Age: 65
End: 2020-05-18

## 2020-06-04 NOTE — PROGRESS NOTES
Subjective:     Reason for follow up:   1. Stage 1 (T1bN0), left breast invasive ductal carcinoma, ER+ (90%), SC+, [id number] negative   * status post lumpectomy with SLNB    * status post radiation therapy   * Arimidex started 6/2016 - changed to Aromasin due to arthalgias     History of Present Ilness: Debra S Sandifer presents for follow-up of breast cancer. She remains on Aromiasin.  She has been on hormonal blockade now for years 1 year of Arimidex and 3 years of Aromasin    Her arthralgias are better than they were on Arimidex but they are still present. Her hot flashes are present and tolerable.     She had her repeat colonoscopy  because of polyps and it was totally n negativ  She had her right knee replaced 6 months ago and is slowly recovering from the surgery and her anemia has resolved    She had her mammograms done in February and was benign thankfully DEXA scan is due again in April 2021      Past Medical   Past Medical History:   Diagnosis Date   • Allergic codeine/latex   • Arthralgia of both hands    • Arthritis     hand   • Atypical chest pain    • Breast cancer (CMS/HCC)     Left   • Coronary artery disease involving native coronary artery 11/23/2016   • Dyspareunia    • H/O bronchitis    • H/O infectious mononucleosis     COLLEGE AGE   • High cholesterol    • History of medical problems back surgery  2005    repair herniated disc   • Hot flashes, menopausal    • Hx of radiation therapy     JULY 2016   • Hyperlipidemia    • Hypertension    • Myocardial infarction (CMS/HCC) November 2016    2 stents   • Polyp of sigmoid colon    • Right knee pain    • Stye     LEFT EYE   • Urinary tract infection periodically   • Vitamin D deficiency      Patient Active Problem List   Diagnosis   • Mixed hyperlipidemia   • Malignant neoplasm of upper-inner quadrant of left breast in female, estrogen receptor positive (CMS/HCC)   • Coronary artery disease involving native coronary artery   • Prediabetes   • History of  coronary artery stent placement   • DJD (degenerative joint disease) of knee     Social History   Social History     Socioeconomic History   • Marital status:      Spouse name: Van   • Number of children: Not on file   • Years of education: College   • Highest education level: Not on file   Occupational History   • Occupation: Retired      Employer: [employer]     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[place], [place], [place], [place]   Tobacco Use   • Smoking status: Former Smoker     Packs/day: 0.25     Years: 5.00     Pack years: 1.25     Start date:      Last attempt to quit:      Years since quittin.4   • Smokeless tobacco: Former User   • Tobacco comment: smoked no more than 1 pack/week   Substance and Sexual Activity   • Alcohol use: Yes     Alcohol/week: 2.0 standard drinks     Types: 2 Glasses of wine per week     Comment: social drinker/weekends   • Drug use: No   • Sexual activity: Yes     Partners: Male     Birth control/protection: Post-menopausal   Social History Narrative    Enjoys working out, golf, walking, running, grandchildren's activities. Traveled to [place] and [place] in 2015 and [facility] 2016      Family History  Family History   Problem Relation Age of Onset   • Coronary artery disease Mother    • Dementia Mother    • Heart disease Mother         Heart attack w/2 stents   • Heart attack Mother         had heart attack. Heart catheter -2 stents   • Hypertension Father    • Heart disease Father         Coronary heart disease   • Stroke Father         Ischemic   • Diabetes type II Father    • Diabetes Father    • Hyperlipidemia Father    • Prostate cancer Brother 51   • Alcohol abuse Maternal Grandfather    • Rheum arthritis Paternal Grandmother    • Arthritis Paternal Grandmother    • Alcohol abuse Paternal Grandfather    • Stroke Paternal Grandfather         Ischemic   • Rheum arthritis Paternal Grandfather    • Diabetes type II Paternal  "Grandfather    • Diabetes Paternal Grandfather    • Hyperlipidemia Paternal Grandfather    • Hypertension Paternal Grandfather    • Heart disease Paternal Grandfather    • Cancer Brother         Prostate   • No Known Problems Maternal Grandmother    • Malig Hyperthermia Neg Hx      Allergies  Allergies   Allergen Reactions   • Oxycodone-Acetaminophen Nausea And Vomiting   • Codeine Rash   • Latex Rash       Medications: The current medication list was reviewed in the EMR.    Review of Systems  Review of Systems   Constitutional: Negative for activity change, appetite change, chills, diaphoresis, fatigue (11/12/2018), fever and unexpected weight change.   Eyes: Negative.    Respiratory: Negative.  Negative for cough, chest tightness and shortness of breath.    Cardiovascular: Negative.  Negative for chest pain, palpitations and leg swelling.   Gastrointestinal: Negative.  Negative for abdominal pain, blood in stool, constipation, diarrhea, nausea and vomiting.   Genitourinary: Negative.    Musculoskeletal: Positive for arthralgias (hands same 6/8/2020). Negative for gait problem (rigth knee better had replacement 11/11/19), joint swelling (stable 11/11/19) and myalgias.   Neurological: Positive for light-headedness (better 6/8/2020). Negative for dizziness. Numbness: right knee numbness 11/11/19. Headaches: same 5/6/19.   Hematological: Negative for adenopathy. Does not bruise/bleed easily (stable since going off medication 5/6/19).   Psychiatric/Behavioral: Negative.  Negative for confusion. The patient is not nervous/anxious.    All other systems reviewed and are negative.      Objective:     Vitals:    06/08/20 1052   BP: 103/63   Pulse: 73   Resp: 16   Temp: 96.8 °F (36 °C)   SpO2: 96%   Weight: 62.4 kg (137 lb 9.6 oz)   Height: 165 cm (64.96\")   PainSc:   5   PainLoc: Knee  Comment: right     Current Status 6/8/2020   ECOG score 0   GENERAL:  Well-developed, well-nourished female in no acute distress.   SKIN:  " Warm, dry without rashes, purpura or petechiae.  HENT: Hearing: normal, Lips normal. Dentition: good  LYMPHATICS:  No cervical, supraclavicular, axillary adenopathy.  RESPIRATORY:  Lungs clear to percussion and auscultation. Good airflow. Normal respiratory effort  BREASTS: Left breast smaller than right breast, bilateral breast exam without palpable masses, skin changes or nipple discharge-  CARDIAC:  Regular rate and rhythm without murmurs, rubs or gallops. Normal S1,S2. Edema -  No  GI: Soft, nontender, non-distended, no hernia present  PSYCHIATRIC:  Normal affect and mood.  Oriented to person/place/time.  MSK: Gait: Normal; No clubbing, cyanosis. No tenderness or swelling in left knee, right knee-    Labs and Imaging  Lab Results   Component Value Date    WBC 4.83 06/08/2020    HGB 12.8 06/08/2020    HCT 39.8 06/08/2020    MCV 89.8 06/08/2020     06/08/2020     Labs from 10/2017 reviewed.     Breast imaging: Mammogram 2/2018  CONCLUSION: Probable benign mammogram with small area of focal  asymmetric density at site of previous lumpectomy in upper inner left  breast and best seen in the CC projection. A short-term follow-up  left-sided mammogram in 6 months is recommended.      BI-RADS CATEGORY 3: Probably benign. Short interval follow-up suggested.    MAMMO SCREENING DIGITAL TOMOSYNTHESIS BILATERAL W CAD-     CONCLUSION: There is no evidence for malignancy nor significant change  in either breast. BI-RADS Category 2, benign.     Recommendation: Monthly self-examination and annual mammography.     This report was finalized on 2/12/2019               HISTORY: 64 year-old asymptomatic female     MPRESSION:  1. There is no evidence for malignancy or significant change in either  breast. Routine followup mammography is recommended.     BI-RADS category 1: Negative.     This report was finalized on 2/17/2020 9:11 AM by Dr. Nikita Davenport M.D.         Assessment/Plan     Assessment:   1. Stage 1 (T1bN0), left  breast invasive ductal carcinoma, ER+ (90%), MI+, [id number] negative   * status post lumpectomy with SLNB 4/13/16   * Oncotype Dx 20 (low intermediate). No chemotherapy indicated.   * status post radiation therapy   * Arimidex started June 2016. Change to Aromasin due to arthralgias 5/2017. Tolerating well.    * Mammo normal 2/2020     2. Hot flashes. Tolerable. Stable.   3. Joint arthralgias. 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 Present but improved with Aromasin compared to AI  4. Coronary artery disease with stents  5.  Mild anemia due to knee replacement in 9/19    Plan:     1. Continue Aromasin.   2. Annual bilateral mammogram due February 2021;   3. . Patient was instructed on the importance of physical activity, healthy diet, and self breast exams.  Patient will continue calcium and vitamin D supplementation.    Follow-up in 6 months. I asked the patient to call for any questions, concerns, or new symptoms.

## 2020-06-08 ENCOUNTER — LAB (OUTPATIENT)
Dept: OTHER | Facility: HOSPITAL | Age: 65
End: 2020-06-08

## 2020-06-08 ENCOUNTER — OFFICE VISIT (OUTPATIENT)
Dept: ONCOLOGY | Facility: CLINIC | Age: 65
End: 2020-06-08

## 2020-06-08 VITALS
HEIGHT: 65 IN | BODY MASS INDEX: 22.92 KG/M2 | TEMPERATURE: 96.8 F | RESPIRATION RATE: 16 BRPM | DIASTOLIC BLOOD PRESSURE: 63 MMHG | OXYGEN SATURATION: 96 % | SYSTOLIC BLOOD PRESSURE: 103 MMHG | HEART RATE: 73 BPM | WEIGHT: 137.6 LBS

## 2020-06-08 DIAGNOSIS — C50.212 MALIGNANT NEOPLASM OF UPPER-INNER QUADRANT OF LEFT BREAST IN FEMALE, ESTROGEN RECEPTOR POSITIVE (HCC): Primary | ICD-10-CM

## 2020-06-08 DIAGNOSIS — Z17.0 MALIGNANT NEOPLASM OF UPPER-INNER QUADRANT OF LEFT BREAST IN FEMALE, ESTROGEN RECEPTOR POSITIVE (HCC): Primary | ICD-10-CM

## 2020-06-08 LAB
ALBUMIN SERPL-MCNC: 4.2 G/DL (ref 3.5–5.2)
ALBUMIN/GLOB SERPL: 1.6 G/DL
ALP SERPL-CCNC: 121 U/L (ref 39–117)
ALT SERPL W P-5'-P-CCNC: 19 U/L (ref 1–33)
ANION GAP SERPL CALCULATED.3IONS-SCNC: 8.3 MMOL/L (ref 5–15)
AST SERPL-CCNC: 26 U/L (ref 1–32)
BASOPHILS # BLD AUTO: 0.03 10*3/MM3 (ref 0–0.2)
BASOPHILS NFR BLD AUTO: 0.6 % (ref 0–1.5)
BILIRUB SERPL-MCNC: 0.6 MG/DL (ref 0.1–1.2)
BUN BLD-MCNC: 16 MG/DL (ref 8–23)
BUN/CREAT SERPL: 19.8 (ref 7–25)
CALCIUM SPEC-SCNC: 9.3 MG/DL (ref 8.6–10.5)
CHLORIDE SERPL-SCNC: 106 MMOL/L (ref 98–107)
CO2 SERPL-SCNC: 25.7 MMOL/L (ref 22–29)
CREAT BLD-MCNC: 0.81 MG/DL (ref 0.57–1)
DEPRECATED RDW RBC AUTO: 46.5 FL (ref 37–54)
EOSINOPHIL # BLD AUTO: 0.12 10*3/MM3 (ref 0–0.4)
EOSINOPHIL NFR BLD AUTO: 2.5 % (ref 0.3–6.2)
ERYTHROCYTE [DISTWIDTH] IN BLOOD BY AUTOMATED COUNT: 14.1 % (ref 12.3–15.4)
GFR SERPL CREATININE-BSD FRML MDRD: 71 ML/MIN/1.73
GLOBULIN UR ELPH-MCNC: 2.7 GM/DL
GLUCOSE BLD-MCNC: 95 MG/DL (ref 65–99)
HCT VFR BLD AUTO: 39.8 % (ref 34–46.6)
HGB BLD-MCNC: 12.8 G/DL (ref 12–15.9)
IMM GRANULOCYTES # BLD AUTO: 0.02 10*3/MM3 (ref 0–0.05)
IMM GRANULOCYTES NFR BLD AUTO: 0.4 % (ref 0–0.5)
LYMPHOCYTES # BLD AUTO: 1.29 10*3/MM3 (ref 0.7–3.1)
LYMPHOCYTES NFR BLD AUTO: 26.7 % (ref 19.6–45.3)
MCH RBC QN AUTO: 28.9 PG (ref 26.6–33)
MCHC RBC AUTO-ENTMCNC: 32.2 G/DL (ref 31.5–35.7)
MCV RBC AUTO: 89.8 FL (ref 79–97)
MONOCYTES # BLD AUTO: 0.46 10*3/MM3 (ref 0.1–0.9)
MONOCYTES NFR BLD AUTO: 9.5 % (ref 5–12)
NEUTROPHILS # BLD AUTO: 2.91 10*3/MM3 (ref 1.7–7)
NEUTROPHILS NFR BLD AUTO: 60.3 % (ref 42.7–76)
NRBC BLD AUTO-RTO: 0 /100 WBC (ref 0–0.2)
PLATELET # BLD AUTO: 259 10*3/MM3 (ref 140–450)
PMV BLD AUTO: 9.6 FL (ref 6–12)
POTASSIUM BLD-SCNC: 4.8 MMOL/L (ref 3.5–5.2)
PROT SERPL-MCNC: 6.9 G/DL (ref 6–8.5)
RBC # BLD AUTO: 4.43 10*6/MM3 (ref 3.77–5.28)
SODIUM BLD-SCNC: 140 MMOL/L (ref 136–145)
WBC NRBC COR # BLD: 4.83 10*3/MM3 (ref 3.4–10.8)

## 2020-06-08 PROCEDURE — 99213 OFFICE O/P EST LOW 20 MIN: CPT | Performed by: INTERNAL MEDICINE

## 2020-06-08 PROCEDURE — 80053 COMPREHEN METABOLIC PANEL: CPT | Performed by: INTERNAL MEDICINE

## 2020-06-08 PROCEDURE — 85025 COMPLETE CBC W/AUTO DIFF WBC: CPT | Performed by: INTERNAL MEDICINE

## 2020-06-08 PROCEDURE — 36415 COLL VENOUS BLD VENIPUNCTURE: CPT

## 2020-07-22 RX ORDER — EXEMESTANE 25 MG/1
TABLET ORAL
Qty: 90 TABLET | Refills: 1 | Status: SHIPPED | OUTPATIENT
Start: 2020-07-22 | End: 2021-01-18

## 2020-07-27 RX ORDER — ASPIRIN 81 MG/1
TABLET ORAL
Qty: 90 TABLET | Refills: 1 | Status: SHIPPED | OUTPATIENT
Start: 2020-07-27 | End: 2021-01-19

## 2020-09-14 ENCOUNTER — OFFICE VISIT (OUTPATIENT)
Dept: CARDIOLOGY | Facility: CLINIC | Age: 65
End: 2020-09-14

## 2020-09-14 VITALS
DIASTOLIC BLOOD PRESSURE: 64 MMHG | SYSTOLIC BLOOD PRESSURE: 114 MMHG | WEIGHT: 138 LBS | HEIGHT: 64 IN | HEART RATE: 72 BPM | OXYGEN SATURATION: 99 % | BODY MASS INDEX: 23.56 KG/M2

## 2020-09-14 DIAGNOSIS — R00.2 PALPITATIONS: ICD-10-CM

## 2020-09-14 DIAGNOSIS — I25.10 CORONARY ARTERY DISEASE INVOLVING NATIVE CORONARY ARTERY OF NATIVE HEART WITHOUT ANGINA PECTORIS: Primary | ICD-10-CM

## 2020-09-14 DIAGNOSIS — R42 LIGHTHEADEDNESS: ICD-10-CM

## 2020-09-14 PROCEDURE — 99214 OFFICE O/P EST MOD 30 MIN: CPT | Performed by: NURSE PRACTITIONER

## 2020-09-14 PROCEDURE — 93000 ELECTROCARDIOGRAM COMPLETE: CPT | Performed by: NURSE PRACTITIONER

## 2020-09-14 NOTE — PROGRESS NOTES
"  Date of Office Visit: 2020  Encounter Provider: CLEMENTE Fortune  Place of Service: [facility] CARDIOLOGY  Patient Name: Debra S Sandifer  :1955    Chief Complaint   Patient presents with   • Dizziness   • Rapid Heart Rate   :     HPI: Debra S Sandifer is a 65 y.o. female, new to me, who presents today for follow-up.  Old records have been obtained and reviewed by me.  She is a patient of Dr. Dykes's with a past cardiac history significant for coronary artery disease.  In 2016, she was diagnosed with breast cancer.  She subsequently underwent a lumpectomy and has been on maintenance chemotherapy since.  In 2016, she underwent drug-eluting stent placement to her mid and distal LAD as well as a balloon angioplasty of her LAD diagonal.  At that time, she was noted to have mildly reduced LV function with an EF of 46%.  She did have normalization of her LV function.  In , she was evaluated for near syncope and palpitations.  Holter monitor at that time revealed sinus rhythm with rare atrial and ventricular ectopic beats, 2 episodes of NSVT with the longest lasting 7 beats, and 3 short atrial runs.   She was last seen in the office by Dr. Dykes on 2020 at which time she was doing well with no complaints of angina or heart failure.  No changes were made to her medical regimen, and she was advised to follow-up in 1 year.  I am seeing her today for complaints of lightheadedness and rapid heart rate.   Last Tuesday, she experienced two episodes of near syncope.  The first occurred in the afternoon while standing in the kitchen.  She experienced a sudden onset of lightheadedness, feeling \"hot inside\", like she was going to pass out.  The same thing occurred later that afternoon while sitting at the table with her .  She did not actually pass out either time, only felt like she was going to.  She states this is the same exact thing that " happened prior to her stent placement in 2016.  Additionally, she has been feeling more fatigued the last couple of months.  Over the last month, she has felt a constant pressure in her chest.  She does exercise every day.  She does cardio on the elliptical and stationary bike.  She also swims.  She does not feel these activities aggravate or alleviate her symptoms at all.  She also reports an episode on Thursday night of feeling her heart racing.  She denies any shortness of breath or edema.           Past Medical History:   Diagnosis Date   • Allergic codeine/latex   • Arthralgia of both hands    • Arthritis     hand   • Atypical chest pain    • Breast cancer (CMS/HCC)     Left   • Coronary artery disease involving native coronary artery 11/23/2016   • Dyspareunia    • H/O bronchitis    • H/O infectious mononucleosis     COLLEGE AGE   • High cholesterol    • History of medical problems back surgery  2005    repair herniated disc   • Hot flashes, menopausal    • Hx of radiation therapy     JULY 2016   • Hyperlipidemia    • Hypertension    • Myocardial infarction (CMS/HCC) November 2016    2 stents   • Polyp of sigmoid colon    • Right knee pain    • Stye     LEFT EYE   • Urinary tract infection periodically   • Vitamin D deficiency        Past Surgical History:   Procedure Laterality Date   • BACK SURGERY  01/13/2005    Herniated Disk repair (Done by Dr Jurgen Reddy)   • BREAST BIOPSY     • BREAST LUMPECTOMY     • BREAST LUMPECTOMY WITH SENTINEL NODE BIOPSY Left 4/13/2016    Procedure: LEFT BREAST MAMMO NEEDLE LOC LUMPECTOMY WITH LEFT AXILLA SENTINEL NODE BIOPSY;  Surgeon: Aminata Estrella MD;  Location: [facility] OR;  Service:    • CARDIAC CATHETERIZATION N/A 11/15/2016    Procedure: Left Heart Cath;  Surgeon: Sanjiv Dykes MD;  Location: [facility] INVASIVE LOCATION;  Service:    • CARDIAC CATHETERIZATION N/A 11/15/2016    Procedure: Left ventriculography;  Surgeon: Sanjiv Dykes MD;   Location: [facility] CATH INVASIVE LOCATION;  Service:    • CARDIAC SURGERY  11/15/2016   • COLONOSCOPY     • CORONARY ANGIOPLASTY     • CORONARY STENT PLACEMENT  11/15/16    two   • ENDOSCOPY N/A 2016    Procedure: ESOPHAGOGASTRODUODENOSCOPY WITH BIOPSY;  Surgeon: Mehdi Guardado MD;  Location: [facility] ENDOSCOPY;  Service:    • JOINT MANIPULATION Right 10/28/2019    Procedure: RIGHT KNEE MANIPULATION;  Surgeon: Van Titus MD;  Location: [facility] MAIN OR;  Service: Orthopedics   • LUMBAR DISCECTOMY     • LYMPH NODE BIOPSY  16    two   • SPINE SURGERY  2005    herniated disc   • TOTAL KNEE ARTHROPLASTY Right 2019    Procedure: TOTAL KNEE ARTHROPLASTY;  Surgeon: Van Titus MD;  Location: [facility] MAIN OR;  Service: Orthopedics   • WISDOM TOOTH EXTRACTION         Social History     Socioeconomic History   • Marital status:      Spouse name: Van   • Number of children: Not on file   • Years of education: College   • Highest education level: Not on file   Occupational History   • Occupation: Retired      Employer: [employer]     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[place], [place], [place], [place]   Tobacco Use   • Smoking status: Former Smoker     Packs/day: 0.25     Years: 5.00     Pack years: 1.25     Start date:      Quit date:      Years since quittin.7   • Smokeless tobacco: Former User   • Tobacco comment: smoked no more than 1 pack/week   Substance and Sexual Activity   • Alcohol use: Yes     Alcohol/week: 2.0 standard drinks     Types: 2 Glasses of wine per week     Comment: social drinker/weekends   • Drug use: No   • Sexual activity: Yes     Partners: Male     Birth control/protection: Post-menopausal   Social History Narrative    Enjoys working out, golf, walking, running, grandchildren's activities. Traveled to [place] and [place] in 2015 and [facility] 2016       Family History   Problem Relation Age of Onset   • Coronary artery disease  Mother    • Dementia Mother    • Heart disease Mother         Heart attack w/2 stents   • Heart attack Mother         had heart attack. Heart catheter -2 stents   • Hypertension Father    • Heart disease Father         Coronary heart disease   • Stroke Father         Ischemic   • Diabetes type II Father    • Diabetes Father    • Hyperlipidemia Father    • Prostate cancer Brother 51   • Alcohol abuse Maternal Grandfather    • Rheum arthritis Paternal Grandmother    • Arthritis Paternal Grandmother    • Alcohol abuse Paternal Grandfather    • Stroke Paternal Grandfather         Ischemic   • Rheum arthritis Paternal Grandfather    • Diabetes type II Paternal Grandfather    • Diabetes Paternal Grandfather    • Hyperlipidemia Paternal Grandfather    • Hypertension Paternal Grandfather    • Heart disease Paternal Grandfather    • Cancer Brother         Prostate   • No Known Problems Maternal Grandmother    • Malig Hyperthermia Neg Hx        Review of Systems   Constitution: Positive for malaise/fatigue. Negative for chills and fever.   Cardiovascular: Positive for palpitations. Negative for chest pain (heaviness), dyspnea on exertion, leg swelling, near-syncope, orthopnea, paroxysmal nocturnal dyspnea and syncope (near-syncope).   Respiratory: Negative for cough and shortness of breath.    Musculoskeletal: Negative for joint pain, joint swelling and myalgias.   Gastrointestinal: Negative for abdominal pain, diarrhea, melena, nausea and vomiting.   Genitourinary: Negative for frequency and hematuria.   Neurological: Positive for light-headedness. Negative for numbness, paresthesias and seizures.   Allergic/Immunologic: Negative.    All other systems reviewed and are negative.      Allergies   Allergen Reactions   • Oxycodone-Acetaminophen Nausea And Vomiting   • Codeine Rash   • Latex Rash         Current Outpatient Medications:   •  ASPIRIN 81 MG EC tablet, TAKE 1 TABLET BY MOUTH EVERY DAY, Disp: 90 tablet, Rfl: 1  •   "atorvastatin (LIPITOR) 40 MG tablet, TAKE 1 TABLET BY MOUTH EVERY NIGHT., Disp: 90 tablet, Rfl: 1  •  carvedilol (COREG) 3.125 MG tablet, TAKE 1 TABLET BY MOUTH EVERY 12 (TWELVE) HOURS., Disp: 180 tablet, Rfl: 1  •  cholecalciferol (VITAMIN D3) 1000 units tablet, Take 1,000 Units by mouth Every Morning., Disp: , Rfl:   •  exemestane (AROMASIN) 25 MG chemo tablet, TAKE 1 TABLET BY MOUTH EVERY DAY, Disp: 90 tablet, Rfl: 1  •  Probiotic Product (PROBIOTIC DAILY PO), Take 1 tablet by mouth Every Morning., Disp: , Rfl:       Objective:     Vitals:    09/14/20 1004   BP: 114/64   BP Location: Right arm   Patient Position: Sitting   Cuff Size: Adult   Pulse: 72   SpO2: 99%   Weight: 62.6 kg (138 lb)   Height: 162.6 cm (64\")     Body mass index is 23.69 kg/m².    PHYSICAL EXAM:    Constitutional:       General: Not in acute distress.     Appearance: Well-developed. Not diaphoretic.   Eyes:      Pupils: Pupils are equal, round, and reactive to light.   HENT:      Head: Normocephalic and atraumatic.   Neck:      Thyroid: No thyromegaly.      Vascular: No JVD.   Pulmonary:      Effort: Pulmonary effort is normal. No respiratory distress.      Breath sounds: Normal breath sounds.   Cardiovascular:      Normal rate. Regular rhythm.   Pulses:     Intact distal pulses.   Abdominal:      General: Bowel sounds are normal. There is no distension.      Palpations: Abdomen is soft. There is no hepatomegaly or splenomegaly.      Tenderness: There is no abdominal tenderness.   Musculoskeletal: Normal range of motion.   Skin:     General: Skin is warm and dry.      Findings: No erythema.   Neurological:      Mental Status: Alert and oriented to person, place, and time.   Psychiatric:         Mood and Affect: Mood is anxious.         Behavior: Behavior normal.         Judgment: Judgment normal.           ECG 12 Lead    Date/Time: 9/14/2020 10:30 AM  Performed by: Marsha Freedman APRN  Authorized by: Marsha Freedman APRN "   Comparison: compared with previous ECG from 1/9/2020  Similar to previous ECG  Rhythm: sinus rhythm  Rate: normal  BPM: 60  T flattening: aVL  QRS axis: left  Other findings: non-specific ST-T wave changes    Clinical impression: abnormal EKG  Comments: Indication: CAD              Assessment:       Diagnosis Plan   1. Coronary artery disease involving native coronary artery of native heart without angina pectoris  ECG 12 Lead    Adult Stress Echo W/ Cont or Stress Agent if Necessary Per Protocol   2. Lightheadedness  Holter Monitor - 72 Hour Up To 21 Days   3. Palpitations  Holter Monitor - 72 Hour Up To 21 Days     Orders Placed This Encounter   Procedures   • Holter Monitor - 72 Hour Up To 21 Days     Standing Status:   Future     Standing Expiration Date:   9/14/2021     Scheduling Instructions:      14 day zio     Order Specific Question:   Reason for exam?     Answer:   Palpitations   • ECG 12 Lead     This order was created via procedure documentation   • Adult Stress Echo W/ Cont or Stress Agent if Necessary Per Protocol     Standing Status:   Future     Standing Expiration Date:   9/14/2021     Order Specific Question:   What stress agent will be used?     Answer:   Exercise with Possible [employer]     Order Specific Question:   Reason for exam?     Answer:   Coronary Artery Disease          Plan:       Overall I think she is stable.  There are no ischemic changes on her EKG.  However, due to her history of coronary disease and reports of fatigue and chest heaviness similar to her prior symptoms, I do think that an ischemic evaluation is warranted.  I will order a stress echocardiogram for further evaluation.  Additionally, I am going to order a 14-day ZIO for evaluation of her palpitations.  Further recommendations will be made pending these results.      As always, it has been a pleasure to participate in your patient's care.      Sincerely,         CLEMENTE Mccarthy

## 2020-09-16 ENCOUNTER — TELEPHONE (OUTPATIENT)
Dept: CARDIOLOGY | Facility: CLINIC | Age: 65
End: 2020-09-16

## 2020-09-16 NOTE — TELEPHONE ENCOUNTER
Pt L/M re: if holding her Carvedilol for two doses prior to Stress ECHO was okay.    I L/M advising that that was correct due to needing to hold BP Meds 24 hrs prior to procedure per Chaz Soriano. . Meaning if that meant her PM & AM Meds, she was to hold them both if that was w/in the 24 hrs. Lastly, advised pt to call w/ any further questions or concerns.    HOUSTON Le

## 2020-09-21 ENCOUNTER — HOSPITAL ENCOUNTER (OUTPATIENT)
Dept: CARDIOLOGY | Facility: HOSPITAL | Age: 65
Discharge: HOME | End: 2020-09-21
Admitting: NURSE PRACTITIONER

## 2020-09-21 VITALS
HEIGHT: 64 IN | HEART RATE: 72 BPM | BODY MASS INDEX: 23.56 KG/M2 | WEIGHT: 138 LBS | DIASTOLIC BLOOD PRESSURE: 62 MMHG | SYSTOLIC BLOOD PRESSURE: 100 MMHG

## 2020-09-21 DIAGNOSIS — I25.10 CORONARY ARTERY DISEASE INVOLVING NATIVE CORONARY ARTERY OF NATIVE HEART WITHOUT ANGINA PECTORIS: ICD-10-CM

## 2020-09-21 LAB
ASCENDING AORTA: 2.9 CM
BH CV ECHO MEAS - ACS: 1.9 CM
BH CV ECHO MEAS - AO MAX PG: 6.8 MMHG
BH CV ECHO MEAS - AO ROOT AREA (BSA CORRECTED): 1.8
BH CV ECHO MEAS - AO ROOT AREA: 7 CM^2
BH CV ECHO MEAS - AO ROOT DIAM: 3 CM
BH CV ECHO MEAS - AO V2 MAX: 130.3 CM/SEC
BH CV ECHO MEAS - ASC AORTA: 2.9 CM
BH CV ECHO MEAS - BSA(HAYCOCK): 1.7 M^2
BH CV ECHO MEAS - BSA: 1.7 M^2
BH CV ECHO MEAS - BZI_BMI: 23.7 KILOGRAMS/M^2
BH CV ECHO MEAS - BZI_METRIC_HEIGHT: 162.6 CM
BH CV ECHO MEAS - BZI_METRIC_WEIGHT: 62.6 KG
BH CV ECHO MEAS - EDV(MOD-SP4): 104 ML
BH CV ECHO MEAS - EDV(TEICH): 91.3 ML
BH CV ECHO MEAS - EF(CUBED): 72.7 %
BH CV ECHO MEAS - EF(MOD-BP): 56 %
BH CV ECHO MEAS - EF(MOD-SP4): 55.8 %
BH CV ECHO MEAS - EF(TEICH): 64.6 %
BH CV ECHO MEAS - ESV(MOD-SP4): 46 ML
BH CV ECHO MEAS - ESV(TEICH): 32.3 ML
BH CV ECHO MEAS - FS: 35.1 %
BH CV ECHO MEAS - IVS/LVPW: 0.86
BH CV ECHO MEAS - IVSD: 0.77 CM
BH CV ECHO MEAS - LAT PEAK E' VEL: 13.9 CM/SEC
BH CV ECHO MEAS - LV DIASTOLIC VOL/BSA (35-75): 62.3 ML/M^2
BH CV ECHO MEAS - LV MASS(C)D: 118.8 GRAMS
BH CV ECHO MEAS - LV MASS(C)DI: 71.1 GRAMS/M^2
BH CV ECHO MEAS - LV SYSTOLIC VOL/BSA (12-30): 27.5 ML/M^2
BH CV ECHO MEAS - LVIDD: 4.5 CM
BH CV ECHO MEAS - LVIDS: 2.9 CM
BH CV ECHO MEAS - LVLD AP4: 7 CM
BH CV ECHO MEAS - LVLS AP4: 5.9 CM
BH CV ECHO MEAS - LVPWD: 0.9 CM
BH CV ECHO MEAS - MED PEAK E' VEL: 8.2 CM/SEC
BH CV ECHO MEAS - MR MAX PG: 93.4 MMHG
BH CV ECHO MEAS - MR MAX VEL: 483.2 CM/SEC
BH CV ECHO MEAS - MV A DUR: 0.12 SEC
BH CV ECHO MEAS - MV A MAX VEL: 66.8 CM/SEC
BH CV ECHO MEAS - MV DEC SLOPE: 349.5 CM/SEC^2
BH CV ECHO MEAS - MV DEC TIME: 0.17 SEC
BH CV ECHO MEAS - MV E MAX VEL: 78 CM/SEC
BH CV ECHO MEAS - MV E/A: 1.2
BH CV ECHO MEAS - MV P1/2T MAX VEL: 96.4 CM/SEC
BH CV ECHO MEAS - MV P1/2T: 80.8 MSEC
BH CV ECHO MEAS - MVA P1/2T LCG: 2.3 CM^2
BH CV ECHO MEAS - MVA(P1/2T): 2.7 CM^2
BH CV ECHO MEAS - PULM A REVS DUR: 0.12 SEC
BH CV ECHO MEAS - PULM A REVS VEL: 24 CM/SEC
BH CV ECHO MEAS - PULM DIAS VEL: 43.3 CM/SEC
BH CV ECHO MEAS - PULM S/D: 0.57
BH CV ECHO MEAS - PULM SYS VEL: 24.9 CM/SEC
BH CV ECHO MEAS - SI(CUBED): 39 ML/M^2
BH CV ECHO MEAS - SI(MOD-SP4): 34.7 ML/M^2
BH CV ECHO MEAS - SI(TEICH): 35.3 ML/M^2
BH CV ECHO MEAS - SV(CUBED): 65.2 ML
BH CV ECHO MEAS - SV(MOD-SP4): 58 ML
BH CV ECHO MEAS - SV(TEICH): 59 ML
BH CV ECHO MEAS - TAPSE (>1.6): 2.6 CM
BH CV ECHO MEAS - TR MAX VEL: 190 CM/SEC
BH CV ECHO MEASUREMENTS AVERAGE E/E' RATIO: 7.06
BH CV STRESS BP STAGE 1: NORMAL
BH CV STRESS BP STAGE 2: NORMAL
BH CV STRESS BP STAGE 3: NORMAL
BH CV STRESS BP STAGE 4: NORMAL
BH CV STRESS DURATION MIN STAGE 1: 3
BH CV STRESS DURATION MIN STAGE 2: 3
BH CV STRESS DURATION MIN STAGE 3: 3
BH CV STRESS DURATION MIN STAGE 4: 3
BH CV STRESS DURATION SEC STAGE 1: 0
BH CV STRESS DURATION SEC STAGE 2: 0
BH CV STRESS DURATION SEC STAGE 3: 0
BH CV STRESS DURATION SEC STAGE 4: 0
BH CV STRESS ECHO POST STRESS EJECTION FRACTION EF: 69 %
BH CV STRESS GRADE STAGE 1: 10
BH CV STRESS GRADE STAGE 2: 12
BH CV STRESS GRADE STAGE 3: 14
BH CV STRESS GRADE STAGE 4: 16
BH CV STRESS HR STAGE 1: 99
BH CV STRESS HR STAGE 2: 110
BH CV STRESS HR STAGE 3: 120
BH CV STRESS HR STAGE 4: 138
BH CV STRESS METS STAGE 1: 5
BH CV STRESS METS STAGE 2: 7.5
BH CV STRESS METS STAGE 3: 10
BH CV STRESS METS STAGE 4: 13.5
BH CV STRESS PROTOCOL 1: NORMAL
BH CV STRESS SPEED STAGE 1: 1.7
BH CV STRESS SPEED STAGE 2: 2.5
BH CV STRESS SPEED STAGE 3: 3.4
BH CV STRESS SPEED STAGE 4: 4.2
BH CV STRESS STAGE 1: 1
BH CV STRESS STAGE 2: 2
BH CV STRESS STAGE 3: 3
BH CV STRESS STAGE 4: 4
BH CV XLRA - RV BASE: 2.5 CM
BH CV XLRA - RV LENGTH: 5.3 CM
BH CV XLRA - RV MID: 2.3 CM
BH CV XLRA - TDI S': 14.1 CM/SEC
LEFT ATRIUM VOLUME INDEX: 23 ML/M2
MAXIMAL PREDICTED HEART RATE: 156 BPM
PERCENT MAX PREDICTED HR: 88.46 %
SINUS: 2.6 CM
STJ: 2.7 CM
STRESS BASELINE BP: NORMAL MMHG
STRESS BASELINE HR: 72 BPM
STRESS O2 SAT REST: 100 %
STRESS PERCENT HR: 104 %
STRESS POST ESTIMATED WORKLOAD: 13 METS
STRESS POST EXERCISE DUR MIN: 12 MIN
STRESS POST EXERCISE DUR SEC: 0 SEC
STRESS POST PEAK BP: NORMAL MMHG
STRESS POST PEAK HR: 138 BPM
STRESS TARGET HR: 133 BPM

## 2020-09-21 PROCEDURE — 93352 ADMIN ECG CONTRAST AGENT: CPT | Performed by: INTERNAL MEDICINE

## 2020-09-21 PROCEDURE — 93320 DOPPLER ECHO COMPLETE: CPT | Performed by: INTERNAL MEDICINE

## 2020-09-21 PROCEDURE — 93325 DOPPLER ECHO COLOR FLOW MAPG: CPT | Performed by: INTERNAL MEDICINE

## 2020-09-21 PROCEDURE — 93350 STRESS TTE ONLY: CPT

## 2020-09-21 PROCEDURE — 93016 CV STRESS TEST SUPVJ ONLY: CPT | Performed by: INTERNAL MEDICINE

## 2020-09-21 PROCEDURE — 93350 STRESS TTE ONLY: CPT | Performed by: INTERNAL MEDICINE

## 2020-09-21 PROCEDURE — 93325 DOPPLER ECHO COLOR FLOW MAPG: CPT

## 2020-09-21 PROCEDURE — 93018 CV STRESS TEST I&R ONLY: CPT | Performed by: INTERNAL MEDICINE

## 2020-09-21 PROCEDURE — 93017 CV STRESS TEST TRACING ONLY: CPT

## 2020-09-21 PROCEDURE — 93320 DOPPLER ECHO COMPLETE: CPT

## 2020-09-21 PROCEDURE — 25010000002 PERFLUTREN (DEFINITY) 8.476 MG IN SODIUM CHLORIDE 0.9 % 10 ML INJECTION: Performed by: NURSE PRACTITIONER

## 2020-09-21 RX ADMIN — PERFLUTREN 3 ML: 6.52 INJECTION, SUSPENSION INTRAVENOUS at 14:47

## 2020-09-22 ENCOUNTER — TELEPHONE (OUTPATIENT)
Dept: CARDIOLOGY | Facility: CLINIC | Age: 65
End: 2020-09-22

## 2020-09-22 NOTE — TELEPHONE ENCOUNTER
I spoke with her regarding the results of her stress echocardiogram.  She has normal LV function and there was no evidence of myocardial ischemia.  She denies any recurrence of chest pressure.  She is still wearing the monitor and continues to report episodes of her heart racing.  Further recommendations will be made once I have those results.

## 2020-10-02 ENCOUNTER — TELEPHONE (OUTPATIENT)
Dept: CARDIOLOGY | Facility: CLINIC | Age: 65
End: 2020-10-02

## 2020-10-02 NOTE — TELEPHONE ENCOUNTER
"I spoke with her regarding her unrevealing Holter results.  She reports that but something still doesn't feel right\".  Specifically, she is reporting lightheadedness and heaviness in her chest, mostly occurring after exercise.  She is able to exercise pretty rigorously without any symptoms.  She denies any more episodes of heart racing.  She also wanted to mention that even though the stress test report states that the test was stopped due to complaints of fatigue and this of breath, she said that she never said this during the test.  She just wanted me to know.  I honestly do not feel like her symptoms are cardiac.  She had a very normal stress echocardiogram and Holter monitor.  I recommended she follow-up with her hematologist and her PCP.  In the meantime, I will also discuss with Dr. Dykes.     Dr. Dykes, do you have any other recommendations?  "

## 2020-10-11 NOTE — TELEPHONE ENCOUNTER
I discussed the results of her Zio patch with Dr. Dykes.  She had a 7 beat run of VT.  She has normal LV function.  The rest of her monitor was unremarkable.  At this point, I am not recommending any further cardiac workup.  
Statement Selected

## 2020-10-21 ENCOUNTER — TELEPHONE (OUTPATIENT)
Dept: INTERNAL MEDICINE | Facility: CLINIC | Age: 65
End: 2020-10-21

## 2020-10-21 RX ORDER — CARVEDILOL 3.12 MG/1
TABLET ORAL
Qty: 180 TABLET | Refills: 3 | Status: SHIPPED | OUTPATIENT
Start: 2020-10-21 | End: 2021-04-15 | Stop reason: SDUPTHER

## 2020-10-21 NOTE — TELEPHONE ENCOUNTER
PATIENT HAS APPT ON 11/4 FOR PHYSICAL WITH DR. SOLIS. SHE IS GOING TO [facility] TO GET HER LAB WORK DONE PRIOR. SHE WOULD LIKE DR. SOLIS TO COMPLETE THE ORDER SO SHE CAN COME INTO THE OFFICE AND PICK IT UP ON 10/23 OR 10/26. PLEASE ADVISE.     PATIENT CALL BACK: [phone]

## 2020-10-26 RX ORDER — ATORVASTATIN CALCIUM 40 MG/1
TABLET, FILM COATED ORAL
Qty: 90 TABLET | Refills: 1 | Status: SHIPPED | OUTPATIENT
Start: 2020-10-26 | End: 2021-04-15 | Stop reason: SDUPTHER

## 2020-11-10 ENCOUNTER — OFFICE VISIT (OUTPATIENT)
Dept: INTERNAL MEDICINE | Facility: CLINIC | Age: 65
End: 2020-11-10

## 2020-11-10 VITALS
HEIGHT: 64 IN | OXYGEN SATURATION: 98 % | HEART RATE: 74 BPM | SYSTOLIC BLOOD PRESSURE: 104 MMHG | WEIGHT: 132 LBS | DIASTOLIC BLOOD PRESSURE: 74 MMHG | BODY MASS INDEX: 22.53 KG/M2

## 2020-11-10 DIAGNOSIS — E78.2 MIXED HYPERLIPIDEMIA: ICD-10-CM

## 2020-11-10 DIAGNOSIS — Z00.00 WELL ADULT EXAM: Primary | ICD-10-CM

## 2020-11-10 DIAGNOSIS — I25.10 CORONARY ARTERY DISEASE INVOLVING NATIVE CORONARY ARTERY OF NATIVE HEART WITHOUT ANGINA PECTORIS: ICD-10-CM

## 2020-11-10 DIAGNOSIS — C50.212 MALIGNANT NEOPLASM OF UPPER-INNER QUADRANT OF LEFT BREAST IN FEMALE, ESTROGEN RECEPTOR POSITIVE (HCC): ICD-10-CM

## 2020-11-10 DIAGNOSIS — Z17.0 MALIGNANT NEOPLASM OF UPPER-INNER QUADRANT OF LEFT BREAST IN FEMALE, ESTROGEN RECEPTOR POSITIVE (HCC): ICD-10-CM

## 2020-11-10 PROBLEM — R42 LIGHTHEADEDNESS: Status: RESOLVED | Noted: 2020-09-14 | Resolved: 2020-11-10

## 2020-11-10 PROCEDURE — 99396 PREV VISIT EST AGE 40-64: CPT | Performed by: INTERNAL MEDICINE

## 2020-11-10 NOTE — PROGRESS NOTES
Subjective     Debra S Sandifer is a 65 y.o. female who presents for a complete physical exam.      History of Present Illness     HLD.  Reviewed labs from [facility].  Control is excellent.    Breast cancer.  She is maintained on Aromasin.       Recently saw cardiology for episode of palpitations and dizziness.      Right knee stiffness after surgery one year ago.  She is working with her orthopedic.     Review of Systems   Constitutional: Negative for fever.   Respiratory: Negative for shortness of breath and wheezing.    Cardiovascular: Positive for palpitations. Negative for chest pain.       The following portions of the patient's history were reviewed and updated as appropriate: allergies, current medications, past family history, past medical history, past social history, past surgical history and problem list.  Health maintenance tab was reviewed and updated with the patient.       Patient Active Problem List    Diagnosis Date Noted   • DJD (degenerative joint disease) of knee 09/26/2019   • History of coronary artery stent placement 06/18/2018   • Prediabetes 10/17/2017   • Coronary artery disease involving native coronary artery 11/23/2016     Note Last Updated: 11/23/2016 11/2016  PTCA of the first diagonal branch with a 2.5 x 12 mm trek Rx balloon.  PCI of the distal LAD with a 2.75 x 18 mm Xience Alpine drug-eluting stent  PCI of the mid LAD with a 3.0 x 28 mm Xience Alpine drug-eluting stent     • Malignant neoplasm of upper-inner quadrant of left breast in female, estrogen receptor positive (CMS/HCC) 04/27/2016     Note Last Updated: 10/14/2016     S/p lumpectomy and radiation in 2016     • Mixed hyperlipidemia 02/10/2016       Past Medical History:   Diagnosis Date   • Allergic codeine/latex   • Arthralgia of both hands    • Arthritis     hand   • Atypical chest pain    • Breast cancer (CMS/HCC)     Left   • Coronary artery disease involving native coronary artery 11/23/2016   • Dyspareunia    • H/O  bronchitis    • H/O infectious mononucleosis     COLLEGE AGE   • High cholesterol    • History of medical problems back surgery  2005    repair herniated disc   • Hot flashes, menopausal    • Hx of radiation therapy     JULY 2016   • Hyperlipidemia    • Hypertension    • Myocardial infarction (CMS/HCC) November 2016    2 stents   • Polyp of sigmoid colon    • Right knee pain    • Stye     LEFT EYE   • Urinary tract infection periodically   • Vitamin D deficiency        Past Surgical History:   Procedure Laterality Date   • BACK SURGERY  01/13/2005    Herniated Disk repair (Done by Dr Jurgen Reddy)   • BREAST BIOPSY     • BREAST LUMPECTOMY     • BREAST LUMPECTOMY WITH SENTINEL NODE BIOPSY Left 4/13/2016    Procedure: LEFT BREAST MAMMO NEEDLE LOC LUMPECTOMY WITH LEFT AXILLA SENTINEL NODE BIOPSY;  Surgeon: Aminata Estrella MD;  Location: [facility] OR;  Service:    • CARDIAC CATHETERIZATION N/A 11/15/2016    Procedure: Left Heart Cath;  Surgeon: Sanjiv Dykes MD;  Location: [facility] CATH INVASIVE LOCATION;  Service:    • CARDIAC CATHETERIZATION N/A 11/15/2016    Procedure: Left ventriculography;  Surgeon: Sanjiv Dykes MD;  Location: [facility] CATH INVASIVE LOCATION;  Service:    • CARDIAC SURGERY  11/15/2016   • COLONOSCOPY  2014   • CORONARY ANGIOPLASTY     • CORONARY STENT PLACEMENT  11/15/16    two   • ENDOSCOPY N/A 11/11/2016    Procedure: ESOPHAGOGASTRODUODENOSCOPY WITH BIOPSY;  Surgeon: Mehdi Guardado MD;  Location: [facility] ENDOSCOPY;  Service:    • JOINT MANIPULATION Right 10/28/2019    Procedure: RIGHT KNEE MANIPULATION;  Surgeon: Van Titus MD;  Location: [facility] OR;  Service: Orthopedics   • LUMBAR DISCECTOMY     • LYMPH NODE BIOPSY  4/13/16    two   • SPINE SURGERY  2005    herniated disc   • TOTAL KNEE ARTHROPLASTY Right 9/26/2019    Procedure: TOTAL KNEE ARTHROPLASTY;  Surgeon: Van Titus MD;  Location: [facility] MAIN OR;  Service: Orthopedics   • WISDOM TOOTH EXTRACTION          Family History   Problem Relation Age of Onset   • Coronary artery disease Mother    • Dementia Mother    • Heart disease Mother         Heart attack w/2 stents   • Heart attack Mother         had heart attack. Heart catheter -2 stents   • Hypertension Father    • Heart disease Father         Coronary heart disease   • Stroke Father         Ischemic   • Diabetes type II Father    • Diabetes Father    • Hyperlipidemia Father    • Prostate cancer Brother 51   • Alcohol abuse Maternal Grandfather    • Rheum arthritis Paternal Grandmother    • Arthritis Paternal Grandmother    • Alcohol abuse Paternal Grandfather    • Stroke Paternal Grandfather         Ischemic   • Rheum arthritis Paternal Grandfather    • Diabetes type II Paternal Grandfather    • Diabetes Paternal Grandfather    • Hyperlipidemia Paternal Grandfather    • Hypertension Paternal Grandfather    • Heart disease Paternal Grandfather    • Cancer Brother         Prostate   • No Known Problems Maternal Grandmother    • Malig Hyperthermia Neg Hx        Social History     Socioeconomic History   • Marital status:      Spouse name: Van   • Number of children: Not on file   • Years of education: College   • Highest education level: Not on file   Occupational History   • Occupation: Retired      Employer: [employer]     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[place], [place], [place], [place]   Tobacco Use   • Smoking status: Former Smoker     Packs/day: 0.25     Years: 5.00     Pack years: 1.25     Start date:      Quit date:      Years since quittin.8   • Smokeless tobacco: Former User   • Tobacco comment: smoked no more than 1 pack/week   Substance and Sexual Activity   • Alcohol use: Yes     Alcohol/week: 2.0 standard drinks     Types: 2 Glasses of wine per week     Comment: social drinker/weekends   • Drug use: No   • Sexual activity: Yes     Partners: Male     Birth control/protection: Post-menopausal   Social History  "[place]    Enjoys working out, golf, walking, running, grandchildren's activities. Traveled to [place] and [place] in 07/2015 and [place] [facility] 03/2016       Current Outpatient Medications on File Prior to Visit   Medication Sig Dispense Refill   • ASPIRIN 81 MG EC tablet TAKE 1 TABLET BY MOUTH EVERY DAY 90 tablet 1   • atorvastatin (LIPITOR) 40 MG tablet TAKE 1 TABLET BY MOUTH EVERY DAY AT NIGHT 90 tablet 1   • carvedilol (COREG) 3.125 MG tablet TAKE 1 TABLET BY MOUTH EVERY 12 HOURS 180 tablet 3   • cholecalciferol (VITAMIN D3) 1000 units tablet Take 1,000 Units by mouth Every Morning.     • exemestane (AROMASIN) 25 MG chemo tablet TAKE 1 TABLET BY MOUTH EVERY DAY 90 tablet 1   • Probiotic Product (PROBIOTIC DAILY PO) Take 1 tablet by mouth Every Morning.       No current facility-administered medications on file prior to visit.        Allergies   Allergen Reactions   • Oxycodone-Acetaminophen Nausea And Vomiting   • Codeine Rash   • Latex Rash       Immunization History   Administered Date(s) Administered   • Flu Mist 10/13/2015   • Flu Vaccine Quad PF >18YRS 10/14/2016, 10/17/2017   • Flulaval/Fluarix/Fluzone Quad 10/07/2020   • Hepatitis A 10/23/2018, 04/24/2019   • Influenza Quad Vaccine (Inpatient) 10/17/2017   • Influenza, Unspecified 10/23/2018, 10/29/2019   • Tdap 04/15/2005, 02/22/2017   • Zostavax 10/04/2013   • flucelvax quad pfs =>4 YRS 10/23/2018, 10/29/2019       Objective     /74   Pulse 74   Ht 162.6 cm (64.02\")   Wt 59.9 kg (132 lb)   SpO2 98%   BMI 22.65 kg/m²     Physical Exam  Constitutional:       Appearance: She is well-developed.   HENT:      Head: Normocephalic and atraumatic.      Right Ear: Hearing, tympanic membrane and external ear normal.      Left Ear: Hearing, tympanic membrane and external ear normal.      Nose: Nose normal.   Neck:      Musculoskeletal: Neck supple.      Thyroid: No thyromegaly.   Cardiovascular:      Rate and Rhythm: Normal rate and regular rhythm. "      Heart sounds: Normal heart sounds. No murmur.   Pulmonary:      Effort: Pulmonary effort is normal.      Breath sounds: Normal breath sounds.   Chest:      Breasts:         Right: No mass.         Left: No mass.   Abdominal:      General: There is no distension.      Palpations: Abdomen is soft.      Tenderness: There is no abdominal tenderness.   Lymphadenopathy:      Cervical: No cervical adenopathy.   Skin:     General: Skin is warm and dry.   Neurological:      Mental Status: She is alert and oriented to person, place, and time.   Psychiatric:         Speech: Speech normal.         Behavior: Behavior normal.         Thought Content: Thought content normal.         Judgment: Judgment normal.         Assessment/Plan   Diagnoses and all orders for this visit:    1. Well adult exam (Primary)        Discussion    Patient presents today for a CPE.      Patient follows a healthy diet.   Patient follows an adequate exercise regimen. Mammogram is up to date.   Colon cancer screening is up to date.   Pap smear performed every 3 years.  I have recommended that the patient get the following immunizations:  Shingrix.      Health Maintenance   Topic Date Due   • ZOSTER VACCINE (2 of 3) 11/29/2013   • MAMMOGRAM  02/14/2021   • PAP SMEAR  10/23/2021   • LIPID PANEL  11/10/2021   • ANNUAL PHYSICAL  11/11/2021   • COLONOSCOPY  04/16/2024   • TDAP/TD VACCINES (3 - Td) 02/22/2027   • INFLUENZA VACCINE  Completed   • HEPATITIS C SCREENING  Addressed   • Pneumococcal Vaccine 0-64  Aged Out            Future Appointments   Date Time Provider Department Center   11/23/2020 10:30 AM LAB CHAIR [facility]   11/23/2020 11:00 AM Andrea Akhtar MD [facility]   1/11/2021 12:20 PM Sanjiv Dykes MD [facility] None

## 2020-11-23 ENCOUNTER — LAB (OUTPATIENT)
Dept: OTHER | Facility: HOSPITAL | Age: 65
End: 2020-11-23

## 2020-11-23 ENCOUNTER — TELEMEDICINE (OUTPATIENT)
Dept: ONCOLOGY | Facility: CLINIC | Age: 65
End: 2020-11-23

## 2020-11-23 ENCOUNTER — APPOINTMENT (OUTPATIENT)
Dept: OTHER | Facility: HOSPITAL | Age: 65
End: 2020-11-23

## 2020-11-23 VITALS
HEART RATE: 64 BPM | SYSTOLIC BLOOD PRESSURE: 126 MMHG | OXYGEN SATURATION: 97 % | HEIGHT: 64 IN | TEMPERATURE: 96.9 F | WEIGHT: 142.3 LBS | DIASTOLIC BLOOD PRESSURE: 75 MMHG | RESPIRATION RATE: 16 BRPM | BODY MASS INDEX: 24.3 KG/M2

## 2020-11-23 DIAGNOSIS — C50.212 MALIGNANT NEOPLASM OF UPPER-INNER QUADRANT OF LEFT BREAST IN FEMALE, ESTROGEN RECEPTOR POSITIVE (HCC): ICD-10-CM

## 2020-11-23 DIAGNOSIS — Z17.0 MALIGNANT NEOPLASM OF UPPER-INNER QUADRANT OF LEFT BREAST IN FEMALE, ESTROGEN RECEPTOR POSITIVE (HCC): ICD-10-CM

## 2020-11-23 DIAGNOSIS — Z79.811 LONG TERM (CURRENT) USE OF AROMATASE INHIBITORS: ICD-10-CM

## 2020-11-23 DIAGNOSIS — Z17.0 MALIGNANT NEOPLASM OF UPPER-INNER QUADRANT OF LEFT BREAST IN FEMALE, ESTROGEN RECEPTOR POSITIVE (HCC): Primary | ICD-10-CM

## 2020-11-23 DIAGNOSIS — C50.212 MALIGNANT NEOPLASM OF UPPER-INNER QUADRANT OF LEFT BREAST IN FEMALE, ESTROGEN RECEPTOR POSITIVE (HCC): Primary | ICD-10-CM

## 2020-11-23 DIAGNOSIS — Z12.31 ENCOUNTER FOR SCREENING MAMMOGRAM FOR BREAST CANCER: ICD-10-CM

## 2020-11-23 LAB
ALBUMIN SERPL-MCNC: 4.2 G/DL (ref 3.5–5.2)
ALBUMIN/GLOB SERPL: 1.5 G/DL
ALP SERPL-CCNC: 102 U/L (ref 39–117)
ALT SERPL W P-5'-P-CCNC: 25 U/L (ref 1–33)
ANION GAP SERPL CALCULATED.3IONS-SCNC: 3.8 MMOL/L (ref 5–15)
AST SERPL-CCNC: 33 U/L (ref 1–32)
BASOPHILS # BLD AUTO: 0.05 10*3/MM3 (ref 0–0.2)
BASOPHILS NFR BLD AUTO: 1 % (ref 0–1.5)
BILIRUB SERPL-MCNC: 0.5 MG/DL (ref 0–1.2)
BUN SERPL-MCNC: 18 MG/DL (ref 8–23)
BUN/CREAT SERPL: 18.4 (ref 7–25)
CALCIUM SPEC-SCNC: 9.5 MG/DL (ref 8.6–10.5)
CHLORIDE SERPL-SCNC: 105 MMOL/L (ref 98–107)
CO2 SERPL-SCNC: 30.2 MMOL/L (ref 22–29)
CREAT SERPL-MCNC: 0.98 MG/DL (ref 0.57–1)
DEPRECATED RDW RBC AUTO: 47.8 FL (ref 37–54)
EOSINOPHIL # BLD AUTO: 0.16 10*3/MM3 (ref 0–0.4)
EOSINOPHIL NFR BLD AUTO: 3.3 % (ref 0.3–6.2)
ERYTHROCYTE [DISTWIDTH] IN BLOOD BY AUTOMATED COUNT: 14.3 % (ref 12.3–15.4)
GFR SERPL CREATININE-BSD FRML MDRD: 57 ML/MIN/1.73
GLOBULIN UR ELPH-MCNC: 2.8 GM/DL
GLUCOSE SERPL-MCNC: 111 MG/DL (ref 65–99)
HCT VFR BLD AUTO: 40.9 % (ref 34–46.6)
HGB BLD-MCNC: 13.2 G/DL (ref 12–15.9)
IMM GRANULOCYTES # BLD AUTO: 0.02 10*3/MM3 (ref 0–0.05)
IMM GRANULOCYTES NFR BLD AUTO: 0.4 % (ref 0–0.5)
LYMPHOCYTES # BLD AUTO: 1.3 10*3/MM3 (ref 0.7–3.1)
LYMPHOCYTES NFR BLD AUTO: 26.5 % (ref 19.6–45.3)
MCH RBC QN AUTO: 29.1 PG (ref 26.6–33)
MCHC RBC AUTO-ENTMCNC: 32.3 G/DL (ref 31.5–35.7)
MCV RBC AUTO: 90.3 FL (ref 79–97)
MONOCYTES # BLD AUTO: 0.63 10*3/MM3 (ref 0.1–0.9)
MONOCYTES NFR BLD AUTO: 12.9 % (ref 5–12)
NEUTROPHILS NFR BLD AUTO: 2.74 10*3/MM3 (ref 1.7–7)
NEUTROPHILS NFR BLD AUTO: 55.9 % (ref 42.7–76)
NRBC BLD AUTO-RTO: 0 /100 WBC (ref 0–0.2)
PLATELET # BLD AUTO: 253 10*3/MM3 (ref 140–450)
PMV BLD AUTO: 9.6 FL (ref 6–12)
POTASSIUM SERPL-SCNC: 5 MMOL/L (ref 3.5–5.2)
PROT SERPL-MCNC: 7 G/DL (ref 6–8.5)
RBC # BLD AUTO: 4.53 10*6/MM3 (ref 3.77–5.28)
SODIUM SERPL-SCNC: 139 MMOL/L (ref 136–145)
WBC # BLD AUTO: 4.9 10*3/MM3 (ref 3.4–10.8)

## 2020-11-23 PROCEDURE — 85025 COMPLETE CBC W/AUTO DIFF WBC: CPT | Performed by: INTERNAL MEDICINE

## 2020-11-23 PROCEDURE — 99213 OFFICE O/P EST LOW 20 MIN: CPT | Performed by: INTERNAL MEDICINE

## 2020-11-23 PROCEDURE — 36415 COLL VENOUS BLD VENIPUNCTURE: CPT

## 2020-11-23 PROCEDURE — 80053 COMPREHEN METABOLIC PANEL: CPT | Performed by: INTERNAL MEDICINE

## 2020-11-23 NOTE — PROGRESS NOTES
Subjective:     Reason for follow up:   1. Stage 1 (T1bN0), left breast invasive ductal carcinoma, ER+ (90%), AR+, [id number] negative   * status post lumpectomy with SLNB    * status post radiation therapy   * Arimidex started 6/2016 - changed to Aromasin due to arthalgias     History of Present Ilness: Debra S Sandifer presents for follow-up of breast cancer. She remains on Aromiasin.  She has been on hormonal blockade now for years 1 year of Arimidex and 3.5 years of Aromasin    Her arthralgias are better than they were on Arimidex but they are still present. Her hot flashes are present and tolerable.       She had her right knee replaced 10months ago and will likely have to have it redone sometime in the spring   She is due for DEXA scan and mammogram was again in February and she is felt no masses in her breast.    She will be at 5 years in the summer of next year but we talked about going to 7 years if she tolerates the medicines well and she has no significant change in her bone density.   CBC and chemistries from today were reviewed and normal    She had her mammograms done in February and was benign thankfully DEXA scan is due again in April 2021      Past Medical   Past Medical History:   Diagnosis Date   • Allergic codeine/latex   • Arthralgia of both hands    • Arthritis     hand   • Atypical chest pain    • Breast cancer (CMS/HCC)     Left   • Coronary artery disease involving native coronary artery 11/23/2016   • Dyspareunia    • H/O bronchitis    • H/O infectious mononucleosis     COLLEGE AGE   • High cholesterol    • History of medical problems back surgery  2005    repair herniated disc   • Hot flashes, menopausal    • Hx of radiation therapy     JULY 2016   • Hyperlipidemia    • Hypertension    • Myocardial infarction (CMS/HCC) November 2016    2 stents   • Polyp of sigmoid colon    • Right knee pain    • Stye     LEFT EYE   • Urinary tract infection periodically   • Vitamin D deficiency      Patient  Active Problem List   Diagnosis   • Mixed hyperlipidemia   • Malignant neoplasm of upper-inner quadrant of left breast in female, estrogen receptor positive (CMS/HCC)   • Coronary artery disease involving native coronary artery   • Prediabetes   • History of coronary artery stent placement   • DJD (degenerative joint disease) of knee     Social History   Social History     Socioeconomic History   • Marital status:      Spouse name: Van   • Number of children: Not on file   • Years of education: College   • Highest education level: Not on file   Occupational History   • Occupation: Retired      Employer: [employer]     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[place], [place], [place], [place]   Tobacco Use   • Smoking status: Former Smoker     Packs/day: 0.25     Years: 5.00     Pack years: 1.25     Start date:      Quit date:      Years since quittin.9   • Smokeless tobacco: Former User   • Tobacco comment: smoked no more than 1 pack/week   Substance and Sexual Activity   • Alcohol use: Yes     Alcohol/week: 2.0 standard drinks     Types: 2 Glasses of wine per week     Comment: social drinker/weekends   • Drug use: No   • Sexual activity: Yes     Partners: Male     Birth control/protection: Post-menopausal   Social History Narrative    Enjoys working out, golf, walking, running, grandchildren's activities. Traveled to [place] and [place] in 2015 and [place] [facility] 2016      Family History  Family History   Problem Relation Age of Onset   • Coronary artery disease Mother    • Dementia Mother    • Heart disease Mother         Heart attack w/2 stents   • Heart attack Mother         had heart attack. Heart catheter -2 stents   • Hypertension Father    • Heart disease Father         Coronary heart disease   • Stroke Father         Ischemic   • Diabetes type II Father    • Diabetes Father    • Hyperlipidemia Father    • Prostate cancer Brother 51   • Alcohol abuse Maternal  Grandfather    • Rheum arthritis Paternal Grandmother    • Arthritis Paternal Grandmother    • Alcohol abuse Paternal Grandfather    • Stroke Paternal Grandfather         Ischemic   • Rheum arthritis Paternal Grandfather    • Diabetes type II Paternal Grandfather    • Diabetes Paternal Grandfather    • Hyperlipidemia Paternal Grandfather    • Hypertension Paternal Grandfather    • Heart disease Paternal Grandfather    • Cancer Brother         Prostate   • No Known Problems Maternal Grandmother    • Malig Hyperthermia Neg Hx      Allergies  Allergies   Allergen Reactions   • Oxycodone-Acetaminophen Nausea And Vomiting   • Codeine Rash   • Latex Rash       Medications: The current medication list was reviewed in the EMR.    Review of Systems  Review of Systems   Constitutional: Negative for activity change, appetite change, chills, diaphoresis, fatigue (11/12/2018), fever and unexpected weight change.   Eyes: Negative.    Respiratory: Negative.  Negative for cough, chest tightness and shortness of breath.    Cardiovascular: Negative.  Negative for chest pain, palpitations and leg swelling.   Gastrointestinal: Negative.  Negative for abdominal pain, blood in stool, constipation, diarrhea, nausea and vomiting.   Genitourinary: Negative.    Musculoskeletal: Positive for arthralgias (hands same 6/8/2020). Negative for gait problem (rigth knee better had replacement 11/11/19), joint swelling (stable 11/11/19) and myalgias.   Neurological: Negative for dizziness and light-headedness. Numbness: right knee numbness 11/11/19. Headaches: same 5/6/19.   Hematological: Negative for adenopathy. Does not bruise/bleed easily (stable since going off medication 5/6/19).   Psychiatric/Behavioral: Negative.  Negative for confusion. The patient is not nervous/anxious.    All other systems reviewed and are negative.      Objective:     Vitals:    11/23/20 0928   BP: 126/75   Pulse: 64   Resp: 16   Temp: 96.9 °F (36.1 °C)   TempSrc: Skin  "  SpO2: 97%   Weight: 64.5 kg (142 lb 4.8 oz)   Height: 162.6 cm (64.02\")   PainSc: 0-No pain     Current Status 11/23/2020   ECOG score 0   GENERAL:  Well-developed, well-nourished female in no acute distress.   SKIN:  Warm, dry without rashes, purpura or petechiae.  HENT: Hearing: normal, Lips normal. Dentition: good  LYMPHATICS:  No cervical, supraclavicular, axillary adenopathy.  RESPIRATORY:  Lungs clear to percussion and auscultation. Good airflow. Normal respiratory effort  BREASTS: Left breast smaller than right breast, bilateral breast exam without palpable masses, skin changes or nipple discharge-  CARDIAC:  Regular rate and rhythm without murmurs, rubs or gallops. Normal S1,S2. Edema -  No  GI: Soft, nontender, non-distended, no hernia present  PSYCHIATRIC:  Normal affect and mood.  Oriented to person/place/time.  MSK: Gait: Normal; No clubbing, cyanosis. No tenderness or swelling in left knee, right knee-  Video visit no physical  Labs and Imaging  Lab Results   Component Value Date    WBC 4.90 11/23/2020    HGB 13.2 11/23/2020    HCT 40.9 11/23/2020    MCV 90.3 11/23/2020     11/23/2020     Labs  reviewed.     Breast imaging: Mammogram 2/2018  CONCLUSION: Probable benign mammogram with small area of focal  asymmetric density at site of previous lumpectomy in upper inner left  breast and best seen in the CC projection. A short-term follow-up  left-sided mammogram in 6 months is recommended.      BI-RADS CATEGORY 3: Probably benign. Short interval follow-up suggested.    MAMMO SCREENING DIGITAL TOMOSYNTHESIS BILATERAL W CAD-     CONCLUSION: There is no evidence for malignancy nor significant change  in either breast. BI-RADS Category 2, benign.     Recommendation: Monthly self-examination and annual mammography.     This report was finalized on 2/12/2019               HISTORY: 64 year-old asymptomatic female     MPRESSION:  1. There is no evidence for malignancy or significant change in " either  breast. Routine followup mammography is recommended.     BI-RADS category 1: Negative.     This report was finalized on 2/17/2020 9:11 AM by Dr. Nikita Davenport M.D.         Assessment/Plan     Assessment:   1. Stage 1 (T1bN0), left breast invasive ductal carcinoma, ER+ (90%), IL+, [id number] negative   * status post lumpectomy with SLNB 4/13/16   * Oncotype Dx 20 (low intermediate). No chemotherapy indicated.   * status post radiation therapy   * Arimidex started June 2016. Change to Aromasin due to arthralgias 5/2017. Tolerating well.    * Mammo normal 2/2020     2. Hot flashes. Tolerable. Stable.   3. Joint arthralgias. 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 Present but improved with Aromasin compared to AI  4. Coronary artery disease with stents  5.  Mild anemia due to knee replacement in 9/19    Plan:     1. Continue Aromasin.   2. Annual bilateral mammogram due February 2021;   3. . Patient was instructed on the importance of physical activity, healthy diet, and self breast exams.  Patient will continue calcium and vitamin D supplementation.    Follow-up in 6 months. I asked the patient to call for any questions, concerns, or new symptoms.  Video visit 15 minutes

## 2020-11-30 ENCOUNTER — TELEPHONE (OUTPATIENT)
Dept: ONCOLOGY | Facility: CLINIC | Age: 65
End: 2020-11-30

## 2020-11-30 NOTE — TELEPHONE ENCOUNTER
PATIENT CALLING    PATIENT WAS SEEN LAST WEEK ON 11-23-20, SHE WAS SEEN IN THE OFFICE AT [facility] WITH NURSE'S CELL PHONE, SHE IS NOW GETTING A MY CHART MESSAGE STATING THAT SHE WAS A NO SHOW, HER APPT DESK ALSO SAYS SHE NO SHOWED BUT HER CHART HAS THE OFFICE VISIT WITH DR. LEONARDO DOCUMENTED, PT WANTS TO SEE IF THIS CAN BE FIXED AND MAKE SURE SHE DOESN'T GET CHARGED AS A NO SHOW SINCE SHE WAS SEEN, PLEASE ADVISE?    PT CALL BACK # [phone]

## 2021-01-11 ENCOUNTER — OFFICE VISIT (OUTPATIENT)
Dept: CARDIOLOGY | Facility: CLINIC | Age: 66
End: 2021-01-11

## 2021-01-11 VITALS
SYSTOLIC BLOOD PRESSURE: 110 MMHG | BODY MASS INDEX: 23.9 KG/M2 | RESPIRATION RATE: 18 BRPM | OXYGEN SATURATION: 98 % | HEIGHT: 64 IN | WEIGHT: 140 LBS | HEART RATE: 76 BPM | DIASTOLIC BLOOD PRESSURE: 58 MMHG

## 2021-01-11 DIAGNOSIS — Z95.5 HISTORY OF CORONARY ARTERY STENT PLACEMENT: ICD-10-CM

## 2021-01-11 DIAGNOSIS — I25.10 CORONARY ARTERY DISEASE INVOLVING NATIVE CORONARY ARTERY OF NATIVE HEART WITHOUT ANGINA PECTORIS: Primary | ICD-10-CM

## 2021-01-11 DIAGNOSIS — R00.2 PALPITATIONS: ICD-10-CM

## 2021-01-11 DIAGNOSIS — E78.2 MIXED HYPERLIPIDEMIA: ICD-10-CM

## 2021-01-11 PROCEDURE — 93000 ELECTROCARDIOGRAM COMPLETE: CPT | Performed by: INTERNAL MEDICINE

## 2021-01-11 PROCEDURE — 99214 OFFICE O/P EST MOD 30 MIN: CPT | Performed by: INTERNAL MEDICINE

## 2021-01-11 NOTE — PROGRESS NOTES
Date of Office Visit: 21  Encounter Provider: Sanjiv Dykes MD  Place of Service: [facility] CARDIOLOGY  Patient Name: Debra S Sandifer  :1955    Chief Complaint   Patient presents with   • Coronary Artery Disease     1 YR FOLLOW UP       HPI:  65 y.o. female, new to me, who presents today for follow-up.  She has a past cardiac history significant for coronary artery disease.  In 2016, she was diagnosed with breast cancer.  She subsequently underwent a lumpectomy and has been on maintenance chemotherapy since.  In 2016, she underwent drug-eluting stent placement to her mid and distal LAD as well as a balloon angioplasty of her LAD diagonal.  At that time, she was noted to have mildly reduced LV function with an EF of 46%.  She did have normalization of her LV function.  In , she was evaluated for near syncope and palpitations.  Holter monitor at that time revealed sinus rhythm with rare atrial and ventricular ectopic beats, 2 episodes of NSVT with the longest lasting 7 beats, and 3 short atrial runs.    Since our last visit, she has been doing well.  She denies any chest pain or dyspnea on exertion.  She does state that she has been active as of late and really has not had any new issues.  She is tolerating her current medical regimen.  Her blood pressure and her heart rate are well controlled.        Past Medical History:   Diagnosis Date   • Allergic codeine/latex   • Arthralgia of both hands    • Arthritis     hand   • Atypical chest pain    • Breast cancer (CMS/HCC)     Left   • Coronary artery disease involving native coronary artery 2016   • Dyspareunia    • H/O bronchitis    • H/O infectious mononucleosis     COLLEGE AGE   • High cholesterol    • History of medical problems back surgery      repair herniated disc   • Hot flashes, menopausal    • Hx of radiation therapy     2016   • Hyperlipidemia    • Hypertension    •  Myocardial infarction (CMS/HCC) November 2016    2 stents   • Polyp of sigmoid colon    • Right knee pain    • Stye     LEFT EYE   • Urinary tract infection periodically   • Vitamin D deficiency        Past Surgical History:   Procedure Laterality Date   • BACK SURGERY  01/13/2005    Herniated Disk repair (Done by Dr Jurgen Reddy)   • BREAST BIOPSY     • BREAST LUMPECTOMY     • BREAST LUMPECTOMY WITH SENTINEL NODE BIOPSY Left 4/13/2016    Procedure: LEFT BREAST MAMMO NEEDLE LOC LUMPECTOMY WITH LEFT AXILLA SENTINEL NODE BIOPSY;  Surgeon: Aminata Estrella MD;  Location: [facility] MAIN OR;  Service:    • CARDIAC CATHETERIZATION N/A 11/15/2016    Procedure: Left Heart Cath;  Surgeon: Sanjiv Dykes MD;  [facility] C[facility]ASIVE [facility];  Service:    • CARDIAC CATHETERIZATION N/A 11/15/2016    Procedure: Left ventriculography;  Surgeon: Sanjiv Dykes MD;  Location: [facility] CATH INVASIVE LOCATION;  Service:    • CARDIAC SURGERY  11/15/2016   • COLONOSCOPY  2014   • CORONARY ANGIOPLASTY     • CORONARY STENT PLACEMENT  11/15/16    two   • ENDOSCOPY N/A 11/11/2016    Procedure: ESOPHAGOGASTRODUODENOSCOPY WITH BIOPSY;  Surgeon: Mehdi Guardado MD;  Location: [facility] ENDOSCOPY;  Service:    • JOINT MANIPULATION Right 10/28/2019    Procedure: RIGHT KNEE MANIPULATION;  Surgeon: Van Titus MD;  Location: [facility] MAIN OR;  Service: Orthopedics   • LUMBAR DISCECTOMY     • LYMPH NODE BIOPSY  4/13/16    two   • SPINE SURGERY  2005    herniated disc   • TOTAL KNEE ARTHROPLASTY Right 9/26/2019    Procedure: TOTAL KNEE ARTHROPLASTY;  Surgeon: Van Titus MD;  Location: [facility] MAIN OR;  Service: Orthopedics   • WISDOM TOOTH EXTRACTION         Social History     Socioeconomic History   • Marital status:      Spouse name: Van   • Number of children: Not on file   • Years of education: College   • Highest education level: Not on file   Occupational History   • Occupation: Retired       Employer: [employer]     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[place], [place], [place], [place]   Tobacco Use   • Smoking status: Former Smoker     Packs/day: 0.25     Years: 5.00     Pack years: 1.25     Start date:      Quit date:      Years since quittin.0   • Smokeless tobacco: Former User   • Tobacco comment: CAFFEINE USE   Substance and Sexual Activity   • Alcohol use: Yes     Alcohol/week: 2.0 standard drinks     Types: 2 Glasses of wine per week     Comment: social drinker/weekends   • Drug use: No   • Sexual activity: Yes     Partners: Male     Birth control/protection: Post-menopausal   Social History Narrative    Enjoys working out, golf, walking, running, grandchildren's activities. Traveled to [place] and [place] in 2015 and [facility] 2016       Family History   Problem Relation Age of Onset   • Coronary artery disease Mother    • Dementia Mother    • Heart disease Mother         Heart attack w/2 stents   • Heart attack Mother         had heart attack. Heart catheter -2 stents   • Hypertension Father    • Heart disease Father         Coronary heart disease   • Stroke Father         Ischemic   • Diabetes type II Father    • Diabetes Father    • Hyperlipidemia Father    • Prostate cancer Brother 51   • Alcohol abuse Maternal Grandfather    • Rheum arthritis Paternal Grandmother    • Arthritis Paternal Grandmother    • Alcohol abuse Paternal Grandfather    • Stroke Paternal Grandfather         Ischemic   • Rheum arthritis Paternal Grandfather    • Diabetes type II Paternal Grandfather    • Diabetes Paternal Grandfather    • Hyperlipidemia Paternal Grandfather    • Hypertension Paternal Grandfather    • Heart disease Paternal Grandfather    • Cancer Brother         Prostate   • No Known Problems Maternal Grandmother    • Malig Hyperthermia Neg Hx        Review of Systems   Constitution: Negative for chills, fever and malaise/fatigue.   Cardiovascular: Positive for palpitations.  "Negative for chest pain, dyspnea on exertion, leg swelling, near-syncope, orthopnea, paroxysmal nocturnal dyspnea and syncope.   Respiratory: Negative for cough and shortness of breath.    Musculoskeletal: Negative for joint pain, joint swelling and myalgias.   Gastrointestinal: Negative for abdominal pain, diarrhea, melena, nausea and vomiting.   Genitourinary: Negative for frequency and hematuria.   Neurological: Positive for light-headedness. Negative for numbness, paresthesias and seizures.   Allergic/Immunologic: Negative.    All other systems reviewed and are negative.      Allergies   Allergen Reactions   • Oxycodone-Acetaminophen Nausea And Vomiting   • Codeine Rash   • Latex Rash         Current Outpatient Medications:   •  ASPIRIN 81 MG EC tablet, TAKE 1 TABLET BY MOUTH EVERY DAY, Disp: 90 tablet, Rfl: 1  •  atorvastatin (LIPITOR) 40 MG tablet, TAKE 1 TABLET BY MOUTH EVERY DAY AT NIGHT, Disp: 90 tablet, Rfl: 1  •  carvedilol (COREG) 3.125 MG tablet, TAKE 1 TABLET BY MOUTH EVERY 12 HOURS, Disp: 180 tablet, Rfl: 3  •  cholecalciferol (VITAMIN D3) 1000 units tablet, Take 1,000 Units by mouth Every Morning., Disp: , Rfl:   •  exemestane (AROMASIN) 25 MG chemo tablet, TAKE 1 TABLET BY MOUTH EVERY DAY, Disp: 90 tablet, Rfl: 1  •  Probiotic Product (PROBIOTIC DAILY PO), Take 1 tablet by mouth Every Morning., Disp: , Rfl:       Objective:     Vitals:    01/11/21 1232   BP: 110/58   BP Location: Right arm   Patient Position: Sitting   Pulse: 76   Resp: 18   SpO2: 98%   Weight: 63.5 kg (140 lb)   Height: 162.6 cm (64\")     Body mass index is 24.03 kg/m².    PHYSICAL EXAM:    Constitutional:       General: Not in acute distress.     Appearance: Well-developed. Not diaphoretic.   Eyes:      Pupils: Pupils are equal, round, and reactive to light.   HENT:      Head: Normocephalic and atraumatic.   Neck:      Thyroid: No thyromegaly.      Vascular: No JVD.   Pulmonary:      Effort: Pulmonary effort is normal. No " respiratory distress.      Breath sounds: Normal breath sounds.   Cardiovascular:      Normal rate. Regular rhythm.   Pulses:     Intact distal pulses.   Abdominal:      General: Bowel sounds are normal. There is no distension.      Palpations: Abdomen is soft. There is no hepatomegaly or splenomegaly.      Tenderness: There is no abdominal tenderness.   Musculoskeletal: Normal range of motion.   Skin:     General: Skin is warm and dry.      Findings: No erythema.   Neurological:      Mental Status: Alert and oriented to person, place, and time.   Psychiatric:         Mood and Affect: Mood is not anxious.         Behavior: Behavior normal.         Judgment: Judgment normal.           ECG 12 Lead    Date/Time: 1/11/2021 1:12 PM  Performed by: Sanjiv Dykes MD  Authorized by: Sanjiv Dykes MD   Comparison: compared with previous ECG from 9/14/2020  Similar to previous ECG  Rhythm: sinus rhythm  Rate: normal  Conduction: left anterior fascicular block  QRS axis: left                 9/21/2020  · Calculated EF = 56% Estimated EF was in agreement with the calculated EF.  · Mild mitral valve regurgitation is present.  · STRESS ECHO  · Normal stress echo with no significant echocardiographic evidence for myocardial ischemia.  · Post EF- 69%.      Assessment:        Plan:       This is a very pleasant 65-year-old female with a medical history of coronary artery disease and prior PCI of the mid to distal LAD, PCA to the diagonal, previously documented ischemic cardiomyopathy with now normal ejection fraction, PVCs and PACs, who presents back to me in follow up.  She has been doing well from a cardiac stand point.  She denies any chest pain or dyspnea.  She is tolerating her current medical regimen.    1.  Coronary disease with prior PCI to the LAD.  - Continue aspirin life long.  - Continue Carvedilol at current dose.  Blood pressure and heart rate are well controlled.  - Continue atorvastatin 40 mg p.o. at  bedtime.  I have reviewed the lipid panel and CMP performed in late 2020.  This is well controlled.  No evidence of elevated transaminases.  LDL 73.  2.  Vitamin D deficiency.  - Continue cholecalciferol therapy.    I will see her back in 1 year.

## 2021-01-18 RX ORDER — EXEMESTANE 25 MG/1
TABLET ORAL
Qty: 90 TABLET | Refills: 1 | Status: SHIPPED | OUTPATIENT
Start: 2021-01-18 | End: 2021-04-14 | Stop reason: SDUPTHER

## 2021-01-20 RX ORDER — ASPIRIN 81 MG/1
TABLET ORAL
Qty: 90 TABLET | Refills: 4 | Status: SHIPPED | OUTPATIENT
Start: 2021-01-20 | End: 2021-04-27 | Stop reason: HOSPADM

## 2021-03-10 ENCOUNTER — APPOINTMENT (OUTPATIENT)
Dept: BONE DENSITY | Facility: HOSPITAL | Age: 66
End: 2021-03-10

## 2021-03-10 ENCOUNTER — APPOINTMENT (OUTPATIENT)
Dept: MAMMOGRAPHY | Facility: HOSPITAL | Age: 66
End: 2021-03-10

## 2021-03-10 ENCOUNTER — HOSPITAL ENCOUNTER (OUTPATIENT)
Dept: BONE DENSITY | Facility: HOSPITAL | Age: 66
Discharge: HOME | End: 2021-03-10
Admitting: INTERNAL MEDICINE

## 2021-03-10 DIAGNOSIS — Z12.31 ENCOUNTER FOR SCREENING MAMMOGRAM FOR BREAST CANCER: ICD-10-CM

## 2021-03-10 DIAGNOSIS — Z79.811 LONG TERM (CURRENT) USE OF AROMATASE INHIBITORS: ICD-10-CM

## 2021-03-10 DIAGNOSIS — Z17.0 MALIGNANT NEOPLASM OF UPPER-INNER QUADRANT OF LEFT BREAST IN FEMALE, ESTROGEN RECEPTOR POSITIVE (HCC): ICD-10-CM

## 2021-03-10 DIAGNOSIS — C50.212 MALIGNANT NEOPLASM OF UPPER-INNER QUADRANT OF LEFT BREAST IN FEMALE, ESTROGEN RECEPTOR POSITIVE (HCC): ICD-10-CM

## 2021-03-10 PROCEDURE — 77080 DXA BONE DENSITY AXIAL: CPT

## 2021-03-19 ENCOUNTER — BULK ORDERING (OUTPATIENT)
Dept: CASE MANAGEMENT | Facility: OTHER | Age: 66
End: 2021-03-19

## 2021-03-19 DIAGNOSIS — Z23 IMMUNIZATION DUE: ICD-10-CM

## 2021-04-05 ENCOUNTER — TELEPHONE (OUTPATIENT)
Dept: CARDIOLOGY | Facility: CLINIC | Age: 66
End: 2021-04-05

## 2021-04-05 NOTE — TELEPHONE ENCOUNTER
Patient calling for surgery clearance with Dr. Van Titus  Knee replacement  Fax to [phone] Attn: Kassidy

## 2021-04-07 DIAGNOSIS — R73.03 PREDIABETES: ICD-10-CM

## 2021-04-07 DIAGNOSIS — E78.2 MIXED HYPERLIPIDEMIA: Primary | ICD-10-CM

## 2021-04-07 LAB
ALBUMIN SERPL-MCNC: 4.2 G/DL (ref 3.5–5.2)
ALBUMIN/GLOB SERPL: 1.8 G/DL
ALP SERPL-CCNC: 102 U/L (ref 39–117)
ALT SERPL-CCNC: 23 U/L (ref 1–33)
AST SERPL-CCNC: 29 U/L (ref 1–32)
BILIRUB SERPL-MCNC: 0.7 MG/DL (ref 0–1.2)
BUN SERPL-MCNC: 17 MG/DL (ref 8–23)
BUN/CREAT SERPL: 20.2 (ref 7–25)
CALCIUM SERPL-MCNC: 9.1 MG/DL (ref 8.6–10.5)
CHLORIDE SERPL-SCNC: 106 MMOL/L (ref 98–107)
CHOLEST SERPL-MCNC: 140 MG/DL (ref 0–200)
CO2 SERPL-SCNC: 24.7 MMOL/L (ref 22–29)
CREAT SERPL-MCNC: 0.84 MG/DL (ref 0.57–1)
GLOBULIN SER CALC-MCNC: 2.4 GM/DL
GLUCOSE SERPL-MCNC: 90 MG/DL (ref 65–99)
HBA1C MFR BLD: 5.9 % (ref 4.8–5.6)
HDLC SERPL-MCNC: 81 MG/DL (ref 40–60)
LDLC SERPL CALC-MCNC: 46 MG/DL (ref 0–100)
POTASSIUM SERPL-SCNC: 4.6 MMOL/L (ref 3.5–5.2)
PROT SERPL-MCNC: 6.6 G/DL (ref 6–8.5)
SODIUM SERPL-SCNC: 141 MMOL/L (ref 136–145)
TRIGL SERPL-MCNC: 66 MG/DL (ref 0–150)
VLDLC SERPL CALC-MCNC: 13 MG/DL (ref 5–40)

## 2021-04-08 ENCOUNTER — TRANSCRIBE ORDERS (OUTPATIENT)
Dept: PREADMISSION TESTING | Facility: HOSPITAL | Age: 66
End: 2021-04-08

## 2021-04-08 DIAGNOSIS — Z01.818 OTHER SPECIFIED PRE-OPERATIVE EXAMINATION: Primary | ICD-10-CM

## 2021-04-13 ENCOUNTER — HOSPITAL ENCOUNTER (OUTPATIENT)
Dept: GENERAL RADIOLOGY | Facility: HOSPITAL | Age: 66
Discharge: HOME | End: 2021-04-13

## 2021-04-13 ENCOUNTER — PRE-ADMISSION TESTING (OUTPATIENT)
Dept: PREADMISSION TESTING | Facility: HOSPITAL | Age: 66
End: 2021-04-13

## 2021-04-13 VITALS
HEART RATE: 73 BPM | BODY MASS INDEX: 23.82 KG/M2 | HEIGHT: 65 IN | OXYGEN SATURATION: 99 % | RESPIRATION RATE: 20 BRPM | TEMPERATURE: 98.1 F | DIASTOLIC BLOOD PRESSURE: 62 MMHG | WEIGHT: 143 LBS | SYSTOLIC BLOOD PRESSURE: 106 MMHG

## 2021-04-13 LAB
APTT PPP: 29.8 SECONDS (ref 22.7–35.4)
BACTERIA UR QL AUTO: NORMAL /HPF
BASOPHILS # BLD AUTO: 0.04 10*3/MM3 (ref 0–0.2)
BASOPHILS NFR BLD AUTO: 0.7 % (ref 0–1.5)
BILIRUB UR QL STRIP: NEGATIVE
CLARITY UR: CLEAR
COLOR UR: YELLOW
CRP SERPL-MCNC: <0.3 MG/DL (ref 0–0.5)
DEPRECATED RDW RBC AUTO: 43.2 FL (ref 37–54)
EOSINOPHIL # BLD AUTO: 0.12 10*3/MM3 (ref 0–0.4)
EOSINOPHIL NFR BLD AUTO: 2.2 % (ref 0.3–6.2)
ERYTHROCYTE [DISTWIDTH] IN BLOOD BY AUTOMATED COUNT: 13.2 % (ref 12.3–15.4)
ERYTHROCYTE [SEDIMENTATION RATE] IN BLOOD: 6 MM/HR (ref 0–30)
GLUCOSE UR STRIP-MCNC: NEGATIVE MG/DL
HCT VFR BLD AUTO: 38.7 % (ref 34–46.6)
HGB BLD-MCNC: 13 G/DL (ref 12–15.9)
HGB UR QL STRIP.AUTO: ABNORMAL
HYALINE CASTS UR QL AUTO: NORMAL /LPF
IMM GRANULOCYTES # BLD AUTO: 0.01 10*3/MM3 (ref 0–0.05)
IMM GRANULOCYTES NFR BLD AUTO: 0.2 % (ref 0–0.5)
INR PPP: 1.1 (ref 0.9–1.1)
KETONES UR QL STRIP: NEGATIVE
LEUKOCYTE ESTERASE UR QL STRIP.AUTO: NEGATIVE
LYMPHOCYTES # BLD AUTO: 1.53 10*3/MM3 (ref 0.7–3.1)
LYMPHOCYTES NFR BLD AUTO: 27.9 % (ref 19.6–45.3)
MCH RBC QN AUTO: 30.4 PG (ref 26.6–33)
MCHC RBC AUTO-ENTMCNC: 33.6 G/DL (ref 31.5–35.7)
MCV RBC AUTO: 90.6 FL (ref 79–97)
MONOCYTES # BLD AUTO: 0.51 10*3/MM3 (ref 0.1–0.9)
MONOCYTES NFR BLD AUTO: 9.3 % (ref 5–12)
NEUTROPHILS NFR BLD AUTO: 3.28 10*3/MM3 (ref 1.7–7)
NEUTROPHILS NFR BLD AUTO: 59.7 % (ref 42.7–76)
NITRITE UR QL STRIP: NEGATIVE
NRBC BLD AUTO-RTO: 0.2 /100 WBC (ref 0–0.2)
PH UR STRIP.AUTO: <=5 [PH] (ref 5–8)
PLATELET # BLD AUTO: 260 10*3/MM3 (ref 140–450)
PMV BLD AUTO: 9.9 FL (ref 6–12)
PROT UR QL STRIP: NEGATIVE
PROTHROMBIN TIME: 14.1 SECONDS (ref 11.7–14.2)
RBC # BLD AUTO: 4.27 10*6/MM3 (ref 3.77–5.28)
RBC # UR: NORMAL /HPF
REF LAB TEST METHOD: NORMAL
SP GR UR STRIP: 1.02 (ref 1–1.03)
SQUAMOUS #/AREA URNS HPF: NORMAL /HPF
UROBILINOGEN UR QL STRIP: ABNORMAL
WBC # BLD AUTO: 5.49 10*3/MM3 (ref 3.4–10.8)
WBC UR QL AUTO: NORMAL /HPF

## 2021-04-13 PROCEDURE — 85025 COMPLETE CBC W/AUTO DIFF WBC: CPT

## 2021-04-13 PROCEDURE — 36415 COLL VENOUS BLD VENIPUNCTURE: CPT

## 2021-04-13 PROCEDURE — 85610 PROTHROMBIN TIME: CPT

## 2021-04-13 PROCEDURE — 73560 X-RAY EXAM OF KNEE 1 OR 2: CPT

## 2021-04-13 PROCEDURE — 85730 THROMBOPLASTIN TIME PARTIAL: CPT

## 2021-04-13 PROCEDURE — 81001 URINALYSIS AUTO W/SCOPE: CPT

## 2021-04-13 PROCEDURE — 86140 C-REACTIVE PROTEIN: CPT

## 2021-04-13 PROCEDURE — 85652 RBC SED RATE AUTOMATED: CPT

## 2021-04-13 PROCEDURE — 71046 X-RAY EXAM CHEST 2 VIEWS: CPT

## 2021-04-13 RX ORDER — CHLORHEXIDINE GLUCONATE 500 MG/1
1 CLOTH TOPICAL TAKE AS DIRECTED
Status: ON HOLD | COMMUNITY
End: 2021-04-26

## 2021-04-13 ASSESSMENT — KOOS JR
KOOS JR SCORE: 52.465
KOOS JR SCORE: 14

## 2021-04-13 NOTE — DISCHARGE INSTRUCTIONS
Take the following medications the morning of surgery:    coreg    If you are on prescription narcotic pain medication to control your pain you may also take that medication the morning of surgery.    General Instructions:  • Do not eat solid food after midnight the night before surgery.  • You may drink clear liquids day of surgery but must stop at least one hour before your hospital arrival time.  • It is beneficial for you to have a clear drink that contains carbohydrates the day of surgery.  We suggest a 12 to 20 ounce bottle of Gatorade or Powerade for non-diabetic patients or a 12 to 20 ounce bottle of G2 or Powerade Zero for diabetic patients. (Pediatric patients, are not advised to drink a 12 to 20 ounce carbohydrate drink)    Clear liquids are liquids you can see through.  Nothing red in color.     Plain water                               Sports drinks  Sodas                                   Gelatin (Jell-O)  Fruit juices without pulp such as white grape juice and apple juice  Popsicles that contain no fruit or yogurt  Tea or coffee (no cream or milk added)  Gatorade / Powerade  G2 / Powerade Zero    • Infants may have breast milk up to four hours before surgery.  • Infants drinking formula may drink formula up to six hours before surgery.   • Patients who avoid smoking, chewing tobacco and alcohol for 4 weeks prior to surgery have a reduced risk of post-operative complications.  Quit smoking as many days before surgery as you can.  • Do not smoke, use chewing tobacco or drink alcohol the day of surgery.   • If applicable bring your C-PAP/ BI-PAP machine.  • Bring any papers given to you in the doctor’s office.  • Wear clean comfortable clothes.  • Do not wear contact lenses, false eyelashes or make-up.  Bring a case for your glasses.   • Bring crutches or walker if applicable.  • Remove all piercings.  Leave jewelry and any other valuables at home.  • Hair extensions with metal clips must be removed  prior to surgery.  • The Pre-Admission Testing nurse will instruct you to bring medications if unable to obtain an accurate list in Pre-Admission Testing.        If you were given a blood bank ID arm band remember to bring it with you the day of surgery.    Preventing a Surgical Site Infection:  • For 2 to 3 days before surgery, avoid shaving with a razor because the razor can irritate skin and make it easier to develop an infection.    • Any areas of open skin can increase the risk of a post-operative wound infection by allowing bacteria to enter and travel throughout the body.  Notify your surgeon if you have any skin wounds / rashes even if it is not near the expected surgical site.  The area will need assessed to determine if surgery should be delayed until it is healed.  • The night prior to surgery shower using a fresh bar of anti-bacterial soap (such as Dial) and clean washcloth.  Sleep in a clean bed with clean clothing.  Do not allow pets to sleep with you.  • Shower on the morning of surgery using a fresh bar of anti-bacterial soap (such as Dial) and clean washcloth.  Dry with a clean towel and dress in clean clothing.  • Ask your surgeon if you will be receiving antibiotics prior to surgery.  • Make sure you, your family, and all healthcare providers clean their hands with soap and water or an alcohol based hand  before caring for you or your wound.    Day of surgery:4/26/2021   1000  Your arrival time is approximately two hours before your scheduled surgery time.  Upon arrival, a Pre-op nurse and Anesthesiologist will review your health history, obtain vital signs, and answer questions you may have.  The only belongings needed at this time will be a list of your home medications and if applicable your C-PAP/BI-PAP machine.  A Pre-op nurse will start an IV and you may receive medication in preparation for surgery, including something to help you relax.     Please be aware that surgery does come  with discomfort.  We want to make every effort to control your discomfort so please discuss any uncontrolled symptoms with your nurse.   Your doctor will most likely have prescribed pain medications.      If you are going home after surgery you will receive individualized written care instructions before being discharged.  A responsible adult must drive you to and from the hospital on the day of your surgery and stay with you for 24 hours.  Discharge prescriptions can be filled by the hospital pharmacy during regular pharmacy hours.  If you are having surgery late in the day/evening your prescription may be e-prescribed to your pharmacy.  Please verify your pharmacy hours or chose a 24 hour pharmacy to avoid not having access to your prescription because your pharmacy has closed for the day.    If you are staying overnight following surgery, you will be transported to your hospital room following the recovery period.  [facility] has all private rooms.    If you have any questions please call Pre-Admission Testing at [phone].  Deductibles and co-payments are collected on the day of service. Please be prepared to pay the required co-pay, deductible or deposit on the day of service as defined by your plan.    Patient Education for Self-Quarantine Process    Following your COVID testing, we strongly recommend that you do not leave your home after you have been tested for COVID except to get medical care. This includes not going to work, school or to public areas.  If this is not possible for you to do please limit your activities to only required outings.  Be sure to wear a mask when you are with other people, practice social distancing and wash your hands frequently.      The following items provide additional details to keep you safe.  • Wash your hands with soap and water frequently for at least 20 seconds.   • Avoid touching your eyes, nose and mouth with unwashed hands.  • Do not share anything  - utensils, towels, food from the same bowl.   • Have your own utensils, drinking glass, dishes, towels and bedding.   • Do not have visitors.   • Do use FaceTime to stay in touch with family and friends.  • You should stay in a specific room away from others if possible.   • Stay at least 6 feet away from others in the home if you cannot have a dedicated room to yourself.   • Do not snuggle with your pet. While the CDC says there is no evidence that pets can spread COVID-19 or be infected from humans, it is probably best to avoid “petting, snuggling, being kissed or licked and sharing food (during self-quarantine)”, according to the CDC.   • Sanitize household surfaces daily. Include all high touch areas (door handles, light switches, phones, countertops, etc.)  • Do not share a bathroom with others, if possible.   • Wear a mask around others in your home if you are unable to stay in a separate room or 6 feet apart. If  you are unable to wear a mask, have your family member wear a mask if they must be within 6 feet of you.   Call your surgeon immediately if you experience any of the following symptoms:  • Sore Throat  • Shortness of Breath or difficulty breathing  • Cough  • Chills  • Body soreness or muscle pain  • Headache  • Fever  • New loss of taste or smell  • Do not arrive for your surgery ill.  Your procedure will need to be rescheduled to another time.  You will need to call your physician before the day of surgery to avoid any unnecessary exposure to hospital staff as well as other patients.    CHLORHEXIDINE CLOTH INSTRUCTIONS  The morning of surgery follow these instructions using the Chlorhexidine cloths you've been given.  These steps reduce bacteria on the body.  Do not use the cloths near your eyes, ears mouth, genitalia or on open wounds.  Throw the cloths away after use but do not try to flush them down a toilet.      • Open and remove one cloth at a time from the package.    • Leave the cloth  unfolded and begin the bathing.  • Massage the skin with the cloths using gentle pressure to remove bacteria.  Do not scrub harshly.   • Follow the steps below with one 2% CHG cloth per area (6 total cloths).  • One cloth for neck, shoulders and chest.  • One cloth for both arms, hands, fingers and underarms (do underarms last).  • One cloth for the abdomen followed by groin.  • One cloth for right leg and foot including between the toes.  • One cloth for left leg and foot including between the toes.  • The last cloth is to be used for the back of the neck, back and buttocks.    Allow the CHG to air dry 3 minutes on the skin which will give it time to work and decrease the chance of irritation.  The skin may feel sticky until it is dry.  Do not rinse with water or any other liquid or you will lose the beneficial effects of the CHG.  If mild skin irritation occurs, do rinse the skin to remove the CHG.  Report this to the nurse at time of admission.  Do not apply lotions, creams, ointments, deodorants or perfumes after using the clothes. Dress in clean clothes before coming to the hospital.    BACTROBAN NASAL OINTMENT  There are many germs normally in your nose. Bactroban is an ointment that will help reduce these germs. Please follow these instructions for Bactroban use:      __1__The day before surgery in the morning  Date_4/25/2021_______    __2__The day before surgery in the evening              Date_4/25/2021_______    _3___The day of surgery in the morning    Date_4/26/2021_______    **Squirt ½ package of Bactroban Ointment onto a cotton applicator and apply to inside of 1st nostril.  Squirt the remaining Bactroban and apply to the inside of the other nostril.    PERIDEX- ORAL:  Use only if your surgeon has ordered  Use the night before and morning of surgery - Swish, gargle, and spit - do not swallow.

## 2021-04-14 ENCOUNTER — TELEPHONE (OUTPATIENT)
Dept: ONCOLOGY | Facility: CLINIC | Age: 66
End: 2021-04-14

## 2021-04-14 DIAGNOSIS — C50.212 MALIGNANT NEOPLASM OF UPPER-INNER QUADRANT OF LEFT BREAST IN FEMALE, ESTROGEN RECEPTOR POSITIVE (HCC): Primary | ICD-10-CM

## 2021-04-14 DIAGNOSIS — Z17.0 MALIGNANT NEOPLASM OF UPPER-INNER QUADRANT OF LEFT BREAST IN FEMALE, ESTROGEN RECEPTOR POSITIVE (HCC): Primary | ICD-10-CM

## 2021-04-14 RX ORDER — EXEMESTANE 25 MG/1
25 TABLET ORAL DAILY
Qty: 90 TABLET | Refills: 1 | Status: SHIPPED | OUTPATIENT
Start: 2021-04-14 | End: 2021-10-13

## 2021-04-14 NOTE — TELEPHONE ENCOUNTER
Caller: SANDIFER, DEBRA    Relationship to patient: SELF    Best call back number: [phone]    Medication needed: exemestane (AROMASIN) 25 MG    When do you need the refill by: 04/28/21    Does the patient have less than a 3 day supply:  [] Yes  [x] No    What is the patient's preferred pharmacy: [facility] [address] [facility] [phone] [facility] [phone] FX

## 2021-04-14 NOTE — TELEPHONE ENCOUNTER
Pt switched pharmacy and is requesting new script be sent to her new location. Advised pt that insurance may not allow her to fill it until a later date but that it would be sent so her pharmacy has it on file. Script routed to Dr. Akhtar, pt v/u.

## 2021-04-14 NOTE — TELEPHONE ENCOUNTER
Caller: SANDIFER, DEBRA    Relationship to patient: SELF    Best call back number: [phone]    PT IS HAVING SURGERY ON 04/26/21, PT WANTS TO MAKE SURE IT IS OKAY FOR HER TO TAKE HER exemestane (AROMASIN) 25 MG UP UNTIL THE DAY PRIOR TO THE SURGERY

## 2021-04-15 NOTE — TELEPHONE ENCOUNTER
Returned pt's call. Per Dr. Akhtar she can continue her aromasin and does not need to hold it for surgery. She v/u.

## 2021-04-16 RX ORDER — CARVEDILOL 3.12 MG/1
3.12 TABLET ORAL EVERY 12 HOURS
Qty: 180 TABLET | Refills: 4 | Status: SHIPPED | OUTPATIENT
Start: 2021-04-16 | End: 2022-07-11

## 2021-04-16 RX ORDER — ATORVASTATIN CALCIUM 40 MG/1
40 TABLET, FILM COATED ORAL
Qty: 90 TABLET | Refills: 4 | Status: SHIPPED | OUTPATIENT
Start: 2021-04-16 | End: 2022-07-11

## 2021-04-23 ENCOUNTER — LAB (OUTPATIENT)
Dept: LAB | Facility: HOSPITAL | Age: 66
End: 2021-04-23

## 2021-04-23 DIAGNOSIS — Z01.818 OTHER SPECIFIED PRE-OPERATIVE EXAMINATION: ICD-10-CM

## 2021-04-23 PROCEDURE — U0004 COV-19 TEST NON-CDC HGH THRU: HCPCS

## 2021-04-23 PROCEDURE — U0005 INFEC AGEN DETEC AMPLI PROBE: HCPCS

## 2021-04-23 PROCEDURE — C9803 HOPD COVID-19 SPEC COLLECT: HCPCS

## 2021-04-24 LAB — SARS-COV-2 ORF1AB RESP QL NAA+PROBE: NOT DETECTED

## 2021-04-26 ENCOUNTER — HOSPITAL ENCOUNTER (OUTPATIENT)
Facility: HOSPITAL | Age: 66
Discharge: HOME | End: 2021-04-27
Attending: ORTHOPAEDIC SURGERY | Admitting: ORTHOPAEDIC SURGERY

## 2021-04-26 ENCOUNTER — APPOINTMENT (OUTPATIENT)
Dept: GENERAL RADIOLOGY | Facility: HOSPITAL | Age: 66
End: 2021-04-26

## 2021-04-26 ENCOUNTER — ANESTHESIA EVENT (OUTPATIENT)
Dept: PERIOP | Facility: HOSPITAL | Age: 66
End: 2021-04-26

## 2021-04-26 ENCOUNTER — ANESTHESIA (OUTPATIENT)
Dept: PERIOP | Facility: HOSPITAL | Age: 66
End: 2021-04-26

## 2021-04-26 DIAGNOSIS — M17.11 PRIMARY OSTEOARTHRITIS OF RIGHT KNEE: Primary | ICD-10-CM

## 2021-04-26 LAB — GLUCOSE BLDC GLUCOMTR-MCNC: 160 MG/DL (ref 70–130)

## 2021-04-26 PROCEDURE — 25010000003 CEFAZOLIN IN DEXTROSE 2-4 GM/100ML-% SOLUTION: Performed by: ORTHOPAEDIC SURGERY

## 2021-04-26 PROCEDURE — 82962 GLUCOSE BLOOD TEST: CPT

## 2021-04-26 PROCEDURE — 73560 X-RAY EXAM OF KNEE 1 OR 2: CPT

## 2021-04-26 PROCEDURE — C1713 ANCHOR/SCREW BN/BN,TIS/BN: HCPCS | Performed by: ORTHOPAEDIC SURGERY

## 2021-04-26 PROCEDURE — 25010000003 BUPIVACAINE LIPOSOME 1.3 % SUSPENSION 20 ML VIAL: Performed by: ORTHOPAEDIC SURGERY

## 2021-04-26 PROCEDURE — 25010000002 VANCOMYCIN PER 500 MG: Performed by: ORTHOPAEDIC SURGERY

## 2021-04-26 PROCEDURE — 25010000002 FENTANYL CITRATE (PF) 100 MCG/2ML SOLUTION: Performed by: NURSE ANESTHETIST, CERTIFIED REGISTERED

## 2021-04-26 PROCEDURE — 25010000002 PROPOFOL 10 MG/ML EMULSION: Performed by: NURSE ANESTHETIST, CERTIFIED REGISTERED

## 2021-04-26 PROCEDURE — C1776 JOINT DEVICE (IMPLANTABLE): HCPCS | Performed by: ORTHOPAEDIC SURGERY

## 2021-04-26 PROCEDURE — 25010000002 DEXAMETHASONE PER 1 MG: Performed by: NURSE ANESTHETIST, CERTIFIED REGISTERED

## 2021-04-26 PROCEDURE — 25010000002 ONDANSETRON PER 1 MG: Performed by: NURSE ANESTHETIST, CERTIFIED REGISTERED

## 2021-04-26 PROCEDURE — 25010000002 CEFAZOLIN PER 500 MG: Performed by: NURSE ANESTHETIST, CERTIFIED REGISTERED

## 2021-04-26 PROCEDURE — 25010000002 SUCCINYLCHOLINE PER 20 MG: Performed by: NURSE ANESTHETIST, CERTIFIED REGISTERED

## 2021-04-26 PROCEDURE — 25010000002 NEOSTIGMINE 5 MG/10ML SOLUTION: Performed by: NURSE ANESTHETIST, CERTIFIED REGISTERED

## 2021-04-26 PROCEDURE — 25010000002 KETOROLAC TROMETHAMINE PER 15 MG: Performed by: NURSE ANESTHETIST, CERTIFIED REGISTERED

## 2021-04-26 PROCEDURE — C9290 INJ, BUPIVACAINE LIPOSOME: HCPCS | Performed by: ORTHOPAEDIC SURGERY

## 2021-04-26 DEVICE — LEGION OXINIUM CONSTRAINED FEMORAL                                    SIZE 4 RIGHT
Type: IMPLANTABLE DEVICE | Site: KNEE | Status: FUNCTIONAL
Brand: LEGION

## 2021-04-26 DEVICE — GII PS HIGH FLEXION INSERT W/ JRNY                                    LK SZ 3-4 15MM
Type: IMPLANTABLE DEVICE | Site: KNEE | Status: FUNCTIONAL
Brand: GENESIS II

## 2021-04-26 DEVICE — LEGION PRESSFIT STEM 18MM X 160MM
Type: IMPLANTABLE DEVICE | Site: KNEE | Status: FUNCTIONAL
Brand: LEGION

## 2021-04-26 DEVICE — LEGION SCREW-ON DISTAL FEMORAL                                    WEDGE SIZE 4 5MM
Type: IMPLANTABLE DEVICE | Site: KNEE | Status: FUNCTIONAL
Brand: LEGION

## 2021-04-26 DEVICE — PALACOS® R IS A FAST-CURING, RADIOPAQUE, POLY(METHYL METHACRYLATE)-BASED BONE CEMENT.PALACOS ® R CONTAINS THE X-RAY CONTRAST MEDIUM ZIRCONIUM DIOXIDE. TO IMPROVE VISIBILITY IN THE SURGICAL FIELD PALACOS ® R HAS BEEN COLOURED WITH CHLOROPHYLL (E141). THE BONE CEMENT IS PREPARED DIRECTLY BEFORE USE BY MIXING A POLYMER POWDER COMPONENT WITH A LIQUID MONOMER COMPONENT. A DUCTILE DOUGH FORMS WHICH CURES WITHIN A FEW MINUTES.
Type: IMPLANTABLE DEVICE | Site: KNEE | Status: FUNCTIONAL
Brand: PALACOS®

## 2021-04-26 RX ORDER — SUCCINYLCHOLINE CHLORIDE 20 MG/ML
INJECTION INTRAMUSCULAR; INTRAVENOUS AS NEEDED
Status: DISCONTINUED | OUTPATIENT
Start: 2021-04-26 | End: 2021-04-26 | Stop reason: SURG

## 2021-04-26 RX ORDER — MIDAZOLAM HYDROCHLORIDE 1 MG/ML
2 INJECTION INTRAMUSCULAR; INTRAVENOUS
Status: DISCONTINUED | OUTPATIENT
Start: 2021-04-26 | End: 2021-04-26 | Stop reason: HOSPADM

## 2021-04-26 RX ORDER — ONDANSETRON 2 MG/ML
4 INJECTION INTRAMUSCULAR; INTRAVENOUS EVERY 6 HOURS PRN
Status: DISCONTINUED | OUTPATIENT
Start: 2021-04-26 | End: 2021-04-27 | Stop reason: HOSPADM

## 2021-04-26 RX ORDER — ASPIRIN 325 MG
325 TABLET, DELAYED RELEASE (ENTERIC COATED) ORAL DAILY
Status: DISCONTINUED | OUTPATIENT
Start: 2021-04-26 | End: 2021-04-27 | Stop reason: HOSPADM

## 2021-04-26 RX ORDER — ONDANSETRON 4 MG/1
4 TABLET, FILM COATED ORAL EVERY 6 HOURS PRN
Status: DISCONTINUED | OUTPATIENT
Start: 2021-04-26 | End: 2021-04-27 | Stop reason: HOSPADM

## 2021-04-26 RX ORDER — ACETAMINOPHEN 650 MG/1
650 SUPPOSITORY RECTAL EVERY 4 HOURS PRN
Status: DISCONTINUED | OUTPATIENT
Start: 2021-04-26 | End: 2021-04-27 | Stop reason: HOSPADM

## 2021-04-26 RX ORDER — PROMETHAZINE HYDROCHLORIDE 25 MG/1
25 SUPPOSITORY RECTAL ONCE AS NEEDED
Status: DISCONTINUED | OUTPATIENT
Start: 2021-04-26 | End: 2021-04-26 | Stop reason: HOSPADM

## 2021-04-26 RX ORDER — DIPHENHYDRAMINE HYDROCHLORIDE 50 MG/ML
12.5 INJECTION INTRAMUSCULAR; INTRAVENOUS
Status: DISCONTINUED | OUTPATIENT
Start: 2021-04-26 | End: 2021-04-26 | Stop reason: HOSPADM

## 2021-04-26 RX ORDER — SODIUM CHLORIDE 0.9 % (FLUSH) 0.9 %
3 SYRINGE (ML) INJECTION EVERY 12 HOURS SCHEDULED
Status: DISCONTINUED | OUTPATIENT
Start: 2021-04-26 | End: 2021-04-26 | Stop reason: HOSPADM

## 2021-04-26 RX ORDER — DOCUSATE SODIUM 100 MG/1
100 CAPSULE, LIQUID FILLED ORAL 2 TIMES DAILY PRN
Status: DISCONTINUED | OUTPATIENT
Start: 2021-04-26 | End: 2021-04-27 | Stop reason: HOSPADM

## 2021-04-26 RX ORDER — ONDANSETRON 2 MG/ML
INJECTION INTRAMUSCULAR; INTRAVENOUS AS NEEDED
Status: DISCONTINUED | OUTPATIENT
Start: 2021-04-26 | End: 2021-04-26 | Stop reason: SURG

## 2021-04-26 RX ORDER — KETOROLAC TROMETHAMINE 15 MG/ML
15 INJECTION, SOLUTION INTRAMUSCULAR; INTRAVENOUS EVERY 6 HOURS PRN
Status: DISCONTINUED | OUTPATIENT
Start: 2021-04-26 | End: 2021-04-27 | Stop reason: HOSPADM

## 2021-04-26 RX ORDER — TRANEXAMIC ACID 100 MG/ML
INJECTION, SOLUTION INTRAVENOUS AS NEEDED
Status: DISCONTINUED | OUTPATIENT
Start: 2021-04-26 | End: 2021-04-26 | Stop reason: SURG

## 2021-04-26 RX ORDER — MEPERIDINE HYDROCHLORIDE 25 MG/ML
12.5 INJECTION INTRAMUSCULAR; INTRAVENOUS; SUBCUTANEOUS
Status: DISCONTINUED | OUTPATIENT
Start: 2021-04-26 | End: 2021-04-26 | Stop reason: HOSPADM

## 2021-04-26 RX ORDER — CARVEDILOL 3.12 MG/1
3.12 TABLET ORAL EVERY 12 HOURS
Status: DISCONTINUED | OUTPATIENT
Start: 2021-04-26 | End: 2021-04-27 | Stop reason: HOSPADM

## 2021-04-26 RX ORDER — LABETALOL HYDROCHLORIDE 5 MG/ML
5 INJECTION, SOLUTION INTRAVENOUS
Status: DISCONTINUED | OUTPATIENT
Start: 2021-04-26 | End: 2021-04-26 | Stop reason: HOSPADM

## 2021-04-26 RX ORDER — ONDANSETRON 2 MG/ML
4 INJECTION INTRAMUSCULAR; INTRAVENOUS ONCE AS NEEDED
Status: DISCONTINUED | OUTPATIENT
Start: 2021-04-26 | End: 2021-04-26 | Stop reason: HOSPADM

## 2021-04-26 RX ORDER — MIDAZOLAM HYDROCHLORIDE 1 MG/ML
0.5 INJECTION INTRAMUSCULAR; INTRAVENOUS
Status: DISCONTINUED | OUTPATIENT
Start: 2021-04-26 | End: 2021-04-26 | Stop reason: HOSPADM

## 2021-04-26 RX ORDER — ROCURONIUM BROMIDE 10 MG/ML
INJECTION, SOLUTION INTRAVENOUS AS NEEDED
Status: DISCONTINUED | OUTPATIENT
Start: 2021-04-26 | End: 2021-04-26 | Stop reason: SURG

## 2021-04-26 RX ORDER — SODIUM CHLORIDE 0.9 % (FLUSH) 0.9 %
1-10 SYRINGE (ML) INJECTION AS NEEDED
Status: DISCONTINUED | OUTPATIENT
Start: 2021-04-26 | End: 2021-04-27 | Stop reason: HOSPADM

## 2021-04-26 RX ORDER — CEFAZOLIN SODIUM 500 MG/2.2ML
INJECTION, POWDER, FOR SOLUTION INTRAMUSCULAR; INTRAVENOUS AS NEEDED
Status: DISCONTINUED | OUTPATIENT
Start: 2021-04-26 | End: 2021-04-26 | Stop reason: SURG

## 2021-04-26 RX ORDER — MIDAZOLAM HYDROCHLORIDE 1 MG/ML
1 INJECTION INTRAMUSCULAR; INTRAVENOUS
Status: DISCONTINUED | OUTPATIENT
Start: 2021-04-26 | End: 2021-04-26 | Stop reason: HOSPADM

## 2021-04-26 RX ORDER — PROMETHAZINE HYDROCHLORIDE 25 MG/1
25 TABLET ORAL ONCE AS NEEDED
Status: DISCONTINUED | OUTPATIENT
Start: 2021-04-26 | End: 2021-04-26 | Stop reason: HOSPADM

## 2021-04-26 RX ORDER — ACETAMINOPHEN 325 MG/1
325 TABLET ORAL EVERY 6 HOURS PRN
COMMUNITY

## 2021-04-26 RX ORDER — EPHEDRINE SULFATE 50 MG/ML
INJECTION, SOLUTION INTRAVENOUS AS NEEDED
Status: DISCONTINUED | OUTPATIENT
Start: 2021-04-26 | End: 2021-04-26 | Stop reason: SURG

## 2021-04-26 RX ORDER — FENTANYL CITRATE 50 UG/ML
INJECTION, SOLUTION INTRAMUSCULAR; INTRAVENOUS AS NEEDED
Status: DISCONTINUED | OUTPATIENT
Start: 2021-04-26 | End: 2021-04-26 | Stop reason: SURG

## 2021-04-26 RX ORDER — ALBUTEROL SULFATE 2.5 MG/3ML
2.5 SOLUTION RESPIRATORY (INHALATION) ONCE AS NEEDED
Status: DISCONTINUED | OUTPATIENT
Start: 2021-04-26 | End: 2021-04-26 | Stop reason: HOSPADM

## 2021-04-26 RX ORDER — NALOXONE HCL 0.4 MG/ML
0.1 VIAL (ML) INJECTION
Status: DISCONTINUED | OUTPATIENT
Start: 2021-04-26 | End: 2021-04-27 | Stop reason: HOSPADM

## 2021-04-26 RX ORDER — DEXAMETHASONE SODIUM PHOSPHATE 10 MG/ML
INJECTION INTRAMUSCULAR; INTRAVENOUS AS NEEDED
Status: DISCONTINUED | OUTPATIENT
Start: 2021-04-26 | End: 2021-04-26 | Stop reason: SURG

## 2021-04-26 RX ORDER — CEFAZOLIN SODIUM 2 G/100ML
2 INJECTION, SOLUTION INTRAVENOUS EVERY 8 HOURS
Status: COMPLETED | OUTPATIENT
Start: 2021-04-26 | End: 2021-04-27

## 2021-04-26 RX ORDER — ACETAMINOPHEN 325 MG/1
650 TABLET ORAL EVERY 4 HOURS PRN
Status: DISCONTINUED | OUTPATIENT
Start: 2021-04-26 | End: 2021-04-27 | Stop reason: HOSPADM

## 2021-04-26 RX ORDER — KETOROLAC TROMETHAMINE 30 MG/ML
INJECTION, SOLUTION INTRAMUSCULAR; INTRAVENOUS AS NEEDED
Status: DISCONTINUED | OUTPATIENT
Start: 2021-04-26 | End: 2021-04-26 | Stop reason: SURG

## 2021-04-26 RX ORDER — EPHEDRINE SULFATE 50 MG/ML
5 INJECTION, SOLUTION INTRAVENOUS ONCE AS NEEDED
Status: DISCONTINUED | OUTPATIENT
Start: 2021-04-26 | End: 2021-04-26 | Stop reason: HOSPADM

## 2021-04-26 RX ORDER — MAGNESIUM HYDROXIDE 1200 MG/15ML
LIQUID ORAL AS NEEDED
Status: DISCONTINUED | OUTPATIENT
Start: 2021-04-26 | End: 2021-04-26 | Stop reason: HOSPADM

## 2021-04-26 RX ORDER — PROPOFOL 10 MG/ML
VIAL (ML) INTRAVENOUS AS NEEDED
Status: DISCONTINUED | OUTPATIENT
Start: 2021-04-26 | End: 2021-04-26 | Stop reason: SURG

## 2021-04-26 RX ORDER — HYDROCODONE BITARTRATE AND ACETAMINOPHEN 7.5; 325 MG/1; MG/1
1 TABLET ORAL EVERY 4 HOURS PRN
Status: DISCONTINUED | OUTPATIENT
Start: 2021-04-26 | End: 2021-04-27 | Stop reason: HOSPADM

## 2021-04-26 RX ORDER — VANCOMYCIN HYDROCHLORIDE 1 G/200ML
1000 INJECTION, SOLUTION INTRAVENOUS ONCE
Status: COMPLETED | OUTPATIENT
Start: 2021-04-26 | End: 2021-04-26

## 2021-04-26 RX ORDER — ACETAMINOPHEN 325 MG/1
325 TABLET ORAL EVERY 4 HOURS PRN
Status: DISCONTINUED | OUTPATIENT
Start: 2021-04-26 | End: 2021-04-27 | Stop reason: HOSPADM

## 2021-04-26 RX ORDER — FAMOTIDINE 10 MG/ML
20 INJECTION, SOLUTION INTRAVENOUS ONCE
Status: COMPLETED | OUTPATIENT
Start: 2021-04-26 | End: 2021-04-26

## 2021-04-26 RX ORDER — LIDOCAINE HYDROCHLORIDE 10 MG/ML
0.5 INJECTION, SOLUTION EPIDURAL; INFILTRATION; INTRACAUDAL; PERINEURAL ONCE AS NEEDED
Status: DISCONTINUED | OUTPATIENT
Start: 2021-04-26 | End: 2021-04-26 | Stop reason: HOSPADM

## 2021-04-26 RX ORDER — FLUMAZENIL 0.1 MG/ML
0.2 INJECTION INTRAVENOUS AS NEEDED
Status: DISCONTINUED | OUTPATIENT
Start: 2021-04-26 | End: 2021-04-26 | Stop reason: HOSPADM

## 2021-04-26 RX ORDER — ATORVASTATIN CALCIUM 20 MG/1
40 TABLET, FILM COATED ORAL DAILY
Status: DISCONTINUED | OUTPATIENT
Start: 2021-04-26 | End: 2021-04-27 | Stop reason: HOSPADM

## 2021-04-26 RX ORDER — FERROUS SULFATE 325(65) MG
325 TABLET ORAL
Status: DISCONTINUED | OUTPATIENT
Start: 2021-04-27 | End: 2021-04-27 | Stop reason: HOSPADM

## 2021-04-26 RX ORDER — HYDROCODONE BITARTRATE AND ACETAMINOPHEN 7.5; 325 MG/1; MG/1
2 TABLET ORAL EVERY 4 HOURS PRN
Status: DISCONTINUED | OUTPATIENT
Start: 2021-04-26 | End: 2021-04-27 | Stop reason: HOSPADM

## 2021-04-26 RX ORDER — LIDOCAINE HYDROCHLORIDE 20 MG/ML
INJECTION, SOLUTION INFILTRATION; PERINEURAL AS NEEDED
Status: DISCONTINUED | OUTPATIENT
Start: 2021-04-26 | End: 2021-04-26 | Stop reason: SURG

## 2021-04-26 RX ORDER — DIPHENHYDRAMINE HCL 25 MG
25 CAPSULE ORAL
Status: DISCONTINUED | OUTPATIENT
Start: 2021-04-26 | End: 2021-04-26 | Stop reason: HOSPADM

## 2021-04-26 RX ORDER — SODIUM CHLORIDE, SODIUM LACTATE, POTASSIUM CHLORIDE, CALCIUM CHLORIDE 600; 310; 30; 20 MG/100ML; MG/100ML; MG/100ML; MG/100ML
9 INJECTION, SOLUTION INTRAVENOUS CONTINUOUS
Status: DISCONTINUED | OUTPATIENT
Start: 2021-04-26 | End: 2021-04-27 | Stop reason: HOSPADM

## 2021-04-26 RX ORDER — SODIUM CHLORIDE 0.9 % (FLUSH) 0.9 %
3-10 SYRINGE (ML) INJECTION AS NEEDED
Status: DISCONTINUED | OUTPATIENT
Start: 2021-04-26 | End: 2021-04-26 | Stop reason: HOSPADM

## 2021-04-26 RX ORDER — SODIUM CHLORIDE, SODIUM LACTATE, POTASSIUM CHLORIDE, CALCIUM CHLORIDE 600; 310; 30; 20 MG/100ML; MG/100ML; MG/100ML; MG/100ML
100 INJECTION, SOLUTION INTRAVENOUS CONTINUOUS
Status: DISCONTINUED | OUTPATIENT
Start: 2021-04-26 | End: 2021-04-27 | Stop reason: HOSPADM

## 2021-04-26 RX ORDER — HYDROMORPHONE HYDROCHLORIDE 1 MG/ML
0.25 INJECTION, SOLUTION INTRAMUSCULAR; INTRAVENOUS; SUBCUTANEOUS
Status: DISCONTINUED | OUTPATIENT
Start: 2021-04-26 | End: 2021-04-26 | Stop reason: HOSPADM

## 2021-04-26 RX ORDER — NALOXONE HCL 0.4 MG/ML
0.2 VIAL (ML) INJECTION AS NEEDED
Status: DISCONTINUED | OUTPATIENT
Start: 2021-04-26 | End: 2021-04-26 | Stop reason: HOSPADM

## 2021-04-26 RX ORDER — SODIUM CHLORIDE 0.9 % (FLUSH) 0.9 %
3 SYRINGE (ML) INJECTION EVERY 12 HOURS SCHEDULED
Status: DISCONTINUED | OUTPATIENT
Start: 2021-04-26 | End: 2021-04-27 | Stop reason: HOSPADM

## 2021-04-26 RX ORDER — ACETAMINOPHEN 500 MG
1000 TABLET ORAL ONCE
Status: COMPLETED | OUTPATIENT
Start: 2021-04-26 | End: 2021-04-26

## 2021-04-26 RX ORDER — NEOSTIGMINE METHYLSULFATE 0.5 MG/ML
INJECTION, SOLUTION INTRAVENOUS AS NEEDED
Status: DISCONTINUED | OUTPATIENT
Start: 2021-04-26 | End: 2021-04-26 | Stop reason: SURG

## 2021-04-26 RX ORDER — FENTANYL CITRATE 50 UG/ML
50 INJECTION, SOLUTION INTRAMUSCULAR; INTRAVENOUS
Status: DISCONTINUED | OUTPATIENT
Start: 2021-04-26 | End: 2021-04-26 | Stop reason: HOSPADM

## 2021-04-26 RX ORDER — GLYCOPYRROLATE 0.2 MG/ML
INJECTION INTRAMUSCULAR; INTRAVENOUS AS NEEDED
Status: DISCONTINUED | OUTPATIENT
Start: 2021-04-26 | End: 2021-04-26 | Stop reason: SURG

## 2021-04-26 RX ADMIN — GLYCOPYRROLATE 0.6 MG: 0.2 INJECTION INTRAMUSCULAR; INTRAVENOUS at 14:34

## 2021-04-26 RX ADMIN — SUCCINYLCHOLINE CHLORIDE 100 MG: 20 INJECTION, SOLUTION INTRAMUSCULAR; INTRAVENOUS; PARENTERAL at 12:39

## 2021-04-26 RX ADMIN — HYDROCODONE BITARTRATE AND ACETAMINOPHEN 1 TABLET: 7.5; 325 TABLET ORAL at 17:04

## 2021-04-26 RX ADMIN — HYDROCODONE BITARTRATE AND ACETAMINOPHEN 1 TABLET: 7.5; 325 TABLET ORAL at 21:27

## 2021-04-26 RX ADMIN — SODIUM CHLORIDE, POTASSIUM CHLORIDE, SODIUM LACTATE AND CALCIUM CHLORIDE: 600; 310; 30; 20 INJECTION, SOLUTION INTRAVENOUS at 14:37

## 2021-04-26 RX ADMIN — ACETAMINOPHEN 1000 MG: 500 TABLET ORAL at 09:17

## 2021-04-26 RX ADMIN — ROCURONIUM BROMIDE 30 MG: 50 INJECTION INTRAVENOUS at 12:53

## 2021-04-26 RX ADMIN — TRANEXAMIC ACID 1000 MG: 1 INJECTION, SOLUTION INTRAVENOUS at 14:29

## 2021-04-26 RX ADMIN — NEOSTIGMINE METHYLSULFATE 3.5 MG: 0.5 INJECTION INTRAVENOUS at 14:34

## 2021-04-26 RX ADMIN — DEXAMETHASONE SODIUM PHOSPHATE 6 MG: 10 INJECTION INTRAMUSCULAR; INTRAVENOUS at 13:01

## 2021-04-26 RX ADMIN — ONDANSETRON 4 MG: 2 INJECTION INTRAMUSCULAR; INTRAVENOUS at 14:31

## 2021-04-26 RX ADMIN — FENTANYL CITRATE 50 MCG: 50 INJECTION, SOLUTION INTRAMUSCULAR; INTRAVENOUS at 15:05

## 2021-04-26 RX ADMIN — FAMOTIDINE 20 MG: 10 INJECTION INTRAVENOUS at 09:25

## 2021-04-26 RX ADMIN — EPHEDRINE SULFATE 5 MG: 50 INJECTION INTRAVENOUS at 14:15

## 2021-04-26 RX ADMIN — FENTANYL CITRATE 50 MCG: 50 INJECTION, SOLUTION INTRAMUSCULAR; INTRAVENOUS at 15:27

## 2021-04-26 RX ADMIN — SODIUM CHLORIDE, POTASSIUM CHLORIDE, SODIUM LACTATE AND CALCIUM CHLORIDE 9 ML/HR: 600; 310; 30; 20 INJECTION, SOLUTION INTRAVENOUS at 09:00

## 2021-04-26 RX ADMIN — KETOROLAC TROMETHAMINE 30 MG: 30 INJECTION, SOLUTION INTRAMUSCULAR at 14:34

## 2021-04-26 RX ADMIN — SODIUM CHLORIDE, PRESERVATIVE FREE 3 ML: 5 INJECTION INTRAVENOUS at 21:43

## 2021-04-26 RX ADMIN — FENTANYL CITRATE 25 MCG: 50 INJECTION INTRAMUSCULAR; INTRAVENOUS at 13:09

## 2021-04-26 RX ADMIN — CARVEDILOL 3.12 MG: 3.12 TABLET, FILM COATED ORAL at 18:00

## 2021-04-26 RX ADMIN — SODIUM CHLORIDE, POTASSIUM CHLORIDE, SODIUM LACTATE AND CALCIUM CHLORIDE 100 ML/HR: 600; 310; 30; 20 INJECTION, SOLUTION INTRAVENOUS at 21:41

## 2021-04-26 RX ADMIN — GLYCOPYRROLATE 0.2 MG: 0.2 INJECTION INTRAMUSCULAR; INTRAVENOUS at 12:37

## 2021-04-26 RX ADMIN — CEFAZOLIN SODIUM 2 G: 2 INJECTION, SOLUTION INTRAVENOUS at 21:43

## 2021-04-26 RX ADMIN — CEFAZOLIN 2 G: 225 INJECTION, POWDER, FOR SOLUTION INTRAMUSCULAR; INTRAVENOUS at 14:29

## 2021-04-26 RX ADMIN — FENTANYL CITRATE 50 MCG: 50 INJECTION INTRAMUSCULAR; INTRAVENOUS at 12:37

## 2021-04-26 RX ADMIN — ATORVASTATIN CALCIUM 40 MG: 20 TABLET, FILM COATED ORAL at 18:00

## 2021-04-26 RX ADMIN — LIDOCAINE HYDROCHLORIDE 80 MG: 20 INJECTION, SOLUTION INFILTRATION; PERINEURAL at 12:39

## 2021-04-26 RX ADMIN — EPHEDRINE SULFATE 5 MG: 50 INJECTION INTRAVENOUS at 14:12

## 2021-04-26 RX ADMIN — ASPIRIN 325 MG: 325 TABLET, COATED ORAL at 18:00

## 2021-04-26 RX ADMIN — PROPOFOL 120 MG: 10 INJECTION, EMULSION INTRAVENOUS at 12:39

## 2021-04-26 RX ADMIN — FENTANYL CITRATE 25 MCG: 50 INJECTION INTRAMUSCULAR; INTRAVENOUS at 13:58

## 2021-04-26 RX ADMIN — VANCOMYCIN HYDROCHLORIDE 1000 MG: 1 INJECTION, SOLUTION INTRAVENOUS at 09:02

## 2021-04-26 NOTE — OP NOTE
Orthopaedic Surgery   Revision right Total Knee Replacement  Dr. 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      Surgeon: Van Titus MD     Assistant: Poornima Abreu PA-C; note that as part of the surgical procedure, I utilized service of an assistant surgeon, specifically Poornima Abreu PA-C. Assistant surgeon participated in crucial portion of the operation. Use of the assistant surgeon greatly reduced overall operative time, thus significantly reducing overall morbidity for the patient.      Implants:    Implant Name Type Inv. Item Serial No.  Lot No. LRB No. Used Action   CMT BONE PALACOS R HI/VISC 1X40 - [id number] Implant CMT BONE PALACOS R HI/VISC 1X40  [employer] [id number] Right 1 Implanted   CMT BONE PALACOS R HI/VISC 1X40 - [id number] Implant CMT BONE PALACOS R HI/VISC 1X40  [employer] [id number] Right 1 Implanted   COMP FEM LEGION OXINIUM CNSTR SZ4 RT - [id number] Implant COMP FEM LEGION OXINIUM CNSTR SZ4 RT  [employer] [id number] Right 1 Implanted   WEDGE FEM/KN LEGION RK DIST SZ4 5MM - [id number] Implant WEDGE FEM/KN LEGION RK DIST SZ4 5MM  [employer] [id number] Right 1 Implanted   WEDGE FEM/KN LEGION RK DIST SZ4 5MM - [id number] Implant WEDGE FEM/KN LEGION RK DIST SZ4 5MM  [employer] [id number] Right 1 Implanted   STEM FEM/TIB KN LEGION/HK PF STR [id number] - [id number] Implant STEM FEM/TIB KN LEGION/HK PF STR [id number]  [employer]  [employer] [id number] Right 1 Implanted   INSRT TIB JOURNEY HF SZ3TO4 15MM - [id number] Implant INSRT TIB JOURNEY HF SZ3TO4 15MM  [employer] [id number] Right 1 Implanted       Implants utilized: I used the [employer] Legion system.  I previously had utilized a a size 3 tibial Journey 2 baseplate this was retained as well as the patella.  Size 4 Legion revision femur with a press-fit 18 x 160 mm stem and two 5 mm augments medially and laterally.    15 PS mm  polyethylene insert.      Tourniquet Time: Was 65 minutes.      Medications: Note that we gave 1 g IV tranexamic acid when the cement was mixed.      Estimated Blood Loss:   minimal    Specimens: * No orders in the log *     Complications: None.      Quality Measures: I have documented the patient's current medications including dosage, frequency, and route of administration in medical record today. Conservative alternatives were discussed with the patient as part of the decision-making process prior to the procedure. The patient was evaluated immediately preoperatively for cardiovascular and thromboembolic risk factors.  There are no acute risk factors.  Prophylactic IV antibiotics were administered before the tourniquet went up.      Description of Procedure: After prep and drape, Right  knee was approached via midline longitudinal anterior incision. Medial parapatellar arthrotomy was extended to the vastus medialis obliquus which was split.  Scant effusion was present.  No sign of infection.  Scar tissue was present throughout the knee in association with her arthrofibrosis.  This was sharply debrided.  I examined all 3 components for loosening.  Patellar implant was well fixed.  We remove scar tissue around it to make sure it tracked properly.  I checked the tibial implant next.  We had excellent bone cement interdigitation.  There is no sign of loosening.  There is no sign of implant debonding from the cement.    On examination of the femoral  implant, also found to be well fixed.  As had been determined preoperatively, her problem was arthrofibrosis and unsatisfactory flexion.  I used a small osteotome to free the femoral implant from the underlying bone stock.  Minimal damage to the underlying bone occurred as we explanted the femoral component.  I continued debride scar tissue throughout the procedure.  We reamed and prepared the femoral canal.  Intercondylar notch was resected for the #4 Legion implant.  Trial reduction was performed to determine augment sizes which will be required and to make sure the flexion and extension space was well balanced.  During this care was taken protect popliteal neurovascular ruptures and peroneal nerve.    Bony surfaces thoroughly cleaned and dried.  I injected posterior capsule medial lateral gutter with Exparel solution containing 20 cc Exparel, 25 cc quarter percent bupivacaine with epinephrine 20 cc saline.  Again the neurovascular structures were protected.  I cemented the Smith [employer] Nephew Legion #4 femoral component with 5 mm augments distally on the medial lateral side and a press-fit 18 x 160 mm stem.  Final trial reduction revealed a 15 mm PS polyethylene insert best mount stability range of motion.  Specifically noted she had full extension and over 140- 145 degrees of flexion with gravity pressure only.     Tourniquet was released; hemostasis obtained. Wound was closed in layers with #1 Vicryl on the capsule, 2-0 Vicryl in subcutaneous tissue, and zipline in the skin over a 1/8-inch drain. Note that I injected my capsule with my Exparel solution during closure.      Van Titus MD  4/26/2021  14:49 EDT

## 2021-04-26 NOTE — CASE MANAGEMENT/SOCIAL WORK
Continued Stay Note  [facility]     Patient Name: Debra S Sandifer  MRN: [id number]  Today's Date: 4/26/2021    Admit Date: 4/26/2021    Discharge Plan     Row Name 04/26/21 1619       Plan    Plan  Home with family support & [facility].    Patient/Family in Agreement with Plan  yes    Plan Comments  Spoke with the patient, verified current information and CCP role explained. Patient said she lives with /Van and has good family support. She plans to d/c home with family support & HH PT. CCP Consult for HH PT noted. Patient requests a referral to [facility] since she's worked with them in the past. Referral sent in Epic. Spoke with Nhi/Kenia who will look into the patient's case. Patient also said she plans for /Van to transport her home by car at d/c. No other needs identified.        Discharge Codes    No documentation.             Lupe Naranjo RN

## 2021-04-26 NOTE — ANESTHESIA POSTPROCEDURE EVALUATION
"Patient: Debra S Sandifer    Procedure Summary     Date: 04/26/21 Room / Location: [facility] OR [address] [place] [address] MAIN OR    Anesthesia Start: 1231 Anesthesia Stop: 1448    Procedure: RIGHT KNEE FEMORAL REVISION (Right Knee) Diagnosis:     Surgeons: Van Titus MD Provider: Jose L Weaver MD    Anesthesia Type: general ASA Status: 3          Anesthesia Type: general    Vitals  Vitals Value Taken Time   /78 04/26/21 1530   Temp 36.5 °C (97.7 °F) 04/26/21 1446   Pulse 56 04/26/21 1535   Resp 16 04/26/21 1515   SpO2 100 % 04/26/21 1535   Vitals shown include unvalidated device data.        Post Anesthesia Care and Evaluation    Patient location during evaluation: bedside  Patient participation: complete - patient participated  Level of consciousness: awake and alert  Pain management: adequate  Airway patency: patent  Anesthetic complications: No anesthetic complications    Cardiovascular status: acceptable  Respiratory status: acceptable  Hydration status: acceptable    Comments: /78   Pulse 53   Temp 36.5 °C (97.7 °F) (Oral)   Resp 16   Ht 165.1 cm (65\")   Wt 64.8 kg (142 lb 12.8 oz)   SpO2 100%   BMI 23.76 kg/m²       "

## 2021-04-26 NOTE — PERIOPERATIVE NURSING NOTE
Dr. Titus at bedside, notified pt cracked lower left back tooth yesterday and has small scratch under right knee. No new orders noted.

## 2021-04-26 NOTE — DISCHARGE PLACEMENT REQUEST
"Sandifer, Debra S (66 y.o. Female)     Date of Birth Social Security Number Address [place] Phone MRN    1955   [facility] [address] [phone] [id number]    Congregation Marital Status          Ep[employer]        Admission Date Admission Type Admitting Provider Attending Provider Department, Room/Bed    4/26/21 Elective Van Titus MD Sweet, Richard A, MD [address], P893/1    Discharge Date Discharge Disposition Discharge Destination                       Attending Provider: Van Titus MD    Allergies: Codeine, Oxycodone, Latex    Isolation: None   Infection: None   Code Status: CPR    Ht: 165.1 cm (65\")   Wt: 64.8 kg (142 lb 12.8 oz)    Admission Cmt: None   Principal Problem: None                Active Insurance as of 4/26/2021     Primary Coverage     Payor Plan Insurance Group Employer/Plan Group    MEDICARE MEDICARE A & B      Payor Plan Address Payor Plan Phone Number Payor Plan Fax Number Effective Dates    [address] [phone]  3/1/2021 - None Entered    [facility] [id number]       Subscriber Name Subscriber Birth Date Member ID       SANDIFER,DEBRA S 1955 [id number]                 Emergency Contacts      (Rel.) Home Phone Work Phone Mobile Phone    SandiferChristopher (Spouse) [phone] -- [phone]              "

## 2021-04-26 NOTE — ANESTHESIA PREPROCEDURE EVALUATION
Anesthesia Evaluation     Patient summary reviewed and Nursing notes reviewed   NPO Solid Status: > 8 hours             Airway   Mallampati: II  TM distance: >3 FB  Neck ROM: full  no difficulty expected  Dental - normal exam     Pulmonary - normal exam   (+) a smoker Former,   Cardiovascular - normal exam    (+) hypertension, past MI  >12 months, CAD, cardiac stents hyperlipidemia,       Neuro/Psych  GI/Hepatic/Renal/Endo      Musculoskeletal     Abdominal  - normal exam   Substance History      OB/GYN          Other   arthritis,    history of cancer remission                    Anesthesia Plan    ASA 3     general     intravenous induction     Anesthetic plan, all risks, benefits, and alternatives have been provided, discussed and informed consent has been obtained with: patient.

## 2021-04-26 NOTE — ANESTHESIA PROCEDURE NOTES
Airway  Urgency: elective    Date/Time: 4/26/2021 12:43 PM  Airway not difficult    General Information and Staff    Patient location during procedure: OR  Anesthesiologist: Jose L Weaver MD  CRNA: Clay Tierney CRNA    Indications and Patient Condition  Indications for airway management: airway protection    Preoxygenated: yes  Mask difficulty assessment: 1 - vent by mask    Final Airway Details  Final airway type: endotracheal airway      Successful airway: ETT  Cuffed: yes   Successful intubation technique: direct laryngoscopy  Endotracheal tube insertion site: oral  Blade: Parag  Blade size: 4  ETT size (mm): 7.0  Cormack-Lehane Classification: grade I - full view of glottis  Placement verified by: chest auscultation and capnometry   Inital cuff pressure (cm H2O): 22  Cuff volume (mL): 7  Measured from: lips  ETT/EBT  to lips (cm): 21  Number of attempts at approach: 1

## 2021-04-27 VITALS
TEMPERATURE: 97.2 F | BODY MASS INDEX: 23.79 KG/M2 | WEIGHT: 142.8 LBS | SYSTOLIC BLOOD PRESSURE: 97 MMHG | RESPIRATION RATE: 16 BRPM | HEIGHT: 65 IN | HEART RATE: 57 BPM | DIASTOLIC BLOOD PRESSURE: 52 MMHG | OXYGEN SATURATION: 98 %

## 2021-04-27 LAB
ANION GAP SERPL CALCULATED.3IONS-SCNC: 7.2 MMOL/L (ref 5–15)
BUN SERPL-MCNC: 12 MG/DL (ref 8–23)
BUN/CREAT SERPL: 15.6 (ref 7–25)
CALCIUM SPEC-SCNC: 8.6 MG/DL (ref 8.6–10.5)
CHLORIDE SERPL-SCNC: 107 MMOL/L (ref 98–107)
CO2 SERPL-SCNC: 25.8 MMOL/L (ref 22–29)
CREAT SERPL-MCNC: 0.77 MG/DL (ref 0.57–1)
GFR SERPL CREATININE-BSD FRML MDRD: 75 ML/MIN/1.73
GLUCOSE BLDC GLUCOMTR-MCNC: 107 MG/DL (ref 70–130)
GLUCOSE SERPL-MCNC: 105 MG/DL (ref 65–99)
HCT VFR BLD AUTO: 29.8 % (ref 34–46.6)
HGB BLD-MCNC: 9.8 G/DL (ref 12–15.9)
POTASSIUM SERPL-SCNC: 4.9 MMOL/L (ref 3.5–5.2)
SODIUM SERPL-SCNC: 140 MMOL/L (ref 136–145)

## 2021-04-27 PROCEDURE — 97161 PT EVAL LOW COMPLEX 20 MIN: CPT

## 2021-04-27 PROCEDURE — 85018 HEMOGLOBIN: CPT | Performed by: ORTHOPAEDIC SURGERY

## 2021-04-27 PROCEDURE — 80048 BASIC METABOLIC PNL TOTAL CA: CPT | Performed by: ORTHOPAEDIC SURGERY

## 2021-04-27 PROCEDURE — 82962 GLUCOSE BLOOD TEST: CPT

## 2021-04-27 PROCEDURE — 85014 HEMATOCRIT: CPT | Performed by: ORTHOPAEDIC SURGERY

## 2021-04-27 PROCEDURE — 25010000003 CEFAZOLIN IN DEXTROSE 2-4 GM/100ML-% SOLUTION: Performed by: ORTHOPAEDIC SURGERY

## 2021-04-27 PROCEDURE — 97110 THERAPEUTIC EXERCISES: CPT

## 2021-04-27 RX ORDER — HYDROCODONE BITARTRATE AND ACETAMINOPHEN 7.5; 325 MG/1; MG/1
1-2 TABLET ORAL EVERY 4 HOURS PRN
Qty: 60 TABLET | Refills: 0 | Status: SHIPPED | OUTPATIENT
Start: 2021-04-27 | End: 2021-05-02 | Stop reason: HOSPADM

## 2021-04-27 RX ORDER — ASPIRIN 325 MG
325 TABLET, DELAYED RELEASE (ENTERIC COATED) ORAL DAILY
Qty: 42 TABLET | Refills: 0 | Status: SHIPPED | OUTPATIENT
Start: 2021-04-27 | End: 2021-06-08

## 2021-04-27 RX ADMIN — HYDROCODONE BITARTRATE AND ACETAMINOPHEN 1 TABLET: 7.5; 325 TABLET ORAL at 01:36

## 2021-04-27 RX ADMIN — FERROUS SULFATE TAB 325 MG (65 MG ELEMENTAL FE) 325 MG: 325 (65 FE) TAB at 09:12

## 2021-04-27 RX ADMIN — HYDROCODONE BITARTRATE AND ACETAMINOPHEN 1 TABLET: 7.5; 325 TABLET ORAL at 05:37

## 2021-04-27 RX ADMIN — CARVEDILOL 3.12 MG: 3.12 TABLET, FILM COATED ORAL at 05:37

## 2021-04-27 RX ADMIN — CEFAZOLIN SODIUM 2 G: 2 INJECTION, SOLUTION INTRAVENOUS at 05:38

## 2021-04-27 RX ADMIN — HYDROCODONE BITARTRATE AND ACETAMINOPHEN 2 TABLET: 7.5; 325 TABLET ORAL at 11:01

## 2021-04-27 RX ADMIN — ASPIRIN 325 MG: 325 TABLET, COATED ORAL at 09:12

## 2021-04-27 RX ADMIN — SODIUM CHLORIDE, PRESERVATIVE FREE 3 ML: 5 INJECTION INTRAVENOUS at 09:34

## 2021-04-27 NOTE — CASE MANAGEMENT/SOCIAL WORK
Continued Stay Note  [facility]     Patient Name: Debra S Sandifer  MRN: [id number]  Today's Date: 4/27/2021    Admit Date: 4/26/2021    Discharge Plan     Row Name 04/27/21 1049       Plan    Plan  Home with family support & [facility].    Patient/Family in Agreement with Plan  yes    Plan Comments  Physical Therapy eval/rec noted. Spoke with the patient who still plans to d/c home with family support & [facility]. Patient also said she plans for her /Van to transport her home by car at d/c. No other needs identified. CCP following.    Final Discharge Disposition Code  01 - home or self-care    Final Note  Patient's being discharged home with family support & [facility]. /Van to transport.        Discharge Codes    No documentation.       Expected Discharge Date and Time     Expected Discharge Date Expected Discharge Time    Apr 27, 2021             Lupe Naranjo RN

## 2021-04-27 NOTE — PLAN OF CARE
Goal Outcome Evaluation:  Plan of Care Reviewed With: patient  Progress: improving  Outcome Summary: Pt doing well this am and agreeable to PT. SHe is s/p R knee revision and currently presents with expected post op pain, weakness, and decreased mobility. Pt does plan to DC home later today. She is moving quite well at this time. SHe was able to ambulate over 200 ft with Rwx, negotiate steps without difficulty, and tolerate all knee exercises well. Pt with no further questions at this time. She is safe to DC home from PT standpoint.  Patient was intermittently wearing a face mask during this therapy encounter. Therapist used appropriate personal protective equipment including eye protection, mask, and gloves.  Mask used was standard procedure mask. Appropriate PPE was worn during the entire therapy session. Hand hygiene was completed before and after therapy session. Patient is not in enhanced droplet precautions.

## 2021-04-27 NOTE — DISCHARGE SUMMARY
Discharge Summary   Van Titus M.D.    NAME: Debra S Sandifer ADMIT: 2021   : 1955  PCP: Alisa Morales MD    MRN: [id number] LOS: 1 days   AGE/SEX: 66 y.o. female  ROOM: P893/1       Date of Discharge: 2021    Primary Discharge Diagnosis: Arthrofibrosis right total knee replacement    Secondary Discharge Diagnosis:    Problems Addressed this Visit        Musculoskeletal and Injuries    DJD (degenerative joint disease) of knee - Primary    Relevant Medications    HYDROcodone-acetaminophen (NORCO) 7.5-325 MG per tablet      Diagnoses       Codes Comments    Primary osteoarthritis of right knee    -  Primary ICD-10-CM: M17.11  ICD-9-CM: 715.16           Procedures Performed: Revision right Total Knee Arthroplasty    Hospital Course:    Debra S Sandifer is a 66 y.o.  female who underwent successful revision right Total Knee Arthroplasty on 2021.  Debra S Sandifer was started on Aspirin 325mg po daily immediately post-operatively for DVT prophylaxis.  On post-op day 1 the patients dressing was changed, drain removed and their incision was clean, with no signs of infection and their calf was soft, with no signs of DVT.  She did have some bloody drainage.  Dressing was changed.  The patient progressed well with physical therapy and the patients hemoglobin remained stable. On post-operative day 1 the patient was felt ready for discharge.      Total Knee Joint Replacement Discharge Instructions:    I. ACTIVITIES:    1. Exercises:  ? Complete exercise program as taught by the hospital physical therapist 2 times per day  ? Exercise program will be advanced by the physical therapist  ? During the day be up ambulating every 2 hours (while awake) for short distances  ? Complete the ankle pump exercises at least 10 times per hour (while awake)  ? Elevate legs most of the day the first week post operatively and thereafter elevate legs when in bed and for at least 30 minutes during the day.  Caution must be taken to avoid pillow placement under the bend of the knee as this can led to flexion contractures of the knee.  ? Use cold packs 20-30 minutes approximately 5 times per day. This should be done before and after completing your exercises and at any time you are experiencing pain/ stiffness in your operative extremity.    2. Activities of Daily Living:  ? No tub baths, hot tubs, or swimming pools for 4 weeks  ? May shower and let water run over the incision on post-operative day #7 if no drainage. Do not scrub or rub the incision. Simply let the water run over the incision and pat dry.    II. Precautions:    ? Everyone that comes near you should wash their hands  ? No elective dental, genital-urinary, or colon procedures or surgical procedures for 12 weeks after surgery unless absolutely necessary.  ?  If dental work or surgical procedure is deemed absolutely necessary during the first 12 weeks, you will need to contact your surgeon as you will need to take antibiotics 1 hour prior to any dental work (including teeth cleanings).  ? Please discuss with your surgeon prophylactic antibiotics as the length of time this intervention will be necessary for you varies with each patient’s health history and situation.  ? Avoid sick people. If you must be around someone who is ill, they should wear a mask.  ? Avoid visits to the Emergency Room or Urgent Care unless you are having a life threatening event.     III. INCISION CARE:    ? Wash your hands prior to dressing changes  ? Change the dressing as needed to keep incision clean and dry. Utilize dry gauze and paper tape. Avoid touching the side of the gauze that goes against the incision with your hands.  ? No creams or ointments to the incision  ? May remove dressing once the incision is free of drainage  ? Do not touch or pick at the incision  ? Check incision every day and notify surgeon immediately if any of the following signs or symptoms are  noted:  o Increase in redness  o Increase in swelling around the incision and of the entire extremity  o Increase in pain  o Drainage oozing from the incision  o Pulling apart of the edges of the incision  o Increase in overall body temperature (greater than 100.5 degrees)  ? Your surgeon will instruct you regarding suture or staple removal    IV. Medications:     1. Anticoagulants: You will be discharged on an anticoagulant. This is a prophylactic medication that helps prevent blood clots during your post-operative period. The type and length of dosage varies based on your individual needs, procedure performed, and surgeon’s preference.    ? While taking the anticoagulant, you should avoid taking any additional aspirin, ibuprofen (Advil or Motrin), Aleve (Naprosyn) or other non-steroidal anti-inflammatory medications.   ? Notify surgeon immediately if any julia bleeding is noted in the urine, stool, emesis, or from the nose or the incision. Blood in the stool will often appear as black rather than red. Blood in urine may appear as pink. Blood in emesis may appear as brown/black like coffee grounds.  ? You will need to apply pressure for longer periods of time to any cuts or abrasions to stop bleeding  ? Avoid alcohol while taking anticoagulants    2. Stool Softeners: You will be at greater risk of constipation after surgery due to being less mobile and the pain medications.   ? Take stool softeners as instructed by your surgeon while on pain medications. Bran cereal is most effective. Over the counter Colace 100 mg 1-2 capsules twice daily.   ? Drink plenty of fluids, and eat fruits and vegetables during your recovery time    3. Pain Medications utilized after surgery are narcotics and the law requires that the following information be given to all patients that are prescribed narcotics:  ? CLASSIFICATION: Pain medications are called Opioids and are narcotics  ? LEGALITIES: It is illegal to share narcotics with  others and to drive within 24 hours of taking narcotics  ? POTENTIAL SIDE EFFECTS: Potential side effects of opioids include: nausea, vomiting, itching, dizziness, drowsiness, dry mouth, constipation, and difficulty urinating.  ? POTENTIAL ADVERSE EFFECTS:   o Opioid tolerance can develop with use of pain medications and this simply means that it requires more and more of the medication to control pain; however, this is seen more in patients that use opioids for longer periods of time.  o Opioid dependence can develop with use of Opioids and this simply means that to stop the medication can cause withdrawal symptoms; however, this is seen with patients that use Opioids for longer periods of time.  o Opioid addiction can develop with use of Opioids and the incidence of this is very unlikely in patients who take the medications as ordered and stop the medications as instructed.  o Opioid overdose can be dangerous, but is unlikely when the medication is taken as ordered and stopped when ordered. It is important not to mix opioids with alcohol or with and type of sedative such as Benadryl as this can lead to over sedation and respiratory difficulty.  ? DOSAGE:   o Pain medications will need to be taken consistently for the first week to decrease pain and promote adequate pain relief and participation in physical therapy.  o After the initial surgical pain begins to resolve, you may begin to decrease the pain medication. By the end of 6-8 weeks, you should be off of pain medications.  o Refills will not be given by the office during evening hours, on weekends, or after 6-8 weeks post-op.  o To seek refills on pain medications during the initial 6 week post-operative period, you must call the office 48 hours in advance to request the refill. The office will then notify you when to  the prescription. DO NOT wait until you are out of the medication to request a refill.    V. FOLLOW-UP VISITS:  ? You will need to  follow up in the office with your surgeon in 3 weeks. Please call this number [phone] to schedule this appointment.  If you have any concerns or suspected complications prior to your follow up visit, please call your surgeons office. Do not wait until your appointment time if you suspect complications. These will need to be addressed in the office promptly.    Final diagnosis:  Arthrofibrosis right total knee replacement.  She underwent revision TKR this admission      Discharge Medications:     1) Norco 7.5/325 1-2 po q 4-6 hours for pain control  2)  Aspirin 325 mg po daily for 6 weeks.      Van Titus MD  4/27/2021  06:57 EDT

## 2021-04-27 NOTE — PROGRESS NOTES
"  Orthopaedic Surgery   Daily Progress Note  Dr. Van Titus   [phone]  DEMOGRAPHICS:   · Debra S Sandifer   · Age:66 y.o.   · MRN:[id number]  · Admitted: 4/26/2021    PROCEDURE: 1 Day Post-Op s/p Procedure(s):  RIGHT KNEE FEMORAL REVISION     /64   Pulse 57   Temp 97.1 °F (36.2 °C) (Skin)   Resp 16   Ht 165.1 cm (65\")   Wt 64.8 kg (142 lb 12.8 oz)   SpO2 97%   BMI 23.76 kg/m²     Lab Results (last 24 hours)     Procedure Component Value Units Date/Time    Basic Metabolic Panel [[id number]]  (Abnormal) Collected: 04/27/21 0505    Specimen: Blood Updated: 04/27/21 0631     Glucose 105 mg/dL      BUN 12 mg/dL      Creatinine 0.77 mg/dL      Sodium 140 mmol/L      Potassium 4.9 mmol/L      Chloride 107 mmol/L      CO2 25.8 mmol/L      Calcium 8.6 mg/dL      eGFR Non African Amer 75 mL/min/1.73      BUN/Creatinine Ratio 15.6     Anion Gap 7.2 mmol/L     Narrative:      GFR Normal >60  Chronic Kidney Disease <60  Kidney Failure <15      POC Glucose Once [[id number]]  (Normal) Collected: 04/27/21 0627    Specimen: Blood Updated: 04/27/21 0630     Glucose 107 mg/dL     Hemoglobin & Hematocrit, Blood [[id number]]  (Abnormal) Collected: 04/27/21 0505    Specimen: Blood Updated: 04/27/21 0604     Hemoglobin 9.8 g/dL      Hematocrit 29.8 %     POC Glucose Once [[id number]]  (Abnormal) Collected: 04/26/21 2051    Specimen: Blood Updated: 04/26/21 2053     Glucose 160 mg/dL           Imaging Results (Last 24 Hours)     Procedure Component Value Units Date/Time    XR Knee 1 or 2 View Right [[id number]] Collected: 04/26/21 1617     Updated: 04/26/21 1620    Narrative:      TWO-VIEW PORTABLE RIGHT KNEE     HISTORY: Knee revision surgery.     FINDINGS: The patient has had recent knee revision surgery with  replacement of the femoral prosthesis and the alignment appears  satisfactory.     This report was finalized on 4/26/2021 4:17 PM by Dr. Manuel French M.D.             Patient Care Team:  Alisa Morales MD " as PCP - General  Aminata Estrella MD as Surgeon (Breast Surgery)  Mechelle Garcia MD (Inactive) as Consulting Physician (Hematology and Oncology)  Sheila Wahl MD as Consulting Physician (Radiation Oncology)  Aminata Estrella MD as Referring Physician (Breast Surgery)  Andrea Akhtar MD as Consulting Physician (Hematology and Oncology)  Sanjiv Dykes MD as Consulting Physician (Cardiology)    SUBJECTIVE  Comfortable    PHYSICAL EXAM  Wound looks good  She has had some bloody drainage  Neurovascular intact       ASSESSMENT: Patient is a 66 y.o. female who is 1 Day Post-Op s/p Procedure(s):  RIGHT KNEE FEMORAL REVISION     PLAN / DISPOSITION:  Aspirin for DVT prevention  Discharge today after therapy    Van Titus Sr, MD  Orthopaedic Surgery  [facility]  [phone]

## 2021-04-27 NOTE — PLAN OF CARE
Goal Outcome Evaluation:  Plan of Care Reviewed With: patient  Progress: improving  Outcome Summary: vss, ambulating well with walker, dressing reinforced at bedtime, will remove dressing and hemovac in am, pain controlled with [place], possible DC today, will continue to monitor

## 2021-04-27 NOTE — THERAPY EVALUATION
Patient Name: Debra S Sandifer  : 1955    MRN: [id number]                              Today's Date: 2021       Admit Date: 2021    Visit Dx:     ICD-10-CM ICD-9-CM   1. Primary osteoarthritis of right knee  M17.11 715.16     Patient Active Problem List   Diagnosis   • Mixed hyperlipidemia   • Malignant neoplasm of upper-inner quadrant of left breast in female, estrogen receptor positive (CMS/HCC)   • Coronary artery disease involving native coronary artery   • Prediabetes   • History of coronary artery stent placement   • DJD (degenerative joint disease) of knee     Past Medical History:   Diagnosis Date   • Allergic codeine/latex   • Arthralgia of both hands    • Arthritis     hand   • Atypical chest pain    • Breast cancer (CMS/HCC)     Left   • Coronary artery disease involving native coronary artery 2016    has stents   • Dyspareunia    • H/O bronchitis    • H/O infectious mononucleosis     COLLEGE AGE   • Hemorrhoid    • High cholesterol    • History of medical problems back surgery      repair herniated disc   • Hot flashes, menopausal    • Hx of radiation therapy     2016   • Hyperlipidemia    • Hypertension    • Myocardial infarction (CMS/HCC) 2016    2 stents   • Polyp of sigmoid colon    • Right knee pain    • Stye     LEFT EYE   • Urinary tract infection periodically   • Vitamin D deficiency      Past Surgical History:   Procedure Laterality Date   • BACK SURGERY  2005    Herniated Disk repair (Done by Dr Jurgen Reddy)   • BREAST BIOPSY Left    • BREAST LUMPECTOMY Left     also biopsied and resected lymph nodes   • BREAST LUMPECTOMY WITH SENTINEL NODE BIOPSY Left 2016    Procedure: LEFT BREAST MAMMO NEEDLE LOC LUMPECTOMY WITH LEFT AXILLA SENTINEL NODE BIOPSY;  Surgeon: Aminata Estrella MD;  Location: [facility];  Service:    • CARDIAC CATHETERIZATION N/A 11/15/2016    Procedure: Left Heart Cath;  Surgeon: Sanjiv Dykes MD;  Location:  [facility] CATH INVASIVE LOCATION;  Service:    • CARDIAC CATHETERIZATION N/A 11/15/2016    Procedure: Left ventriculography;  Surgeon: Sanjiv Dykes MD;  Location: [facility] CATH INVASIVE LOCATION;  Service:    • CARDIAC SURGERY  11/15/2016   • COLONOSCOPY  2014   • CORONARY ANGIOPLASTY     • CORONARY STENT PLACEMENT  11/15/16    two   • ENDOSCOPY N/A 11/11/2016    Procedure: ESOPHAGOGASTRODUODENOSCOPY WITH BIOPSY;  Surgeon: Mehdi Guardado MD;  Location: [facility] ENDOSCOPY;  Service:    • JOINT MANIPULATION Right 10/28/2019    Procedure: RIGHT KNEE MANIPULATION;  Surgeon: Van Titus MD;  Location: [facility] MAIN OR;  Service: Orthopedics   • LUMBAR DISCECTOMY     • LYMPH NODE BIOPSY  4/13/16    two   • TOTAL KNEE ARTHROPLASTY Right 9/26/2019    Procedure: TOTAL KNEE ARTHROPLASTY;  Surgeon: Van Titus MD;  Location: [facility] MAIN OR;  Service: Orthopedics   • WISDOM TOOTH EXTRACTION       General Information     Row Name 04/27/21 0841          Physical Therapy Time and Intention    Document Type  evaluation;discharge evaluation/summary  -     Mode of Treatment  physical therapy  -EJ     Row Name 04/27/21 0841          General Information    Patient Profile Reviewed  yes  -EJ     Prior Level of Function  independent:;ADL's;all household mobility;community mobility  -EJ     Existing Precautions/Restrictions  no known precautions/restrictions  -EJ     Barriers to Rehab  none identified  -EJ     Row Name 04/27/21 0841          Living Environment    Lives With  spouse  -EJ     Row Name 04/27/21 0841          Home Main Entrance    Number of Stairs, Main Entrance  five  -EJ     Row Name 04/27/21 0841          Cognition    Orientation Status (Cognition)  oriented x 4  -EJ     Row Name 04/27/21 0841          Safety Issues, Functional Mobility    Impairments Affecting Function (Mobility)  pain;strength;range of motion (ROM)  -EJ       User Key  (r) = Recorded By, (t) = Taken By, (c) = Cosigned By    Initials Name  Provider Type    EJ Teena Judd, PT Physical Therapist        Mobility     Row Name 04/27/21 0841          Bed Mobility    Bed Mobility  supine-sit  -EJ     Supine-Sit [place] (Bed Mobility)  supervision  -     Row Name 04/27/21 0841          Sit-Stand Transfer    Sit-Stand [place] (Transfers)  verbal cues;standby assist  -EJ     Assistive Device (Sit-Stand Transfers)  walker, front-wheeled  -EJ     Row Name 04/27/21 0841          Gait/Stairs (Locomotion)    [place] Level (Gait)  verbal cues;standby assist;contact guard  -EJ     Assistive Device (Gait)  walker, front-wheeled  -EJ     Distance in Feet (Gait)  200  -EJ     Deviations/Abnormal Patterns (Gait)  bailey decreased  -EJ     Bilateral Gait Deviations  forward flexed posture  -EJ     [place] Level (Stairs)  verbal cues;contact guard;stand by assist  -EJ     Handrail Location (Stairs)  right side (ascending)  -EJ     Number of Steps (Stairs)  4  -EJ     Ascending Technique (Stairs)  step-to-step  -EJ     Descending Technique (Stairs)  step-to-step  -EJ     Row Name 04/27/21 0841          Mobility    Extremity Weight-bearing Status  right lower extremity  -EJ     Right Lower Extremity (Weight-bearing Status)  weight-bearing as tolerated (WBAT)  -EJ       User Key  (r) = Recorded By, (t) = Taken By, (c) = Cosigned By    Initials Name Provider Type    Teena Arteaga, PT Physical Therapist        Obj/Interventions     Row Name 04/27/21 0841          Range of Motion Comprehensive    General Range of Motion  no range of motion deficits identified  -EJ     Comment, General Range of Motion  x  R Knee  -[facility] Name 04/27/21 0841          Strength Comprehensive (MMT)    Comment, General Manual Muscle Testing (MMT) Assessment  post op weakness  -[facility] Name 04/27/21 0841          Motor Skills    Therapeutic Exercise  -- R TKR protocol x 10 reps  -EJ       User Key  (r) = Recorded By, (t) = Taken By, (c) = Cosigned By     Initials Name Provider Type    Teena Arteaga, PT Physical Therapist        Goals/Plan    No documentation.       Clinical Impression     Row Name 04/27/21 0842          Pain    Additional Documentation  Pain Scale: Numbers Pre/Post-Treatment (Group)  -[facility] Name 04/27/21 0842          Pain Scale: Numbers Pre/Post-Treatment    Pretreatment Pain Rating  5/10  -EJ     Posttreatment Pain Rating  5/10  -EJ     Pain Location - Side  Right  -EJ     Pain Location  knee  -EJ     Pain Intervention(s)  Repositioned;Ambulation/increased activity  -EJ     Row Name 04/27/21 0842          Plan of Care Review    Plan of Care Reviewed With  patient  -EJ     Progress  improving  -EJ     Outcome Summary  Pt doing well this am and agreeable to PT. SHe is s/p R knee revision and currently presents with expected post op pain, weakness, and decreased mobility. Pt does plan to DC home later today. She is moving quite well at this time. SHe was able to ambulate over 200 ft with Rwx, negotiate steps without difficulty, and tolerate all knee exercises well. Pt with no further questions at this time. She is safe to DC home from PT standpoint.  -     Row Name 04/27/21 0842          Therapy Assessment/Plan (PT)    Patient/Family Therapy Goals Statement (PT)  go home  -[facility] Name 04/27/21 0842          Positioning and Restraints    Pre-Treatment Position  in bed  -EJ     Post Treatment Position  chair  -EJ     In Chair  notified nsg;reclined;call light within reach;encouraged to call for assist;exit alarm on  -EJ       User Key  (r) = Recorded By, (t) = Taken By, (c) = Cosigned By    Initials Name Provider Type    Teena Arteaga, PT Physical Therapist        Outcome Measures     Row Name 04/27/21 0844          How much help from another person do you currently need...    Turning from your back to your side while in flat bed without using bedrails?  4  -EJ     Moving from lying on back to sitting on the side of a flat  bed without bedrails?  4  -EJ     Moving to and from a bed to a chair (including a wheelchair)?  4  -EJ     Standing up from a chair using your arms (e.g., wheelchair, bedside chair)?  4  -EJ     Climbing 3-5 steps with a railing?  3  -EJ     To walk in hospital room?  3  -EJ     AM-PAC 6 Clicks Score (PT)  22  -EJ     Row Name 04/27/21 0844          Functional Assessment    Outcome Measure Options  AM-PAC 6 Clicks Basic Mobility (PT)  -EJ       User Key  (r) = Recorded By, (t) = Taken By, (c) = Cosigned By    Initials Name Provider Type    Teena Arteaga, PT Physical Therapist        Physical Therapy Education                 Title: PT OT SLP Therapies (Not Started)     Topic: Physical Therapy (Not Started)     Point: Mobility training (Not Started)     Learner Progress:  Not documented in this visit.          Point: Home exercise program (Not Started)     Learner Progress:  Not documented in this visit.          Point: Body mechanics (Not Started)     Learner Progress:  Not documented in this visit.          Point: Precautions (Not Started)     Learner Progress:  Not documented in this visit.                          PT Recommendation and Plan     Plan of Care Reviewed With: patient  Progress: improving  Outcome Summary: Pt doing well this am and agreeable to PT. SHe is s/p R knee revision and currently presents with expected post op pain, weakness, and decreased mobility. Pt does plan to DC home later today. She is moving quite well at this time. SHe was able to ambulate over 200 ft with Rwx, negotiate steps without difficulty, and tolerate all knee exercises well. Pt with no further questions at this time. She is safe to DC home from PT standpoint.     Time Calculation:   PT Charges     Row Name 04/27/21 0845             Time Calculation    Start Time  0811  -EJ      Stop Time  0839  -EJ      Time Calculation (min)  28 min  -EJ      PT Received On  04/27/21  -EJ         Time Calculation- PT    Total Timed  Code Minutes- PT  18 minute(s)  -NANCY        User Key  (r) = Recorded By, (t) = Taken By, (c) = Cosigned By    Initials Name Provider Type    Teena Arteaga, PT Physical Therapist        Therapy Charges for Today     Code Description Service Date Service Provider Modifiers Qty    11571318279  PT THER PROC EA 15 MIN 4/27/2021 Teena Judd, PT GP 1    [id number]  PT EVAL LOW COMPLEXITY 2 4/27/2021 Teena Judd, PT GP 1          PT G-Codes  Outcome Measure Options: AM-PAC 6 Clicks Basic Mobility (PT)  AM-PAC 6 Clicks Score (PT): 22    Teena Judd, PT  4/27/2021

## 2021-04-30 ENCOUNTER — HOSPITAL ENCOUNTER (OUTPATIENT)
Facility: HOSPITAL | Age: 66
Setting detail: OBSERVATION
LOS: 2 days | Discharge: HOME | End: 2021-05-02
Attending: ORTHOPAEDIC SURGERY | Admitting: ORTHOPAEDIC SURGERY

## 2021-04-30 LAB
CRP SERPL-MCNC: 8.04 MG/DL (ref 0–0.5)
DEPRECATED RDW RBC AUTO: 45.8 FL (ref 37–54)
ERYTHROCYTE [DISTWIDTH] IN BLOOD BY AUTOMATED COUNT: 13.2 % (ref 12.3–15.4)
ERYTHROCYTE [SEDIMENTATION RATE] IN BLOOD: 11 MM/HR (ref 0–30)
ERYTHROCYTE [SEDIMENTATION RATE] IN BLOOD: 18 MM/HR (ref 0–30)
HCT VFR BLD AUTO: 31.1 % (ref 34–46.6)
HGB BLD-MCNC: 9.8 G/DL (ref 12–15.9)
INR PPP: 1.13 (ref 0.9–1.1)
MCH RBC QN AUTO: 29.9 PG (ref 26.6–33)
MCHC RBC AUTO-ENTMCNC: 31.5 G/DL (ref 31.5–35.7)
MCV RBC AUTO: 94.8 FL (ref 79–97)
PLATELET # BLD AUTO: 197 10*3/MM3 (ref 140–450)
PMV BLD AUTO: 10.9 FL (ref 6–12)
PROTHROMBIN TIME: 14.3 SECONDS (ref 11.7–14.2)
RBC # BLD AUTO: 3.28 10*6/MM3 (ref 3.77–5.28)
WBC # BLD AUTO: 5.14 10*3/MM3 (ref 3.4–10.8)

## 2021-04-30 PROCEDURE — 85610 PROTHROMBIN TIME: CPT | Performed by: ORTHOPAEDIC SURGERY

## 2021-04-30 PROCEDURE — 85652 RBC SED RATE AUTOMATED: CPT | Performed by: ORTHOPAEDIC SURGERY

## 2021-04-30 PROCEDURE — 86140 C-REACTIVE PROTEIN: CPT | Performed by: ORTHOPAEDIC SURGERY

## 2021-04-30 PROCEDURE — 96365 THER/PROPH/DIAG IV INF INIT: CPT

## 2021-04-30 PROCEDURE — 85027 COMPLETE CBC AUTOMATED: CPT | Performed by: ORTHOPAEDIC SURGERY

## 2021-04-30 RX ORDER — HYDROCODONE BITARTRATE AND ACETAMINOPHEN 7.5; 325 MG/1; MG/1
1 TABLET ORAL EVERY 6 HOURS PRN
Status: DISCONTINUED | OUTPATIENT
Start: 2021-04-30 | End: 2021-04-30

## 2021-04-30 RX ORDER — HYDROCODONE BITARTRATE AND ACETAMINOPHEN 7.5; 325 MG/1; MG/1
2 TABLET ORAL EVERY 6 HOURS PRN
Status: DISCONTINUED | OUTPATIENT
Start: 2021-04-30 | End: 2021-05-02 | Stop reason: HOSPADM

## 2021-04-30 RX ORDER — TRAMADOL HYDROCHLORIDE 50 MG/1
50 TABLET ORAL EVERY 4 HOURS PRN
Status: DISCONTINUED | OUTPATIENT
Start: 2021-04-30 | End: 2021-05-02 | Stop reason: HOSPADM

## 2021-04-30 RX ORDER — ASPIRIN 325 MG
325 TABLET ORAL DAILY
Status: DISCONTINUED | OUTPATIENT
Start: 2021-05-01 | End: 2021-05-02 | Stop reason: HOSPADM

## 2021-04-30 RX ORDER — CARVEDILOL 3.12 MG/1
3.12 TABLET ORAL 2 TIMES DAILY
Status: DISCONTINUED | OUTPATIENT
Start: 2021-04-30 | End: 2021-05-02 | Stop reason: HOSPADM

## 2021-04-30 RX ADMIN — CARVEDILOL 3.12 MG: 3.12 TABLET, FILM COATED ORAL at 20:32

## 2021-04-30 RX ADMIN — TRAMADOL HYDROCHLORIDE 50 MG: 50 TABLET ORAL at 20:32

## 2021-04-30 RX ADMIN — TRAMADOL HYDROCHLORIDE 50 MG: 50 TABLET ORAL at 14:15

## 2021-04-30 RX ADMIN — TRANEXAMIC ACID 1000 MG: 100 INJECTION, SOLUTION INTRAVENOUS at 14:11

## 2021-05-01 PROCEDURE — 94799 UNLISTED PULMONARY SVC/PX: CPT

## 2021-05-01 RX ADMIN — CARVEDILOL 3.12 MG: 3.12 TABLET, FILM COATED ORAL at 20:08

## 2021-05-01 RX ADMIN — ASPIRIN 325 MG: 325 TABLET ORAL at 08:29

## 2021-05-01 RX ADMIN — TRANEXAMIC ACID 1000 MG: 100 INJECTION, SOLUTION INTRAVENOUS at 00:13

## 2021-05-01 RX ADMIN — TRAMADOL HYDROCHLORIDE 50 MG: 50 TABLET ORAL at 00:25

## 2021-05-01 RX ADMIN — TRAMADOL HYDROCHLORIDE 50 MG: 50 TABLET ORAL at 10:28

## 2021-05-01 RX ADMIN — TRAMADOL HYDROCHLORIDE 50 MG: 50 TABLET ORAL at 06:35

## 2021-05-01 RX ADMIN — CARVEDILOL 3.12 MG: 3.12 TABLET, FILM COATED ORAL at 08:29

## 2021-05-01 NOTE — PLAN OF CARE
Goal Outcome Evaluation:         R knee revision 4/26/2021, since DC pt was having increased drainage, admitted to Dr. Tacho Che, monitoring dressing, if drainage minimal Dr. Titus plans to place MARTI tomorrow, ultram x 1 for pain this shift, WBAT, up to chair, voiding per BRP, educated on the importance of BP monitoring related to hx of HTN

## 2021-05-01 NOTE — PROGRESS NOTES
"There was very minimal drainage noted and almost all of it was dry.  Plan will be for new dressing today and continue mobilization and recheck tomorrow.  If still minimal to no drainage, will likely use a vero wound VAC.  Most likely disposition is home on Monday as long as she progresses.    R \"Jamal\" Tacho LAWS MD  Orthopaedic Surgery  [facility]  [phone] - [place] Office  [phone] - [place] Office    "

## 2021-05-01 NOTE — PLAN OF CARE
Goal Outcome Evaluation:  Plan of Care Reviewed With: patient     Outcome Summary: pain under control with pain meds. vitals stable. Dressing dry and intact. voiding without difficulty. Neuro vascular checks intact. Kept NPO from midnight. Educated on blood pressure monitoring, verbalise sunderstanding. Will continue to monitor.

## 2021-05-02 ENCOUNTER — READMISSION MANAGEMENT (OUTPATIENT)
Dept: CALL CENTER | Facility: HOSPITAL | Age: 66
End: 2021-05-02

## 2021-05-02 VITALS
WEIGHT: 142.86 LBS | RESPIRATION RATE: 16 BRPM | SYSTOLIC BLOOD PRESSURE: 91 MMHG | DIASTOLIC BLOOD PRESSURE: 53 MMHG | BODY MASS INDEX: 23.8 KG/M2 | OXYGEN SATURATION: 98 % | TEMPERATURE: 97.1 F | HEIGHT: 65 IN | HEART RATE: 79 BPM

## 2021-05-02 PROCEDURE — G0378 HOSPITAL OBSERVATION PER HR: HCPCS

## 2021-05-02 RX ADMIN — ASPIRIN 325 MG: 325 TABLET ORAL at 08:43

## 2021-05-02 RX ADMIN — CARVEDILOL 3.12 MG: 3.12 TABLET, FILM COATED ORAL at 08:43

## 2021-05-02 NOTE — PLAN OF CARE
Goal Outcome Evaluation:  Plan of Care Reviewed With: patient  Progress: improving  Outcome Summary: The pt remains stable. A/O. No s/s distress. Pt admitted d/t bloody drainage s/p R knee revision on 04/26/21. No c/o pain at this time. Educated the importance of hand hygiene. Will continue to monitor.

## 2021-05-02 NOTE — PLAN OF CARE
Goal Outcome Evaluation:PT VSS, afebrile, wound dressing changed, steri strips applied along with MARTI DSG, light flashing green. Pt educated on monitoring blood  pressure d/t h/t HTN. All DC instructions reviewed with pt and spouse, all questions answered. Pt taken via WC to DC  exit with all belongings.

## 2021-05-02 NOTE — OUTREACH NOTE
Prep Survey      Responses   [facility] facility patient discharged from?  [place]   Is LACE score < 7 ?  No   Emergency Room discharge w/ pulse ox?  No   Eligibility  [facility]   Date of Admission  04/30/21   Date of Discharge  05/02/21   Discharge Disposition  Home or Self Care   Discharge diagnosis  admit for bloody drainage from right total knee replacement  done on 4/26   Does the patient have one of the following disease processes/diagnoses(primary or secondary)?  Total Joint Replacement   Does the patient have Home health ordered?  No   Is there a DME ordered?  Yes   What DME was ordered?  ? wound vac placed   Prep survey completed?  Yes          Ann Ricahrd RN

## 2021-05-02 NOTE — DISCHARGE SUMMARY
Orthopaedic Discharge Summary  Dr. MAGGY Rosario” [place]  [phone]    NAME: Debra S Sandifer PCP: Alisa Morales MD   :  MRN: [id number]  [id number] LOS:  ADMIT: 2 days  2021   AGE/SEX: 66 y.o. female DC:  today             · Admitting Diagnosis: DJD (degenerative joint disease) of knee [M17.10]    · Surgery Performed: No admission procedures for hospital encounter.    · Discharge Medications:         Discharge Medications      Continue These Medications      Instructions Start Date   acetaminophen 325 MG tablet  Commonly known as: TYLENOL   325 mg, Oral, Every 6 Hours PRN      aspirin  MG tablet   325 mg, Oral, Daily      atorvastatin 40 MG tablet  Commonly known as: LIPITOR   40 mg, Oral, Every Night at Bedtime      carvedilol 3.125 MG tablet  Commonly known as: COREG   3.125 mg, Oral, Every 12 Hours      cholecalciferol 25 MCG (1000 UT) tablet  Commonly known as: VITAMIN D3   1,000 Units, Oral, Every Morning      exemestane 25 MG chemo tablet  Commonly known as: AROMASIN   25 mg, Oral, Daily      PROBIOTIC DAILY PO   1 tablet, Oral, Every Morning         ASK your doctor about these medications      Instructions Start Date   HYDROcodone-acetaminophen 7.5-325 MG per tablet  Commonly known as: NORCO   1-2 tablets, Oral, Every 4 Hours PRN             · Vitals:     Vitals:    21 2300 21 0334 21 0759 21 1207   BP: 105/53 101/54 100/60 91/53   BP Location: Right arm Right arm Right arm Right arm   Patient Position: Lying Lying Lying Lying   Pulse: 77 86 79    Resp: 16 16 16 16   Temp: 99 °F (37.2 °C) 98.1 °F (36.7 °C) 97.5 °F (36.4 °C) 97.1 °F (36.2 °C)   TempSrc: Oral Oral Oral Oral   SpO2: 95% 97% 98%    Weight:       Height:           · Labs:      Admission on 2021   Component Date Value Ref Range Status   • Sed Rate 2021 18  0 - 30 mm/hr Final   • C-Reactive Protein 2021 8.04* 0.00 - 0.50 mg/dL Final   • WBC 2021 5.14  3.40 - 10.80 10*3/mm3 Final   •  "RBC 04/30/2021 3.28* 3.77 - 5.28 10*6/mm3 Final   • Hemoglobin 04/30/2021 9.8* 12.0 - 15.9 g/dL Final   • Hematocrit 04/30/2021 31.1* 34.0 - 46.6 % Final   • MCV 04/30/2021 94.8  79.0 - 97.0 fL Final   • MCH 04/30/2021 29.9  26.6 - 33.0 pg Final   • MCHC 04/30/2021 31.5  31.5 - 35.7 g/dL Final   • RDW 04/30/2021 13.2  12.3 - 15.4 % Final   • RDW-SD 04/30/2021 45.8  37.0 - 54.0 fl Final   • MPV 04/30/2021 10.9  6.0 - 12.0 fL Final   • Platelets 04/30/2021 197  140 - 450 10*3/mm3 Final   • Sed Rate 04/30/2021 11  0 - 30 mm/hr Final   • Protime 04/30/2021 14.3* 11.7 - 14.2 Seconds Final   • INR 04/30/2021 1.13* 0.90 - 1.10 Final        No results found.    · Hospital Course:   66 y.o. female was admitted to [facility] to services of Van Titus II, MD with DJD (degenerative joint disease) of knee [M17.10] on 4/30/2021 and underwent No admission procedures for hospital encounter.. Post-operatively the patient transferred to the floor where the patient underwent mobilization therapy. Opioids were titrated to achieve appropriate pain management to allow for participation in mobilization exercises. Vital signs and laboratory values are now within safe parameters for discharge. The dressings and/or incision is intact without signs or symptoms of active infection. Operative extremity neurovascular status remains intact as compared to the preoperative exam. Appropriate education re: incision care, activity levels, medications, and follow up visits was completed and all questions were answered. The patient is now deemed stable for discharge.    HOME: The patient progressed well with physical therapy. There were cleared for discharge to home. The patietn was sent home in good condition}.       R \"Jamal\" Tacho LAWS MD  Orthopaedic Surgery  [facility]  [phone]                                               "

## 2021-05-02 NOTE — PROGRESS NOTES
"Just a slight amount of drainage noted.  Steri-Strips to be redone and then vero wound VAC to be placed.  I will speak to my father who is the operative surgeon about whether or not he would like her to stay another night, the patient would like to go home if it would be safe but is very willing to stay if it is thought that would be prudent.    R \"Jamal\" Tacho LAWS MD  Orthopaedic Surgery  [facility]  [phone] - [place] Office  [phone] - [place] Office    "

## 2021-05-03 ENCOUNTER — TRANSITIONAL CARE MANAGEMENT TELEPHONE ENCOUNTER (OUTPATIENT)
Dept: CALL CENTER | Facility: HOSPITAL | Age: 66
End: 2021-05-03

## 2021-05-03 NOTE — OUTREACH NOTE
Call Center TCM Note      Responses   [facility] patient discharged from?  [place]   Does the patient have one of the following disease processes/diagnoses(primary or secondary)?  Total Joint Replacement   TCM attempt successful?  No [[place] on verbal release ]   Unsuccessful attempts  Attempt 1          Sharonda Mendoza RN    5/3/2021, 13:28 EDT

## 2021-05-03 NOTE — OUTREACH NOTE
Call Center TCM Note      Responses   [facility] patient discharged from?  [place]   Does the patient have one of the following disease processes/diagnoses(primary or secondary)?  Total Joint Replacement   TCM attempt successful?  No   Unsuccessful attempts  Attempt 2          Sharonda Mendoza RN    5/3/2021, 13:55 EDT

## 2021-05-04 ENCOUNTER — TRANSITIONAL CARE MANAGEMENT TELEPHONE ENCOUNTER (OUTPATIENT)
Dept: CALL CENTER | Facility: HOSPITAL | Age: 66
End: 2021-05-04

## 2021-05-04 NOTE — OUTREACH NOTE
Call Center TCM Note      Responses   [facility] patient discharged from?  [place]   Does the patient have one of the following disease processes/diagnoses(primary or secondary)?  Total Joint Replacement   Joint surgery performed?  Knee   TCM attempt successful?  No   Unsuccessful attempts  Attempt 3          Shelley Kat RN    5/4/2021, 08:35 EDT

## 2021-05-07 ENCOUNTER — OFFICE VISIT (OUTPATIENT)
Dept: INTERNAL MEDICINE | Facility: CLINIC | Age: 66
End: 2021-05-07

## 2021-05-07 VITALS
HEIGHT: 65 IN | WEIGHT: 143 LBS | HEART RATE: 90 BPM | DIASTOLIC BLOOD PRESSURE: 56 MMHG | SYSTOLIC BLOOD PRESSURE: 100 MMHG | BODY MASS INDEX: 23.82 KG/M2 | OXYGEN SATURATION: 98 %

## 2021-05-07 DIAGNOSIS — Z96.651 TOTAL KNEE REPLACEMENT STATUS, RIGHT: Primary | ICD-10-CM

## 2021-05-07 DIAGNOSIS — D64.9 ANEMIA, UNSPECIFIED TYPE: ICD-10-CM

## 2021-05-07 PROCEDURE — 99213 OFFICE O/P EST LOW 20 MIN: CPT | Performed by: INTERNAL MEDICINE

## 2021-05-07 RX ORDER — TRAMADOL HYDROCHLORIDE 50 MG/1
TABLET ORAL
COMMUNITY
Start: 2021-05-04 | End: 2021-05-07

## 2021-05-07 NOTE — PROGRESS NOTES
Subjective     Debra S Sandifer is a 66 y.o. female who presents with   Chief Complaint   Patient presents with   • Surgery Follow up       History of Present Illness     The following data was reviewed by: Alisa Morales MD on 05/07/2021:  CBC    CBC 4/13/21 4/27/21 4/30/21   WBC 5.49  5.14   RBC 4.27  3.28 (A)   Hemoglobin 13.0 9.8 (A) 9.8 (A)   Hematocrit 38.7 29.8 (A) 31.1 (A)   MCV 90.6  94.8   MCH 30.4  29.9   MCHC 33.6  31.5   RDW 13.2  13.2   Platelets 260  197   (A) Abnormal value            Patient presents in hospital f/u.  She was admitted for right knee replacement.  I have reviewed discharge summary, labs, scans, cardiovascular studies and medication changes.        Review of Systems   Constitutional: Negative for fever.   Respiratory: Negative.    Cardiovascular: Negative.        The following portions of the patient's history were reviewed and updated as appropriate: allergies, current medications and problem list.    Patient Active Problem List    Diagnosis Date Noted   • DJD (degenerative joint disease) of knee 09/26/2019   • History of coronary artery stent placement 06/18/2018   • Prediabetes 10/17/2017   • Coronary artery disease involving native coronary artery 11/23/2016     Note Last Updated: 11/23/2016 11/2016  PTCA of the first diagonal branch with a 2.5 x 12 mm trek Rx balloon.  PCI of the distal LAD with a 2.75 x 18 mm Xience Alpine drug-eluting stent  PCI of the mid LAD with a 3.0 x 28 mm Xience Alpine drug-eluting stent     • Malignant neoplasm of upper-inner quadrant of left breast in female, estrogen receptor positive (CMS/HCC) 04/27/2016     Note Last Updated: 10/14/2016     S/p lumpectomy and radiation in 2016     • Mixed hyperlipidemia 02/10/2016       Current Outpatient Medications on File Prior to Visit   Medication Sig Dispense Refill   • acetaminophen (TYLENOL) 325 MG tablet Take 325 mg by mouth Every 6 (Six) Hours As Needed for Mild Pain .     • aspirin  MG tablet  "Take 1 tablet by mouth Daily for 42 days. 42 tablet 0   • atorvastatin (LIPITOR) 40 MG tablet Take 1 tablet by mouth every night at bedtime. 90 tablet 4   • carvedilol (COREG) 3.125 MG tablet Take 1 tablet by mouth Every 12 (Twelve) Hours. 180 tablet 4   • cholecalciferol (VITAMIN D3) 1000 units tablet Take 1,000 Units by mouth Every Morning.     • exemestane (AROMASIN) 25 MG chemo tablet Take 1 tablet by mouth Daily. 90 tablet 1   • Probiotic Product (PROBIOTIC DAILY PO) Take 1 tablet by mouth Every Morning.     • [DISCONTINUED] traMADol (ULTRAM) 50 MG tablet        No current facility-administered medications on file prior to visit.       Objective     /56   Pulse 90   Ht 165.1 cm (65\")   Wt 64.9 kg (143 lb)   SpO2 98%   BMI 23.80 kg/m²     Physical Exam  Constitutional:       Appearance: She is well-developed.   HENT:      Head: Normocephalic and atraumatic.   Pulmonary:      Effort: Pulmonary effort is normal.   Neurological:      Mental Status: She is alert and oriented to person, place, and time.   Psychiatric:         Behavior: Behavior normal.         Assessment/Plan   Diagnoses and all orders for this visit:    1. Total knee replacement status, right (Primary)    2. Anemia, unspecified type  -     CBC & Differential        Discussion    F/u R TKR.  Overall doing well.  Check CBC in f/u to follow her anemia.         Future Appointments   Date Time Provider Department Center   5/17/2021  3:15 PM [facility] 2 BH [facility]   5/24/2021 10:10 [facility] [place]   5/24/2021 10:40 AM Andrea Akhtar MD [facility] CBC [facility] [place]   11/12/2021  8:10 AM [facility]   11/19/2021  7:45 AM Alisa Morales MD [facility]   1/13/2022  9:40 AM Sanjiv Dykes MD [facility] CD [facility]         Answers for HPI/ROS submitted by the patient on 4/30/2021  Please describe your symptoms.: Follow up from knee revision surgery on April 26.  Have you had " these symptoms before?: Yes  How long have you been having these symptoms?: Greater than 2 weeks  Please list any medications you are currently taking for this condition.: 325MG Aspirin, Tramadol  Please describe any probable cause for these symptoms. : Former knee replacement did not heal as expected.  What is the primary reason for your visit?: Other

## 2021-05-08 LAB
BASOPHILS # BLD AUTO: 0.04 10*3/MM3 (ref 0–0.2)
BASOPHILS NFR BLD AUTO: 0.7 % (ref 0–1.5)
EOSINOPHIL # BLD AUTO: 0.1 10*3/MM3 (ref 0–0.4)
EOSINOPHIL NFR BLD AUTO: 1.7 % (ref 0.3–6.2)
ERYTHROCYTE [DISTWIDTH] IN BLOOD BY AUTOMATED COUNT: 13.1 % (ref 12.3–15.4)
HCT VFR BLD AUTO: 32.3 % (ref 34–46.6)
HGB BLD-MCNC: 10.7 G/DL (ref 12–15.9)
IMM GRANULOCYTES # BLD AUTO: 0.05 10*3/MM3 (ref 0–0.05)
IMM GRANULOCYTES NFR BLD AUTO: 0.9 % (ref 0–0.5)
LYMPHOCYTES # BLD AUTO: 0.97 10*3/MM3 (ref 0.7–3.1)
LYMPHOCYTES NFR BLD AUTO: 16.6 % (ref 19.6–45.3)
MCH RBC QN AUTO: 29.6 PG (ref 26.6–33)
MCHC RBC AUTO-ENTMCNC: 33.1 G/DL (ref 31.5–35.7)
MCV RBC AUTO: 89.2 FL (ref 79–97)
MONOCYTES # BLD AUTO: 0.58 10*3/MM3 (ref 0.1–0.9)
MONOCYTES NFR BLD AUTO: 9.9 % (ref 5–12)
NEUTROPHILS # BLD AUTO: 4.11 10*3/MM3 (ref 1.7–7)
NEUTROPHILS NFR BLD AUTO: 70.2 % (ref 42.7–76)
NRBC BLD AUTO-RTO: 0 /100 WBC (ref 0–0.2)
PLATELET # BLD AUTO: 399 10*3/MM3 (ref 140–450)
RBC # BLD AUTO: 3.62 10*6/MM3 (ref 3.77–5.28)
WBC # BLD AUTO: 5.85 10*3/MM3 (ref 3.4–10.8)

## 2021-05-10 ENCOUNTER — READMISSION MANAGEMENT (OUTPATIENT)
Dept: CALL CENTER | Facility: HOSPITAL | Age: 66
End: 2021-05-10

## 2021-05-10 ENCOUNTER — TELEPHONE (OUTPATIENT)
Dept: INTERNAL MEDICINE | Facility: CLINIC | Age: 66
End: 2021-05-10

## 2021-05-10 NOTE — OUTREACH NOTE
Total Joint Week 2 Survey      Responses   [facility] patient discharged from?  [place]   Does the patient have one of the following disease processes/diagnoses(primary or secondary)?  Total Joint Replacement   Joint surgery performed?  Knee   Week 2 attempt successful?  Yes   Call start time  1843   Call end time  1854   Has the patient been back in either the hospital or Emergency Department since discharge?  No   Discharge diagnosis  admit for bloody drainage from right total knee replacement  done on 4/26   Does the patient have all medications related to this admission filled (includes all antibiotics, pain medications, etc.)  Yes   Is the patient taking all medications as directed (includes completed medication regime)?  Yes   Comments regarding appointments  Pt was seen by surgeon on 05/04/2021 and placed a basic dressing.   Does the patient have a follow up appointment with their surgeon?  Yes   Has the patient kept scheduled appointments due by today?  Yes   Comments  She will f/u with surgeon tomorrow.    What is the Home health agency?   [facility]-due to pt being readmitted, it was cancelled   Has home health visited the patient within 72 hours of discharge?  Yes   Has all DME been delivered?  Yes   Has the patient began therapy sessions (either in the home or as an out patient)?  Yes   If the patient has started attending therapy, what post op day did they begin to attend (either in home or as an out patient)?    started on May 10th per surgeon, started outpt PT   Does the patient have a wound vac in place?  Yes   Has the patient fallen since discharge?  No   Comments  Pt was readmitted due to bloody drainage.  She had a Susie dressing placed at this time.    Nursing interventions  Reviewed instructions with patient   What is the patient's perception of their functional status since discharge?  Improving   Is the patient able to teach back signs and symptoms of infection?  Temp >100.4 for 24h or  "longer, Incisional drainage, Increased swelling or redness around incision (not associated with surgical edema)   Is the patient able to teach back how to prevent infection?  Check incision daily, Wash hands before and after touching incision, Keep incision covered if drainage   Is the patient able to teach back signs and symptoms of DVT?  Redness in calf, Area hot to touch, Shortness of breath or chest pain, Swelling in calf, Severe pain in calf   Did the patient implement home safety suggestions from pre-surgery classes if attended?  Yes   If the patient is a current smoker, are they able to teach back resources for cessation?  Not a smoker   Is the patient/caregiver able to teach back the hierarchy of who to call/visit for symptoms/problems? PCP, Specialist, Home health nurse, Urgent Care, ED, 911  Yes   Week 2 call completed?  Yes   Wrap up additional comments  Pt reports she is \"on the mend\" at this time.  She denies needs.          Deisy Quinones RN  "

## 2021-05-10 NOTE — TELEPHONE ENCOUNTER
----- Message from Alisa Morales MD sent at 5/10/2021 12:35 PM EDT -----  Hemoglobin is improving.

## 2021-05-17 ENCOUNTER — HOSPITAL ENCOUNTER (OUTPATIENT)
Dept: MAMMOGRAPHY | Facility: HOSPITAL | Age: 66
Discharge: HOME | End: 2021-05-17
Admitting: INTERNAL MEDICINE

## 2021-05-17 DIAGNOSIS — Z79.811 LONG TERM (CURRENT) USE OF AROMATASE INHIBITORS: ICD-10-CM

## 2021-05-17 DIAGNOSIS — Z12.31 ENCOUNTER FOR SCREENING MAMMOGRAM FOR BREAST CANCER: ICD-10-CM

## 2021-05-17 DIAGNOSIS — Z17.0 MALIGNANT NEOPLASM OF UPPER-INNER QUADRANT OF LEFT BREAST IN FEMALE, ESTROGEN RECEPTOR POSITIVE (HCC): ICD-10-CM

## 2021-05-17 DIAGNOSIS — C50.212 MALIGNANT NEOPLASM OF UPPER-INNER QUADRANT OF LEFT BREAST IN FEMALE, ESTROGEN RECEPTOR POSITIVE (HCC): ICD-10-CM

## 2021-05-17 PROCEDURE — 77067 SCR MAMMO BI INCL CAD: CPT

## 2021-05-17 PROCEDURE — 77063 BREAST TOMOSYNTHESIS BI: CPT

## 2021-05-24 ENCOUNTER — LAB (OUTPATIENT)
Dept: OTHER | Facility: HOSPITAL | Age: 66
End: 2021-05-24

## 2021-05-24 ENCOUNTER — OFFICE VISIT (OUTPATIENT)
Dept: ONCOLOGY | Facility: CLINIC | Age: 66
End: 2021-05-24

## 2021-05-24 VITALS
DIASTOLIC BLOOD PRESSURE: 64 MMHG | SYSTOLIC BLOOD PRESSURE: 102 MMHG | RESPIRATION RATE: 16 BRPM | HEIGHT: 65 IN | TEMPERATURE: 96.8 F | WEIGHT: 135.7 LBS | OXYGEN SATURATION: 96 % | HEART RATE: 81 BPM | BODY MASS INDEX: 22.61 KG/M2

## 2021-05-24 DIAGNOSIS — Z17.0 MALIGNANT NEOPLASM OF UPPER-INNER QUADRANT OF LEFT BREAST IN FEMALE, ESTROGEN RECEPTOR POSITIVE (HCC): Primary | ICD-10-CM

## 2021-05-24 DIAGNOSIS — C50.212 MALIGNANT NEOPLASM OF UPPER-INNER QUADRANT OF LEFT BREAST IN FEMALE, ESTROGEN RECEPTOR POSITIVE (HCC): Primary | ICD-10-CM

## 2021-05-24 DIAGNOSIS — Z17.0 MALIGNANT NEOPLASM OF UPPER-INNER QUADRANT OF LEFT BREAST IN FEMALE, ESTROGEN RECEPTOR POSITIVE (HCC): ICD-10-CM

## 2021-05-24 DIAGNOSIS — C50.212 MALIGNANT NEOPLASM OF UPPER-INNER QUADRANT OF LEFT BREAST IN FEMALE, ESTROGEN RECEPTOR POSITIVE (HCC): ICD-10-CM

## 2021-05-24 PROBLEM — M85.859 OSTEOPENIA OF HIP: Status: ACTIVE | Noted: 2021-05-24

## 2021-05-24 LAB
ALBUMIN SERPL-MCNC: 4 G/DL (ref 3.5–5.2)
ALBUMIN/GLOB SERPL: 1.2 G/DL
ALP SERPL-CCNC: 125 U/L (ref 39–117)
ALT SERPL W P-5'-P-CCNC: 11 U/L (ref 1–33)
ANION GAP SERPL CALCULATED.3IONS-SCNC: 8 MMOL/L (ref 5–15)
AST SERPL-CCNC: 21 U/L (ref 1–32)
BASOPHILS # BLD AUTO: 0.04 10*3/MM3 (ref 0–0.2)
BASOPHILS NFR BLD AUTO: 0.8 % (ref 0–1.5)
BILIRUB SERPL-MCNC: 0.5 MG/DL (ref 0–1.2)
BUN SERPL-MCNC: 17 MG/DL (ref 8–23)
BUN/CREAT SERPL: 18.7 (ref 7–25)
CALCIUM SPEC-SCNC: 9.6 MG/DL (ref 8.6–10.5)
CHLORIDE SERPL-SCNC: 106 MMOL/L (ref 98–107)
CO2 SERPL-SCNC: 28 MMOL/L (ref 22–29)
CREAT SERPL-MCNC: 0.91 MG/DL (ref 0.57–1)
DEPRECATED RDW RBC AUTO: 44.9 FL (ref 37–54)
EOSINOPHIL # BLD AUTO: 0.18 10*3/MM3 (ref 0–0.4)
EOSINOPHIL NFR BLD AUTO: 3.5 % (ref 0.3–6.2)
ERYTHROCYTE [DISTWIDTH] IN BLOOD BY AUTOMATED COUNT: 13.6 % (ref 12.3–15.4)
GFR SERPL CREATININE-BSD FRML MDRD: 62 ML/MIN/1.73
GLOBULIN UR ELPH-MCNC: 3.4 GM/DL
GLUCOSE SERPL-MCNC: 101 MG/DL (ref 65–99)
HCT VFR BLD AUTO: 37.2 % (ref 34–46.6)
HGB BLD-MCNC: 11.7 G/DL (ref 12–15.9)
IMM GRANULOCYTES # BLD AUTO: 0.02 10*3/MM3 (ref 0–0.05)
IMM GRANULOCYTES NFR BLD AUTO: 0.4 % (ref 0–0.5)
LYMPHOCYTES # BLD AUTO: 1 10*3/MM3 (ref 0.7–3.1)
LYMPHOCYTES NFR BLD AUTO: 19.4 % (ref 19.6–45.3)
MCH RBC QN AUTO: 28.3 PG (ref 26.6–33)
MCHC RBC AUTO-ENTMCNC: 31.5 G/DL (ref 31.5–35.7)
MCV RBC AUTO: 89.9 FL (ref 79–97)
MONOCYTES # BLD AUTO: 0.56 10*3/MM3 (ref 0.1–0.9)
MONOCYTES NFR BLD AUTO: 10.9 % (ref 5–12)
NEUTROPHILS NFR BLD AUTO: 3.36 10*3/MM3 (ref 1.7–7)
NEUTROPHILS NFR BLD AUTO: 65 % (ref 42.7–76)
NRBC BLD AUTO-RTO: 0 /100 WBC (ref 0–0.2)
PLATELET # BLD AUTO: 330 10*3/MM3 (ref 140–450)
PMV BLD AUTO: 9.1 FL (ref 6–12)
POTASSIUM SERPL-SCNC: 4.8 MMOL/L (ref 3.5–5.2)
PROT SERPL-MCNC: 7.4 G/DL (ref 6–8.5)
RBC # BLD AUTO: 4.14 10*6/MM3 (ref 3.77–5.28)
SODIUM SERPL-SCNC: 142 MMOL/L (ref 136–145)
WBC # BLD AUTO: 5.16 10*3/MM3 (ref 3.4–10.8)

## 2021-05-24 PROCEDURE — 99214 OFFICE O/P EST MOD 30 MIN: CPT | Performed by: INTERNAL MEDICINE

## 2021-05-24 PROCEDURE — 36415 COLL VENOUS BLD VENIPUNCTURE: CPT

## 2021-05-24 PROCEDURE — 80053 COMPREHEN METABOLIC PANEL: CPT | Performed by: INTERNAL MEDICINE

## 2021-05-24 PROCEDURE — 85025 COMPLETE CBC W/AUTO DIFF WBC: CPT | Performed by: INTERNAL MEDICINE

## 2021-05-24 RX ORDER — IBANDRONATE SODIUM 150 MG/1
150 TABLET, FILM COATED ORAL
Qty: 4 TABLET | Refills: 3 | Status: SHIPPED | OUTPATIENT
Start: 2021-05-24 | End: 2022-05-02

## 2021-05-28 ENCOUNTER — READMISSION MANAGEMENT (OUTPATIENT)
Dept: CALL CENTER | Facility: HOSPITAL | Age: 66
End: 2021-05-28

## 2021-05-28 NOTE — OUTREACH NOTE
Total Joint Month 1 Survey      Responses   [facility] patient discharged from?  [place]   Does the patient have one of the following disease processes/diagnoses(primary or secondary)?  Total Joint Replacement   Joint surgery performed?  Knee   Month 1 attempt successful?  Yes   Call start time  1604   Call end time  1622   Has the patient been back in either the hospital or Emergency Department since discharge?  No   Discharge diagnosis  admit for bloody drainage from right total knee replacement  done on 4/26   Is the patient taking all medications as directed (includes completed medication regime)?  Yes   Is the patient able to teach back alternate methods of pain control?  Ice, Knee-elevation/no pillow under knee, Reposition, Short, frequent activity, Correct alignment   Medication comments  has been given brace from PT to assist with bending   Has the patient kept scheduled appointments due by today?  Yes   Is the patient still receiving Home Health Services?  No   Is the patient still attending therapy sessions(either in the home or as an outpatient)?  Yes [outpt PT]   Has the patient fallen since discharge?  No   Comments  states PT going well   What is the patient's perception of their functional status since discharge?  Improving   Is the patient able to teach back signs and symptoms of infection?  Temp >100.4 for 24h or longer, Incisional drainage, Increased swelling or redness around incision (not associated with surgical edema), Blisters around incision, Severe discomfort or pain, Changes in mobility, Shortness of breath or chest pain   Is the patient/caregiver able to teach back the hierarchy of who to call/visit for symptoms/problems? PCP, Specialist, Home health nurse, Urgent Care, ED, 911  Yes   Additional teach back comments  states incision healing, free of bleeding, pain under control   Month 1 call completed?  Yes          Noreen Silverman RN

## 2021-06-29 ENCOUNTER — READMISSION MANAGEMENT (OUTPATIENT)
Dept: CALL CENTER | Facility: HOSPITAL | Age: 66
End: 2021-06-29

## 2021-06-29 NOTE — OUTREACH NOTE
Total Joint Month 2 Survey      Responses   [facility] [place] patient discharged from?  [place]   Does the patient have one of the following disease processes/diagnoses(primary or secondary)?  Total Joint Replacement   Joint surgery performed?  Knee   Month 2 attempt successful?  No   Unsuccessful attempts  Attempt 1          Shelley Kat RN

## 2021-06-30 ENCOUNTER — READMISSION MANAGEMENT (OUTPATIENT)
Dept: CALL CENTER | Facility: HOSPITAL | Age: 66
End: 2021-06-30

## 2021-06-30 NOTE — OUTREACH NOTE
Total Joint Month 2 Survey      Responses   [facility] patient discharged from?  [place]   Does the patient have one of the following disease processes/diagnoses(primary or secondary)?  Total Joint Replacement   Joint surgery performed?  Knee   Month 2 attempt successful?  No   Unsuccessful attempts  Attempt 2          Rhiannon Villareal LPN

## 2021-08-04 ENCOUNTER — READMISSION MANAGEMENT (OUTPATIENT)
Dept: CALL CENTER | Facility: HOSPITAL | Age: 66
End: 2021-08-04

## 2021-08-04 NOTE — OUTREACH NOTE
Total Joint Month 3 Survey      Responses   [facility] patient discharged from?  [place]   Does the patient have one of the following disease processes/diagnoses(primary or secondary)?  Total Joint Replacement   Joint surgery performed?  Knee   Month 3 attempt successful?  No   Unsuccessful attempts  Attempt 1          Noreen Silverman RN

## 2021-08-05 ENCOUNTER — READMISSION MANAGEMENT (OUTPATIENT)
Dept: CALL CENTER | Facility: HOSPITAL | Age: 66
End: 2021-08-05

## 2021-08-05 NOTE — OUTREACH NOTE
Total Joint Month 3 Survey      Responses   [facility] patient discharged from?  [place]   Does the patient have one of the following disease processes/diagnoses(primary or secondary)?  Total Joint Replacement   Joint surgery performed?  Knee   Month 3 attempt successful?  Yes   Call start time  1752   Call end time  1754   Has the patient been back in either the hospital or Emergency Department since discharge?  No   Is the patient taking all medications as directed (includes completed medication regime)?  Yes   Is the patient able to teach back alternate methods of pain control?  Ice [She is doing great. ]   Has the patient kept scheduled appointments due by today?  Yes   Comments  Knee is doing great.    Is the patient still receiving Home Health Services?  N/A   Psychosocial comments  Completed Out patient therapy.    Is the patient still attending therapy sessions(either in the home or as an outpatient)?  No   Has the patient fallen since discharge?  No   What is the patient's perception of their functional status since discharge?  Improving   Is the patient able to teach back signs and symptoms of infection?  Temp >100.4 for 24h or longer, Incisional drainage, Increased swelling or redness around incision (not associated with surgical edema), Blisters around incision, Severe discomfort or pain, Changes in mobility, Shortness of breath or chest pain   If the patient is a current smoker, are they able to teach back resources for cessation?  Not a smoker   Graduated  Yes   Is the patient interested in additional calls from an ambulatory ?  NOTE:  applies to high risk patients requiring additional follow-up.  No   Did the patient feel the follow up calls were helpful during their recovery period?  Yes   Was the number of calls appropriate?  Yes   Wrap up additional comments  She is doing well.           Keesha Perrin RN

## 2021-10-08 ENCOUNTER — OFFICE VISIT (OUTPATIENT)
Dept: INTERNAL MEDICINE | Facility: CLINIC | Age: 66
End: 2021-10-08

## 2021-10-08 VITALS
TEMPERATURE: 97.5 F | SYSTOLIC BLOOD PRESSURE: 112 MMHG | DIASTOLIC BLOOD PRESSURE: 78 MMHG | OXYGEN SATURATION: 98 % | HEIGHT: 65 IN | WEIGHT: 133 LBS | BODY MASS INDEX: 22.16 KG/M2 | HEART RATE: 67 BPM

## 2021-10-08 DIAGNOSIS — J01.90 ACUTE SINUSITIS, RECURRENCE NOT SPECIFIED, UNSPECIFIED LOCATION: Primary | ICD-10-CM

## 2021-10-08 PROCEDURE — 99213 OFFICE O/P EST LOW 20 MIN: CPT | Performed by: INTERNAL MEDICINE

## 2021-10-08 RX ORDER — AMOXICILLIN AND CLAVULANATE POTASSIUM 875; 125 MG/1; MG/1
1 TABLET, FILM COATED ORAL 2 TIMES DAILY
Qty: 20 TABLET | Refills: 0 | Status: SHIPPED | OUTPATIENT
Start: 2021-10-08 | End: 2021-10-18

## 2021-10-08 NOTE — PROGRESS NOTES
Subjective     Debra S Sandifer is a 66 y.o. female who presents with   Chief Complaint   Patient presents with   • Nasal Congestion   • Cough       History of Present Illness     Nine days ago started with cough symptoms.  Head and nasal congestion.  Cough associated.  On Saturday rapid urgent test was negative.  Sinus pain pressure and drainage.  Ear are stopped up.  No SOA.  No wheezing.        Review of Systems   Constitutional: Negative for fever.   Respiratory: Positive for cough. Negative for shortness of breath and wheezing.    Cardiovascular: Negative for chest pain.       The following portions of the patient's history were reviewed and updated as appropriate: allergies, current medications and problem list.    Patient Active Problem List    Diagnosis Date Noted   • Osteopenia of hip 05/24/2021   • DJD (degenerative joint disease) of knee 09/26/2019   • History of coronary artery stent placement 06/18/2018   • Prediabetes 10/17/2017   • Coronary artery disease involving native coronary artery 11/23/2016     Note Last Updated: 11/23/2016 11/2016  PTCA of the first diagonal branch with a 2.5 x 12 mm trek Rx balloon.  PCI of the distal LAD with a 2.75 x 18 mm Xience Alpine drug-eluting stent  PCI of the mid LAD with a 3.0 x 28 mm Xience Alpine drug-eluting stent     • Malignant neoplasm of upper-inner quadrant of left breast in female, estrogen receptor positive (HCC) 04/27/2016     Note Last Updated: 10/14/2016     S/p lumpectomy and radiation in 2016     • Mixed hyperlipidemia 02/10/2016       Current Outpatient Medications on File Prior to Visit   Medication Sig Dispense Refill   • acetaminophen (TYLENOL) 325 MG tablet Take 325 mg by mouth Every 6 (Six) Hours As Needed for Mild Pain .     • atorvastatin (LIPITOR) 40 MG tablet Take 1 tablet by mouth every night at bedtime. 90 tablet 4   • carvedilol (COREG) 3.125 MG tablet Take 1 tablet by mouth Every 12 (Twelve) Hours. 180 tablet 4   • cholecalciferol  "(VITAMIN D3) 1000 units tablet Take 1,000 Units by mouth Every Morning.     • exemestane (AROMASIN) 25 MG chemo tablet Take 1 tablet by mouth Daily. 90 tablet 1   • ibandronate (BONIVA) 150 MG tablet Take 1 tablet by mouth Every 30 (Thirty) Days. 4 tablet 3   • Probiotic Product (PROBIOTIC DAILY PO) Take 1 tablet by mouth Every Morning.       No current facility-administered medications on file prior to visit.       Objective     /78   Pulse 67   Temp 97.5 °F (36.4 °C)   Ht 165.1 cm (65\")   Wt 60.3 kg (133 lb)   SpO2 98%   BMI 22.13 kg/m²     Physical Exam  Constitutional:       Appearance: She is well-developed.   HENT:      Head: Normocephalic and atraumatic.      Right Ear: Hearing and tympanic membrane normal.      Left Ear: Hearing and tympanic membrane normal.      Mouth/Throat:      Pharynx: No oropharyngeal exudate or posterior oropharyngeal erythema.   Cardiovascular:      Rate and Rhythm: Normal rate and regular rhythm.      Heart sounds: Normal heart sounds.   Pulmonary:      Effort: Pulmonary effort is normal.      Breath sounds: Normal breath sounds.   Skin:     General: Skin is warm and dry.   Neurological:      Mental Status: She is alert and oriented to person, place, and time.   Psychiatric:         Behavior: Behavior normal.         Assessment/Plan   Diagnoses and all orders for this visit:    1. Acute sinusitis, recurrence not specified, unspecified location (Primary)    Other orders  -     amoxicillin-clavulanate (AUGMENTIN) 875-125 MG per tablet; Take 1 tablet by mouth 2 (Two) Times a Day for 10 days.  Dispense: 20 tablet; Refill: 0        Discussion    Patient presents with an acute sinus infection.  She has tested negative for COVID.  Rx for antibiotics are called in.  The patient is encouraged to take with OTC Mucinex .  Let me know if not feeling better over the next 3 days or if there is any change in symptoms.           Future Appointments   Date Time Provider Department Center "   11/1/2021  9:30 AM LAB CHAIR CBC LAB [facility]   11/1/2021 10:00 AM Andrea Akhtar MD [facility]   11/12/2021  8:10 AM [facility]   11/19/2021  7:45 AM Alisa Morales MD [facility]   1/13/2022  9:40 AM [facility]

## 2021-10-13 DIAGNOSIS — C50.212 MALIGNANT NEOPLASM OF UPPER-INNER QUADRANT OF LEFT BREAST IN FEMALE, ESTROGEN RECEPTOR POSITIVE (HCC): ICD-10-CM

## 2021-10-13 DIAGNOSIS — Z17.0 MALIGNANT NEOPLASM OF UPPER-INNER QUADRANT OF LEFT BREAST IN FEMALE, ESTROGEN RECEPTOR POSITIVE (HCC): ICD-10-CM

## 2021-10-13 RX ORDER — EXEMESTANE 25 MG/1
TABLET ORAL
Qty: 90 TABLET | Refills: 3 | Status: SHIPPED | OUTPATIENT
Start: 2021-10-13 | End: 2021-11-01

## 2021-10-13 NOTE — TELEPHONE ENCOUNTER
Exemestane refill request rec electronically from [facility]. Per last office note from Dr Akhtar-pt is to continue.    Continue Aromasin.     I have routed the rx to Dr Akhtar for signature. Once signed it will be escribed to [facility].

## 2021-10-18 ENCOUNTER — TELEPHONE (OUTPATIENT)
Dept: ONCOLOGY | Facility: CLINIC | Age: 66
End: 2021-10-18

## 2021-10-18 NOTE — TELEPHONE ENCOUNTER
HUB call from pt forwarded to me-pt called the office to report Maria Luisa says her Exemestane needs a PA.    I have submitted the PA to [facility] through covermymeds and rec a response stating Prior Authorization is not required.    I will contact [facility] Luisa to find out what they are needing.

## 2021-10-18 NOTE — TELEPHONE ENCOUNTER
Caller: SANDIFER, DEBRA    Relationship: SELF    Medication requested (name and dosage): exemestane (AROMASIN) 25 MG    Pharmacy where request should be sent: [facility] [address] AT [facility] & [address] - [phone]  - [phone] FX    Additional details provided by patient: PT STATES [facility] NEEDS A PA  FOR MEDICATION    Best call back number: [phone]    Does the patient have less than a 3 day supply:  [x] Yes  [] No

## 2021-11-01 ENCOUNTER — OFFICE VISIT (OUTPATIENT)
Dept: ONCOLOGY | Facility: CLINIC | Age: 66
End: 2021-11-01

## 2021-11-01 ENCOUNTER — LAB (OUTPATIENT)
Dept: OTHER | Facility: HOSPITAL | Age: 66
End: 2021-11-01

## 2021-11-01 VITALS
SYSTOLIC BLOOD PRESSURE: 110 MMHG | DIASTOLIC BLOOD PRESSURE: 70 MMHG | HEIGHT: 65 IN | HEART RATE: 61 BPM | BODY MASS INDEX: 23.09 KG/M2 | TEMPERATURE: 96.8 F | OXYGEN SATURATION: 97 % | WEIGHT: 138.6 LBS | RESPIRATION RATE: 18 BRPM

## 2021-11-01 DIAGNOSIS — Z17.0 MALIGNANT NEOPLASM OF UPPER-INNER QUADRANT OF LEFT BREAST IN FEMALE, ESTROGEN RECEPTOR POSITIVE (HCC): ICD-10-CM

## 2021-11-01 DIAGNOSIS — C50.212 MALIGNANT NEOPLASM OF UPPER-INNER QUADRANT OF LEFT BREAST IN FEMALE, ESTROGEN RECEPTOR POSITIVE (HCC): Primary | ICD-10-CM

## 2021-11-01 DIAGNOSIS — C50.212 MALIGNANT NEOPLASM OF UPPER-INNER QUADRANT OF LEFT BREAST IN FEMALE, ESTROGEN RECEPTOR POSITIVE (HCC): ICD-10-CM

## 2021-11-01 DIAGNOSIS — Z79.811 AROMATASE INHIBITOR USE: ICD-10-CM

## 2021-11-01 DIAGNOSIS — Z17.0 MALIGNANT NEOPLASM OF UPPER-INNER QUADRANT OF LEFT BREAST IN FEMALE, ESTROGEN RECEPTOR POSITIVE (HCC): Primary | ICD-10-CM

## 2021-11-01 DIAGNOSIS — M85.851 OSTEOPENIA OF RIGHT HIP: ICD-10-CM

## 2021-11-01 LAB
ALBUMIN SERPL-MCNC: 4.2 G/DL (ref 3.5–5.2)
ALBUMIN/GLOB SERPL: 1.4 G/DL
ALP SERPL-CCNC: 96 U/L (ref 39–117)
ALT SERPL W P-5'-P-CCNC: 23 U/L (ref 1–33)
ANION GAP SERPL CALCULATED.3IONS-SCNC: 8.3 MMOL/L (ref 5–15)
AST SERPL-CCNC: 32 U/L (ref 1–32)
BASOPHILS # BLD AUTO: 0.03 10*3/MM3 (ref 0–0.2)
BASOPHILS NFR BLD AUTO: 0.7 % (ref 0–1.5)
BILIRUB SERPL-MCNC: 0.5 MG/DL (ref 0–1.2)
BUN SERPL-MCNC: 16 MG/DL (ref 8–23)
BUN/CREAT SERPL: 17.8 (ref 7–25)
CALCIUM SPEC-SCNC: 9.8 MG/DL (ref 8.6–10.5)
CHLORIDE SERPL-SCNC: 105 MMOL/L (ref 98–107)
CO2 SERPL-SCNC: 27.7 MMOL/L (ref 22–29)
CREAT SERPL-MCNC: 0.9 MG/DL (ref 0.57–1)
DEPRECATED RDW RBC AUTO: 50.3 FL (ref 37–54)
EOSINOPHIL # BLD AUTO: 0.14 10*3/MM3 (ref 0–0.4)
EOSINOPHIL NFR BLD AUTO: 3.2 % (ref 0.3–6.2)
ERYTHROCYTE [DISTWIDTH] IN BLOOD BY AUTOMATED COUNT: 15.6 % (ref 12.3–15.4)
GFR SERPL CREATININE-BSD FRML MDRD: 63 ML/MIN/1.73
GLOBULIN UR ELPH-MCNC: 3.1 GM/DL
GLUCOSE SERPL-MCNC: 96 MG/DL (ref 65–99)
HCT VFR BLD AUTO: 40.7 % (ref 34–46.6)
HGB BLD-MCNC: 12.9 G/DL (ref 12–15.9)
IMM GRANULOCYTES # BLD AUTO: 0.02 10*3/MM3 (ref 0–0.05)
IMM GRANULOCYTES NFR BLD AUTO: 0.5 % (ref 0–0.5)
LYMPHOCYTES # BLD AUTO: 1.2 10*3/MM3 (ref 0.7–3.1)
LYMPHOCYTES NFR BLD AUTO: 27.3 % (ref 19.6–45.3)
MCH RBC QN AUTO: 27.9 PG (ref 26.6–33)
MCHC RBC AUTO-ENTMCNC: 31.7 G/DL (ref 31.5–35.7)
MCV RBC AUTO: 88.1 FL (ref 79–97)
MONOCYTES # BLD AUTO: 0.52 10*3/MM3 (ref 0.1–0.9)
MONOCYTES NFR BLD AUTO: 11.8 % (ref 5–12)
NEUTROPHILS NFR BLD AUTO: 2.49 10*3/MM3 (ref 1.7–7)
NEUTROPHILS NFR BLD AUTO: 56.5 % (ref 42.7–76)
NRBC BLD AUTO-RTO: 0 /100 WBC (ref 0–0.2)
PLATELET # BLD AUTO: 245 10*3/MM3 (ref 140–450)
PMV BLD AUTO: 9.6 FL (ref 6–12)
POTASSIUM SERPL-SCNC: 4.9 MMOL/L (ref 3.5–5.2)
PROT SERPL-MCNC: 7.3 G/DL (ref 6–8.5)
RBC # BLD AUTO: 4.62 10*6/MM3 (ref 3.77–5.28)
SODIUM SERPL-SCNC: 141 MMOL/L (ref 136–145)
WBC # BLD AUTO: 4.4 10*3/MM3 (ref 3.4–10.8)

## 2021-11-01 PROCEDURE — 85025 COMPLETE CBC W/AUTO DIFF WBC: CPT | Performed by: INTERNAL MEDICINE

## 2021-11-01 PROCEDURE — 36415 COLL VENOUS BLD VENIPUNCTURE: CPT

## 2021-11-01 PROCEDURE — 80053 COMPREHEN METABOLIC PANEL: CPT | Performed by: INTERNAL MEDICINE

## 2021-11-01 PROCEDURE — 99214 OFFICE O/P EST MOD 30 MIN: CPT | Performed by: INTERNAL MEDICINE

## 2021-11-01 NOTE — PROGRESS NOTES
Subjective:     Reason for follow up:   1. Stage 1 (T1bN0), left breast invasive ductal carcinoma, ER+ (90%), PA+, Vkg6cav negative   * status post lumpectomy with SLNB    * status post radiation therapy   * Arimidex started 8/2016 - changed to Aromasin due to arthalgias     History of Present Ilness: Debra S Sandifer presents for follow-up of breast cancer. She remains on Aromiasin.  She has been on hormonal blockade now for years 1 year of Arimidex and 4 years of Aromasin    Her arthralgias are better than they were on Arimidex but they are still present. Her hot flashes are present and tolerable.     We did a breast cancer index because of her tolerance issues and her worsening bone density and this showed a low risk of late recurrence and a low risk of response to extended endocrine blockade and therefore I think it is safe to go off her Aromasin    She will continue on her Boniva for another year and to recheck her bone density    Past Medical   Past Medical History:   Diagnosis Date   • Allergic codeine/latex   • Arthralgia of both hands    • Arthritis     hand   • Atypical chest pain    • Breast cancer (HCC)     Left   • Coronary artery disease involving native coronary artery 11/23/2016    has stents   • Dyspareunia    • H/O bronchitis    • H/O infectious mononucleosis     COLLEGE AGE   • Hemorrhoid    • High cholesterol    • History of medical problems back surgery  2005    repair herniated disc   • Hot flashes, menopausal    • Hx of radiation therapy     JULY 2016   • Hyperlipidemia    • Hypertension    • Myocardial infarction (HCC) November 2016    2 stents   • Polyp of sigmoid colon    • Right knee pain    • Stye     LEFT EYE   • Urinary tract infection periodically   • Vitamin D deficiency      Patient Active Problem List   Diagnosis   • Mixed hyperlipidemia   • Malignant neoplasm of upper-inner quadrant of left breast in female, estrogen receptor positive (HCC)   • Coronary artery disease involving  native coronary artery   • Prediabetes   • History of coronary artery stent placement   • DJD (degenerative joint disease) of knee   • Osteopenia of hip     Social History   Social History     Socioeconomic History   • Marital status:      Spouse name: Van   • Years of education: College   Tobacco Use   • Smoking status: Former Smoker     Packs/day: 0.25     Years: 5.00     Pack years: 1.25     Start date:      Quit date:      Years since quittin.8   • Smokeless tobacco: Never Used   • Tobacco comment: CAFFEINE USE   Vaping Use   • Vaping Use: Never used   Substance and Sexual Activity   • Alcohol use: Yes     Alcohol/week: 2.0 standard drinks     Types: 2 Glasses of wine per week   • Drug use: Never      Family History  Family History   Problem Relation Age of Onset   • Coronary artery disease Mother    • Dementia Mother    • Heart disease Mother         Heart attack w/2 stents   • Heart attack Mother         had heart attack. Heart catheter -2 stents   • Hypertension Father    • Heart disease Father         Coronary heart disease   • Stroke Father         Ischemic   • Diabetes type II Father    • Diabetes Father    • Hyperlipidemia Father    • Prostate cancer Brother 51   • Alcohol abuse Maternal Grandfather    • Rheum arthritis Paternal Grandmother    • Arthritis Paternal Grandmother    • Alcohol abuse Paternal Grandfather    • Stroke Paternal Grandfather         Ischemic   • Rheum arthritis Paternal Grandfather    • Diabetes type II Paternal Grandfather    • Diabetes Paternal Grandfather    • Hyperlipidemia Paternal Grandfather    • Hypertension Paternal Grandfather    • Heart disease Paternal Grandfather    • Cancer Brother         Prostate   • No Known Problems Maternal Grandmother    • Malig Hyperthermia Neg Hx      Allergies  Allergies   Allergen Reactions   • Codeine Rash   • Oxycodone Nausea And Vomiting and Hallucinations   • Hydrocodone-Acetaminophen Nausea And Vomiting   • Latex  "Rash       Medications: The current medication list was reviewed in the EMR.    Review of Systems  Review of Systems   Constitutional: Negative for activity change, appetite change, chills, diaphoresis, fatigue (11/12/2018), fever and unexpected weight change.   Eyes: Negative.    Respiratory: Negative.  Negative for cough, chest tightness and shortness of breath.    Cardiovascular: Negative.  Negative for chest pain, palpitations and leg swelling.   Gastrointestinal: Negative.  Negative for abdominal pain, blood in stool, constipation, diarrhea, nausea and vomiting.   Genitourinary: Negative.    Musculoskeletal: Negative for arthralgias, gait problem (rigth knee better had replacement 11/11/19), joint swelling (stable ) and myalgias.   Neurological: Negative for dizziness, light-headedness, numbness and headaches.   Hematological: Negative for adenopathy. Does not bruise/bleed easily (stable since going off medication 5/6/19).   Psychiatric/Behavioral: Negative.  Negative for confusion. The patient is not nervous/anxious.    All other systems reviewed and are negative.      Objective:     Vitals:    11/01/21 0951   BP: 110/70   Pulse: 61   Resp: 18   Temp: 96.8 °F (36 °C)   TempSrc: Temporal   SpO2: 97%   Weight: 62.9 kg (138 lb 9.6 oz)   Height: 165.1 cm (65\")   PainSc: 0-No pain     Current Status 11/1/2021   ECOG score 0   GENERAL:  Well-developed, well-nourished female in no acute distress.   SKIN:  Warm, dry without rashes, purpura or petechiae.  HENT: Hearing: normal, Lips normal. Dentition: good  LYMPHATICS:  No cervical, supraclavicular, axillary adenopathy.  RESPIRATORY:  Lungs clear to percussion and auscultation. Good airflow. Normal respiratory effort- promimnet T4 vertbral spine  BREASTS: Left breast smaller than right breast, bilateral breast exam without palpable masses, skin changes or nipple discharge-  CARDIAC:  Regular rate and rhythm without murmurs, rubs or gallops. Normal S1,S2. Edema -  " No  GI: Soft, nontender, non-distended, no hernia present  PSYCHIATRIC:  Normal affect and mood.  Oriented to person/place/time.  MSK: Gait: Normal; No clubbing, cyanosis. No tenderness or swelling in left knee, right knee-    I have reexamined the patient and the results are consistent with the previously documented exam. Andrea Akhtar MD     Labs and Imaging  Lab Results   Component Value Date    WBC 4.40 11/01/2021    HGB 12.9 11/01/2021    HCT 40.7 11/01/2021    MCV 88.1 11/01/2021     11/01/2021     Labs from 10/2017 reviewed.     Breast imaging: Mammogram 2/2018  CONCLUSION: Probable benign mammogram with small area of focal  asymmetric density at site of previous lumpectomy in upper inner left  breast and best seen in the CC projection. A short-term follow-up  left-sided mammogram in 6 months is recommended.      BI-RADS CATEGORY 3: Probably benign. Short interval follow-up suggested.    MAMMO SCREENING DIGITAL TOMOSYNTHESIS BILATERAL W CAD-     CONCLUSION: There is no evidence for malignancy nor significant change  in either breast. BI-RADS Category 2, benign.     Recommendation: Monthly self-examination and annual mammography.     This report was finalized on 2/12/2019               HISTORY: 64 year-old asymptomatic female     MPRESSION:  1. There is no evidence for malignancy or significant change in either  breast. Routine followup mammography is recommended.     BI-RADS category 1: Negative.     This report was finalized on 2/17/2020 9:11 AM by Dr. Nikita Davenport M.D.         Assessment/Plan     Assessment:   1. Stage 1 (T1bN0), left breast invasive ductal carcinoma, ER+ (90%), WI+, [id number] negative   * status post lumpectomy with SLNB 4/13/16   * Oncotype Dx 20 (low intermediate). No chemotherapy indicated.   * status post radiation therapy   * Arimidex started June 2016. Change to Aromasin due to arthralgias 5/2017. Tolerating well.    * Mammo normal 2/2021               *Breast cancer  index shows low risk of recurrence after 5 years and low risk of benefit from extended hormonal therapy Aromasin stopped    2. Hot flashes. Tolerable. Stable.   3. Joint arthralgias. 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 Present but improved with Aromasin compared to AI  4. Coronary artery disease with stents  5.  Mild anemia due to knee replacement in 3/21  6.  Worsening osteopenia in the hips-Boniva added in 6/21  7.  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  Stop Aromasin.   2.  Continue Boniva 150 mg p.o. monthly with calcium and vitamin D  3.  patient was instructed on the importance of physical activity, healthy diet, and self breast exams.  Patient will continue calcium and vitamin D supplementation.    Follow-up in 12 months. I asked the patient to call for any questions, concerns, or new symptoms.

## 2021-11-04 DIAGNOSIS — R73.03 PREDIABETES: ICD-10-CM

## 2021-11-04 DIAGNOSIS — E78.2 MIXED HYPERLIPIDEMIA: Primary | ICD-10-CM

## 2021-11-13 LAB
ALBUMIN SERPL-MCNC: 4 G/DL (ref 3.8–4.8)
ALBUMIN/GLOB SERPL: 1.4 {RATIO} (ref 1.2–2.2)
ALP SERPL-CCNC: 87 IU/L (ref 44–121)
ALT SERPL-CCNC: 15 IU/L (ref 0–32)
APPEARANCE UR: CLEAR
AST SERPL-CCNC: 23 IU/L (ref 0–40)
BACTERIA #/AREA URNS HPF: ABNORMAL /[HPF]
BASOPHILS # BLD AUTO: 0.1 X10E3/UL (ref 0–0.2)
BASOPHILS NFR BLD AUTO: 1 %
BILIRUB SERPL-MCNC: 0.5 MG/DL (ref 0–1.2)
BILIRUB UR QL STRIP: NEGATIVE
BUN SERPL-MCNC: 14 MG/DL (ref 8–27)
BUN/CREAT SERPL: 15 (ref 12–28)
CALCIUM SERPL-MCNC: 9.4 MG/DL (ref 8.7–10.3)
CASTS URNS QL MICRO: ABNORMAL /LPF
CHLORIDE SERPL-SCNC: 103 MMOL/L (ref 96–106)
CHOLEST SERPL-MCNC: 148 MG/DL (ref 100–199)
CO2 SERPL-SCNC: 23 MMOL/L (ref 20–29)
COLOR UR: YELLOW
CREAT SERPL-MCNC: 0.91 MG/DL (ref 0.57–1)
EOSINOPHIL # BLD AUTO: 0.1 X10E3/UL (ref 0–0.4)
EOSINOPHIL NFR BLD AUTO: 2 %
EPI CELLS #/AREA URNS HPF: ABNORMAL /HPF (ref 0–10)
ERYTHROCYTE [DISTWIDTH] IN BLOOD BY AUTOMATED COUNT: 14.5 % (ref 11.7–15.4)
GLOBULIN SER CALC-MCNC: 2.8 G/DL (ref 1.5–4.5)
GLUCOSE SERPL-MCNC: 95 MG/DL (ref 65–99)
GLUCOSE UR QL: NEGATIVE
HBA1C MFR BLD: 6 % (ref 4.8–5.6)
HCT VFR BLD AUTO: 40.2 % (ref 34–46.6)
HDLC SERPL-MCNC: 71 MG/DL
HGB BLD-MCNC: 12.7 G/DL (ref 11.1–15.9)
HGB UR QL STRIP: ABNORMAL
IMM GRANULOCYTES # BLD AUTO: 0 X10E3/UL (ref 0–0.1)
IMM GRANULOCYTES NFR BLD AUTO: 0 %
KETONES UR QL STRIP: NEGATIVE
LDLC SERPL CALC-MCNC: 64 MG/DL (ref 0–99)
LEUKOCYTE ESTERASE UR QL STRIP: NEGATIVE
LYMPHOCYTES # BLD AUTO: 1.2 X10E3/UL (ref 0.7–3.1)
LYMPHOCYTES NFR BLD AUTO: 19 %
MCH RBC QN AUTO: 28.3 PG (ref 26.6–33)
MCHC RBC AUTO-ENTMCNC: 31.6 G/DL (ref 31.5–35.7)
MCV RBC AUTO: 90 FL (ref 79–97)
MICRO URNS: ABNORMAL
MONOCYTES # BLD AUTO: 0.7 X10E3/UL (ref 0.1–0.9)
MONOCYTES NFR BLD AUTO: 11 %
NEUTROPHILS # BLD AUTO: 4 X10E3/UL (ref 1.4–7)
NEUTROPHILS NFR BLD AUTO: 67 %
NITRITE UR QL STRIP: NEGATIVE
PH UR STRIP: 6.5 [PH] (ref 5–7.5)
PLATELET # BLD AUTO: 279 X10E3/UL (ref 150–450)
POTASSIUM SERPL-SCNC: 4.7 MMOL/L (ref 3.5–5.2)
PROT SERPL-MCNC: 6.8 G/DL (ref 6–8.5)
PROT UR QL STRIP: NEGATIVE
RBC # BLD AUTO: 4.48 X10E6/UL (ref 3.77–5.28)
RBC #/AREA URNS HPF: ABNORMAL /HPF (ref 0–2)
SODIUM SERPL-SCNC: 139 MMOL/L (ref 134–144)
SP GR UR: 1.02 (ref 1–1.03)
TRIGL SERPL-MCNC: 65 MG/DL (ref 0–149)
TSH SERPL DL<=0.005 MIU/L-ACNC: 1.99 UIU/ML (ref 0.45–4.5)
UROBILINOGEN UR STRIP-MCNC: 0.2 MG/DL (ref 0.2–1)
VLDLC SERPL CALC-MCNC: 13 MG/DL (ref 5–40)
WBC # BLD AUTO: 6.1 X10E3/UL (ref 3.4–10.8)
WBC #/AREA URNS HPF: ABNORMAL /HPF (ref 0–5)

## 2021-11-19 ENCOUNTER — OFFICE VISIT (OUTPATIENT)
Dept: INTERNAL MEDICINE | Facility: CLINIC | Age: 66
End: 2021-11-19

## 2021-11-19 VITALS
SYSTOLIC BLOOD PRESSURE: 110 MMHG | WEIGHT: 137 LBS | HEART RATE: 65 BPM | DIASTOLIC BLOOD PRESSURE: 64 MMHG | BODY MASS INDEX: 22.82 KG/M2 | OXYGEN SATURATION: 99 % | HEIGHT: 65 IN

## 2021-11-19 DIAGNOSIS — Z00.00 WELCOME TO MEDICARE PREVENTIVE VISIT: Primary | ICD-10-CM

## 2021-11-19 DIAGNOSIS — Z12.4 ENCOUNTER FOR SCREENING FOR CERVICAL CANCER: ICD-10-CM

## 2021-11-19 DIAGNOSIS — R31.29 MICROHEMATURIA: ICD-10-CM

## 2021-11-19 PROBLEM — Z79.811 AROMATASE INHIBITOR USE: Status: RESOLVED | Noted: 2021-11-01 | Resolved: 2021-11-19

## 2021-11-19 PROCEDURE — 1159F MED LIST DOCD IN RCRD: CPT | Performed by: INTERNAL MEDICINE

## 2021-11-19 PROCEDURE — 1126F AMNT PAIN NOTED NONE PRSNT: CPT | Performed by: INTERNAL MEDICINE

## 2021-11-19 PROCEDURE — 3015F CERV CANCER SCREEN DOCD: CPT | Performed by: INTERNAL MEDICINE

## 2021-11-19 PROCEDURE — G0402 INITIAL PREVENTIVE EXAM: HCPCS | Performed by: INTERNAL MEDICINE

## 2021-11-19 PROCEDURE — 1170F FXNL STATUS ASSESSED: CPT | Performed by: INTERNAL MEDICINE

## 2021-11-19 PROCEDURE — G0403 EKG FOR INITIAL PREVENT EXAM: HCPCS | Performed by: INTERNAL MEDICINE

## 2021-11-19 NOTE — PATIENT INSTRUCTIONS
Medicare Wellness  Personal Prevention Plan of Service     Date of Office Visit:  2021  Encounter Provider:  Alisa Morales MD  Place of Service:  [facility]  Patient Name: Debra S Sandifer  :  1955    As part of the Medicare Wellness portion of your visit today, we are providing you with this personalized preventive plan of services (PPPS). This plan is based upon recommendations of the [place] Preventive Services Task Force (USPSTF) and the Advisory Committee on Immunization Practices (ACIP).    This lists the preventive care services that should be considered, and provides dates of when you are due. Items listed as completed are up-to-date and do not require any further intervention.    Health Maintenance   Topic Date Due   • ANNUAL WELLNESS VISIT  Never done   • Pneumococcal Vaccine 65+ (1 of 1 - PPSV23) Never done   • PAP SMEAR  10/23/2021   • COVID-19 Vaccine (3 - Booster for Moderna series) 2022   • MAMMOGRAM  2022   • LIPID PANEL  2022   • DXA SCAN  03/10/2023   • COLORECTAL CANCER SCREENING  2024   • TDAP/TD VACCINES (3 - Td or Tdap) 2027   • INFLUENZA VACCINE  Completed   • ZOSTER VACCINE  Completed   • HEPATITIS C SCREENING  Addressed       No orders of the defined types were placed in this encounter.      Return in about 1 year (around 2022) for Medicare Wellness.

## 2021-11-19 NOTE — PROGRESS NOTES
The ABCs of the Annual Wellness Visit  [facility] to Medicare Visit    Chief Complaint   Patient presents with   • Welcome To Medicare     Subjective {   History of Present Illness:  Debra S Sandifer is a 66 y.o. female who presents for a  Welcome to Medicare Visit.    The following data was reviewed by: Alisa Morales MD on 11/19/2021:  Common labs    Common Labsle 5/24/21 5/24/21 11/1/21 11/1/21 11/12/21 11/12/21 11/12/21 11/12/21    1015 1015 0940 0940 0801 0801 0801 0801   Glucose  101 (A)  96  95     BUN  17  16  14     Creatinine  0.91  0.90  0.91     eGFR Non  Am  62  63  66     eGFR African Am      77     Sodium  142  141  139     Potassium  4.8  4.9  4.7     Chloride  106  105  103     Calcium  9.6  9.8  9.4     Total Protein      6.8     Albumin  4.00  4.20  4.0     Total Bilirubin  0.5  0.5  0.5     Alkaline Phosphatase  125 (A)  96  87     AST (SGOT)  21  32  23     ALT (SGPT)  11  23  15     WBC 5.16  4.40  6.1      Hemoglobin 11.7 (A)  12.9  12.7      Hematocrit 37.2  40.7  40.2      Platelets 330  245  279      Total Cholesterol       148    Triglycerides       65    HDL Cholesterol       71    LDL Cholesterol        64    Hemoglobin A1C        6.0 (A)   (A) Abnormal value       Comments are available for some flowsheets but are not being displayed.           HLD.  Excellent control.  CAD.  On med management.    Prediabetes.  Discussed diet, exercise.    Minimum work up of blood in urine is CT scan and urology referral for possible cystoscope.  Let me know when advised and I will place orders.       The following portions of the patient's history were reviewed and   updated as appropriate: allergies, current medications, past family history, past medical history, past social history, past surgical history and problem list.     Compared to one year ago, the patient feels her physical   health is the same.    Compared to one year ago, the patient feels her mental   health is the same.    Recent  Hospitalizations:  This patient has had a [facility] admission record on file within the last 365 days.    Current Medical Providers:  Patient Care Team:  Alisa Morales MD as PCP - General  Aminata Estrella MD as Surgeon (Breast Surgery)  Mechelle Garcia MD as Consulting Physician (Hematology and Oncology)  Sheila Wahl MD as Consulting Physician (Radiation Oncology)  Aminata Estrella MD as Referring Physician (Breast Surgery)  Andrea Akhtar MD as Consulting Physician (Hematology and Oncology)  Sanjiv Dykes MD as Consulting Physician (Cardiology)    Outpatient Medications Prior to Visit   Medication Sig Dispense Refill   • acetaminophen (TYLENOL) 325 MG tablet Take 325 mg by mouth Every 6 (Six) Hours As Needed for Mild Pain .     • aspirin 81 MG oral suspension      • atorvastatin (LIPITOR) 40 MG tablet Take 1 tablet by mouth every night at bedtime. 90 tablet 4   • calcium carbonate-vitamin d (Calcium 600+D) 600-400 MG-UNIT per tablet      • carvedilol (COREG) 3.125 MG tablet Take 1 tablet by mouth Every 12 (Twelve) Hours. 180 tablet 4   • cholecalciferol (VITAMIN D3) 1000 units tablet Take 1,000 Units by mouth Every Morning.     • ibandronate (BONIVA) 150 MG tablet Take 1 tablet by mouth Every 30 (Thirty) Days. 4 tablet 3   • Probiotic Product (PROBIOTIC DAILY PO) Take 1 tablet by mouth Every Morning.       No facility-administered medications prior to visit.       No opioid medication identified on active medication list. I have reviewed chart for other potential  high risk medication/s and harmful drug interactions in the elderly.          Aspirin is on active medication list. Aspirin use is indicated based on review of current medical condition/s. Pros and cons of this therapy have been discussed today. Benefits of this medication outweigh potential harm.  Patient has been encouraged to continue taking this medication.  .      Patient Active Problem List   Diagnosis  "  • Mixed hyperlipidemia   • Malignant neoplasm of upper-inner quadrant of left breast in female, estrogen receptor positive (HCC)   • Coronary artery disease involving native coronary artery   • Prediabetes   • History of coronary artery stent placement   • DJD (degenerative joint disease) of knee   • Osteopenia of hip     Advance Care Planning  Advance Directive is on file.  ACP discussion was held with the patient during this visit. Patient has an advance directive in EMR which is still valid.     Review of Systems   Respiratory: Negative.    Cardiovascular: Negative.         Objective      Vitals:    11/19/21 0739   BP: 110/64   Pulse: 65   SpO2: 99%   Weight: 62.1 kg (137 lb)   Height: 165.1 cm (65\")   PainSc: 0-No pain     BMI Readings from Last 1 Encounters:   11/19/21 22.80 kg/m²   BMI is within normal parameters. No follow-up required.    Does the patient have evidence of cognitive impairment? No    Physical Exam  Constitutional:       Appearance: She is well-developed.   HENT:      Head: Normocephalic and atraumatic.      Right Ear: Hearing, tympanic membrane and external ear normal.      Left Ear: Hearing, tympanic membrane and external ear normal.      Nose: Nose normal.   Neck:      Thyroid: No thyromegaly.   Cardiovascular:      Rate and Rhythm: Normal rate and regular rhythm.      Heart sounds: Normal heart sounds. No murmur heard.      Pulmonary:      Effort: Pulmonary effort is normal.      Breath sounds: Normal breath sounds.   Chest:   Breasts:      Right: No mass.      Left: No mass.       Abdominal:      General: There is no distension.      Palpations: Abdomen is soft.      Tenderness: There is no abdominal tenderness.   Genitourinary:     Labia:         Right: No lesion.         Left: No lesion.       Vagina: Normal.      Cervix: No cervical motion tenderness, discharge or friability.      Adnexa:         Right: No mass or tenderness.          Left: No mass or tenderness.        Comments: Pap " obtained.  Musculoskeletal:      Cervical back: Neck supple.   Lymphadenopathy:      Cervical: No cervical adenopathy.   Skin:     General: Skin is warm and dry.   Neurological:      Mental Status: She is alert and oriented to person, place, and time.   Psychiatric:         Speech: Speech normal.         Behavior: Behavior normal.         Thought Content: Thought content normal.         Judgment: Judgment normal.         Lab Results   Component Value Date    CHLPL 148 2021    TRIG 65 2021    HDL 71 2021    LDL 64 2021    VLDL 13 2021    HGBA1C 6.0 (H) 2021         ECG 12 Lead    Date/Time: 2021 8:15 AM  Performed by: Alisa Morales MD  Authorized by: Alisa Morales MD   Comparison: compared with previous ECG   Similar to previous ECG  Rhythm: sinus rhythm  Rate: normal  Conduction: left anterior fascicular block  ST Segments: ST segments normal  T Waves: T waves normal  QRS axis: left    Clinical impression: abnormal EKG               HEALTH RISK ASSESSMENT    Smoking Status:  Social History     Tobacco Use   Smoking Status Former Smoker   • Packs/day: 0.25   • Years: 5.00   • Pack years: 1.25   • Start date:    • Quit date:    • Years since quittin.9   Smokeless Tobacco Never Used   Tobacco Comment    CAFFEINE USE     Alcohol Consumption:  Social History     Substance and Sexual Activity   Alcohol Use Yes   • Alcohol/week: 2.0 standard drinks   • Types: 2 Glasses of wine per week       Fall Risk Screen:    RITESH Fall Risk Assessment was completed, and patient is at LOW risk for falls.Assessment completed on:2021    Depression Screen:   PHQ-2/PHQ-9 Depression Screening 2021   Little interest or pleasure in doing things 0   Feeling down, depressed, or hopeless 0   Trouble falling or staying asleep, or sleeping too much -   Feeling tired or having little energy -   Poor appetite or overeating -   Feeling bad about yourself - or that you are a  failure or have let yourself or your family down -   Trouble concentrating on things, such as reading the newspaper or watching television -   Moving or speaking so slowly that other people could have noticed. Or the opposite - being so fidgety or restless that you have been moving around a lot more than usual -   Thoughts that you would be better off dead, or of hurting yourself in some way -   Total Score 0       Health Habits and Functional and Cognitive Screening:  Functional & Cognitive Status 11/19/2021   Do you have difficulty preparing food and eating? No   Do you have difficulty bathing yourself, getting dressed or grooming yourself? No   Do you have difficulty using the toilet? No   Do you have difficulty moving around from place to place? No   Do you have trouble with steps or getting out of a bed or a chair? No   Current Diet Well Balanced Diet   Dental Exam Up to date   Eye Exam Up to date   Exercise (times per week) 5 times per week   Current Exercises Include Cardiovascular Workout   Do you need help using the phone?  No   Are you deaf or do you have serious difficulty hearing?  No   Do you need help with transportation? No   Do you need help shopping? No   Do you need help preparing meals?  No   Do you need help with housework?  No   Do you need help with laundry? No   Do you need help taking your medications? No   Do you need help managing money? No   Do you ever drive or ride in a car without wearing a seat belt? No   Have you felt unusual stress, anger or loneliness in the last month? No   Who do you live with? Alone   If you need help, do you have trouble finding someone available to you? No   Have you been bothered in the last four weeks by sexual problems? No   Do you have difficulty concentrating, remembering or making decisions? No       Visual Acuity:    No exam data present    Age-appropriate Screening Schedule:  Refer to the list below for future screening recommendations based on  patient's age, sex and/or medical conditions. Orders for these recommended tests are listed in the plan section. The patient has been provided with a written plan.    Health Maintenance   Topic Date Due   • PAP SMEAR  10/23/2021   • MAMMOGRAM  05/17/2022   • LIPID PANEL  11/12/2022   • DXA SCAN  03/10/2023   • TDAP/TD VACCINES (3 - Td or Tdap) 02/22/2027   • INFLUENZA VACCINE  Completed   • ZOSTER VACCINE  Completed          Assessment/Plan   CMS Preventative Services Quick Reference  Risk Factors Identified During Encounter  Immunizations Discussed/Encouraged (specific Immunizations; Pneumococcal 23  The above risks/problems have been discussed with the patient.  Pertinent information has been shared with the patient in the After Visit Summary.  Follow up plans and orders are seen below in the Assessment/Plan Section.    Diagnoses and all orders for this visit:    1. Welcome to Medicare preventive visit (Primary)  -     ECG 12 Lead    2. Encounter for screening for cervical cancer  -     IGP, Rfx Aptima HPV ASCU    3. Microhematuria  -     Ambulatory Referral to Urology  -     CT Abdomen Pelvis Without Contrast; Future        Follow Up:   Return in about 1 year (around 11/19/2022) for Medicare Wellness.     An After Visit Summary and PPPS were made available to the patient.

## 2021-11-23 ENCOUNTER — CLINICAL SUPPORT (OUTPATIENT)
Dept: OTHER | Facility: HOSPITAL | Age: 66
End: 2021-11-23

## 2021-11-23 ENCOUNTER — APPOINTMENT (OUTPATIENT)
Dept: LAB | Facility: HOSPITAL | Age: 66
End: 2021-11-23

## 2021-11-23 DIAGNOSIS — Z85.3 PERSONAL HISTORY OF MALIGNANT NEOPLASM OF BREAST: ICD-10-CM

## 2021-11-23 DIAGNOSIS — Z13.79 GENETIC TESTING: Primary | ICD-10-CM

## 2021-11-23 DIAGNOSIS — Z80.42 FAMILY HISTORY OF PROSTATE CANCER: ICD-10-CM

## 2021-11-23 PROCEDURE — 96040: CPT | Performed by: GENETIC COUNSELOR, MS

## 2021-11-23 NOTE — PROGRESS NOTES
Debra Sandifer, a 66-year-old female, was seen for genetic counseling due to a personal history of breast cancer. Ms. Sandifer was diagnosed with a left breast cancer in 2016. She had a left lumpectomy and radiation performed. She started taking Arimidex in July 2016 and then was switched to Aromasin. At the time of her diagnosis, genetic testing for BRCA1/2 was ordered which was negative. Ms. Sandifer when through menopause when she was 53 years old and retains her uterus and ovaries. Her most recent mammogram was in May 2021, and returned normally. Her most recent colonoscopy was in 2019 and she reports she has previously been found to have 2 polyps in her lifetime. Ms. Sandifer was interested in discussing her risk for having a hereditary cancer syndrome. She chose to pursue more comprehensive genetic testing via the Common Hereditary Cancers panel through [employer], which analyzes 47 genes associated with various cancer risks. Results are expected in 2-3 weeks.     PERTINENT FAMILY HISTORY: (See attached pedigree)   Sister:  Appendiceal cancer, 64  Brother: Prostate cancer, 50s    Records were not able to be obtained to confirm these diagnoses.     RISK ASSESSMENT:   Ms. Sandifer’s personal history led to her concern regarding the possibility of a hereditary risk for breast cancer.   Based on her age at diagnosis (>50) and reported family history, she does not meet NCCN guidelines criteria for genetic testing, we discussed the variability and incomplete penetrance that can be seen with hereditary cancer syndromes.  Due to her desire to gather information regarding potential risks, Ms. Sandifer opted to pursue genetic testing.  We discussed that the standard approach to BRCA1/2 testing is via a multigene panel that evaluates BRCA1/2 and multiple other genes known to impact cancer risk.  This risk assessment is based on the family history information provided at the time of the appointment.  The assessment  could change in the future should new information be obtained.     GENETIC COUNSELING (30 minutes): We reviewed the family history information in detail.  Cases of breast cancer follow three general patterns: sporadic, familial, and hereditary.  While most cancer is sporadic, some cases appear to occur in family clusters.  These cases are said to be familial and account for 10-20% of breast cancer cases.  Familial cases may be due to a combination of shared genes and environmental factors among family members.  In even fewer cases (5-10%), the risk for cancer is inherited, and the genes responsible for the increased cancer risk are known.       Family histories typical of hereditary cancer syndromes usually include multiple first- and second-degree relatives diagnosed with cancer types that define a syndrome.  These cases tend to be diagnosed at younger-than-expected ages and can be bilateral or multifocal.  The cancer in these families follows an autosomal dominant inheritance pattern, which indicates the likely presence of a mutation in a cancer susceptibility gene.  Children and siblings of an individual believed to carry this mutation have a 50% chance of inheriting that mutation, thereby inheriting the increased risk to develop cancer.  These mutations can be passed down from the maternal or the paternal lineage.     Hereditary breast cancer accounts for 5-10% of all cases of breast cancer.  A significant proportion of hereditary breast cancer can be attributed to mutations in the BRCA1 and BRCA2 genes.  Mutations in these genes confer an increased risk for breast cancer, ovarian cancer, male breast cancer, prostate cancer and pancreatic cancer.  Ms. Sandifer’s records indicate she already had these two genes tested, but without having that report we cannot confirm if they were tested or to what extent. There are also other genes that are known to be associated with an increased risk for breast cancer and other  cancers that Ms. Sandifer has not previously been tested for. In order to get as much information as possible regarding Ms. Sandifer’s personal risks and potential risks for her family, testing was pursued through a multigene panel that would look at several other genes known to increase the risk for breast cancer and other cancers.     GENETIC TESTING:  The risks, benefits and limitations of genetic testing and implications for clinical management following testing were reviewed.  DNA test results can influence decisions regarding screening, prevention and surgical management.  Genetic testing can have significant psychological implications for both individuals and families.  Also discussed was the possibility of insurance discrimination based on genetic test results and the laws ([place]) in place to prevent this.     We discussed panel testing, which would involve testing for BRCA1/2, as well as several other cancer susceptibility genes at the same time.  The benefits and limitations of genetic testing were discussed and Ms. Sandifer decided to pursue testing. The implications of a positive or negative test result were discussed. We discussed the possibility that, in some cases, genetic test results may be uninformative due to the identification of a genetic variant of uncertain significance. These variants may or may not be associated with an increased cancer risk.  VUSs are frequently reported through multigene panel testing, given the presence of genetic variation in the population and the number of genes being analyzed. Given her personal history, a negative test result would not eliminate all breast cancer risk to her relatives, although the risk would not be as high as it would with positive genetic testing.          PLAN: The patient will be contacted by telephone once results are available in 2-3 weeks. If Ms. Sandifer has any questions in the meantime, she is welcome to give me a call at [phone].         Simran Rios MS, [facility] [place] [facility]  Licensed Certified Genetic Counselor

## 2021-11-25 LAB
CONV .: NORMAL
CYTOLOGIST CVX/VAG CYTO: NORMAL
CYTOLOGY CVX/VAG DOC CYTO: NORMAL
CYTOLOGY CVX/VAG DOC THIN PREP: NORMAL
DX ICD CODE: NORMAL
HIV 1 & 2 AB SER-IMP: NORMAL
OTHER STN SPEC: NORMAL
STAT OF ADQ CVX/VAG CYTO-IMP: NORMAL

## 2021-12-03 ENCOUNTER — HOSPITAL ENCOUNTER (OUTPATIENT)
Dept: CT IMAGING | Facility: HOSPITAL | Age: 66
Discharge: HOME | End: 2021-12-03
Admitting: INTERNAL MEDICINE

## 2021-12-03 DIAGNOSIS — R31.29 MICROHEMATURIA: ICD-10-CM

## 2021-12-03 PROCEDURE — 74176 CT ABD & PELVIS W/O CONTRAST: CPT

## 2021-12-06 ENCOUNTER — DOCUMENTATION (OUTPATIENT)
Dept: OTHER | Facility: CLINIC | Age: 66
End: 2021-12-06

## 2021-12-06 NOTE — PROGRESS NOTES
Debra Sandifer, a 66-year-old female, was seen for genetic counseling due to a personal history of breast cancer. Ms. Sandifer was diagnosed with a left breast cancer in 2016. She had a left lumpectomy and radiation performed. She started taking Arimidex in July 2016 and then was switched to Aromasin. At the time of her diagnosis, genetic testing for BRCA1/2 was ordered which was negative. Ms. Sandifer went through menopause when she was 53 years old and retains her uterus and ovaries. Her most recent mammogram was in May 2021, and returned normally. Her most recent colonoscopy was in 2019 and she reports she has previously been found to have 2 polyps in her lifetime. Ms. Sandifer was interested in discussing her risk for having a hereditary cancer syndrome. She chose to pursue more comprehensive genetic testing via the Common Hereditary Cancers panel through [employer], which analyzes 47 genes associated with various cancer risks. Genetic testing identified one pathogenic mutation in the MUTYH gene (c.536A>G) which indicates Ms. Sandifer is a carrier for MUTYH-associated polyposis (MAP). This finding is discussed in detail below under “test results.” Results were discussed by telephone with Ms. Sandifer on 12/06/2021.    PERTINENT FAMILY HISTORY: (See attached pedigree)   Sister:  Appendiceal cancer, 64  Brother: Prostate cancer, 50s    Records were not able to be obtained to confirm these diagnoses.     RISK ASSESSMENT:   Ms. Sandifer’s personal history led to her concern regarding the possibility of a hereditary risk for breast cancer.   Based on her age at diagnosis (>50) and reported family history, she does not meet NCCN guidelines criteria for genetic testing, we discussed the variability and incomplete penetrance that can be seen with hereditary cancer syndromes.  Due to her desire to gather information regarding potential risks, Ms. Sandifer opted to pursue genetic testing.  We discussed that the  standard approach to BRCA1/2 testing is via a multigene panel that evaluates BRCA1/2 and multiple other genes known to impact cancer risk.  This risk assessment is based on the family history information provided at the time of the appointment.  The assessment could change in the future should new information be obtained.     GENETIC COUNSELING: We reviewed the family history information in detail.  Cases of breast cancer follow three general patterns: sporadic, familial, and hereditary.  While most cancer is sporadic, some cases appear to occur in family clusters.  These cases are said to be familial and account for 10-20% of breast cancer cases.  Familial cases may be due to a combination of shared genes and environmental factors among family members.  In even fewer cases (5-10%), the risk for cancer is inherited, and the genes responsible for the increased cancer risk are known.       Family histories typical of hereditary cancer syndromes usually include multiple first- and second-degree relatives diagnosed with cancer types that define a syndrome.  These cases tend to be diagnosed at younger-than-expected ages and can be bilateral or multifocal.  The cancer in these families follows an autosomal dominant inheritance pattern, which indicates the likely presence of a mutation in a cancer susceptibility gene.  Children and siblings of an individual believed to carry this mutation have a 50% chance of inheriting that mutation, thereby inheriting the increased risk to develop cancer.  These mutations can be passed down from the maternal or the paternal lineage.     Hereditary breast cancer accounts for 5-10% of all cases of breast cancer.  A significant proportion of hereditary breast cancer can be attributed to mutations in the BRCA1 and BRCA2 genes.  Mutations in these genes confer an increased risk for breast cancer, ovarian cancer, male breast cancer, prostate cancer and pancreatic cancer.  Ms. Sandifer’s  records indicate she already had these two genes tested, but without having that report we cannot confirm if they were tested or to what extent. There are also other genes that are known to be associated with an increased risk for breast cancer and other cancers that Ms. Sandifer has not previously been tested for. In order to get as much information as possible regarding Ms. Sandifer ’s personal risks and potential risks for her family, testing was pursued through a multigene panel that would look at several other genes known to increase the risk for breast cancer and other cancers.     GENETIC TESTING:  The risks, benefits and limitations of genetic testing and implications for clinical management following testing were reviewed.  DNA test results can influence decisions regarding screening, prevention and surgical management.  Genetic testing can have significant psychological implications for both individuals and families.  Also discussed was the possibility of insurance discrimination based on genetic test results and the laws ([place]) in place to prevent this.     We discussed panel testing, which would involve testing for BRCA1/2, as well as several other cancer susceptibility genes at the same time.  The benefits and limitations of genetic testing were discussed and Ms. Sandifer decided to pursue testing. The implications of a positive or negative test result were discussed. We discussed the possibility that, in some cases, genetic test results may be uninformative due to the identification of a genetic variant of uncertain significance. These variants may or may not be associated with an increased cancer risk.  VUSs are frequently reported through multigene panel testing, given the presence of genetic variation in the population and the number of genes being analyzed. Given her personal history, a negative test result would not eliminate all breast cancer risk to her relatives, although the risk would not be  as high as it would with positive genetic testing.       TEST RESULTS:  Genetic testing was positive for one pathogenic mutation (c. 536A>G) in the MUTYH gene (see results under Media).  The presence of one MUTYH mutation indicates that Ms. Sandifer does NOT have MUTYH-Associated Polyposis (MAP), but is a carrier.  Unlike most hereditary cancer syndromes, MAP is inherited in an autosomal recessive manner; therefore, Ms. Sandifer being identified as having one MUTYH mutation does not place her at the significantly increased cancer risks that are seen when someone has two mutations, one in each copy of the gene.  The risk of colon cancer in individuals with one MUTYH mutation is unclear. The risk of colon cancer was not found to be significantly increased in large population-based studies, whereas family-based studies found a somewhat increased risk of colon cancer, ranging from a 5.6-10% lifetime risk in comparison to the general population risk of 3-5%. There are varying reports of whether there may be a slight increase in risk for breast cancer for individuals who carry one mutation in MUTYH.      Based on Ms. Sandifer’s test results, her siblings have a 50% chance to have inherited the MUTYH mutation identified. The general population carrier frequency is 1-2%; therefore, it is possible for Ms. Sandifer’s family members to have inherited an additional mutation from the other parent. Therefore, rather than just performing single site analysis for the identified mutation, full MUTYH analysis is indicated for family members.     For individuals found to have two MUTYH mutations, screening colonoscopy begins at age 25.  Genetic testing for adult onset conditions is not recommended before age 18. We would be happy to see family members who live in the area in our clinic to further discuss this information and testing options. Relatives can call our office at [phone] for assistance in scheduling an appointment;  however, a physician referral to our office will prompt our coordinator to contact patients to schedule an appointment.  For family members who live elsewhere, there are genetic counselors at most [facility] centers. They can find a genetic counselor by visiting the National Society of Genetic Counselors website at www.nsgc.org.  Relatives would need a copy of Ms. Sandifer’s genetic test result to ensure they were being tested for the correct gene.    SURVEILLANCE: Per NCCN guidelines, individuals who are carriers of one MUTYH mutation and have a family history of colon cancer in a first-degree relative should begin screening colonoscopy at age 40 or ten years prior to the youngest colorectal cancer diagnosis in the family and repeated every five years. Ms. Sandifer did not report any family history of colon cancer. Her most recent colonoscopy was in 2019 and she reported she was found to have 2 polyps at that time. She was recommended to return in five years for her next colonoscopy.       Based on Ms. Sandifer’s personal history, her female relatives may have an increased lifetime risk for breast cancer.  Relatives may have a personalized risk assessment performed to quantify their breast cancer risk using a family history based risk model, such as the Tyrer-Cuzick model.  According to an American Cancer Society expert panel and NCCN guidelines, annual breast MRI in addition to annual mammogram should be offered to women whose lifetime risk of breast cancer is 20-25 percent or more, starting by age 40 or 10 years prior to the earliest diagnosis in the family.        PLAN: Genetic counseling remains available to Ms. Sandifer. She is welcome to contact us with any questions or concerns at [phone].        Simran Rios MS, [facility] [place], [facility]  Licensed Certified Genetic Counselor    Cc: Debra Sandifer Udaya Joseph, MD

## 2022-01-13 ENCOUNTER — OFFICE VISIT (OUTPATIENT)
Dept: CARDIOLOGY | Facility: CLINIC | Age: 67
End: 2022-01-13

## 2022-01-13 VITALS
HEART RATE: 59 BPM | SYSTOLIC BLOOD PRESSURE: 110 MMHG | HEIGHT: 65 IN | DIASTOLIC BLOOD PRESSURE: 62 MMHG | WEIGHT: 139 LBS | BODY MASS INDEX: 23.16 KG/M2

## 2022-01-13 DIAGNOSIS — Z95.5 HISTORY OF CORONARY ARTERY STENT PLACEMENT: ICD-10-CM

## 2022-01-13 DIAGNOSIS — I25.10 CORONARY ARTERY DISEASE INVOLVING NATIVE CORONARY ARTERY OF NATIVE HEART WITHOUT ANGINA PECTORIS: Primary | ICD-10-CM

## 2022-01-13 DIAGNOSIS — E78.2 MIXED HYPERLIPIDEMIA: ICD-10-CM

## 2022-01-13 PROCEDURE — 93000 ELECTROCARDIOGRAM COMPLETE: CPT | Performed by: INTERNAL MEDICINE

## 2022-01-13 PROCEDURE — 99214 OFFICE O/P EST MOD 30 MIN: CPT | Performed by: INTERNAL MEDICINE

## 2022-01-13 NOTE — PROGRESS NOTES
Date of Office Visit: 22  Encounter Provider: Sanjiv Dykes MD  Place of Service: [facility] CARDIOLOGY  Patient Name: Debra S Sandifer  :1955    Chief Complaint   Patient presents with   • Follow-up     1 year   • Coronary Artery Disease       HPI:  66 y.o. female, new to me, who presents today for follow-up.  She has a past cardiac history significant for coronary artery disease.  In 2016, she was diagnosed with breast cancer.  She subsequently underwent a lumpectomy and has been on maintenance chemotherapy since.  In 2016, she underwent drug-eluting stent placement to her mid and distal LAD as well as a balloon angioplasty of her LAD diagonal.  At that time, she was noted to have mildly reduced LV function with an EF of 46%.  She did have normalization of her LV function.  In 2018, she was evaluated for near syncope and palpitations.  Holter monitor at that time revealed sinus rhythm with rare atrial and ventricular ectopic beats, 2 episodes of NSVT with the longest lasting 7 beats, and 3 short atrial runs.    Since her last visit she has been doing well.  She continues to stay active and denies any dyspnea on exertion or chest pain.  Her blood pressure and heart rate are well controlled.  Her lipid panel looked great on her last lab work but her hemoglobin A1c was slightly increased.  She does not eat much for sweets but she states that she has been cutting back on her carbohydrates since that point in time.    Past Medical History:   Diagnosis Date   • Allergic codeine/latex   • Arthralgia of both hands    • Arthritis     hand   • Atypical chest pain    • Breast cancer (HCC)     Left   • Coronary artery disease involving native coronary artery 2016    has stents   • Dyspareunia    • H/O bronchitis    • H/O infectious mononucleosis     COLLEGE AGE   • Hemorrhoid    • High cholesterol    • History of medical problems back surgery       repair herniated disc   • Hot flashes, menopausal    • Hx of radiation therapy     JULY 2016   • Hyperlipidemia    • Hypertension    • Myocardial infarction (HCC) November 2016    2 stents   • Polyp of sigmoid colon    • Right knee pain    • Stye     LEFT EYE   • Urinary tract infection periodically   • Vitamin D deficiency        Past Surgical History:   Procedure Laterality Date   • BACK SURGERY  01/13/2005    Herniated Disk repair (Done by Dr Jurgen Reddy)   • BREAST BIOPSY Left    • BREAST LUMPECTOMY Left 2016    also biopsied and resected lymph nodes   • BREAST LUMPECTOMY WITH SENTINEL NODE BIOPSY Left 4/13/2016    Procedure: LEFT BREAST MAMMO NEEDLE LOC LUMPECTOMY WITH LEFT AXILLA SENTINEL NODE BIOPSY;  Surgeon: Aminata Estrella MD;  Location: [facility] MAIN OR;  Service:    • CARDIAC CATHETERIZATION N/A 11/15/2016    Procedure: Left Heart Cath;  Surgeon: Sanjiv Dykes MD;  Location: [facility] CATH INVASIVE LOCATION;  Service:    • CARDIAC CATHETERIZATION N/A 11/15/2016    Procedure: Left ventriculography;  Surgeon: Sanjiv Dykes MD;  Location: [facility] CATH INVASIVE LOCATION;  Service:    • CARDIAC SURGERY  11/15/2016   • COLONOSCOPY  2014   • CORONARY ANGIOPLASTY     • CORONARY STENT PLACEMENT  11/15/16    two   • ENDOSCOPY N/A 11/11/2016    Procedure: ESOPHAGOGASTRODUODENOSCOPY WITH BIOPSY;  Surgeon: Mehdi Guardado MD;  Location: [facility] ENDOSCOPY;  Service:    • JOINT MANIPULATION Right 10/28/2019    Procedure: RIGHT KNEE MANIPULATION;  Surgeon: Van Titus MD;  Location: [facility] MAIN OR;  Service: Orthopedics   • LUMBAR DISCECTOMY     • LYMPH NODE BIOPSY  4/13/16    two   • TOTAL KNEE ARTHROPLASTY Right 9/26/2019    Procedure: TOTAL KNEE ARTHROPLASTY;  Surgeon: Van Titus MD;  Location: [facility] MAIN OR;  Service: Orthopedics   • TOTAL KNEE ARTHROPLASTY REVISION Right 4/26/2021    Procedure: RIGHT KNEE FEMORAL REVISION;  Surgeon: Van Titus MD;  Location: [facility] OR;   Service: Orthopedics;  Laterality: Right;   • WISDOM TOOTH EXTRACTION         Social History     Socioeconomic History   • Marital status:      Spouse name: Van   • Years of education: College   Tobacco Use   • Smoking status: Former Smoker     Packs/day: 0.25     Years: 5.00     Pack years: 1.25     Start date:      Quit date:      Years since quittin.5   • Smokeless tobacco: Never Used   • Tobacco comment: smoked less than 1 pack per week   Vaping Use   • Vaping Use: Never used   Substance and Sexual Activity   • Alcohol use: Yes     Alcohol/week: 3.0 standard drinks     Types: 2 Glasses of wine, 1 Standard drinks or equivalent per week     Comment: social drinker/weekends   • Drug use: No   • Sexual activity: Yes     Partners: Male     Birth control/protection: Post-menopausal       Family History   Problem Relation Age of Onset   • Coronary artery disease Mother    • Dementia Mother    • Heart disease Mother         Heart attack w/2 stents   • Heart attack Mother         had heart attack. Heart catheter -2 stents   • Hypertension Father    • Heart disease Father         Coronary heart disease   • Stroke Father         Ischemic   • Diabetes type II Father    • Diabetes Father    • Hyperlipidemia Father    • Prostate cancer Brother 51   • Alcohol abuse Maternal Grandfather    • Rheum arthritis Paternal Grandmother    • Arthritis Paternal Grandmother    • Alcohol abuse Paternal Grandfather    • Stroke Paternal Grandfather         Ischemic   • Rheum arthritis Paternal Grandfather    • Diabetes type II Paternal Grandfather    • Diabetes Paternal Grandfather    • Hyperlipidemia Paternal Grandfather    • Hypertension Paternal Grandfather    • Heart disease Paternal Grandfather    • Cancer Sister         appexdix   • Cancer Brother         Prostate   • No Known Problems Maternal Grandmother    • Malig Hyperthermia Neg Hx        Review of Systems   Constitutional: Negative for chills, fever and  "malaise/fatigue.   Cardiovascular: Positive for palpitations. Negative for chest pain, dyspnea on exertion, leg swelling, near-syncope, orthopnea, paroxysmal nocturnal dyspnea and syncope.   Respiratory: Negative for cough and shortness of breath.    Musculoskeletal: Negative for joint pain, joint swelling and myalgias.   Gastrointestinal: Negative for abdominal pain, diarrhea, melena, nausea and vomiting.   Genitourinary: Negative for frequency and hematuria.   Neurological: Positive for light-headedness. Negative for numbness, paresthesias and seizures.   Allergic/Immunologic: Negative.    All other systems reviewed and are negative.      Allergies   Allergen Reactions   • Codeine Rash   • Oxycodone Nausea And Vomiting and Hallucinations   • Hydrocodone-Acetaminophen Nausea And Vomiting   • Latex Rash         Current Outpatient Medications:   •  acetaminophen (TYLENOL) 325 MG tablet, Take 325 mg by mouth Every 6 (Six) Hours As Needed for Mild Pain ., Disp: , Rfl:   •  aspirin 81 MG oral suspension, , Disp: , Rfl:   •  atorvastatin (LIPITOR) 40 MG tablet, Take 1 tablet by mouth every night at bedtime., Disp: 90 tablet, Rfl: 4  •  calcium carbonate-vitamin d (Calcium 600+D) 600-400 MG-UNIT per tablet, Take 1 tablet by mouth 2 (Two) Times a Day., Disp: , Rfl:   •  carvedilol (COREG) 3.125 MG tablet, Take 1 tablet by mouth Every 12 (Twelve) Hours., Disp: 180 tablet, Rfl: 4  •  cholecalciferol (VITAMIN D3) 1000 units tablet, Take 1,000 Units by mouth Every Morning., Disp: , Rfl:   •  ibandronate (BONIVA) 150 MG tablet, Take 1 tablet by mouth Every 30 (Thirty) Days., Disp: 4 tablet, Rfl: 3  •  Probiotic Product (PROBIOTIC DAILY PO), Take 1 tablet by mouth Every Morning., Disp: , Rfl:       Objective:     Vitals:    01/13/22 0945   BP: 110/62   Pulse: 59   Weight: 63 kg (139 lb)   Height: 165.1 cm (65\")     Body mass index is 23.13 kg/m².    PHYSICAL EXAM:    Constitutional:       General: Not in acute distress.     " Appearance: Well-developed. Not diaphoretic.   Eyes:      Pupils: Pupils are equal, round, and reactive to light.   HENT:      Head: Normocephalic and atraumatic.   Neck:      Thyroid: No thyromegaly.      Vascular: No JVD.   Pulmonary:      Effort: Pulmonary effort is normal. No respiratory distress.      Breath sounds: Normal breath sounds.   Cardiovascular:      Normal rate. Regular rhythm.   Pulses:     Intact distal pulses.   Abdominal:      General: Bowel sounds are normal. There is no distension.      Palpations: Abdomen is soft. There is no hepatomegaly or splenomegaly.      Tenderness: There is no abdominal tenderness.   Musculoskeletal: Normal range of motion. Skin:     General: Skin is warm and dry.      Findings: No erythema.   Neurological:      Mental Status: Alert and oriented to person, place, and time.   Psychiatric:         Mood and Affect: Mood is not anxious.         Behavior: Behavior normal.         Judgment: Judgment normal.           ECG 12 Lead    Date/Time: 1/13/2022 10:26 AM  Performed by: Sanjiv Dykes MD  Authorized by: Sanjiv Dykes MD   Comparison: compared with previous ECG from 1/11/2021  Similar to previous ECG  Rhythm: sinus rhythm  Rate: normal  Conduction: left anterior fascicular block  QRS axis: left                 9/21/2020  · Calculated EF = 56% Estimated EF was in agreement with the calculated EF.  · Mild mitral valve regurgitation is present.  · STRESS ECHO  · Normal stress echo with no significant echocardiographic evidence for myocardial ischemia.  · Post EF- 69%.      Assessment:        Plan:     66-year-old female with a medical history of coronary artery disease and prior PCI of the mid to distal LAD, PCA to the diagonal, previously documented ischemic cardiomyopathy with now normal ejection fraction, PVCs and PACs, who presents back to me in follow up.  She has been doing well from a cardiac stand point.  She denies any chest pain or dyspnea.  She  reports that she is still exercising without any limitations.  Her hemoglobin A1c has been slowly increasing and was 6 on her last evaluation.  She is starting to do a little bit of a better job with her diet because of that.    1.  Coronary disease with prior PCI to the LAD.  No angina.  - Continue aspirin life long.  No bleeding complications documented.  No significant anemia.  - Continue Carvedilol at current dose.  Blood pressure and heart rate are well controlled.  - Continue atorvastatin 40 mg p.o. at bedtime.  Lipid panel from 11/20/2021 reviewed.  LDL well controlled.  At goal.  2.  Vitamin D deficiency.  - Continue cholecalciferol therapy.  3.  Hyperlipidemia: As above.  Continue atorvastatin at current dose.  No myalgias reported.    I will see her back in 1 year.

## 2022-03-21 ENCOUNTER — TRANSCRIBE ORDERS (OUTPATIENT)
Dept: ADMINISTRATIVE | Facility: HOSPITAL | Age: 67
End: 2022-03-21

## 2022-03-21 DIAGNOSIS — Z12.31 SCREENING MAMMOGRAM FOR BREAST CANCER: Primary | ICD-10-CM

## 2022-04-15 ENCOUNTER — OFFICE VISIT (OUTPATIENT)
Dept: INTERNAL MEDICINE | Facility: CLINIC | Age: 67
End: 2022-04-15

## 2022-04-15 VITALS
BODY MASS INDEX: 23.16 KG/M2 | HEIGHT: 65 IN | OXYGEN SATURATION: 98 % | WEIGHT: 139 LBS | SYSTOLIC BLOOD PRESSURE: 104 MMHG | HEART RATE: 71 BPM | DIASTOLIC BLOOD PRESSURE: 64 MMHG

## 2022-04-15 DIAGNOSIS — R39.89 BLADDER PAIN: Primary | ICD-10-CM

## 2022-04-15 LAB
BILIRUB BLD-MCNC: NEGATIVE MG/DL
CLARITY, POC: CLEAR
COLOR UR: YELLOW
EXPIRATION DATE: ABNORMAL
GLUCOSE UR STRIP-MCNC: NEGATIVE MG/DL
KETONES UR QL: NEGATIVE
LEUKOCYTE EST, POC: NEGATIVE
Lab: ABNORMAL
NITRITE UR-MCNC: NEGATIVE MG/ML
PH UR: 5.5 [PH] (ref 5–8)
PROT UR STRIP-MCNC: NEGATIVE MG/DL
RBC # UR STRIP: ABNORMAL /UL
SP GR UR: 1.02 (ref 1–1.03)
UROBILINOGEN UR QL: NORMAL

## 2022-04-15 PROCEDURE — 99213 OFFICE O/P EST LOW 20 MIN: CPT | Performed by: INTERNAL MEDICINE

## 2022-04-15 PROCEDURE — 81003 URINALYSIS AUTO W/O SCOPE: CPT | Performed by: INTERNAL MEDICINE

## 2022-04-15 RX ORDER — NITROFURANTOIN 25; 75 MG/1; MG/1
100 CAPSULE ORAL 2 TIMES DAILY
Qty: 14 CAPSULE | Refills: 0 | Status: SHIPPED | OUTPATIENT
Start: 2022-04-15 | End: 2022-11-02

## 2022-04-15 NOTE — PROGRESS NOTES
Subjective     Debra S Sandifer is a 67 y.o. female who presents with   Chief Complaint   Patient presents with   • bladder pain       History of Present Illness     Pain in vaginal area.  No dysuria.  No urinary frequency.  Suprapubic pain.   Going on ten days.  Urine is cloudy.        Review of Systems   Respiratory: Negative.    Cardiovascular: Negative.        The following portions of the patient's history were reviewed and updated as appropriate: allergies, current medications and problem list.    Patient Active Problem List    Diagnosis Date Noted   • Osteopenia of hip 05/24/2021     Note Last Updated: 11/19/2021     Boniva start 2021     • DJD (degenerative joint disease) of knee 09/26/2019   • History of coronary artery stent placement 06/18/2018   • Prediabetes 10/17/2017   • Coronary artery disease involving native coronary artery 11/23/2016     Note Last Updated: 11/23/2016 11/2016  PTCA of the first diagonal branch with a 2.5 x 12 mm trek Rx balloon.  PCI of the distal LAD with a 2.75 x 18 mm Xience Alpine drug-eluting stent  PCI of the mid LAD with a 3.0 x 28 mm Xience Alpine drug-eluting stent     • Malignant neoplasm of upper-inner quadrant of left breast in female, estrogen receptor positive (HCC) 04/27/2016     Note Last Updated: 10/14/2016     S/p lumpectomy and radiation in 2016     • Mixed hyperlipidemia 02/10/2016       Current Outpatient Medications on File Prior to Visit   Medication Sig Dispense Refill   • acetaminophen (TYLENOL) 325 MG tablet Take 325 mg by mouth Every 6 (Six) Hours As Needed for Mild Pain .     • aspirin 81 MG oral suspension      • atorvastatin (LIPITOR) 40 MG tablet Take 1 tablet by mouth every night at bedtime. 90 tablet 4   • calcium carbonate-vitamin d 600-400 MG-UNIT per tablet Take 1 tablet by mouth 2 (Two) Times a Day.     • carvedilol (COREG) 3.125 MG tablet Take 1 tablet by mouth Every 12 (Twelve) Hours. 180 tablet 4   • cholecalciferol (VITAMIN D3) 1000  "units tablet Take 1,000 Units by mouth Every Morning.     • ibandronate (BONIVA) 150 MG tablet Take 1 tablet by mouth Every 30 (Thirty) Days. 4 tablet 3   • Probiotic Product (PROBIOTIC DAILY PO) Take 1 tablet by mouth Every Morning.       No current facility-administered medications on file prior to visit.       Objective     /64   Pulse 71   Ht 165.1 cm (65\")   Wt 63 kg (139 lb)   SpO2 98%   BMI 23.13 kg/m²     Physical Exam  Constitutional:       Appearance: She is well-developed.   HENT:      Head: Normocephalic and atraumatic.   Pulmonary:      Effort: Pulmonary effort is normal.   Abdominal:      General: There is no distension.      Palpations: Abdomen is soft. There is no mass.      Tenderness: There is no abdominal tenderness. There is no guarding or rebound.   Genitourinary:     Vagina: Normal.      Cervix: Normal.      Uterus: Normal.       Adnexa: Right adnexa normal and left adnexa normal.   Neurological:      Mental Status: She is alert.         Assessment/Plan   Diagnoses and all orders for this visit:    1. Bladder pain (Primary)  -     POC Urinalysis Dipstick, Automated  -     Urine Culture - Urine, Urine, Clean Catch    Other orders  -     nitrofurantoin, macrocrystal-monohydrate, (Macrobid) 100 MG capsule; Take 1 capsule by mouth 2 (Two) Times a Day.  Dispense: 14 capsule; Refill: 0        Discussion    Patient presents with bladder pain likely secondary to acute cystitis.  We will send for culture and follow up on that.  Prescription for antibiotics is sent in.  The patient is instructed to let our office know if not feeling better over the next several days or if there is any change in symptoms.          Future Appointments   Date Time Provider Department Center   5/18/2022  9:10 AM [facility]   6/8/2022  4:00 PM [facility] M[facility] 2 [facility]   10/31/2022 10:00 AM LAB CHAIR 1 [facility] LAB [facility] CARIDAD OCFERNANDO LoCristina   10/31/2022 10:40 AM Andrea Akhtar MD " [facility]   11/15/2022  8:50 AM [facility]   11/22/2022  7:45 AM Alisa Morales MD [facility]  [facility]   1/31/2023  8:30 AM [facility]edman APRN [facility]

## 2022-04-17 LAB
BACTERIA UR CULT: NO GROWTH
BACTERIA UR CULT: NORMAL

## 2022-05-02 RX ORDER — IBANDRONATE SODIUM 150 MG/1
TABLET, FILM COATED ORAL
Qty: 3 TABLET | Refills: 0 | Status: SHIPPED | OUTPATIENT
Start: 2022-05-02 | End: 2022-07-29

## 2022-05-18 DIAGNOSIS — E78.2 MIXED HYPERLIPIDEMIA: Primary | ICD-10-CM

## 2022-05-18 DIAGNOSIS — R73.03 PREDIABETES: ICD-10-CM

## 2022-05-19 LAB
ALBUMIN SERPL-MCNC: 4.3 G/DL (ref 3.8–4.8)
ALBUMIN/GLOB SERPL: 1.8 {RATIO} (ref 1.2–2.2)
ALP SERPL-CCNC: 82 IU/L (ref 44–121)
ALT SERPL-CCNC: 23 IU/L (ref 0–32)
AST SERPL-CCNC: 34 IU/L (ref 0–40)
BILIRUB SERPL-MCNC: 0.7 MG/DL (ref 0–1.2)
BUN SERPL-MCNC: 16 MG/DL (ref 8–27)
BUN/CREAT SERPL: 16 (ref 12–28)
CALCIUM SERPL-MCNC: 9.3 MG/DL (ref 8.7–10.3)
CHLORIDE SERPL-SCNC: 106 MMOL/L (ref 96–106)
CHOLEST SERPL-MCNC: 169 MG/DL (ref 100–199)
CO2 SERPL-SCNC: 22 MMOL/L (ref 20–29)
CREAT SERPL-MCNC: 0.97 MG/DL (ref 0.57–1)
EGFRCR SERPLBLD CKD-EPI 2021: 64 ML/MIN/1.73
GLOBULIN SER CALC-MCNC: 2.4 G/DL (ref 1.5–4.5)
GLUCOSE SERPL-MCNC: 89 MG/DL (ref 65–99)
HBA1C MFR BLD: 5.9 % (ref 4.8–5.6)
HDLC SERPL-MCNC: 82 MG/DL
LDLC SERPL CALC-MCNC: 75 MG/DL (ref 0–99)
POTASSIUM SERPL-SCNC: 4.7 MMOL/L (ref 3.5–5.2)
PROT SERPL-MCNC: 6.7 G/DL (ref 6–8.5)
SODIUM SERPL-SCNC: 141 MMOL/L (ref 134–144)
TRIGL SERPL-MCNC: 64 MG/DL (ref 0–149)
VLDLC SERPL CALC-MCNC: 12 MG/DL (ref 5–40)

## 2022-06-08 ENCOUNTER — HOSPITAL ENCOUNTER (OUTPATIENT)
Dept: MAMMOGRAPHY | Facility: HOSPITAL | Age: 67
Discharge: HOME | End: 2022-06-08
Admitting: INTERNAL MEDICINE

## 2022-06-08 DIAGNOSIS — Z12.31 SCREENING MAMMOGRAM FOR BREAST CANCER: ICD-10-CM

## 2022-06-08 PROCEDURE — 77063 BREAST TOMOSYNTHESIS BI: CPT

## 2022-06-08 PROCEDURE — 77067 SCR MAMMO BI INCL CAD: CPT

## 2022-07-11 RX ORDER — ATORVASTATIN CALCIUM 40 MG/1
TABLET, FILM COATED ORAL
Qty: 90 TABLET | Refills: 4 | Status: SHIPPED | OUTPATIENT
Start: 2022-07-11

## 2022-07-11 RX ORDER — CARVEDILOL 3.12 MG/1
TABLET ORAL
Qty: 180 TABLET | Refills: 4 | Status: SHIPPED | OUTPATIENT
Start: 2022-07-11

## 2022-07-29 RX ORDER — IBANDRONATE SODIUM 150 MG/1
TABLET, FILM COATED ORAL
Qty: 3 TABLET | Refills: 0 | Status: SHIPPED | OUTPATIENT
Start: 2022-07-29 | End: 2022-10-24

## 2022-10-24 RX ORDER — IBANDRONATE SODIUM 150 MG/1
TABLET, FILM COATED ORAL
Qty: 3 TABLET | Refills: 0 | Status: SHIPPED | OUTPATIENT
Start: 2022-10-24 | End: 2022-12-22

## 2022-10-31 ENCOUNTER — TELEPHONE (OUTPATIENT)
Dept: INTERNAL MEDICINE | Facility: CLINIC | Age: 67
End: 2022-10-31

## 2022-10-31 ENCOUNTER — APPOINTMENT (OUTPATIENT)
Dept: OTHER | Facility: HOSPITAL | Age: 67
End: 2022-10-31

## 2022-10-31 NOTE — TELEPHONE ENCOUNTER
Caller: Sandifer, Debra S    Relationship to patient: Self    Best call back number: [phone]     Chief complaint: AMW    Type of visit: AMW    Requested date: CLOSE TO     If rescheduling, when is the original appointment: 02/21/22    Additional notes:PATIENT IS CALLING TO RESCHEDULE THE APPOINTMENT FOR 11/21/22, THAT DR SOLIS IS GOING TO BE OUT OF OFFICE.  SHE IS  WANTING TO KNOW IF DR SOLIS WOULD BE ABLE TO WORK HER IN ANOTHER TIME CLOSER TO THE 11/21/22 APPOINTMENT, RATHER THAN WAIT UNTIL 02/21/22. SHE STATES SHE WOULD LIKE TO KEEP ON SCHEDULE IF POSSIBLE.    PLEASE ADVISE.

## 2022-11-02 ENCOUNTER — TELEMEDICINE (OUTPATIENT)
Dept: INTERNAL MEDICINE | Facility: CLINIC | Age: 67
End: 2022-11-02

## 2022-11-02 DIAGNOSIS — U07.1 COVID-19 VIRUS DETECTED: Primary | ICD-10-CM

## 2022-11-02 PROCEDURE — 99213 OFFICE O/P EST LOW 20 MIN: CPT | Performed by: INTERNAL MEDICINE

## 2022-11-02 NOTE — PROGRESS NOTES
Subjective     Debra S Sandifer is a 67 y.o. female who presents with   Chief Complaint   Patient presents with   • Covid-19 Home Monitoring Video Visit       History of Present Illness     You have chosen to receive care through a telehealth visit.  Do you consent to use a video/audio connection for your medical care today? Yes  Provider is located in [place].  The patient is located in [place].      Symptoms started on Sunday morning.  Tested positive.  Bad cold and cough.  Head and nasal drainage.  No fever.      Review of Systems   Respiratory: Negative.    Cardiovascular: Negative.        The following portions of the patient's history were reviewed and updated as appropriate: allergies, current medications and problem list.    Patient Active Problem List    Diagnosis Date Noted   • Osteopenia of hip 05/24/2021     Note Last Updated: 11/19/2021     Boniva start 2021     • DJD (degenerative joint disease) of knee 09/26/2019   • History of coronary artery stent placement 06/18/2018   • Prediabetes 10/17/2017   • Coronary artery disease involving native coronary artery 11/23/2016     Note Last Updated: 11/23/2016 11/2016  PTCA of the first diagonal branch with a 2.5 x 12 mm trek Rx balloon.  PCI of the distal LAD with a 2.75 x 18 mm Xience Alpine drug-eluting stent  PCI of the mid LAD with a 3.0 x 28 mm Xience Alpine drug-eluting stent     • Malignant neoplasm of upper-inner quadrant of left breast in female, estrogen receptor positive (HCC) 04/27/2016     Note Last Updated: 10/14/2016     S/p lumpectomy and radiation in 2016     • Mixed hyperlipidemia 02/10/2016       Current Outpatient Medications on File Prior to Visit   Medication Sig Dispense Refill   • acetaminophen (TYLENOL) 325 MG tablet Take 325 mg by mouth Every 6 (Six) Hours As Needed for Mild Pain .     • aspirin 81 MG oral suspension      • atorvastatin (LIPITOR) 40 MG tablet TAKE ONE TABLET BY MOUTH EVERY NIGHT AT BEDTIME 90 tablet 4   • calcium  carbonate-vitamin d 600-400 MG-UNIT per tablet Take 1 tablet by mouth 2 (Two) Times a Day.     • carvedilol (COREG) 3.125 MG tablet TAKE ONE TABLET BY MOUTH EVERY 12 HOURS 180 tablet 4   • cholecalciferol (VITAMIN D3) 1000 units tablet Take 1,000 Units by mouth Every Morning.     • ibandronate (BONIVA) 150 MG tablet TAKE ONE TABLET BY MOUTH EVERY 30 DAYS 3 tablet 0   • Probiotic Product (PROBIOTIC DAILY PO) Take 1 tablet by mouth Every Morning.     • [DISCONTINUED] nitrofurantoin, macrocrystal-monohydrate, (Macrobid) 100 MG capsule Take 1 capsule by mouth 2 (Two) Times a Day. 14 capsule 0     No current facility-administered medications on file prior to visit.       Objective     There were no vitals taken for this visit.    Physical Exam  Constitutional:       Appearance: She is well-developed.   HENT:      Head: Normocephalic and atraumatic.   Pulmonary:      Effort: Pulmonary effort is normal.   Neurological:      Mental Status: She is alert and oriented to person, place, and time.   Psychiatric:         Behavior: Behavior normal.         Assessment & Plan   Diagnoses and all orders for this visit:    1. COVID-19 virus detected (Primary)    Other orders  -     Nirmatrelvir&Ritonavir 300/100 (PAXLOVID) 20 x 150 MG & 10 x 100MG tablet therapy pack tablet; Take 3 tablets by mouth 2 (Two) Times a Day for 5 days.  Dispense: 30 tablet; Refill: 0        Discussion    I recommend attention to rest and fluids. I recommend as needed tylenol for fevers and aches. I recommend adding OTC vitamin D, C and zinc. I recommend monitoring pulse oximetry if you have access to that. I recommend 81 mg of aspirin daily unless this is any contraindication. You should quarantine for ten days.  It can be five days if you are feeling better and fever free but you should continue to wear a mask for a total of ten days around others. Paxlovid is an FDA approved for emergency use.  Important considerations include numerous drug interactions.   Symptoms could rebound in some after a course of this medication according to recent reports.    Reasons to go to the ER include severe trouble breathing, chest pain, confusion, inability to wake or stay awake, and bluish lips or face.  Continue supportive measures and let me know if there is any change in symptoms.         Future Appointments   Date Time Provider Department Center   11/2/2022  3:15 PM Alisa Morales MD [facility]  [facility]   11/15/2022  8:50 AM [facility]  [facility]   11/21/2022  7:50 AM LAB CHAIR 1 CBC [facility]   11/21/2022  8:00 AM Andrea Akhtar MD [facility]   11/29/2022  2:15 PM Alisa Morales MD [facility]  [facility]   1/31/2023  8:30 AM Marsha Freedman APRN [facility]  [facility]

## 2022-11-15 DIAGNOSIS — E78.2 MIXED HYPERLIPIDEMIA: Primary | ICD-10-CM

## 2022-11-15 DIAGNOSIS — R73.03 PREDIABETES: ICD-10-CM

## 2022-11-15 LAB
ALBUMIN SERPL-MCNC: 4 G/DL (ref 3.5–5.2)
ALBUMIN/GLOB SERPL: 1.5 G/DL
ALP SERPL-CCNC: 85 U/L (ref 39–117)
ALT SERPL-CCNC: 24 U/L (ref 1–33)
APPEARANCE UR: CLEAR
AST SERPL-CCNC: 31 U/L (ref 1–32)
BACTERIA #/AREA URNS HPF: NORMAL /HPF
BASOPHILS # BLD AUTO: 0.05 10*3/MM3 (ref 0–0.2)
BASOPHILS NFR BLD AUTO: 1.2 % (ref 0–1.5)
BILIRUB SERPL-MCNC: 0.7 MG/DL (ref 0–1.2)
BILIRUB UR QL STRIP: NEGATIVE
BUN SERPL-MCNC: 13 MG/DL (ref 8–23)
BUN/CREAT SERPL: 13.7 (ref 7–25)
CALCIUM SERPL-MCNC: 9.3 MG/DL (ref 8.6–10.5)
CHLORIDE SERPL-SCNC: 104 MMOL/L (ref 98–107)
CHOLEST SERPL-MCNC: 182 MG/DL (ref 0–200)
CO2 SERPL-SCNC: 30.9 MMOL/L (ref 22–29)
COLOR UR: YELLOW
CREAT SERPL-MCNC: 0.95 MG/DL (ref 0.57–1)
EGFRCR SERPLBLD CKD-EPI 2021: 66.2 ML/MIN/1.73
EOSINOPHIL # BLD AUTO: 0.12 10*3/MM3 (ref 0–0.4)
EOSINOPHIL NFR BLD AUTO: 3 % (ref 0.3–6.2)
EPI CELLS #/AREA URNS HPF: NORMAL /HPF
ERYTHROCYTE [DISTWIDTH] IN BLOOD BY AUTOMATED COUNT: 13.3 % (ref 12.3–15.4)
GLOBULIN SER CALC-MCNC: 2.7 GM/DL
GLUCOSE SERPL-MCNC: 91 MG/DL (ref 65–99)
GLUCOSE UR QL STRIP: NEGATIVE
HBA1C MFR BLD: 5.9 % (ref 4.8–5.6)
HCT VFR BLD AUTO: 40.4 % (ref 34–46.6)
HDLC SERPL-MCNC: 90 MG/DL (ref 40–60)
HGB BLD-MCNC: 13.2 G/DL (ref 12–15.9)
HGB UR QL STRIP: NEGATIVE
IMM GRANULOCYTES # BLD AUTO: 0.02 10*3/MM3 (ref 0–0.05)
IMM GRANULOCYTES NFR BLD AUTO: 0.5 % (ref 0–0.5)
KETONES UR QL STRIP: ABNORMAL
LDLC SERPL CALC-MCNC: 80 MG/DL (ref 0–100)
LEUKOCYTE ESTERASE UR QL STRIP: ABNORMAL
LYMPHOCYTES # BLD AUTO: 1.09 10*3/MM3 (ref 0.7–3.1)
LYMPHOCYTES NFR BLD AUTO: 27.2 % (ref 19.6–45.3)
MCH RBC QN AUTO: 29.3 PG (ref 26.6–33)
MCHC RBC AUTO-ENTMCNC: 32.7 G/DL (ref 31.5–35.7)
MCV RBC AUTO: 89.8 FL (ref 79–97)
MONOCYTES # BLD AUTO: 0.4 10*3/MM3 (ref 0.1–0.9)
MONOCYTES NFR BLD AUTO: 10 % (ref 5–12)
NEUTROPHILS # BLD AUTO: 2.33 10*3/MM3 (ref 1.7–7)
NEUTROPHILS NFR BLD AUTO: 58.1 % (ref 42.7–76)
NITRITE UR QL STRIP: NEGATIVE
NRBC BLD AUTO-RTO: 0 /100 WBC (ref 0–0.2)
PH UR STRIP: 6 [PH] (ref 5–8)
PLATELET # BLD AUTO: 272 10*3/MM3 (ref 140–450)
POTASSIUM SERPL-SCNC: 4.6 MMOL/L (ref 3.5–5.2)
PROT SERPL-MCNC: 6.7 G/DL (ref 6–8.5)
PROT UR QL STRIP: NEGATIVE
RBC # BLD AUTO: 4.5 10*6/MM3 (ref 3.77–5.28)
RBC #/AREA URNS HPF: NORMAL /HPF
SODIUM SERPL-SCNC: 139 MMOL/L (ref 136–145)
SP GR UR STRIP: 1.02 (ref 1–1.03)
TRIGL SERPL-MCNC: 61 MG/DL (ref 0–150)
TSH SERPL DL<=0.005 MIU/L-ACNC: 2.74 UIU/ML (ref 0.27–4.2)
UROBILINOGEN UR STRIP-MCNC: ABNORMAL MG/DL
VLDLC SERPL CALC-MCNC: 12 MG/DL (ref 5–40)
WBC # BLD AUTO: 4.01 10*3/MM3 (ref 3.4–10.8)
WBC #/AREA URNS HPF: NORMAL /HPF

## 2022-11-21 ENCOUNTER — LAB (OUTPATIENT)
Dept: OTHER | Facility: HOSPITAL | Age: 67
End: 2022-11-21

## 2022-11-21 ENCOUNTER — OFFICE VISIT (OUTPATIENT)
Dept: ONCOLOGY | Facility: CLINIC | Age: 67
End: 2022-11-21

## 2022-11-21 VITALS
DIASTOLIC BLOOD PRESSURE: 74 MMHG | HEIGHT: 65 IN | SYSTOLIC BLOOD PRESSURE: 124 MMHG | BODY MASS INDEX: 23.54 KG/M2 | TEMPERATURE: 98 F | WEIGHT: 141.3 LBS | RESPIRATION RATE: 16 BRPM | HEART RATE: 64 BPM | OXYGEN SATURATION: 96 %

## 2022-11-21 DIAGNOSIS — Z17.0 MALIGNANT NEOPLASM OF UPPER-INNER QUADRANT OF LEFT BREAST IN FEMALE, ESTROGEN RECEPTOR POSITIVE: Primary | ICD-10-CM

## 2022-11-21 DIAGNOSIS — C50.212 MALIGNANT NEOPLASM OF UPPER-INNER QUADRANT OF LEFT BREAST IN FEMALE, ESTROGEN RECEPTOR POSITIVE: Primary | ICD-10-CM

## 2022-11-21 DIAGNOSIS — Z09 ENCOUNTER FOR FOLLOW-UP EXAMINATION AFTER COMPLETED TREATMENT FOR CONDITIONS OTHER THAN MALIGNANT NEOPLASM: ICD-10-CM

## 2022-11-21 LAB
ALBUMIN SERPL-MCNC: 3.9 G/DL (ref 3.5–5.2)
ALBUMIN/GLOB SERPL: 1.4 G/DL
ALP SERPL-CCNC: 88 U/L (ref 39–117)
ALT SERPL W P-5'-P-CCNC: 22 U/L (ref 1–33)
ANION GAP SERPL CALCULATED.3IONS-SCNC: 5.8 MMOL/L (ref 5–15)
AST SERPL-CCNC: 32 U/L (ref 1–32)
BASOPHILS # BLD AUTO: 0.04 10*3/MM3 (ref 0–0.2)
BASOPHILS NFR BLD AUTO: 1 % (ref 0–1.5)
BILIRUB SERPL-MCNC: 0.4 MG/DL (ref 0–1.2)
BUN SERPL-MCNC: 16 MG/DL (ref 8–23)
BUN/CREAT SERPL: 17.8 (ref 7–25)
CALCIUM SPEC-SCNC: 9.9 MG/DL (ref 8.6–10.5)
CHLORIDE SERPL-SCNC: 106 MMOL/L (ref 98–107)
CO2 SERPL-SCNC: 30.2 MMOL/L (ref 22–29)
CREAT SERPL-MCNC: 0.9 MG/DL (ref 0.57–1)
DEPRECATED RDW RBC AUTO: 48.1 FL (ref 37–54)
EGFRCR SERPLBLD CKD-EPI 2021: 70.7 ML/MIN/1.73
EOSINOPHIL # BLD AUTO: 0.16 10*3/MM3 (ref 0–0.4)
EOSINOPHIL NFR BLD AUTO: 3.8 % (ref 0.3–6.2)
ERYTHROCYTE [DISTWIDTH] IN BLOOD BY AUTOMATED COUNT: 14.3 % (ref 12.3–15.4)
GLOBULIN UR ELPH-MCNC: 2.8 GM/DL
GLUCOSE SERPL-MCNC: 94 MG/DL (ref 65–99)
HCT VFR BLD AUTO: 39.3 % (ref 34–46.6)
HGB BLD-MCNC: 12.2 G/DL (ref 12–15.9)
IMM GRANULOCYTES # BLD AUTO: 0.02 10*3/MM3 (ref 0–0.05)
IMM GRANULOCYTES NFR BLD AUTO: 0.5 % (ref 0–0.5)
LYMPHOCYTES # BLD AUTO: 1.17 10*3/MM3 (ref 0.7–3.1)
LYMPHOCYTES NFR BLD AUTO: 28 % (ref 19.6–45.3)
MCH RBC QN AUTO: 28.4 PG (ref 26.6–33)
MCHC RBC AUTO-ENTMCNC: 31 G/DL (ref 31.5–35.7)
MCV RBC AUTO: 91.4 FL (ref 79–97)
MONOCYTES # BLD AUTO: 0.54 10*3/MM3 (ref 0.1–0.9)
MONOCYTES NFR BLD AUTO: 12.9 % (ref 5–12)
NEUTROPHILS NFR BLD AUTO: 2.25 10*3/MM3 (ref 1.7–7)
NEUTROPHILS NFR BLD AUTO: 53.8 % (ref 42.7–76)
NRBC BLD AUTO-RTO: 0 /100 WBC (ref 0–0.2)
PLATELET # BLD AUTO: 248 10*3/MM3 (ref 140–450)
PMV BLD AUTO: 10.2 FL (ref 6–12)
POTASSIUM SERPL-SCNC: 4.6 MMOL/L (ref 3.5–5.2)
PROT SERPL-MCNC: 6.7 G/DL (ref 6–8.5)
RBC # BLD AUTO: 4.3 10*6/MM3 (ref 3.77–5.28)
SODIUM SERPL-SCNC: 142 MMOL/L (ref 136–145)
WBC NRBC COR # BLD: 4.18 10*3/MM3 (ref 3.4–10.8)

## 2022-11-21 PROCEDURE — 99214 OFFICE O/P EST MOD 30 MIN: CPT | Performed by: INTERNAL MEDICINE

## 2022-11-21 PROCEDURE — 80053 COMPREHEN METABOLIC PANEL: CPT | Performed by: INTERNAL MEDICINE

## 2022-11-21 PROCEDURE — 85025 COMPLETE CBC W/AUTO DIFF WBC: CPT | Performed by: INTERNAL MEDICINE

## 2022-11-21 PROCEDURE — 36415 COLL VENOUS BLD VENIPUNCTURE: CPT

## 2022-11-21 NOTE — PROGRESS NOTES
Subjective:     Reason for follow up:   1. Stage 1 (T1bN0), left breast invasive ductal carcinoma, ER+ (90%), AK+, Pap2fos negative   * status post lumpectomy with SLNB    * status post radiation therapy   * Arimidex started 8/2016 - changed to Aromasin due to arthalgias     History of Present Ilness: Debra S Sandifer presents for follow-up of breast cancer.  She completed 5 years of hormonal blockade and because of the breast cancer index showing no benefit from extended therapy she stopped treatment a year ago.    She is here for follow-up and is doing much better.  She is exercising and keeping her weight under control.  She is up-to-date with colonoscopies    she will continue on her Boniva check her bone density in March    Past Medical   Past Medical History:   Diagnosis Date   • Allergic codeine/latex   • Arthralgia of both hands    • Arthritis     hand   • Atypical chest pain    • Breast cancer (HCC)     Left   • Coronary artery disease involving native coronary artery 11/23/2016    has stents   • Dyspareunia    • H/O bronchitis    • H/O infectious mononucleosis     COLLEGE AGE   • Hemorrhoid    • High cholesterol    • History of medical problems back surgery  2005    repair herniated disc   • Hot flashes, menopausal    • Hx of radiation therapy     JULY 2016   • Hyperlipidemia    • Hypertension    • Myocardial infarction (HCC) November 2016    2 stents   • Polyp of sigmoid colon    • Right knee pain    • Stye     LEFT EYE   • Urinary tract infection periodically   • Vitamin D deficiency      Patient Active Problem List   Diagnosis   • Mixed hyperlipidemia   • Malignant neoplasm of upper-inner quadrant of left breast in female, estrogen receptor positive (HCC)   • Coronary artery disease involving native coronary artery   • Prediabetes   • History of coronary artery stent placement   • DJD (degenerative joint disease) of knee   • Osteopenia of hip     Oncologic history  11/21  Her arthralgias are better than  they were on Arimidex but they are still present. Her hot flashes are present and tolerable.     We did a breast cancer index because of her tolerance issues and her worsening bone density and this showed a low risk of late recurrence and a low risk of response to extended endocrine blockade and therefore I think it is safe to go off her Aromasin  Social History   Social History     Socioeconomic History   • Marital status:      Spouse name: Van   • Years of education: College   Tobacco Use   • Smoking status: Former     Packs/day: 0.25     Years: 5.00     Pack years: 1.25     Types: Cigarettes     Start date:      Quit date:      Years since quittin.4   • Smokeless tobacco: Never   • Tobacco comments:     smoked less than 1 pack per week   Vaping Use   • Vaping Use: Never used   Substance and Sexual Activity   • Alcohol use: Yes     Alcohol/week: 3.0 standard drinks     Types: 2 Glasses of wine, 1 Standard drinks or equivalent per week     Comment: social drinker/weekends   • Drug use: No   • Sexual activity: Yes     Partners: Male     Birth control/protection: Post-menopausal      Family History  Family History   Problem Relation Age of Onset   • Coronary artery disease Mother    • Dementia Mother    • Heart disease Mother         Heart attack w/2 stents   • Heart attack Mother         had heart attack. Heart catheter -2 stents   • Hypertension Father    • Heart disease Father         Coronary heart disease   • Stroke Father         Ischemic   • Diabetes type II Father    • Diabetes Father    • Hyperlipidemia Father    • Prostate cancer Brother 51   • Alcohol abuse Maternal Grandfather    • Rheum arthritis Paternal Grandmother    • Arthritis Paternal Grandmother    • Alcohol abuse Paternal Grandfather    • Stroke Paternal Grandfather         Ischemic   • Rheum arthritis Paternal Grandfather    • Diabetes type II Paternal Grandfather    • Diabetes Paternal Grandfather    • Hyperlipidemia  "Paternal Grandfather    • Hypertension Paternal Grandfather    • Heart disease Paternal Grandfather    • Cancer Sister         appexdix   • Cancer Brother         Prostate   • No Known Problems Maternal Grandmother    • Malig Hyperthermia Neg Hx      Allergies  Allergies   Allergen Reactions   • Codeine Rash   • Oxycodone Nausea And Vomiting and Hallucinations   • Hydrocodone-Acetaminophen Nausea And Vomiting   • Latex Rash       Medications: The current medication list was reviewed in the EMR.    Review of Systems  Review of Systems   Constitutional: Negative for activity change, appetite change, chills, diaphoresis, fatigue (11/12/2018), fever and unexpected weight change.   Eyes: Negative.    Respiratory: Negative.  Negative for cough, chest tightness and shortness of breath.    Cardiovascular: Negative.  Negative for chest pain, palpitations and leg swelling.   Gastrointestinal: Negative.  Negative for abdominal pain, blood in stool, constipation, diarrhea, nausea and vomiting.   Genitourinary: Negative.    Musculoskeletal: Negative for arthralgias, gait problem (rigth knee better had replacement 11/11/19), joint swelling (stable ) and myalgias.   Neurological: Negative for dizziness, light-headedness, numbness and headaches.   Hematological: Negative for adenopathy. Does not bruise/bleed easily (stable since going off medication 5/6/19).   Psychiatric/Behavioral: Negative.  Negative for confusion. The patient is not nervous/anxious.    All other systems reviewed and are negative.      Objective:     Vitals:    11/21/22 0800   BP: 124/74   Pulse: 64   Resp: 16   Temp: 98 °F (36.7 °C)   TempSrc: Temporal   SpO2: 96%   Weight: 64.1 kg (141 lb 4.8 oz)   Height: 165.1 cm (65\")   PainSc: 0-No pain     Current Status 11/21/2022   ECOG score 0   GENERAL:  Well-developed, well-nourished female in no acute distress.   SKIN:  Warm, dry without rashes, purpura or petechiae.  HENT: Hearing: normal, Lips normal. Dentition: " good  LYMPHATICS:  No cervical, supraclavicular, axillary adenopathy.  RESPIRATORY:  Lungs clear to percussion and auscultation. Good airflow. Normal respiratory effort- promimnet T4 vertbral spine  BREASTS: Left breast smaller than right breast, bilateral breast exam without palpable masses, skin changes or nipple discharge-  CARDIAC:  Regular rate and rhythm without murmurs, rubs or gallops. Normal S1,S2. Edema -  No  GI: Soft, nontender, non-distended, no hernia present  PSYCHIATRIC:  Normal affect and mood.  Oriented to person/place/time.  MSK: Gait: Normal; No clubbing, cyanosis. No tenderness or swelling in left knee, right knee-    I have reexamined the patient and the results are consistent with the previously documented exam. Andrea Akhtar MD     Labs and Imaging  Lab Results   Component Value Date    WBC 4.01 11/15/2022    HGB 13.2 11/15/2022    HCT 40.4 11/15/2022    MCV 89.8 11/15/2022     11/15/2022     Labs from 10/2017 reviewed.     Breast imaging: Mammogram 2/2018  CONCLUSION: Probable benign mammogram with small area of focal  asymmetric density at site of previous lumpectomy in upper inner left  breast and best seen in the CC projection. A short-term follow-up  left-sided mammogram in 6 months is recommended.      BI-RADS CATEGORY 3: Probably benign. Short interval follow-up suggested.    MAMMO SCREENING DIGITAL TOMOSYNTHESIS BILATERAL W CAD-     CONCLUSION: There is no evidence for malignancy nor significant change  in either breast. BI-RADS Category 2, benign.     Recommendation: Monthly self-examination and annual mammography.     This report was finalized on 2/12/2019               HISTORY: 64 year-old asymptomatic female     MPRESSION:  1. There is no evidence for malignancy or significant change in either  breast. Routine followup mammography is recommended.     BI-RADS category 1: Negative.     This report was finalized on 2/17/2020 9:11 AM by Dr. Nikita Daevnport M.D.          Assessment/Plan     Assessment:   1. Stage 1 (T1bN0), left breast invasive ductal carcinoma, ER+ (90%), MI+, [id number] negative   * status post lumpectomy with SLNB 4/13/16   * Oncotype Dx 20 (low intermediate). No chemotherapy indicated.   * status post radiation therapy   * Arimidex started June 2016. Change to Aromasin due to arthralgias 5/2017. Tolerating well.    * Mammo normal 2/2021               *Breast cancer index shows low risk of recurrence after 5 years and low risk of benefit from extended hormonal therapy Aromasin stopped 11/21               *Doing well off treatment in 11/22    2. Hot flashes. Tolerable. Stable.   3. Joint arthralgias. 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 Present but improved with Aromasin compared to AI  4. Coronary artery disease with stents  5.  Mild anemia due to knee replacement in 3/21  6.  Worsening osteopenia in the hips-Boniva added in 6/21  7.  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  DEXA scan March mammogram June 23.   2.  Continue Boniva 150 mg p.o. monthly with calcium and vitamin D  3.  patient was instructed on the importance of physical activity, healthy diet, and self breast exams.  Patient will continue calcium and vitamin D supplementation.    Follow-up in 12 months. I asked the patient to call for any questions, concerns, or new symptoms.

## 2022-11-29 ENCOUNTER — OFFICE VISIT (OUTPATIENT)
Dept: INTERNAL MEDICINE | Facility: CLINIC | Age: 67
End: 2022-11-29

## 2022-11-29 VITALS
SYSTOLIC BLOOD PRESSURE: 110 MMHG | HEIGHT: 65 IN | WEIGHT: 139 LBS | HEART RATE: 66 BPM | OXYGEN SATURATION: 98 % | DIASTOLIC BLOOD PRESSURE: 60 MMHG | BODY MASS INDEX: 23.16 KG/M2

## 2022-11-29 DIAGNOSIS — Z00.00 MEDICARE ANNUAL WELLNESS VISIT, SUBSEQUENT: Primary | ICD-10-CM

## 2022-11-29 DIAGNOSIS — E78.2 MIXED HYPERLIPIDEMIA: ICD-10-CM

## 2022-11-29 DIAGNOSIS — R73.03 PREDIABETES: ICD-10-CM

## 2022-11-29 DIAGNOSIS — H93.13 BILATERAL TINNITUS: ICD-10-CM

## 2022-11-29 PROCEDURE — 1126F AMNT PAIN NOTED NONE PRSNT: CPT | Performed by: INTERNAL MEDICINE

## 2022-11-29 PROCEDURE — G0439 PPPS, SUBSEQ VISIT: HCPCS | Performed by: INTERNAL MEDICINE

## 2022-11-29 PROCEDURE — 1170F FXNL STATUS ASSESSED: CPT | Performed by: INTERNAL MEDICINE

## 2022-11-29 PROCEDURE — 99213 OFFICE O/P EST LOW 20 MIN: CPT | Performed by: INTERNAL MEDICINE

## 2022-11-29 PROCEDURE — 1159F MED LIST DOCD IN RCRD: CPT | Performed by: INTERNAL MEDICINE

## 2022-11-29 NOTE — PATIENT INSTRUCTIONS
Medicare Wellness  Personal Prevention Plan of Service     Date of Office Visit:    Encounter Provider:  Alisa Morales MD  Place of Service:  [facility]  Patient Name: Debra S Sandifer  :  1955    As part of the Medicare Wellness portion of your visit today, we are providing you with this personalized preventive plan of services (PPPS). This plan is based upon recommendations of the [place] Preventive Services Task Force (USPSTF) and the Advisory Committee on Immunization Practices (ACIP).    This lists the preventive care services that should be considered, and provides dates of when you are due. Items listed as completed are up-to-date and do not require any further intervention.    Health Maintenance   Topic Date Due    Pneumococcal Vaccine 65+ (1 - PCV) Never done    COVID-19 Vaccine (5 - Booster) 2022    ANNUAL WELLNESS VISIT  2022    DXA SCAN  03/10/2023    MAMMOGRAM  2023    LIPID PANEL  11/15/2023    COLORECTAL CANCER SCREENING  2024    PAP SMEAR  2024    TDAP/TD VACCINES (3 - Td or Tdap) 2027    INFLUENZA VACCINE  Completed    ZOSTER VACCINE  Completed    HEPATITIS C SCREENING  Addressed       Orders Placed This Encounter   Procedures    Ambulatory Referral to ENT (Otolaryngology)     Referral Priority:   Routine     Referral Type:   Consultation     Referral Reason:   Specialty Services Required     Referred to Provider:   Sriram Rehman MD     Requested Specialty:   Otolaryngology     Number of Visits Requested:   1       Return in about 1 year (around 2023) for Medicare Wellness.

## 2022-11-29 NOTE — PROGRESS NOTES
The ABCs of the Annual Wellness Visit  Subsequent Medicare Wellness Visit    Chief Complaint   Patient presents with   • Medicare Wellness-subsequent   • Hyperlipidemia   • Hyperglycemia      Subjective    History of Present Illness:  Debra S Sandifer is a 67 y.o. female who presents for a Subsequent Medicare Wellness Visit.    The following data was reviewed by: Alisa Morales MD on 11/29/2022:  Common labs    Common Labs 5/18/22 5/18/22 5/18/22 11/15/22 11/15/22 11/15/22 11/15/22 11/21/22 11/21/22    0920 0920 0920 0830 0830 0830 0830 [id number]   Glucose 89    91    94   BUN 16    13    16   Creatinine 0.97    0.95    0.90   Sodium 141    139    142   Potassium 4.7    4.6    4.6   Chloride 106    104    106   Calcium 9.3    9.3    9.9   Total Protein 6.7    6.7       Albumin 4.3    4.00    3.90   Total Bilirubin 0.7    0.7    0.4   Alkaline Phosphatase 82    85    88   AST (SGOT) 34    31    32   ALT (SGPT) 23    24    22   WBC    4.01    4.18    Hemoglobin    13.2    12.2    Hematocrit    40.4    39.3    Platelets    272    248    Total Cholesterol  169    182      Triglycerides  64    61      HDL Cholesterol  82    90 (A)      LDL Cholesterol   75    80      Hemoglobin A1C   5.9 (A)    5.90 (A)     (A) Abnormal value       Comments are available for some flowsheets but are not being displayed.           Prediabetes.  A1c is 5.9.  She remains prediabetic range.   HLD.  Fairly good LDL control at 80 but her goal is less than 70.  She states diet could be improved.      C/o bilateral tinnitus.  No hearing loss is associated.      The following portions of the patient's history were reviewed and   updated as appropriate: allergies, current medications, past family history, past medical history, past social history, past surgical history and problem list.    Compared to one year ago, the patient feels her physical   health is the same.    Compared to one year ago, the patient feels her mental   health is the  same.    Recent Hospitalizations:  She was not admitted within the past 365 days.      Current Medical Providers:  Patient Care Team:  Alisa Morales MD as PCP - General  Aminata Estrella MD as Surgeon (Breast Surgery)  Mechelle Garcia MD as Consulting Physician (Hematology and Oncology)  Sheila Wahl MD as Consulting Physician (Radiation Oncology)  Aminata Estrella MD as Referring Physician (Breast Surgery)  Andrea Akhtar MD as Consulting Physician (Hematology and Oncology)  Sanjiv Dykes MD as Consulting Physician (Cardiology)    Outpatient Medications Prior to Visit   Medication Sig Dispense Refill   • acetaminophen (TYLENOL) 325 MG tablet Take 325 mg by mouth Every 6 (Six) Hours As Needed for Mild Pain .     • aspirin 81 MG oral suspension      • atorvastatin (LIPITOR) 40 MG tablet TAKE ONE TABLET BY MOUTH EVERY NIGHT AT BEDTIME 90 tablet 4   • calcium carbonate-vitamin d 600-400 MG-UNIT per tablet Take 1 tablet by mouth 2 (Two) Times a Day.     • carvedilol (COREG) 3.125 MG tablet TAKE ONE TABLET BY MOUTH EVERY 12 HOURS 180 tablet 4   • cholecalciferol (VITAMIN D3) 1000 units tablet Take 1,000 Units by mouth Every Morning.     • ibandronate (BONIVA) 150 MG tablet TAKE ONE TABLET BY MOUTH EVERY 30 DAYS 3 tablet 0   • Probiotic Product (PROBIOTIC DAILY PO) Take 1 tablet by mouth Every Morning.       No facility-administered medications prior to visit.       No opioid medication identified on active medication list. I have reviewed chart for other potential  high risk medication/s and harmful drug interactions in the elderly.          Aspirin is on active medication list. Aspirin use is indicated based on review of current medical condition/s. Pros and cons of this therapy have been discussed today. Benefits of this medication outweigh potential harm.  Patient has been encouraged to continue taking this medication.  .      Patient Active Problem List   Diagnosis   • Mixed  "hyperlipidemia   • Malignant neoplasm of upper-inner quadrant of left breast in female, estrogen receptor positive (HCC)   • Coronary artery disease involving native coronary artery   • Prediabetes   • History of coronary artery stent placement   • DJD (degenerative joint disease) of knee   • Osteopenia of hip     Advance Care Planning  Advance Directive is on file.  ACP discussion was held with the patient during this visit. Patient has an advance directive in EMR which is still valid.     Review of Systems   Respiratory: Negative.    Cardiovascular: Negative.         Objective    Vitals:    11/29/22 1421   BP: 110/60   Pulse: 66   SpO2: 98%   Weight: 63 kg (139 lb)   Height: 165.1 cm (65\")   PainSc: 0-No pain     Estimated body mass index is 23.13 kg/m² as calculated from the following:    Height as of this encounter: 165.1 cm (65\").    Weight as of this encounter: 63 kg (139 lb).    BMI is within normal parameters. No other follow-up for BMI required.      Does the patient have evidence of cognitive impairment? No    Physical Exam  Constitutional:       Appearance: She is well-developed.   HENT:      Head: Normocephalic and atraumatic.      Right Ear: Hearing, tympanic membrane and external ear normal.      Left Ear: Hearing, tympanic membrane and external ear normal.      Nose: Nose normal.   Neck:      Thyroid: No thyromegaly.   Cardiovascular:      Rate and Rhythm: Normal rate and regular rhythm.      Heart sounds: Normal heart sounds. No murmur heard.  Pulmonary:      Effort: Pulmonary effort is normal.      Breath sounds: Normal breath sounds.   Abdominal:      General: There is no distension.      Palpations: Abdomen is soft.      Tenderness: There is no abdominal tenderness.   Musculoskeletal:      Cervical back: Neck supple.   Lymphadenopathy:      Cervical: No cervical adenopathy.   Skin:     General: Skin is warm and dry.   Neurological:      Mental Status: She is alert and oriented to person, place, " and time.   Psychiatric:         Speech: Speech normal.         Behavior: Behavior normal.         Thought Content: Thought content normal.         Judgment: Judgment normal.       Lab Results   Component Value Date    CHLPL 182 11/15/2022    TRIG 61 11/15/2022    HDL 90 (H) 11/15/2022    LDL 80 11/15/2022    VLDL 12 11/15/2022    HGBA1C 5.90 (H) 11/15/2022            HEALTH RISK ASSESSMENT    Smoking Status:  Social History     Tobacco Use   Smoking Status Former   • Packs/day: 0.25   • Years: 5.00   • Pack years: 1.25   • Types: Cigarettes   • Start date:    • Quit date:    • Years since quittin.4   Smokeless Tobacco Never   Tobacco Comments    smoked less than 1 pack per week     Alcohol Consumption:  Social History     Substance and Sexual Activity   Alcohol Use Yes   • Alcohol/week: 3.0 standard drinks   • Types: 2 Glasses of wine, 1 Standard drinks or equivalent per week    Comment: social drinker/weekends     Fall Risk Screen:    RITESH Fall Risk Assessment was completed, and patient is at LOW risk for falls.Assessment completed on:2022    Depression Screening:  PHQ-2/PHQ-9 Depression Screening 2022   Retired PHQ-9 Total Score -   Retired Total Score -   Little Interest or Pleasure in Doing Things 0-->not at all   Feeling Down, Depressed or Hopeless 0-->not at all   Trouble Falling or Staying Asleep, or Sleeping Too Much -   Feeling Tired or Having Little Energy -   Poor Appetite or Overeating -   Feeling Bad about Yourself - or that You are a Failure or Have Let Yourself or Your Family Down -   Trouble Concentrating on Things, Such as Reading the Newspaper or Watching Television -   Moving or Speaking So Slowly that Other People Could Have Noticed? Or the Opposite - Being So Fidgety -   Thoughts that You Would be Better Off Dead or of Hurting Yourself in Some Way -   PHQ-9: Brief Depression Severity Measure Score 0   If You Checked Off Any Problems, How Difficult Have These Problems  Made It For You to Do Your Work, Take Care of Things at Home, or Get Along with Other People? -       Health Habits and Functional and Cognitive Screening:  Functional & Cognitive Status 11/29/2022   Do you have difficulty preparing food and eating? No   Do you have difficulty bathing yourself, getting dressed or grooming yourself? No   Do you have difficulty using the toilet? No   Do you have difficulty moving around from place to place? No   Do you have trouble with steps or getting out of a bed or a chair? No   Current Diet Well Balanced Diet   Dental Exam Up to date   Eye Exam Up to date   Exercise (times per week) 5 times per week   Current Exercises Include Cardiovascular Workout;Walking;Light Weights   Do you need help using the phone?  No   Are you deaf or do you have serious difficulty hearing?  No   Do you need help with transportation? No   Do you need help shopping? No   Do you need help preparing meals?  No   Do you need help with housework?  No   Do you need help with laundry? No   Do you need help taking your medications? No   Do you need help managing money? No   Do you ever drive or ride in a car without wearing a seat belt? No   Have you felt unusual stress, anger or loneliness in the last month? No   Who do you live with? Other   If you need help, do you have trouble finding someone available to you? No   Have you been bothered in the last four weeks by sexual problems? No   Do you have difficulty concentrating, remembering or making decisions? No       Age-appropriate Screening Schedule:  Refer to the list below for future screening recommendations based on patient's age, sex and/or medical conditions. Orders for these recommended tests are listed in the plan section. The patient has been provided with a written plan.    Health Maintenance   Topic Date Due   • DXA SCAN  03/10/2023   • MAMMOGRAM  06/08/2023   • LIPID PANEL  11/15/2023   • PAP SMEAR  11/19/2024   • TDAP/TD VACCINES (3 - Td or Tdap)  02/22/2027   • INFLUENZA VACCINE  Completed   • ZOSTER VACCINE  Completed              Assessment & Plan   CMS Preventative Services Quick Reference  Risk Factors Identified During Encounter  Immunizations Discussed/Encouraged (specific Immunizations; Prevnar 20 (Pneumococcal 20-valent conjugate)  The above risks/problems have been discussed with the patient.  Follow up actions/plans if indicated are seen below in the Assessment/Plan Section.  Pertinent information has been shared with the patient in the After Visit Summary.    Diagnoses and all orders for this visit:    1. Medicare annual wellness visit, subsequent (Primary)    2. Prediabetes    3. Mixed hyperlipidemia    4. Bilateral tinnitus  -     Ambulatory Referral to ENT (Otolaryngology)    Prediabetes. I recommend a diet high in fruits, vegetables, whole grains, lean meats, nuts and beans.  I recommend limiting red meat, full fat dairy, eggs and processed white carbohydrates.  I recommend aerobic exercise at least 3 days per week.  HLD.  Control is good.  The patient is advised to continue current dosage of atorvastatin.  Increase attention to diet.    Bilateral tinnitus.  Refer to ENT for evaluation.        Follow Up:   Return in about 1 year (around 11/29/2023) for Medicare Wellness.     An After Visit Summary and PPPS were made available to the patient.

## 2022-12-16 ENCOUNTER — OFFICE VISIT (OUTPATIENT)
Dept: INTERNAL MEDICINE | Facility: CLINIC | Age: 67
End: 2022-12-16

## 2022-12-16 VITALS
SYSTOLIC BLOOD PRESSURE: 120 MMHG | WEIGHT: 138 LBS | OXYGEN SATURATION: 98 % | BODY MASS INDEX: 22.99 KG/M2 | HEIGHT: 65 IN | HEART RATE: 76 BPM | DIASTOLIC BLOOD PRESSURE: 70 MMHG

## 2022-12-16 DIAGNOSIS — J02.9 ACUTE PHARYNGITIS, UNSPECIFIED ETIOLOGY: Primary | ICD-10-CM

## 2022-12-16 DIAGNOSIS — J02.9 SORETHROAT: ICD-10-CM

## 2022-12-16 DIAGNOSIS — R09.81 NASAL CONGESTION: ICD-10-CM

## 2022-12-16 LAB
EXPIRATION DATE: NORMAL
EXPIRATION DATE: NORMAL
INTERNAL CONTROL: NORMAL
INTERNAL CONTROL: NORMAL
Lab: NORMAL
Lab: NORMAL
S PYO AG THROAT QL: NEGATIVE
SARS-COV-2 AG UPPER RESP QL IA.RAPID: NOT DETECTED

## 2022-12-16 PROCEDURE — 87880 STREP A ASSAY W/OPTIC: CPT | Performed by: INTERNAL MEDICINE

## 2022-12-16 PROCEDURE — 99213 OFFICE O/P EST LOW 20 MIN: CPT | Performed by: INTERNAL MEDICINE

## 2022-12-16 PROCEDURE — 87426 SARSCOV CORONAVIRUS AG IA: CPT | Performed by: INTERNAL MEDICINE

## 2022-12-16 RX ORDER — AMOXICILLIN 875 MG/1
875 TABLET, COATED ORAL 2 TIMES DAILY
Qty: 14 TABLET | Refills: 0 | Status: SHIPPED | OUTPATIENT
Start: 2022-12-16 | End: 2022-12-20

## 2022-12-16 NOTE — PROGRESS NOTES
Subjective     Debra S Sandifer is a 67 y.o. female who presents with   Chief Complaint   Patient presents with   • Cough   • Sore Throat   • Nasal Congestion   • URI       History of Present Illness     Two days of symptoms.  Low grade fever.  Sore throat.  HA.      Review of Systems   Constitutional: Positive for fever.   HENT: Positive for congestion and sore throat.    Respiratory: Negative.    Cardiovascular: Negative.        The following portions of the patient's history were reviewed and updated as appropriate: allergies, current medications and problem list.    Patient Active Problem List    Diagnosis Date Noted   • Osteopenia of hip 05/24/2021     Note Last Updated: 11/19/2021     Boniva start 2021     • DJD (degenerative joint disease) of knee 09/26/2019   • History of coronary artery stent placement 06/18/2018   • Prediabetes 10/17/2017   • Coronary artery disease involving native coronary artery 11/23/2016     Note Last Updated: 11/23/2016 11/2016  PTCA of the first diagonal branch with a 2.5 x 12 mm trek Rx balloon.  PCI of the distal LAD with a 2.75 x 18 mm Xience Alpine drug-eluting stent  PCI of the mid LAD with a 3.0 x 28 mm Xience Alpine drug-eluting stent     • Malignant neoplasm of upper-inner quadrant of left breast in female, estrogen receptor positive (HCC) 04/27/2016     Note Last Updated: 10/14/2016     S/p lumpectomy and radiation in 2016     • Mixed hyperlipidemia 02/10/2016       Current Outpatient Medications on File Prior to Visit   Medication Sig Dispense Refill   • acetaminophen (TYLENOL) 325 MG tablet Take 325 mg by mouth Every 6 (Six) Hours As Needed for Mild Pain .     • aspirin 81 MG oral suspension      • atorvastatin (LIPITOR) 40 MG tablet TAKE ONE TABLET BY MOUTH EVERY NIGHT AT BEDTIME 90 tablet 4   • calcium carbonate-vitamin d 600-400 MG-UNIT per tablet Take 1 tablet by mouth 2 (Two) Times a Day.     • carvedilol (COREG) 3.125 MG tablet TAKE ONE TABLET BY MOUTH EVERY 12  "HOURS 180 tablet 4   • cholecalciferol (VITAMIN D3) 1000 units tablet Take 1,000 Units by mouth Every Morning.     • ibandronate (BONIVA) 150 MG tablet TAKE ONE TABLET BY MOUTH EVERY 30 DAYS 3 tablet 0   • Probiotic Product (PROBIOTIC DAILY PO) Take 1 tablet by mouth Every Morning.       No current facility-administered medications on file prior to visit.       Objective     /70   Pulse 76   Ht 165.1 cm (65\")   Wt 62.6 kg (138 lb)   SpO2 98%   BMI 22.96 kg/m²     Physical Exam  Constitutional:       Appearance: She is well-developed.   HENT:      Head: Normocephalic and atraumatic.      Right Ear: Hearing and tympanic membrane normal.      Left Ear: Hearing and tympanic membrane normal.      Mouth/Throat:      Pharynx: Posterior oropharyngeal erythema present. No oropharyngeal exudate.   Cardiovascular:      Rate and Rhythm: Normal rate and regular rhythm.      Heart sounds: Normal heart sounds.   Pulmonary:      Effort: Pulmonary effort is normal.      Breath sounds: Normal breath sounds.   Skin:     General: Skin is warm and dry.   Neurological:      Mental Status: She is alert and oriented to person, place, and time.   Psychiatric:         Behavior: Behavior normal.         Assessment & Plan   Diagnoses and all orders for this visit:    1. Acute pharyngitis, unspecified etiology (Primary)    2. Sorethroat  -     POC Rapid Strep A  -     POCT SARS-CoV-2 Antigen DOROTHY    3. Nasal congestion  -     POC Rapid Strep A  -     POCT SARS-CoV-2 Antigen DOROTHY    Other orders  -     amoxicillin (AMOXIL) 875 MG tablet; Take 1 tablet by mouth 2 (Two) Times a Day.  Dispense: 14 tablet; Refill: 0        Discussion    Patient presents with acute pharyngitis.  Results of the rapid group A strep in the office is negative.COVID is negative.   Rx for antibiotic provided.  Let us know if not feeling better over the next 48 hours.          Future Appointments   Date Time Provider Department Center   1/31/2023  8:30 AM Tano" CLEMENTE Cho [facility]   5/17/2023  1:00 PM [facility] DEXA 1  [facility] DEXA [facility]   6/13/2023  8:00 AM [facility]   11/20/2023  8:20 AM LAB CHAIR 1 [facility] [place]   11/20/2023  8:40 AM Andrea Akhtar MD [facility]   11/21/2023  8:20 AM [facility]   12/5/2023  1:30 PM Alisa Morales MD [facility]

## 2022-12-19 RX ORDER — CYCLOBENZAPRINE HCL 10 MG
10 TABLET ORAL 3 TIMES DAILY PRN
Qty: 30 TABLET | Refills: 0 | Status: SHIPPED | OUTPATIENT
Start: 2022-12-19 | End: 2023-01-31 | Stop reason: ALTCHOICE

## 2022-12-20 ENCOUNTER — TELEPHONE (OUTPATIENT)
Dept: INTERNAL MEDICINE | Facility: CLINIC | Age: 67
End: 2022-12-20

## 2022-12-20 ENCOUNTER — OFFICE VISIT (OUTPATIENT)
Dept: INTERNAL MEDICINE | Facility: CLINIC | Age: 67
End: 2022-12-20

## 2022-12-20 VITALS
HEART RATE: 83 BPM | DIASTOLIC BLOOD PRESSURE: 68 MMHG | HEIGHT: 65 IN | OXYGEN SATURATION: 98 % | WEIGHT: 138 LBS | SYSTOLIC BLOOD PRESSURE: 118 MMHG | BODY MASS INDEX: 22.99 KG/M2

## 2022-12-20 DIAGNOSIS — R07.9 CHEST PAIN, UNSPECIFIED TYPE: ICD-10-CM

## 2022-12-20 DIAGNOSIS — M54.2 NECK PAIN: ICD-10-CM

## 2022-12-20 DIAGNOSIS — R52 BODY ACHES: ICD-10-CM

## 2022-12-20 DIAGNOSIS — J20.8 ACUTE BRONCHITIS DUE TO OTHER SPECIFIED ORGANISMS: Primary | ICD-10-CM

## 2022-12-20 LAB
EXPIRATION DATE: NORMAL
FLUAV AG UPPER RESP QL IA.RAPID: NOT DETECTED
FLUBV AG UPPER RESP QL IA.RAPID: NOT DETECTED
INTERNAL CONTROL: NORMAL
Lab: NORMAL
SARS-COV-2 AG UPPER RESP QL IA.RAPID: NOT DETECTED

## 2022-12-20 PROCEDURE — 71046 X-RAY EXAM CHEST 2 VIEWS: CPT | Performed by: INTERNAL MEDICINE

## 2022-12-20 PROCEDURE — 87428 SARSCOV & INF VIR A&B AG IA: CPT | Performed by: INTERNAL MEDICINE

## 2022-12-20 PROCEDURE — 93000 ELECTROCARDIOGRAM COMPLETE: CPT | Performed by: INTERNAL MEDICINE

## 2022-12-20 PROCEDURE — 99213 OFFICE O/P EST LOW 20 MIN: CPT | Performed by: INTERNAL MEDICINE

## 2022-12-20 PROCEDURE — 72050 X-RAY EXAM NECK SPINE 4/5VWS: CPT | Performed by: INTERNAL MEDICINE

## 2022-12-20 RX ORDER — DOXYCYCLINE 100 MG/1
100 CAPSULE ORAL EVERY 12 HOURS SCHEDULED
Qty: 14 CAPSULE | Refills: 0 | Status: SHIPPED | OUTPATIENT
Start: 2022-12-20 | End: 2023-01-31 | Stop reason: ALTCHOICE

## 2022-12-20 NOTE — TELEPHONE ENCOUNTER
Pt calling stating she came in last week and tested positive for flu/strep- pt states that Dr. Morales told her that if medication wasn't helping any to call office so she could come in and see her- I don't see any available appts for Dr. Morales- please advise

## 2022-12-20 NOTE — PROGRESS NOTES
Subjective     Debra S Sandifer is a 67 y.o. female who presents with   Chief Complaint   Patient presents with   • Cough       History of Present Illness     She has continued to feel poorly.  Sore throat is better.  Now with cough. Upper chest discomfort.  Cough productive of sputum.  No fever.  No SOA.  Head and nasal congestion.  Achy all over.  Neck particularly is painful.      Review of Systems   Constitutional: Positive for fatigue. Negative for fever.   HENT: Positive for sore throat.    Respiratory: Positive for cough and chest tightness. Negative for shortness of breath and wheezing.    Cardiovascular: Positive for chest pain.   Musculoskeletal: Positive for arthralgias and neck pain.       The following portions of the patient's history were reviewed and updated as appropriate: allergies, current medications and problem list.    Patient Active Problem List    Diagnosis Date Noted   • Osteopenia of hip 05/24/2021     Note Last Updated: 11/19/2021     Boniva start 2021     • DJD (degenerative joint disease) of knee 09/26/2019   • History of coronary artery stent placement 06/18/2018   • Prediabetes 10/17/2017   • Coronary artery disease involving native coronary artery 11/23/2016     Note Last Updated: 11/23/2016 11/2016  PTCA of the first diagonal branch with a 2.5 x 12 mm trek Rx balloon.  PCI of the distal LAD with a 2.75 x 18 mm Xience Alpine drug-eluting stent  PCI of the mid LAD with a 3.0 x 28 mm Xience Alpine drug-eluting stent     • Malignant neoplasm of upper-inner quadrant of left breast in female, estrogen receptor positive (HCC) 04/27/2016     Note Last Updated: 10/14/2016     S/p lumpectomy and radiation in 2016     • Mixed hyperlipidemia 02/10/2016       Current Outpatient Medications on File Prior to Visit   Medication Sig Dispense Refill   • acetaminophen (TYLENOL) 325 MG tablet Take 325 mg by mouth Every 6 (Six) Hours As Needed for Mild Pain .     • aspirin 81 MG oral suspension     "  • atorvastatin (LIPITOR) 40 MG tablet TAKE ONE TABLET BY MOUTH EVERY NIGHT AT BEDTIME 90 tablet 4   • calcium carbonate-vitamin d 600-400 MG-UNIT per tablet Take 1 tablet by mouth 2 (Two) Times a Day.     • carvedilol (COREG) 3.125 MG tablet TAKE ONE TABLET BY MOUTH EVERY 12 HOURS 180 tablet 4   • cholecalciferol (VITAMIN D3) 1000 units tablet Take 1,000 Units by mouth Every Morning.     • cyclobenzaprine (FLEXERIL) 10 MG tablet Take 1 tablet by mouth 3 (Three) Times a Day As Needed for Muscle Spasms. 30 tablet 0   • ibandronate (BONIVA) 150 MG tablet TAKE ONE TABLET BY MOUTH EVERY 30 DAYS 3 tablet 0   • Probiotic Product (PROBIOTIC DAILY PO) Take 1 tablet by mouth Every Morning.     • [DISCONTINUED] amoxicillin (AMOXIL) 875 MG tablet Take 1 tablet by mouth 2 (Two) Times a Day. 14 tablet 0     No current facility-administered medications on file prior to visit.       Objective     /68   Pulse 83   Ht 165.1 cm (65\")   Wt 62.6 kg (138 lb)   SpO2 98%   BMI 22.96 kg/m²     Physical Exam  Constitutional:       Appearance: She is well-developed.   HENT:      Head: Normocephalic and atraumatic.      Right Ear: Hearing and tympanic membrane normal.      Left Ear: Hearing and tympanic membrane normal.      Mouth/Throat:      Pharynx: No oropharyngeal exudate or posterior oropharyngeal erythema.   Cardiovascular:      Rate and Rhythm: Normal rate and regular rhythm.      Heart sounds: Normal heart sounds.   Pulmonary:      Effort: Pulmonary effort is normal.      Breath sounds: Normal breath sounds.   Skin:     General: Skin is warm and dry.   Neurological:      Mental Status: She is alert and oriented to person, place, and time.   Psychiatric:         Behavior: Behavior normal.      X-rays of the chest, neck  performed today for following indication:   Cough, chest pain.  X-ray reveal nad.  There is no available x-ray for comparison.  X-ray sent to radiology for official interpretation and findings.          ECG " 12 Lead    Date/Time: 12/20/2022 4:07 PM  Performed by: Alisa Morales MD  Authorized by: Alisa Morales MD   Comparison: compared with previous ECG from 1/13/2022  Similar to previous ECG  Conduction: left anterior fascicular block  ST Segments: ST segments normal  T Waves: T waves normal  QRS axis: normal    Clinical impression: non-specific ECG              Assessment & Plan   Diagnoses and all orders for this visit:    1. Acute bronchitis due to other specified organisms (Primary)    2. Body aches  -     POCT SARS-CoV-2 Antigen DOROTHY + Flu  -     XR Chest PA & Lateral  -     XR Spine Cervical Complete 4 or 5 View (In Office)    3. Neck pain  -     XR Chest PA & Lateral  -     XR Spine Cervical Complete 4 or 5 View (In Office)    4. Chest pain, unspecified type  -     XR Chest PA & Lateral  -     XR Spine Cervical Complete 4 or 5 View (In Office)  -     ECG 12 Lead    Other orders  -     doxycycline (MONODOX) 100 MG capsule; Take 1 capsule by mouth Every 12 (Twelve) Hours.  Dispense: 14 capsule; Refill: 0        Discussion    Patient presents with episode of acute bronchitis.  She is flu and covid negative.  No evidence of pneumonia.  A prescription for antibiotics is provided today.  Let me know they are not feeling better over the next 3 days or if there is any change in symptoms.    Neck pain. I discussed with the patient a trial of conservative management with:   tylenol, heat and muscle relaxer.  Let me know if not feeling better over the next week or if there is any change in symptoms.             Future Appointments   Date Time Provider Department Center   1/31/2023  8:30 AM Marsha Freedman APRN [facility]   5/17/2023  1:00 PM [facility] DEXA 1  [facility] DEXA [facility]   6/13/2023  8:00 AM [facility]   11/20/2023  8:20 [facility] [place]   11/20/2023  8:40 AM Andrea Akhtar MD [facility] [place]   11/21/2023  8:20 AM [facility]  [facility]   12/5/2023  1:30 PM Alisa Morales MD [facility]

## 2022-12-21 ENCOUNTER — TELEPHONE (OUTPATIENT)
Dept: INTERNAL MEDICINE | Facility: CLINIC | Age: 67
End: 2022-12-21

## 2022-12-21 NOTE — TELEPHONE ENCOUNTER
Caller: Sandifer, Debra S    Relationship: Self    Best call back number: [phone]    What is the best time to reach you: ANYTIME    Who are you requesting to speak with (clinical staff, provider,  specific staff member): DR. SOLIS    What was the call regarding: PATIENT STATES SHE IS WANTING TO KNOW IF DR. SOLIS WANTS HER TO CONTINUE TAKING THE AMOXICILLIN WITH THE DOXYCYCLINE OR IF SHE JUST WANTS HER TO TAKE THE DOXYCYCLINE.     PATIENT IS REQUESTING A CALL BACK AS SOON AS POSSIBLE.

## 2022-12-22 RX ORDER — IBANDRONATE SODIUM 150 MG/1
TABLET, FILM COATED ORAL
Qty: 3 TABLET | Refills: 0 | Status: SHIPPED | OUTPATIENT
Start: 2022-12-22 | End: 2023-03-14 | Stop reason: SDUPTHER

## 2022-12-31 DIAGNOSIS — R91.1 PULMONARY NODULE: Primary | ICD-10-CM

## 2023-01-04 ENCOUNTER — HOSPITAL ENCOUNTER (OUTPATIENT)
Dept: CT IMAGING | Facility: HOSPITAL | Age: 68
Discharge: HOME | End: 2023-01-04
Admitting: INTERNAL MEDICINE
Payer: MEDICARE

## 2023-01-04 DIAGNOSIS — R91.1 PULMONARY NODULE: ICD-10-CM

## 2023-01-04 LAB — CREAT BLDA-MCNC: 0.9 MG/DL (ref 0.6–1.3)

## 2023-01-04 PROCEDURE — 25010000002 IOPAMIDOL 61 % SOLUTION: Performed by: INTERNAL MEDICINE

## 2023-01-04 PROCEDURE — 82565 ASSAY OF CREATININE: CPT

## 2023-01-04 PROCEDURE — 71260 CT THORAX DX C+: CPT

## 2023-01-04 RX ADMIN — IOPAMIDOL 100 ML: 612 INJECTION, SOLUTION INTRAVENOUS at 06:37

## 2023-01-31 ENCOUNTER — OFFICE VISIT (OUTPATIENT)
Dept: CARDIOLOGY | Facility: CLINIC | Age: 68
End: 2023-01-31
Payer: MEDICARE

## 2023-01-31 VITALS
HEART RATE: 67 BPM | HEIGHT: 65 IN | DIASTOLIC BLOOD PRESSURE: 64 MMHG | WEIGHT: 141.4 LBS | BODY MASS INDEX: 23.56 KG/M2 | SYSTOLIC BLOOD PRESSURE: 116 MMHG

## 2023-01-31 DIAGNOSIS — I25.10 CORONARY ARTERY DISEASE INVOLVING NATIVE CORONARY ARTERY OF NATIVE HEART WITHOUT ANGINA PECTORIS: Primary | ICD-10-CM

## 2023-01-31 DIAGNOSIS — E78.2 MIXED HYPERLIPIDEMIA: ICD-10-CM

## 2023-01-31 PROCEDURE — 93000 ELECTROCARDIOGRAM COMPLETE: CPT | Performed by: NURSE PRACTITIONER

## 2023-01-31 PROCEDURE — 99214 OFFICE O/P EST MOD 30 MIN: CPT | Performed by: NURSE PRACTITIONER

## 2023-01-31 NOTE — PROGRESS NOTES
"  Date of Office Visit: 2023  Encounter Provider: CLEMENTE Carl  Place of Service: [facility] CARDIOLOGY  Patient Name: Debra S Sandifer  :1955    Chief Complaint   Patient presents with   • Coronary Artery Disease   :     HPI: Debra S Sandifer is a 67 y.o. female.  She is a patient of Dr. Dykes's whom we follow for coronary artery disease.  In , she underwent PCI of the mid and distal LAD and angioplasty of the diagonal.  At the time, she had mildly reduced LV systolic function.  Her LV function has since recovered.  She also has history of breast cancer (status post lumpectomy) for which she continues maintenance chemotherapy.   She was last seen in the office by Dr. Dykes in 2022 at which time she was doing well.  No changes were made to her regimen, and she was advised to follow-up in 1 year.   She has been doing well.  She reports an occasional and fleeting \"crampy\" pain in her left chest lasting for 30 seconds or less.  It is not associated with exertion.  She is exercising regularly without limitation.  She denies any shortness of breath, palpitations, edema, dizziness, or syncope.    Past Medical History:   Diagnosis Date   • Allergic codeine/latex   • Arthralgia of both hands    • Arthritis     hand   • Atypical chest pain    • Breast cancer (HCC)     Left   • Coronary artery disease involving native coronary artery 2016    has stents   • Dyspareunia    • H/O bronchitis    • H/O infectious mononucleosis     COLLEGE AGE   • Hemorrhoid    • High cholesterol    • History of medical problems back surgery  2005    repair herniated disc   • Hot flashes, menopausal    • Hx of radiation therapy     2016   • Hyperlipidemia    • Hypertension    • Myocardial infarction (HCC) 2016    2 stents   • Polyp of sigmoid colon    • Right knee pain    • Stye     LEFT EYE   • Urinary tract infection periodically   • Vitamin D deficiency  "       Past Surgical History:   Procedure Laterality Date   • BACK SURGERY  01/13/2005    Herniated Disk repair (Done by Dr Jurgen Reddy)   • BREAST BIOPSY Left    • BREAST LUMPECTOMY Left 2016    also biopsied and resected lymph nodes   • BREAST LUMPECTOMY WITH SENTINEL NODE BIOPSY Left 4/13/2016    Procedure: LEFT BREAST MAMMO NEEDLE LOC LUMPECTOMY WITH LEFT AXILLA SENTINEL NODE BIOPSY;  Surgeon: Aminata Estrella MD;  Location: [facility] MAIN OR;  Service:    • CARDIAC CATHETERIZATION N/A 11/15/2016    Procedure: Left Heart Cath;  Surgeon: Sanjiv Dykes MD;  [facility];  Service:    • CARDIAC CATHETERIZATION N/A 11/15/2016    Procedure: Left ventriculography;  Surgeon: Sanjiv Dykes MD;  Location: [facility] CATH INVASIVE LOCATION;  Service:    • CARDIAC SURGERY  11/15/2016   • COLONOSCOPY  2014   • CORONARY ANGIOPLASTY     • CORONARY STENT PLACEMENT  11/15/16    two   • ENDOSCOPY N/A 11/11/2016    Procedure: ESOPHAGOGASTRODUODENOSCOPY WITH BIOPSY;  Surgeon: Mehdi Guardado MD;  Location: [facility] ENDOSCOPY;  Service:    • JOINT MANIPULATION Right 10/28/2019    Procedure: RIGHT KNEE MANIPULATION;  Surgeon: Van Titus MD;  Location: [facility] OR;  Service: Orthopedics   • LUMBAR DISCECTOMY     • LYMPH NODE BIOPSY  4/13/16    two   • TOTAL KNEE ARTHROPLASTY Right 9/26/2019    Procedure: TOTAL KNEE ARTHROPLASTY;  Surgeon: Van Titus MD;  Location: [facility] OR;  Service: Orthopedics   • TOTAL KNEE ARTHROPLASTY REVISION Right 4/26/2021    Procedure: RIGHT KNEE FEMORAL REVISION;  Surgeon: Van Titus MD;  Location: [facility] MAIN OR;  Service: Orthopedics;  Laterality: Right;   • WISDOM TOOTH EXTRACTION         Social History     Socioeconomic History   • Marital status:      Spouse name: Van   • Years of education: College   Tobacco Use   • Smoking status: Former     Packs/day: 0.25     Years: 5.00     Pack years: 1.25     Types: Cigarettes     Start  date:      Quit date:      Years since quittin.6   • Smokeless tobacco: Never   • Tobacco comments:     smoked less than 1 pack per week   Vaping Use   • Vaping Use: Never used   Substance and Sexual Activity   • Alcohol use: Yes     Alcohol/week: 3.0 standard drinks     Types: 2 Glasses of wine, 1 Standard drinks or equivalent per week     Comment: social drinker/weekends   • Drug use: No   • Sexual activity: Yes     Partners: Male     Birth control/protection: Post-menopausal       Family History   Problem Relation Age of Onset   • Coronary artery disease Mother    • Dementia Mother    • Heart disease Mother         Heart attack w/2 stents   • Heart attack Mother         had heart attack. Heart catheter -2 stents   • Hypertension Father    • Heart disease Father         Coronary heart disease   • Stroke Father         Ischemic   • Diabetes type II Father    • Diabetes Father    • Hyperlipidemia Father    • Prostate cancer Brother 51   • Alcohol abuse Maternal Grandfather    • Rheum arthritis Paternal Grandmother    • Arthritis Paternal Grandmother    • Alcohol abuse Paternal Grandfather    • Stroke Paternal Grandfather         Ischemic   • Rheum arthritis Paternal Grandfather    • Diabetes type II Paternal Grandfather    • Diabetes Paternal Grandfather    • Hyperlipidemia Paternal Grandfather    • Hypertension Paternal Grandfather    • Heart disease Paternal Grandfather    • Cancer Sister         appexdix   • Cancer Brother         Prostate   • No Known Problems Maternal Grandmother    • Malig Hyperthermia Neg Hx        Review of Systems   Constitutional: Negative.   Cardiovascular: Negative.  Negative for chest pain, dyspnea on exertion, leg swelling, orthopnea, paroxysmal nocturnal dyspnea and syncope.   Respiratory: Negative.    Hematologic/Lymphatic: Negative for bleeding problem.   Musculoskeletal: Negative for falls.   Gastrointestinal: Negative for melena.   Neurological: Negative for dizziness  "and light-headedness.       Allergies   Allergen Reactions   • Codeine Rash   • Oxycodone Nausea And Vomiting and Hallucinations   • Hydrocodone-Acetaminophen Nausea And Vomiting   • Latex Rash         Current Outpatient Medications:   •  acetaminophen (TYLENOL) 325 MG tablet, Take 325 mg by mouth Every 6 (Six) Hours As Needed for Mild Pain ., Disp: , Rfl:   •  aspirin 81 MG oral suspension, , Disp: , Rfl:   •  atorvastatin (LIPITOR) 40 MG tablet, TAKE ONE TABLET BY MOUTH EVERY NIGHT AT BEDTIME, Disp: 90 tablet, Rfl: 4  •  calcium carbonate-vitamin d 600-400 MG-UNIT per tablet, Take 1 tablet by mouth 2 (Two) Times a Day., Disp: , Rfl:   •  carvedilol (COREG) 3.125 MG tablet, TAKE ONE TABLET BY MOUTH EVERY 12 HOURS, Disp: 180 tablet, Rfl: 4  •  cholecalciferol (VITAMIN D3) 1000 units tablet, Take 1,000 Units by mouth Every Morning., Disp: , Rfl:   •  ibandronate (BONIVA) 150 MG tablet, TAKE ONE TABLET BY MOUTH EVERY 30 DAYS, Disp: 3 tablet, Rfl: 0  •  Probiotic Product (PROBIOTIC DAILY PO), Take 1 tablet by mouth Every Morning., Disp: , Rfl:       Objective:     Vitals:    01/31/23 1359   BP: 116/64   Pulse: 67   Weight: 64.1 kg (141 lb 6.4 oz)   Height: 165.1 cm (65\")     Body mass index is 23.53 kg/m².    PHYSICAL EXAM:    Neck:      Vascular: No JVD.   Pulmonary:      Effort: Pulmonary effort is normal.      Breath sounds: Normal breath sounds.   Cardiovascular:      Normal rate. Regular rhythm.      Murmurs: There is no murmur.      No gallop. No click. No rub.   Pulses:     Intact distal pulses.           ECG 12 Lead    Date/Time: 1/31/2023 2:08 PM  Performed by: Marsha Freedman APRN  Authorized by: Marsha Freedman APRN   Comparison: compared with previous ECG from 12/20/2022  Similar to previous ECG  Rate: normal  BPM: 67  Q waves: V1 and V2    T inversion: aVL                Assessment:       Diagnosis Plan   1. Coronary artery disease involving native coronary artery of native heart without angina " pectoris  ECG 12 Lead      2. Mixed hyperlipidemia          Orders Placed This Encounter   Procedures   • ECG 12 Lead     This order was created via procedure documentation     Order Specific Question:   Release to patient     Answer:   Routine Release          Plan:       1.  Coronary artery disease.  History of PCI of the mid and distal LAD and angioplasty of the diagonal in 2016.  I do not think the fleeting pains she is reporting are anginal.  Likely musculoskeletal.  Continue aspirin and atorvastatin.      2.  Hyperlipidemia.  Lipid panel from November 2022 demonstrated an LDL of 80 and an HDL of 90.  Continue atorvastatin.      I think she is doing well.  I am not recommending any changes, and she will follow-up with Dr. Dykes in 1 year.      As always, it has been a pleasure to participate in your patient's care.      Sincerely,         CLEMENTE Mccarthy

## 2023-03-06 RX ORDER — AMOXICILLIN 875 MG/1
TABLET, COATED ORAL
Qty: 14 TABLET | Refills: 0 | OUTPATIENT
Start: 2023-03-06

## 2023-03-14 RX ORDER — IBANDRONATE SODIUM 150 MG/1
150 TABLET, FILM COATED ORAL
Qty: 6 TABLET | Refills: 0 | Status: SHIPPED | OUTPATIENT
Start: 2023-03-14

## 2023-04-11 ENCOUNTER — TRANSCRIBE ORDERS (OUTPATIENT)
Dept: ADMINISTRATIVE | Facility: HOSPITAL | Age: 68
End: 2023-04-11
Payer: MEDICARE

## 2023-04-11 DIAGNOSIS — Z12.31 SCREENING MAMMOGRAM, ENCOUNTER FOR: Primary | ICD-10-CM

## 2023-05-17 ENCOUNTER — HOSPITAL ENCOUNTER (OUTPATIENT)
Dept: BONE DENSITY | Facility: HOSPITAL | Age: 68
Discharge: HOME | End: 2023-05-17
Admitting: INTERNAL MEDICINE
Payer: MEDICARE

## 2023-05-17 DIAGNOSIS — Z09 ENCOUNTER FOR FOLLOW-UP EXAMINATION AFTER COMPLETED TREATMENT FOR CONDITIONS OTHER THAN MALIGNANT NEOPLASM: ICD-10-CM

## 2023-05-17 DIAGNOSIS — Z17.0 MALIGNANT NEOPLASM OF UPPER-INNER QUADRANT OF LEFT BREAST IN FEMALE, ESTROGEN RECEPTOR POSITIVE: ICD-10-CM

## 2023-05-17 DIAGNOSIS — C50.212 MALIGNANT NEOPLASM OF UPPER-INNER QUADRANT OF LEFT BREAST IN FEMALE, ESTROGEN RECEPTOR POSITIVE: ICD-10-CM

## 2023-05-17 PROCEDURE — 77080 DXA BONE DENSITY AXIAL: CPT

## 2023-06-12 ENCOUNTER — HOSPITAL ENCOUNTER (OUTPATIENT)
Dept: MAMMOGRAPHY | Facility: HOSPITAL | Age: 68
Discharge: HOME | End: 2023-06-12
Admitting: INTERNAL MEDICINE
Payer: MEDICARE

## 2023-06-12 DIAGNOSIS — Z12.31 SCREENING MAMMOGRAM, ENCOUNTER FOR: ICD-10-CM

## 2023-06-12 PROCEDURE — 77063 BREAST TOMOSYNTHESIS BI: CPT

## 2023-06-12 PROCEDURE — 77067 SCR MAMMO BI INCL CAD: CPT

## 2023-06-13 DIAGNOSIS — E78.2 MIXED HYPERLIPIDEMIA: Primary | ICD-10-CM

## 2023-06-13 DIAGNOSIS — R73.03 PREDIABETES: ICD-10-CM

## 2023-06-14 LAB
ALBUMIN SERPL-MCNC: 4.1 G/DL (ref 3.5–5.2)
ALBUMIN/GLOB SERPL: 1.8 G/DL
ALP SERPL-CCNC: 70 U/L (ref 39–117)
ALT SERPL-CCNC: 18 U/L (ref 1–33)
AST SERPL-CCNC: 31 U/L (ref 1–32)
BILIRUB SERPL-MCNC: 0.9 MG/DL (ref 0–1.2)
BUN SERPL-MCNC: 16 MG/DL (ref 8–23)
BUN/CREAT SERPL: 17.6 (ref 7–25)
CALCIUM SERPL-MCNC: 9.3 MG/DL (ref 8.6–10.5)
CHLORIDE SERPL-SCNC: 107 MMOL/L (ref 98–107)
CHOLEST SERPL-MCNC: 161 MG/DL (ref 0–200)
CO2 SERPL-SCNC: 25.1 MMOL/L (ref 22–29)
CREAT SERPL-MCNC: 0.91 MG/DL (ref 0.57–1)
EGFRCR SERPLBLD CKD-EPI 2021: 69.3 ML/MIN/1.73
GLOBULIN SER CALC-MCNC: 2.3 GM/DL
GLUCOSE SERPL-MCNC: 88 MG/DL (ref 65–99)
HBA1C MFR BLD: 5.7 % (ref 4.8–5.6)
HDLC SERPL-MCNC: 83 MG/DL (ref 40–60)
LDLC SERPL CALC-MCNC: 66 MG/DL (ref 0–100)
POTASSIUM SERPL-SCNC: 4.4 MMOL/L (ref 3.5–5.2)
PROT SERPL-MCNC: 6.4 G/DL (ref 6–8.5)
SODIUM SERPL-SCNC: 143 MMOL/L (ref 136–145)
TRIGL SERPL-MCNC: 60 MG/DL (ref 0–150)
VLDLC SERPL CALC-MCNC: 12 MG/DL (ref 5–40)

## 2023-07-31 ENCOUNTER — OFFICE VISIT (OUTPATIENT)
Dept: INTERNAL MEDICINE | Facility: CLINIC | Age: 68
End: 2023-07-31
Payer: MEDICARE

## 2023-07-31 VITALS
BODY MASS INDEX: 23.32 KG/M2 | HEART RATE: 86 BPM | HEIGHT: 65 IN | WEIGHT: 140 LBS | OXYGEN SATURATION: 98 % | DIASTOLIC BLOOD PRESSURE: 74 MMHG | SYSTOLIC BLOOD PRESSURE: 110 MMHG

## 2023-07-31 DIAGNOSIS — K52.9 GASTROENTERITIS: Primary | ICD-10-CM

## 2023-07-31 PROCEDURE — 99213 OFFICE O/P EST LOW 20 MIN: CPT | Performed by: INTERNAL MEDICINE

## 2023-08-01 LAB
ALBUMIN SERPL-MCNC: 4.2 G/DL (ref 3.5–5.2)
ALBUMIN/GLOB SERPL: 1.8 G/DL
ALP SERPL-CCNC: 74 U/L (ref 39–117)
ALT SERPL-CCNC: 16 U/L (ref 1–33)
AST SERPL-CCNC: 29 U/L (ref 1–32)
BASOPHILS # BLD AUTO: 0.04 10*3/MM3 (ref 0–0.2)
BASOPHILS NFR BLD AUTO: 0.6 % (ref 0–1.5)
BILIRUB SERPL-MCNC: 0.5 MG/DL (ref 0–1.2)
BUN SERPL-MCNC: 13 MG/DL (ref 8–23)
BUN/CREAT SERPL: 13.4 (ref 7–25)
CALCIUM SERPL-MCNC: 9.6 MG/DL (ref 8.6–10.5)
CHLORIDE SERPL-SCNC: 106 MMOL/L (ref 98–107)
CO2 SERPL-SCNC: 25.8 MMOL/L (ref 22–29)
CREAT SERPL-MCNC: 0.97 MG/DL (ref 0.57–1)
EGFRCR SERPLBLD CKD-EPI 2021: 64.2 ML/MIN/1.73
EOSINOPHIL # BLD AUTO: 0.17 10*3/MM3 (ref 0–0.4)
EOSINOPHIL NFR BLD AUTO: 2.5 % (ref 0.3–6.2)
ERYTHROCYTE [DISTWIDTH] IN BLOOD BY AUTOMATED COUNT: 13.4 % (ref 12.3–15.4)
GLOBULIN SER CALC-MCNC: 2.4 GM/DL
GLUCOSE SERPL-MCNC: 95 MG/DL (ref 65–99)
HCT VFR BLD AUTO: 38.9 % (ref 34–46.6)
HGB BLD-MCNC: 12.6 G/DL (ref 12–15.9)
IMM GRANULOCYTES # BLD AUTO: 0.05 10*3/MM3 (ref 0–0.05)
IMM GRANULOCYTES NFR BLD AUTO: 0.7 % (ref 0–0.5)
LYMPHOCYTES # BLD AUTO: 1.66 10*3/MM3 (ref 0.7–3.1)
LYMPHOCYTES NFR BLD AUTO: 24 % (ref 19.6–45.3)
MCH RBC QN AUTO: 29.3 PG (ref 26.6–33)
MCHC RBC AUTO-ENTMCNC: 32.4 G/DL (ref 31.5–35.7)
MCV RBC AUTO: 90.5 FL (ref 79–97)
MONOCYTES # BLD AUTO: 0.53 10*3/MM3 (ref 0.1–0.9)
MONOCYTES NFR BLD AUTO: 7.7 % (ref 5–12)
NEUTROPHILS # BLD AUTO: 4.47 10*3/MM3 (ref 1.7–7)
NEUTROPHILS NFR BLD AUTO: 64.5 % (ref 42.7–76)
NRBC BLD AUTO-RTO: 0 /100 WBC (ref 0–0.2)
PLATELET # BLD AUTO: 305 10*3/MM3 (ref 140–450)
POTASSIUM SERPL-SCNC: 4.8 MMOL/L (ref 3.5–5.2)
PROT SERPL-MCNC: 6.6 G/DL (ref 6–8.5)
RBC # BLD AUTO: 4.3 10*6/MM3 (ref 3.77–5.28)
SODIUM SERPL-SCNC: 143 MMOL/L (ref 136–145)
WBC # BLD AUTO: 6.92 10*3/MM3 (ref 3.4–10.8)

## 2023-08-11 RX ORDER — IBANDRONATE SODIUM 150 MG/1
TABLET, FILM COATED ORAL
Qty: 3 TABLET | Refills: 3 | Status: SHIPPED | OUTPATIENT
Start: 2023-08-11

## 2023-08-11 RX ORDER — ATORVASTATIN CALCIUM 40 MG/1
TABLET, FILM COATED ORAL
Qty: 90 TABLET | Refills: 4 | Status: SHIPPED | OUTPATIENT
Start: 2023-08-11

## 2023-08-11 RX ORDER — CARVEDILOL 3.12 MG/1
TABLET ORAL
Qty: 180 TABLET | Refills: 4 | Status: SHIPPED | OUTPATIENT
Start: 2023-08-11

## 2023-08-14 LAB
BACTERIA SPEC CULT: NORMAL
BACTERIA SPEC CULT: NORMAL
C DIFF TOX A+B STL QL IA: NEGATIVE
CAMPYLOBACTER STL CULT: NORMAL
E COLI SXT STL QL IA: NEGATIVE
O+P SPEC MICRO: NORMAL
O+P STL CONC: NORMAL
SALM + SHIG STL CULT: NORMAL

## 2023-11-16 DIAGNOSIS — R73.03 PREDIABETES: ICD-10-CM

## 2023-11-16 DIAGNOSIS — E78.2 MIXED HYPERLIPIDEMIA: Primary | ICD-10-CM

## 2023-11-21 LAB
ALBUMIN SERPL-MCNC: 4.1 G/DL (ref 3.5–5.2)
ALBUMIN/GLOB SERPL: 1.9 G/DL
ALP SERPL-CCNC: 69 U/L (ref 39–117)
ALT SERPL-CCNC: 25 U/L (ref 1–33)
APPEARANCE UR: CLEAR
AST SERPL-CCNC: 31 U/L (ref 1–32)
BACTERIA #/AREA URNS HPF: ABNORMAL /HPF
BASOPHILS # BLD AUTO: 0.05 10*3/MM3 (ref 0–0.2)
BASOPHILS NFR BLD AUTO: 1.1 % (ref 0–1.5)
BILIRUB SERPL-MCNC: 0.7 MG/DL (ref 0–1.2)
BILIRUB UR QL STRIP: NEGATIVE
BUN SERPL-MCNC: 19 MG/DL (ref 8–23)
BUN/CREAT SERPL: 20.9 (ref 7–25)
CALCIUM SERPL-MCNC: 9.3 MG/DL (ref 8.6–10.5)
CASTS URNS MICRO: ABNORMAL
CHLORIDE SERPL-SCNC: 106 MMOL/L (ref 98–107)
CHOLEST SERPL-MCNC: 153 MG/DL (ref 0–200)
CO2 SERPL-SCNC: 27.9 MMOL/L (ref 22–29)
COLOR UR: YELLOW
CREAT SERPL-MCNC: 0.91 MG/DL (ref 0.57–1)
EGFRCR SERPLBLD CKD-EPI 2021: 69.3 ML/MIN/1.73
EOSINOPHIL # BLD AUTO: 0.16 10*3/MM3 (ref 0–0.4)
EOSINOPHIL NFR BLD AUTO: 3.7 % (ref 0.3–6.2)
EPI CELLS #/AREA URNS HPF: ABNORMAL /HPF
ERYTHROCYTE [DISTWIDTH] IN BLOOD BY AUTOMATED COUNT: 13 % (ref 12.3–15.4)
GLOBULIN SER CALC-MCNC: 2.2 GM/DL
GLUCOSE SERPL-MCNC: 85 MG/DL (ref 65–99)
GLUCOSE UR QL STRIP: NEGATIVE
HBA1C MFR BLD: 5.7 % (ref 4.8–5.6)
HCT VFR BLD AUTO: 38.5 % (ref 34–46.6)
HDLC SERPL-MCNC: 86 MG/DL (ref 40–60)
HGB BLD-MCNC: 12.5 G/DL (ref 12–15.9)
HGB UR QL STRIP: ABNORMAL
IMM GRANULOCYTES # BLD AUTO: 0.02 10*3/MM3 (ref 0–0.05)
IMM GRANULOCYTES NFR BLD AUTO: 0.5 % (ref 0–0.5)
KETONES UR QL STRIP: NEGATIVE
LDLC SERPL CALC-MCNC: 56 MG/DL (ref 0–100)
LEUKOCYTE ESTERASE UR QL STRIP: NEGATIVE
LYMPHOCYTES # BLD AUTO: 1.31 10*3/MM3 (ref 0.7–3.1)
LYMPHOCYTES NFR BLD AUTO: 30 % (ref 19.6–45.3)
MCH RBC QN AUTO: 28.7 PG (ref 26.6–33)
MCHC RBC AUTO-ENTMCNC: 32.5 G/DL (ref 31.5–35.7)
MCV RBC AUTO: 88.5 FL (ref 79–97)
MONOCYTES # BLD AUTO: 0.45 10*3/MM3 (ref 0.1–0.9)
MONOCYTES NFR BLD AUTO: 10.3 % (ref 5–12)
NEUTROPHILS # BLD AUTO: 2.37 10*3/MM3 (ref 1.7–7)
NEUTROPHILS NFR BLD AUTO: 54.4 % (ref 42.7–76)
NITRITE UR QL STRIP: NEGATIVE
NRBC BLD AUTO-RTO: 0 /100 WBC (ref 0–0.2)
PH UR STRIP: 6 [PH] (ref 5–8)
PLATELET # BLD AUTO: 261 10*3/MM3 (ref 140–450)
POTASSIUM SERPL-SCNC: 4.4 MMOL/L (ref 3.5–5.2)
PROT SERPL-MCNC: 6.3 G/DL (ref 6–8.5)
PROT UR QL STRIP: NEGATIVE
RBC # BLD AUTO: 4.35 10*6/MM3 (ref 3.77–5.28)
RBC #/AREA URNS HPF: ABNORMAL /HPF
SODIUM SERPL-SCNC: 140 MMOL/L (ref 136–145)
SP GR UR STRIP: 1.02 (ref 1–1.03)
TRIGL SERPL-MCNC: 53 MG/DL (ref 0–150)
TSH SERPL DL<=0.005 MIU/L-ACNC: 2.58 UIU/ML (ref 0.27–4.2)
UROBILINOGEN UR STRIP-MCNC: ABNORMAL MG/DL
VLDLC SERPL CALC-MCNC: 11 MG/DL (ref 5–40)
WBC # BLD AUTO: 4.36 10*3/MM3 (ref 3.4–10.8)
WBC #/AREA URNS HPF: ABNORMAL /HPF

## 2023-12-04 ENCOUNTER — OFFICE VISIT (OUTPATIENT)
Dept: ONCOLOGY | Facility: CLINIC | Age: 68
End: 2023-12-04
Payer: MEDICARE

## 2023-12-04 ENCOUNTER — LAB (OUTPATIENT)
Dept: OTHER | Facility: HOSPITAL | Age: 68
End: 2023-12-04
Payer: MEDICARE

## 2023-12-04 VITALS
HEART RATE: 75 BPM | SYSTOLIC BLOOD PRESSURE: 117 MMHG | DIASTOLIC BLOOD PRESSURE: 66 MMHG | HEIGHT: 65 IN | BODY MASS INDEX: 23.72 KG/M2 | TEMPERATURE: 97.5 F | WEIGHT: 142.4 LBS | OXYGEN SATURATION: 98 % | RESPIRATION RATE: 16 BRPM

## 2023-12-04 DIAGNOSIS — C50.212 MALIGNANT NEOPLASM OF UPPER-INNER QUADRANT OF LEFT BREAST IN FEMALE, ESTROGEN RECEPTOR POSITIVE: Primary | ICD-10-CM

## 2023-12-04 DIAGNOSIS — Z17.0 MALIGNANT NEOPLASM OF UPPER-INNER QUADRANT OF LEFT BREAST IN FEMALE, ESTROGEN RECEPTOR POSITIVE: ICD-10-CM

## 2023-12-04 DIAGNOSIS — C50.212 MALIGNANT NEOPLASM OF UPPER-INNER QUADRANT OF LEFT BREAST IN FEMALE, ESTROGEN RECEPTOR POSITIVE: ICD-10-CM

## 2023-12-04 DIAGNOSIS — Z17.0 MALIGNANT NEOPLASM OF UPPER-INNER QUADRANT OF LEFT BREAST IN FEMALE, ESTROGEN RECEPTOR POSITIVE: Primary | ICD-10-CM

## 2023-12-04 LAB
ALBUMIN SERPL-MCNC: 3.9 G/DL (ref 3.5–5.2)
ALBUMIN/GLOB SERPL: 1.2 G/DL
ALP SERPL-CCNC: 85 U/L (ref 39–117)
ALT SERPL W P-5'-P-CCNC: 31 U/L (ref 1–33)
ANION GAP SERPL CALCULATED.3IONS-SCNC: 8.2 MMOL/L (ref 5–15)
AST SERPL-CCNC: 39 U/L (ref 1–32)
BASOPHILS # BLD AUTO: 0.06 10*3/MM3 (ref 0–0.2)
BASOPHILS NFR BLD AUTO: 0.9 % (ref 0–1.5)
BILIRUB SERPL-MCNC: 0.6 MG/DL (ref 0–1.2)
BUN SERPL-MCNC: 19 MG/DL (ref 8–23)
BUN/CREAT SERPL: 20 (ref 7–25)
CALCIUM SPEC-SCNC: 9.1 MG/DL (ref 8.6–10.5)
CHLORIDE SERPL-SCNC: 105 MMOL/L (ref 98–107)
CO2 SERPL-SCNC: 26.8 MMOL/L (ref 22–29)
CREAT SERPL-MCNC: 0.95 MG/DL (ref 0.57–1)
DEPRECATED RDW RBC AUTO: 48.3 FL (ref 37–54)
EGFRCR SERPLBLD CKD-EPI 2021: 65.8 ML/MIN/1.73
EOSINOPHIL # BLD AUTO: 0.16 10*3/MM3 (ref 0–0.4)
EOSINOPHIL NFR BLD AUTO: 2.3 % (ref 0.3–6.2)
ERYTHROCYTE [DISTWIDTH] IN BLOOD BY AUTOMATED COUNT: 14.4 % (ref 12.3–15.4)
GLOBULIN UR ELPH-MCNC: 3.3 GM/DL
GLUCOSE SERPL-MCNC: 85 MG/DL (ref 65–99)
HCT VFR BLD AUTO: 40.1 % (ref 34–46.6)
HGB BLD-MCNC: 13 G/DL (ref 12–15.9)
IMM GRANULOCYTES # BLD AUTO: 0.03 10*3/MM3 (ref 0–0.05)
IMM GRANULOCYTES NFR BLD AUTO: 0.4 % (ref 0–0.5)
LYMPHOCYTES # BLD AUTO: 2.07 10*3/MM3 (ref 0.7–3.1)
LYMPHOCYTES NFR BLD AUTO: 29.7 % (ref 19.6–45.3)
MCH RBC QN AUTO: 29.6 PG (ref 26.6–33)
MCHC RBC AUTO-ENTMCNC: 32.4 G/DL (ref 31.5–35.7)
MCV RBC AUTO: 91.3 FL (ref 79–97)
MONOCYTES # BLD AUTO: 0.74 10*3/MM3 (ref 0.1–0.9)
MONOCYTES NFR BLD AUTO: 10.6 % (ref 5–12)
NEUTROPHILS NFR BLD AUTO: 3.91 10*3/MM3 (ref 1.7–7)
NEUTROPHILS NFR BLD AUTO: 56.1 % (ref 42.7–76)
NRBC BLD AUTO-RTO: 0 /100 WBC (ref 0–0.2)
PLATELET # BLD AUTO: 280 10*3/MM3 (ref 140–450)
PMV BLD AUTO: 9.9 FL (ref 6–12)
POTASSIUM SERPL-SCNC: 4.4 MMOL/L (ref 3.5–5.2)
PROT SERPL-MCNC: 7.2 G/DL (ref 6–8.5)
RBC # BLD AUTO: 4.39 10*6/MM3 (ref 3.77–5.28)
SODIUM SERPL-SCNC: 140 MMOL/L (ref 136–145)
WBC NRBC COR # BLD AUTO: 6.97 10*3/MM3 (ref 3.4–10.8)

## 2023-12-04 PROCEDURE — 80053 COMPREHEN METABOLIC PANEL: CPT | Performed by: INTERNAL MEDICINE

## 2023-12-04 PROCEDURE — 99213 OFFICE O/P EST LOW 20 MIN: CPT | Performed by: INTERNAL MEDICINE

## 2023-12-04 PROCEDURE — 36415 COLL VENOUS BLD VENIPUNCTURE: CPT

## 2023-12-04 PROCEDURE — 1126F AMNT PAIN NOTED NONE PRSNT: CPT | Performed by: INTERNAL MEDICINE

## 2023-12-04 PROCEDURE — 85025 COMPLETE CBC W/AUTO DIFF WBC: CPT | Performed by: INTERNAL MEDICINE

## 2023-12-04 NOTE — PROGRESS NOTES
Subjective:     Reason for follow up:   1. Stage 1 (T1bN0), left breast invasive ductal carcinoma, ER+ (90%), MD+, [id number] negative   * status post lumpectomy with SLNB    * status post radiation therapy   * Arimidex started 8/2016 - changed to Aromasin due to arthalgias     History of Present Ilness: Debra S Sandifer presents for follow-up of breast cancer.  She completed 5 years of hormonal blockade and because of the breast cancer index showing no benefit from extended therapy she stopped treatment 2 years ago.    She is here for follow-up and is doing much better.  She is exercising and keeping her weight under control.  She is up-to-date with colonoscopies    Mammogram in June 2023 was benign\  she will continue on her Boniva -her bone density in May 2023 shows significant increase in bone density    Past Medical   Past Medical History:   Diagnosis Date    Allergic codeine/latex    Arthralgia of both hands     Arthritis     hand    Atypical chest pain     Breast cancer     Left    Coronary artery disease involving native coronary artery 11/23/2016    has stents    Dyspareunia     H/O bronchitis     H/O infectious mononucleosis     COLLEGE AGE    Hemorrhoid     High cholesterol     History of medical problems back surgery  2005    repair herniated disc    Hot flashes, menopausal     Hx of radiation therapy     JULY 2016    Hyperlipidemia     Hypertension     Myocardial infarction November 2016    2 stents    Polyp of sigmoid colon     Right knee pain     Stye     LEFT EYE    Urinary tract infection periodically    Vitamin D deficiency      Patient Active Problem List   Diagnosis    Mixed hyperlipidemia    Malignant neoplasm of upper-inner quadrant of left breast in female, estrogen receptor positive    Coronary artery disease involving native coronary artery    Prediabetes    History of coronary artery stent placement    DJD (degenerative joint disease) of knee    Osteopenia of hip     Oncologic  history    Her arthralgias are better than they were on Arimidex but they are still present. Her hot flashes are present and tolerable.     We did a breast cancer index because of her tolerance issues and her worsening bone density and this showed a low risk of late recurrence and a low risk of response to extended endocrine blockade and therefore I think it is safe to go off her Aromasin  Social History   Social History     Socioeconomic History    Marital status:      Spouse name: Van    Years of education: College   Tobacco Use    Smoking status: Former     Packs/day: 0.25     Years: 5.00     Additional pack years: 0.00     Total pack years: 1.25     Types: Cigarettes     Start date:      Quit date:      Years since quittin.4    Smokeless tobacco: Never    Tobacco comments:     smoked less than 1 pack per week   Vaping Use    Vaping Use: Never used   Substance and Sexual Activity    Alcohol use: Yes     Alcohol/week: 3.0 standard drinks of alcohol     Types: 2 Glasses of wine, 1 Standard drinks or equivalent per week     Comment: social drinker/weekends    Drug use: No    Sexual activity: Yes     Partners: Male     Birth control/protection: Post-menopausal      Family History  Family History   Problem Relation Age of Onset    Coronary artery disease Mother     Dementia Mother     Heart disease Mother         Heart attack w/2 stents    Heart attack Mother         had heart attack. Heart catheter -2 stents    Hypertension Father     Heart disease Father         Coronary heart disease    Stroke Father         Ischemic    Diabetes type II Father     Diabetes Father     Hyperlipidemia Father     Prostate cancer Brother 51    Alcohol abuse Maternal Grandfather     Rheum arthritis Paternal Grandmother     Arthritis Paternal Grandmother     Alcohol abuse Paternal Grandfather     Stroke Paternal Grandfather         Ischemic    Rheum arthritis Paternal Grandfather     Diabetes type II Paternal  "Grandfather     Diabetes Paternal Grandfather     Hyperlipidemia Paternal Grandfather     Hypertension Paternal Grandfather     Heart disease Paternal Grandfather     Cancer Sister         appexdix    Cancer Brother         Prostate    No Known Problems Maternal Grandmother     Malig Hyperthermia Neg Hx      Allergies  Allergies   Allergen Reactions    Codeine Rash    Oxycodone Nausea And Vomiting and Hallucinations    Hydrocodone-Acetaminophen Nausea And Vomiting    Latex Rash       Medications: The current medication list was reviewed in the EMR.    Review of Systems  Review of Systems   Constitutional:  Negative for activity change, appetite change, chills, diaphoresis, fatigue (11/12/2018), fever and unexpected weight change.   Eyes: Negative.    Respiratory: Negative.  Negative for cough, chest tightness and shortness of breath.    Cardiovascular: Negative.  Negative for chest pain, palpitations and leg swelling.   Gastrointestinal: Negative.  Negative for abdominal pain, blood in stool, constipation, diarrhea, nausea and vomiting.   Genitourinary: Negative.    Musculoskeletal:  Negative for arthralgias, gait problem (rigth knee better had replacement 11/11/19), joint swelling (stable ) and myalgias.   Neurological:  Negative for dizziness, light-headedness, numbness and headaches.   Hematological:  Negative for adenopathy. Does not bruise/bleed easily (stable since going off medication 5/6/19).   Psychiatric/Behavioral: Negative.  Negative for confusion. The patient is not nervous/anxious.    All other systems reviewed and are negative.      Objective:     Vitals:    12/04/23 1527   BP: 117/66   Pulse: 75   Resp: 16   Temp: 97.5 °F (36.4 °C)   TempSrc: Temporal   SpO2: 98%   Weight: 64.6 kg (142 lb 6.4 oz)   Height: 165.1 cm (65\")   PainSc: 0-No pain           12/4/2023     3:26 PM   Current Status   ECOG score 0   GENERAL:  Well-developed, well-nourished female in no acute distress.   SKIN:  Warm, dry without " rashes, purpura or petechiae.  HENT: Hearing: normal, Lips normal. Dentition: good  LYMPHATICS:  No cervical, supraclavicular, axillary adenopathy.  RESPIRATORY:  Lungs clear to percussion and auscultation. Good airflow. Normal respiratory effort- promimnet T4 vertbral spine  BREASTS: Left breast smaller than right breast, bilateral breast exam without palpable masses, skin changes or nipple discharge-  CARDIAC:  Regular rate and rhythm without murmurs, rubs or gallops. Normal S1,S2. Edema -  No  GI: Soft, nontender, non-distended, no hernia present  PSYCHIATRIC:  Normal affect and mood.  Oriented to person/place/time.  MSK: Gait: Normal; No clubbing, cyanosis. No tenderness or swelling in left knee, right knee-    I have reexamined the patient and the results are consistent with the previously documented exam. Andrea Akhtar MD     Labs and Imaging  Lab Results   Component Value Date    WBC 6.97 12/04/2023    HGB 13.0 12/04/2023    HCT 40.1 12/04/2023    MCV 91.3 12/04/2023     12/04/2023     Labs from 10/2017 reviewed.     Breast imaging: Mammogram 2/2018  CONCLUSION: Probable benign mammogram with small area of focal  asymmetric density at site of previous lumpectomy in upper inner left  breast and best seen in the CC projection. A short-term follow-up  left-sided mammogram in 6 months is recommended.      BI-RADS CATEGORY 3: Probably benign. Short interval follow-up suggested.    MAMMO SCREENING DIGITAL TOMOSYNTHESIS BILATERAL W CAD-     CONCLUSION: There is no evidence for malignancy nor significant change  in either breast. BI-RADS Category 2, benign.     Recommendation: Monthly self-examination and annual mammography.     This report was finalized on 2/12/2019               HISTORY: 64 year-old asymptomatic female     MPRESSION:  1. There is no evidence for malignancy or significant change in either  breast. Routine followup mammography is recommended.     BI-RADS category 1: Negative.     This  report was finalized on 2/17/2020 9:11 AM by Dr. Nikita Davenport M.D.         Assessment/Plan     Assessment:   1. Stage 1 (T1bN0), left breast invasive ductal carcinoma, ER+ (90%), LA+, [id number] negative   * status post lumpectomy with SLNB 4/13/16   * Oncotype Dx 20 (low intermediate). No chemotherapy indicated.   * status post radiation therapy   * Arimidex started June 2016. Change to Aromasin due to arthralgias 5/2017. Tolerating well.    * Mammo normal 2/2021               *Breast cancer index shows low risk of recurrence after 5 years and low risk of benefit from extended hormonal therapy Aromasin stopped 11/21               *Doing well off treatment in 11/22               *Doing well off treatment in 12/23    2. Hot flashes. Tolerable. Stable.   3. Joint arthralgias. 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 Present but improved with Aromasin compared to AI  4. Coronary artery disease with stents  5.  Mild anemia due to knee replacement in 3/21  6.  Worsening osteopenia in the hips-Boniva added in 6/21  7.  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   2.  Continue Boniva 150 mg p.o. monthly with calcium and vitamin D  3.  patient was instructed on the importance of physical activity, healthy diet, and self breast exams.  Patient will continue calcium and vitamin D supplementation.    Follow-up in 12 months. I asked the patient to call for any questions, concerns, or new symptoms.

## 2023-12-11 ENCOUNTER — OFFICE VISIT (OUTPATIENT)
Dept: INTERNAL MEDICINE | Facility: CLINIC | Age: 68
End: 2023-12-11
Payer: MEDICARE

## 2023-12-11 VITALS
WEIGHT: 143 LBS | HEIGHT: 65 IN | DIASTOLIC BLOOD PRESSURE: 76 MMHG | OXYGEN SATURATION: 98 % | HEART RATE: 60 BPM | SYSTOLIC BLOOD PRESSURE: 112 MMHG | BODY MASS INDEX: 23.82 KG/M2

## 2023-12-11 DIAGNOSIS — Z00.00 MEDICARE ANNUAL WELLNESS VISIT, SUBSEQUENT: Primary | ICD-10-CM

## 2023-12-11 DIAGNOSIS — R73.03 PREDIABETES: ICD-10-CM

## 2023-12-11 DIAGNOSIS — M85.851 OSTEOPENIA OF RIGHT HIP: ICD-10-CM

## 2023-12-11 DIAGNOSIS — E78.2 MIXED HYPERLIPIDEMIA: ICD-10-CM

## 2023-12-11 NOTE — PROGRESS NOTES
The ABCs of the Annual Wellness Visit  Subsequent Medicare Wellness Visit    Subjective    Debra S Sandifer is a 68 y.o. female who presents for a Subsequent Medicare Wellness Visit.    The following portions of the patient's history were reviewed and   updated as appropriate: allergies, current medications, past family history, past medical history, past social history, past surgical history, and problem list.    Compared to one year ago, the patient feels her physical   health is the same.    Compared to one year ago, the patient feels her mental   health is the same.    Recent Hospitalizations:  She was not admitted to the hospital during the last year.       Current Medical Providers:  Patient Care Team:  Alisa Morales MD as PCP - General  Aminata Estrella MD as Surgeon (Breast Surgery)  Mechelle Garcia MD as Consulting Physician (Hematology and Oncology)  Sheila Wahl MD as Consulting Physician (Radiation Oncology)  Aminata Estrella MD as Referring Physician (Breast Surgery)  Andrea Akhtar MD as Consulting Physician (Hematology and Oncology)  Sanjiv Dykes MD as Consulting Physician (Cardiology)    Outpatient Medications Prior to Visit   Medication Sig Dispense Refill    acetaminophen (TYLENOL) 325 MG tablet Take 1 tablet by mouth Every 6 (Six) Hours As Needed for Mild Pain.      aspirin 81 MG oral suspension Take 81 mL by mouth Daily.      atorvastatin (LIPITOR) 40 MG tablet TAKE ONE TABLET BY MOUTH EVERY NIGHT AT BEDTIME 90 tablet 4    calcium carbonate-vitamin d 600-400 MG-UNIT per tablet Take 1 tablet by mouth 2 (Two) Times a Day.      carvedilol (COREG) 3.125 MG tablet TAKE ONE TABLET BY MOUTH EVERY 12 HOURS 180 tablet 4    cholecalciferol (VITAMIN D3) 1000 units tablet Take 1 tablet by mouth Every Morning.      ibandronate (BONIVA) 150 MG tablet TAKE ONE TABLET BY MOUTH EVERY 30 DAYS 3 tablet 3    Probiotic Product (PROBIOTIC DAILY PO) Take 1 tablet by mouth Every  "Morning.       No facility-administered medications prior to visit.       No opioid medication identified on active medication list. I have reviewed chart for other potential  high risk medication/s and harmful drug interactions in the elderly.        Aspirin is on active medication list. Aspirin use is indicated based on review of current medical condition/s. Pros and cons of this therapy have been discussed today. Benefits of this medication outweigh potential harm.  Patient has been encouraged to continue taking this medication.  .      Patient Active Problem List   Diagnosis    Mixed hyperlipidemia    Malignant neoplasm of upper-inner quadrant of left breast in female, estrogen receptor positive    Coronary artery disease involving native coronary artery    Prediabetes    History of coronary artery stent placement    DJD (degenerative joint disease) of knee    Osteopenia of hip     Advance Care Planning   Advance Care Planning     Advance Directive is on file.  ACP discussion was held with the patient during this visit. Patient has an advance directive in EMR which is still valid.      Objective    Vitals:    12/11/23 0924   BP: 112/76   Pulse: 60   SpO2: 98%   Weight: 64.9 kg (143 lb)   Height: 165.1 cm (65\")   PainSc: 0-No pain     Estimated body mass index is 23.8 kg/m² as calculated from the following:    Height as of this encounter: 165.1 cm (65\").    Weight as of this encounter: 64.9 kg (143 lb).    BMI is within normal parameters. No other follow-up for BMI required.      Does the patient have evidence of cognitive impairment? No    Lab Results   Component Value Date    CHLPL 153 11/21/2023    TRIG 53 11/21/2023    HDL 86 (H) 11/21/2023    LDL 56 11/21/2023    VLDL 11 11/21/2023    HGBA1C 5.70 (H) 11/21/2023        HEALTH RISK ASSESSMENT    Smoking Status:  Social History     Tobacco Use   Smoking Status Former    Packs/day: 0.00    Years: 5.00    Additional pack years: 0.00    Total pack years: 0.00    " Types: Cigarettes    Start date: 1972    Quit date: 1977    Years since quittin.4   Smokeless Tobacco Never   Tobacco Comments    smoked less than 1 pack per week     Alcohol Consumption:  Social History     Substance and Sexual Activity   Alcohol Use Yes    Alcohol/week: 3.0 standard drinks of alcohol    Types: 2 Glasses of wine, 1 Drinks containing 0.5 oz of alcohol per week    Comment: social drinker/weekends     Fall Risk Screen:    RITESH Fall Risk Assessment was completed, and patient is at LOW risk for falls.Assessment completed on:2023    Depression Screenin/11/2023     9:23 AM   PHQ-2/PHQ-9 Depression Screening   Little Interest or Pleasure in Doing Things 0-->not at all   Feeling Down, Depressed or Hopeless 0-->not at all   PHQ-9: Brief Depression Severity Measure Score 0       Health Habits and Functional and Cognitive Screenin/11/2023     9:23 AM   Functional & Cognitive Status   Do you have difficulty preparing food and eating? No   Do you have difficulty bathing yourself, getting dressed or grooming yourself? No   Do you have difficulty using the toilet? No   Do you have difficulty moving around from place to place? No   Do you have trouble with steps or getting out of a bed or a chair? No   Current Diet Well Balanced Diet   Dental Exam Up to date   Eye Exam Up to date   Exercise (times per week) 5 times per week   Current Exercises Include Cardiovascular Workout;Light Weights;Swim Aerobics Class   Do you need help using the phone?  No   Are you deaf or do you have serious difficulty hearing?  No   Do you need help to go to places out of walking distance? No   Do you need help shopping? No   Do you need help preparing meals?  No   Do you need help with housework?  No   Do you need help with laundry? No   Do you need help taking your medications? No   Do you need help managing money? No   Do you ever drive or ride in a car without wearing a seat belt? No   Have you  felt unusual stress, anger or loneliness in the last month? No   Who do you live with? Spouse   If you need help, do you have trouble finding someone available to you? No   Have you been bothered in the last four weeks by sexual problems? No   Do you have difficulty concentrating, remembering or making decisions? No       Age-appropriate Screening Schedule:  Refer to the list below for future screening recommendations based on patient's age, sex and/or medical conditions. Orders for these recommended tests are listed in the plan section. The patient has been provided with a written plan.    Health Maintenance   Topic Date Due    COLORECTAL CANCER SCREENING  04/16/2024    MAMMOGRAM  06/12/2024    PAP SMEAR  11/19/2024    LIPID PANEL  11/21/2024    ANNUAL WELLNESS VISIT  12/11/2024    DXA SCAN  05/17/2025    TDAP/TD VACCINES (3 - Td or Tdap) 02/22/2027    COVID-19 Vaccine  Completed    INFLUENZA VACCINE  Completed    Pneumococcal Vaccine 65+  Completed    ZOSTER VACCINE  Completed    HEPATITIS C SCREENING  Addressed                  CMS Preventative Services Quick Reference  Risk Factors Identified During Encounter  Immunizations Discussed/Encouraged: RSV (Respiratory Syncytial Virus)  The above risks/problems have been discussed with the patient.  Pertinent information has been shared with the patient in the After Visit Summary.  An After Visit Summary and PPPS were made available to the patient.    Follow Up:   Next Medicare Wellness visit to be scheduled in 1 year.       Additional E&M Note during same encounter follows:  Patient has multiple medical problems which are significant and separately identifiable that require additional work above and beyond the Medicare Wellness Visit.      Chief Complaint  Medicare Wellness-subsequent    Subjective        HPI  Debra S Sandifer is also being seen today for     HLD.  LDL is optimal.    Prediabetes.  She remains prediabetic.    Osteopenia.  She is on Boniva, calcium and  "D.      Review of Systems   Respiratory: Negative.     Cardiovascular: Negative.        Objective   Vital Signs:  /76   Pulse 60   Ht 165.1 cm (65\")   Wt 64.9 kg (143 lb)   SpO2 98%   BMI 23.80 kg/m²     Physical Exam  Constitutional:       Appearance: She is well-developed.   HENT:      Head: Normocephalic and atraumatic.      Right Ear: Hearing, tympanic membrane and external ear normal.      Left Ear: Hearing, tympanic membrane and external ear normal.      Nose: Nose normal.   Neck:      Thyroid: No thyromegaly.   Cardiovascular:      Rate and Rhythm: Normal rate and regular rhythm.      Heart sounds: Normal heart sounds. No murmur heard.  Pulmonary:      Effort: Pulmonary effort is normal.      Breath sounds: Normal breath sounds.   Abdominal:      General: There is no distension.      Palpations: Abdomen is soft.      Tenderness: There is no abdominal tenderness.   Musculoskeletal:      Cervical back: Neck supple.   Lymphadenopathy:      Cervical: No cervical adenopathy.   Skin:     General: Skin is warm and dry.   Neurological:      Mental Status: She is alert and oriented to person, place, and time.   Psychiatric:         Speech: Speech normal.         Behavior: Behavior normal.         Thought Content: Thought content normal.         Judgment: Judgment normal.          The following data was reviewed by: Alisa Morales MD on 12/11/2023:  Common labs          7/31/2023    13:27 11/21/2023    08:15 12/4/2023    15:18   Common Labs   Glucose 95  85  85    BUN 13  19  19    Creatinine 0.97  0.91  0.95    Sodium 143  140  140    Potassium 4.8  4.4  4.4    Chloride 106  106  105    Calcium 9.6  9.3  9.1    Total Protein 6.6  6.3     Albumin 4.2  4.1  3.9    Total Bilirubin 0.5  0.7  0.6    Alkaline Phosphatase 74  69  85    AST (SGOT) 29  31  39    ALT (SGPT) 16  25  31    WBC 6.92  4.36  6.97    Hemoglobin 12.6  12.5  13.0    Hematocrit 38.9  38.5  40.1    Platelets 305  261  280    Total " Cholesterol  153     Triglycerides  53     HDL Cholesterol  86     LDL Cholesterol   56     Hemoglobin A1C  5.70                  Assessment and Plan   Diagnoses and all orders for this visit:    1. Medicare annual wellness visit, subsequent (Primary)    2. Mixed hyperlipidemia    3. Prediabetes    4. Osteopenia of right hip    HLD.  Control is good.  The patient is advised to continue current dosage of atorvastatin.    Prediabetes.  Continue attention to healthy diet.    Osteopenia.  I recommend to get 1200 mg of calcium and 1000 IUs of vitamin D through diet and supplements and to participate in a weight based exercise to prevent loss of bone mineral density. Bone mineral will be monitored every two years.    Continue Boniva.             Follow Up   Return in about 6 months (around 6/11/2024) for Recheck.  Patient was given instructions and counseling regarding her condition or for health maintenance advice. Please see specific information pulled into the AVS if appropriate.

## 2023-12-11 NOTE — PATIENT INSTRUCTIONS
Medicare Wellness  Personal Prevention Plan of Service     Date of Office Visit:    Encounter Provider:  Alisa Morales MD  Place of Service:  [facility]  Patient Name: Debra S Sandifer  :  1955    As part of the Medicare Wellness portion of your visit today, we are providing you with this personalized preventive plan of services (PPPS). This plan is based upon recommendations of the [place] Preventive Services Task Force (USPSTF) and the Advisory Committee on Immunization Practices (ACIP).    This lists the preventive care services that should be considered, and provides dates of when you are due. Items listed as completed are up-to-date and do not require any further intervention.    Health Maintenance   Topic Date Due    ANNUAL WELLNESS VISIT  2023    COLORECTAL CANCER SCREENING  2024    MAMMOGRAM  2024    PAP SMEAR  2024    LIPID PANEL  2024    DXA SCAN  2025    TDAP/TD VACCINES (3 - Td or Tdap) 2027    COVID-19 Vaccine  Completed    INFLUENZA VACCINE  Completed    Pneumococcal Vaccine 65+  Completed    ZOSTER VACCINE  Completed    HEPATITIS C SCREENING  Addressed       No orders of the defined types were placed in this encounter.      No follow-ups on file.

## 2024-01-03 ENCOUNTER — TRANSCRIBE ORDERS (OUTPATIENT)
Dept: ONCOLOGY | Facility: CLINIC | Age: 69
End: 2024-01-03
Payer: MEDICARE

## 2024-01-03 DIAGNOSIS — Z12.31 SCREENING MAMMOGRAM FOR BREAST CANCER: Primary | ICD-10-CM

## 2024-01-10 ENCOUNTER — TELEPHONE (OUTPATIENT)
Dept: INTERNAL MEDICINE | Facility: CLINIC | Age: 69
End: 2024-01-10
Payer: MEDICARE

## 2024-01-10 DIAGNOSIS — H91.90 HEARING LOSS, UNSPECIFIED HEARING LOSS TYPE, UNSPECIFIED LATERALITY: Primary | ICD-10-CM

## 2024-01-10 NOTE — TELEPHONE ENCOUNTER
[facility] called and stated that patient has an appointment scheduled tomorrow. The facility is needing a referral for her annual hearing exam to Audiology.    Can you please put in referral for patient    Fax referral to [facility] fax # [phone]

## 2024-02-07 RX ORDER — IBANDRONATE SODIUM 150 MG/1
150 TABLET, FILM COATED ORAL
Qty: 3 TABLET | Refills: 3 | Status: SHIPPED | OUTPATIENT
Start: 2024-02-07

## 2024-02-07 RX ORDER — CARVEDILOL 3.12 MG/1
3.12 TABLET ORAL EVERY 12 HOURS
Qty: 180 TABLET | Refills: 4 | Status: SHIPPED | OUTPATIENT
Start: 2024-02-07

## 2024-02-07 RX ORDER — ATORVASTATIN CALCIUM 40 MG/1
40 TABLET, FILM COATED ORAL
Qty: 90 TABLET | Refills: 4 | Status: SHIPPED | OUTPATIENT
Start: 2024-02-07

## 2024-02-08 ENCOUNTER — OFFICE VISIT (OUTPATIENT)
Dept: CARDIOLOGY | Facility: CLINIC | Age: 69
End: 2024-02-08
Payer: MEDICARE

## 2024-02-08 VITALS
WEIGHT: 143.6 LBS | BODY MASS INDEX: 23.93 KG/M2 | HEART RATE: 64 BPM | SYSTOLIC BLOOD PRESSURE: 110 MMHG | DIASTOLIC BLOOD PRESSURE: 68 MMHG | HEIGHT: 65 IN

## 2024-02-08 DIAGNOSIS — I25.10 CORONARY ARTERY DISEASE INVOLVING NATIVE CORONARY ARTERY OF NATIVE HEART WITHOUT ANGINA PECTORIS: Primary | ICD-10-CM

## 2024-02-08 DIAGNOSIS — E78.2 MIXED HYPERLIPIDEMIA: ICD-10-CM

## 2024-02-08 PROCEDURE — 1159F MED LIST DOCD IN RCRD: CPT | Performed by: NURSE PRACTITIONER

## 2024-02-08 PROCEDURE — 99214 OFFICE O/P EST MOD 30 MIN: CPT | Performed by: NURSE PRACTITIONER

## 2024-02-08 PROCEDURE — 93000 ELECTROCARDIOGRAM COMPLETE: CPT | Performed by: NURSE PRACTITIONER

## 2024-02-08 PROCEDURE — 1160F RVW MEDS BY RX/DR IN RCRD: CPT | Performed by: NURSE PRACTITIONER

## 2024-02-08 NOTE — PROGRESS NOTES
Date of Office Visit: 2024  Encounter Provider: CLEMENTE Carl  Place of Service: [facility] CARDIOLOGY  Patient Name: Debra S Sandifer  :1955    Chief Complaint   Patient presents with    Coronary Artery Disease     1 year f/u   :     HPI: Debra S Sandifer is a 68 y.o. female patient of Dr. Dykes's whom we follow for coronary artery disease.  In , she underwent PCI of the mid and distal LAD and angioplasty of the diagonal.  At the time, she had mildly reduced LV systolic function.  Her LV function has since recovered.  She also has history of breast cancer (status post lumpectomy) for which she continues maintenance chemotherapy.     I last saw her in the office in 2023 at which time she was overall doing well.  She reported occasional fleeting pains in her chest that sounded musculoskeletal.  No changes were made to her regimen, and she was advised to follow-up in 1 year.    She has been doing well.  She denies any chest pain, shortness of breath, palpitations, edema, dizziness, or syncope.  She exercises regularly at [employer].    Past Medical History:   Diagnosis Date    Allergic codeine/latex    Arthralgia of both hands     Arthritis     hand    Atypical chest pain     Breast cancer     Left    Coronary artery disease involving native coronary artery 2016    has stents    Dyspareunia     H/O bronchitis     H/O infectious mononucleosis     COLLEGE AGE    Hemorrhoid     High cholesterol     History of medical problems back surgery      repair herniated disc    Hot flashes, menopausal     Hx of radiation therapy     2016    Hyperlipidemia     Hypertension     Myocardial infarction 2016    2 stents    Polyp of sigmoid colon     Right knee pain     Stye     LEFT EYE    Urinary tract infection periodically    Vitamin D deficiency        Past Surgical History:   Procedure Laterality Date    BACK SURGERY  2005    Herniated  Disk repair (Done by Dr Jurgen Reddy)    BREAST BIOPSY Left     BREAST LUMPECTOMY Left 2016    also biopsied and resected lymph nodes    BREAST LUMPECTOMY WITH SENTINEL NODE BIOPSY Left 04/13/2016    Procedure: LEFT BREAST MAMMO NEEDLE LOC LUMPECTOMY WITH LEFT AXILLA SENTINEL NODE BIOPSY;  Surgeon: Aminata Estrella MD;  Location: [facility] MAIN OR;  Service:     CARDIAC CATHETERIZATION N/A 11/15/2016    Procedure: Left Heart Cath;  Surgeon: Sanjiv Dykes MD;  Location: [facility] CATH INVASIVE LOCATION;  Service:     CARDIAC CATHETERIZATION N/A 11/15/2016    Procedure: Left ventriculography;  Surgeon: Sanjiv Dykes MD;  Location: [facility] CATH INVASIVE LOCATION;  Service:     CARDIAC SURGERY  11/15/2016    COLONOSCOPY  2014    CORONARY ANGIOPLASTY      CORONARY STENT PLACEMENT  11/15/2016    two    ENDOSCOPY N/A 11/11/2016    Procedure: ESOPHAGOGASTRODUODENOSCOPY WITH BIOPSY;  Surgeon: Mehdi Guardado MD;  Location: [facility] ENDOSCOPY;  Service:     JOINT MANIPULATION Right 10/28/2019    Procedure: RIGHT KNEE MANIPULATION;  Surgeon: Van Titus MD;  Location: [facility] MAIN OR;  Service: Orthopedics    JOINT REPLACEMENT  Right knee replacement 09/2019    Right knee replacement-09/2019,  Right knee Revision 04/2021    LUMBAR DISCECTOMY      LYMPH NODE BIOPSY  04/13/2016    two    SPINE SURGERY  January 2005    repair herniated disc    TOTAL KNEE ARTHROPLASTY Right 09/26/2019    Procedure: TOTAL KNEE ARTHROPLASTY;  Surgeon: Van Titus MD;  Location: [facility] MAIN OR;  Service: Orthopedics    TOTAL KNEE ARTHROPLASTY REVISION Right 04/26/2021    Procedure: RIGHT KNEE FEMORAL REVISION;  Surgeon: Van Titus MD;  Location: [facility] MAIN OR;  Service: Orthopedics;  Laterality: Right;    WISDOM TOOTH EXTRACTION         Social History     Socioeconomic History    Marital status:      Spouse name: Van    Years of education: College   Tobacco Use    Smoking status: Former     Packs/day: 0.00      Years: 5.00     Additional pack years: 0.00     Total pack years: 0.00     Types: Cigarettes     Start date: 1972     Quit date: 1977     Years since quittin.6    Smokeless tobacco: Never    Tobacco comments:     smoked less than 1 pack per week   Vaping Use    Vaping Use: Never used   Substance and Sexual Activity    Alcohol use: Yes     Alcohol/week: 3.0 standard drinks of alcohol     Types: 2 Glasses of wine, 1 Drinks containing 0.5 oz of alcohol per week     Comment: social drinker/weekends    Drug use: No    Sexual activity: Yes     Partners: Male     Birth control/protection: Post-menopausal       Family History   Problem Relation Age of Onset    Coronary artery disease Mother     Dementia Mother     Heart disease Mother         Heart attack w/2 stents    Heart attack Mother         had heart attack. Heart catheter -2 stents    Hypertension Father     Heart disease Father         Coronary heart disease    Stroke Father         Ischemic    Diabetes type II Father     Diabetes Father     Hyperlipidemia Father     Prostate cancer Brother 51    Alcohol abuse Maternal Grandfather     Rheum arthritis Paternal Grandmother     Arthritis Paternal Grandmother     Alcohol abuse Paternal Grandfather     Stroke Paternal Grandfather         Ischemic    Rheum arthritis Paternal Grandfather     Diabetes type II Paternal Grandfather     Diabetes Paternal Grandfather     Hyperlipidemia Paternal Grandfather     Hypertension Paternal Grandfather     Heart disease Paternal Grandfather     Cancer Sister         appexdix    Other Sister         Appendiceal cancer    Cancer Brother         Prostate    No Known Problems Maternal Grandmother     Malig Hyperthermia Neg Hx        Review of Systems   Constitutional: Negative.   Cardiovascular: Negative.  Negative for chest pain, dyspnea on exertion, leg swelling, orthopnea, paroxysmal nocturnal dyspnea and syncope.   Respiratory: Negative.     Hematologic/Lymphatic: Negative  "for bleeding problem.   Musculoskeletal:  Negative for falls.   Gastrointestinal:  Negative for melena.   Neurological:  Negative for dizziness and light-headedness.       Allergies   Allergen Reactions    Codeine Rash    Oxycodone Nausea And Vomiting and Hallucinations    Hydrocodone-Acetaminophen Nausea And Vomiting    Latex Rash         Current Outpatient Medications:     acetaminophen (TYLENOL) 325 MG tablet, Take 1 tablet by mouth Every 6 (Six) Hours As Needed for Mild Pain., Disp: , Rfl:     aspirin 81 MG oral suspension, Take 81 mL by mouth Daily., Disp: , Rfl:     atorvastatin (LIPITOR) 40 MG tablet, Take 1 tablet by mouth every night at bedtime., Disp: 90 tablet, Rfl: 4    calcium carbonate-vitamin d 600-400 MG-UNIT per tablet, Take 1 tablet by mouth 2 (Two) Times a Day., Disp: , Rfl:     carvedilol (COREG) 3.125 MG tablet, Take 1 tablet by mouth Every 12 (Twelve) Hours., Disp: 180 tablet, Rfl: 4    cholecalciferol (VITAMIN D3) 1000 units tablet, Take 1 tablet by mouth Every Morning., Disp: , Rfl:     ibandronate (BONIVA) 150 MG tablet, Take 1 tablet by mouth Every 30 (Thirty) Days., Disp: 3 tablet, Rfl: 3    Probiotic Product (PROBIOTIC DAILY PO), Take 1 tablet by mouth Every Morning., Disp: , Rfl:       Objective:     Vitals:    02/08/24 0912   BP: 110/68   BP Location: Right arm   Patient Position: Sitting   Pulse: 64   Weight: 65.1 kg (143 lb 9.6 oz)   Height: 165.1 cm (65\")     Body mass index is 23.9 kg/m².    PHYSICAL EXAM:    Neck:      Vascular: No JVD.   Pulmonary:      Effort: Pulmonary effort is normal.      Breath sounds: Normal breath sounds.   Cardiovascular:      Normal rate. Regular rhythm.      Murmurs: There is no murmur.      No gallop.  No click. No rub.   Pulses:     Intact distal pulses.           ECG 12 Lead    Date/Time: 2/8/2024 9:30 AM  Performed by: Marsha Freedman APRN    Authorized by: Marsha Freedman APRN  Comparison: compared with previous ECG from " 1/31/2023  Similar to previous ECG  Rhythm: sinus rhythm  Rate: normal  BPM: 64  Q waves: V2 and V1              Assessment:       Diagnosis Plan   1. Coronary artery disease involving native coronary artery of native heart without angina pectoris  ECG 12 Lead      2. Mixed hyperlipidemia          Orders Placed This Encounter   Procedures    ECG 12 Lead     This order was created via procedure documentation     Order Specific Question:   Release to patient     Answer:   Routine Release [[id number]]          Plan:       1.  Coronary artery disease.  She denies any symptoms of angina.  Continue aspirin and atorvastatin.      2.  Hyperlipidemia.  Lipid panel from November 2023 demonstrated an LDL of 56 and an HDL of 86.  Continue atorvastatin 40 mg.      I think she is doing well.  I am not making any changes, and she will follow-up with Dr. Dykes in 1 year.      As always, it has been a pleasure to participate in your patient's care.      Sincerely,         CLEMENTE Mccarthy

## 2024-04-26 ENCOUNTER — APPOINTMENT (OUTPATIENT)
Dept: GENERAL RADIOLOGY | Facility: HOSPITAL | Age: 69
End: 2024-04-26
Payer: MEDICARE

## 2024-04-26 ENCOUNTER — HOSPITAL ENCOUNTER (OUTPATIENT)
Facility: HOSPITAL | Age: 69
Discharge: HOME | End: 2024-04-26
Attending: EMERGENCY MEDICINE | Admitting: EMERGENCY MEDICINE
Payer: MEDICARE

## 2024-04-26 ENCOUNTER — NURSE TRIAGE (OUTPATIENT)
Dept: CALL CENTER | Facility: HOSPITAL | Age: 69
End: 2024-04-26
Payer: MEDICARE

## 2024-04-26 VITALS
HEART RATE: 70 BPM | HEIGHT: 65 IN | OXYGEN SATURATION: 98 % | BODY MASS INDEX: 23.32 KG/M2 | DIASTOLIC BLOOD PRESSURE: 81 MMHG | SYSTOLIC BLOOD PRESSURE: 126 MMHG | WEIGHT: 140 LBS | TEMPERATURE: 97.9 F | RESPIRATION RATE: 18 BRPM

## 2024-04-26 DIAGNOSIS — S60.042A CONTUSION OF LEFT RING FINGER WITHOUT DAMAGE TO NAIL, INITIAL ENCOUNTER: Primary | ICD-10-CM

## 2024-04-26 PROCEDURE — 73130 X-RAY EXAM OF HAND: CPT

## 2024-04-26 PROCEDURE — 99202 OFFICE O/P NEW SF 15 MIN: CPT | Performed by: EMERGENCY MEDICINE

## 2024-04-26 PROCEDURE — G0463 HOSPITAL OUTPT CLINIC VISIT: HCPCS | Performed by: EMERGENCY MEDICINE

## 2024-04-26 NOTE — FSED PROVIDER NOTE
Subjective   History of Present Illness  68yo female pmh significant htn/hyperlipidemia/cad/breast ca presents ED c/o left hand digit 3,4,5 pain with swelling digit#4 secondary to FOOSH prior to arrival.  ROS otherwise noncontributory.    History provided by:  Patient  Hand Injury      Review of Systems   Constitutional: Negative.    HENT: Negative.     Eyes: Negative.    Respiratory: Negative.     Cardiovascular: Negative.    Gastrointestinal: Negative.    Musculoskeletal:  Positive for joint swelling.   All other systems reviewed and are negative.      Past Medical History:   Diagnosis Date    Allergic codeine/latex    Arthralgia of both hands     Arthritis     hand    Atypical chest pain     Breast cancer     Left    Coronary artery disease involving native coronary artery 11/23/2016    has stents    Dyspareunia     H/O bronchitis     H/O infectious mononucleosis     COLLEGE AGE    Hemorrhoid     High cholesterol     History of medical problems back surgery  2005    repair herniated disc    Hot flashes, menopausal     Hx of radiation therapy     JULY 2016    Hyperlipidemia     Hypertension     Myocardial infarction November 2016    2 stents    Polyp of sigmoid colon     Right knee pain     Stye     LEFT EYE    Urinary tract infection periodically    Vitamin D deficiency        Allergies   Allergen Reactions    Codeine Rash    Oxycodone Nausea And Vomiting and Hallucinations    Hydrocodone-Acetaminophen Nausea And Vomiting    Latex Rash       Past Surgical History:   Procedure Laterality Date    BACK SURGERY  01/13/2005    Herniated Disk repair (Done by Dr Jurgen Reddy)    BREAST BIOPSY Left     BREAST LUMPECTOMY Left 2016    also biopsied and resected lymph nodes    BREAST LUMPECTOMY WITH SENTINEL NODE BIOPSY Left 04/13/2016    Procedure: LEFT BREAST MAMMO NEEDLE LOC LUMPECTOMY WITH LEFT AXILLA SENTINEL NODE BIOPSY;  Surgeon: Aminata Estrella MD;  Location: [facility];  Service:     CARDIAC CATHETERIZATION  N/A 11/15/2016    Procedure: Left Heart Cath;  Surgeon: Sanjiv Dykes MD;  Location: [facility] CATH INVASIVE LOCATION;  Service:     CARDIAC CATHETERIZATION N/A 11/15/2016    Procedure: Left ventriculography;  Surgeon: Sanjiv Dykes MD;  Location: [facility] CATH INVASIVE LOCATION;  Service:     CARDIAC SURGERY  11/15/2016    COLONOSCOPY  2014    CORONARY ANGIOPLASTY      CORONARY STENT PLACEMENT  11/15/2016    two    ENDOSCOPY N/A 11/11/2016    Procedure: ESOPHAGOGASTRODUODENOSCOPY WITH BIOPSY;  Surgeon: Mehdi Guardado MD;  [facility];  Service:     JOINT MANIPULATION Right 10/28/2019    Procedure: RIGHT KNEE MANIPULATION;  Surgeon: Van Titus MD;  Location: [facility] MAIN OR;  Service: Orthopedics    JOINT REPLACEMENT  Right knee replacement 09/2019    Right knee replacement-09/2019,  Right knee Revision 04/2021    LUMBAR DISCECTOMY      LYMPH NODE BIOPSY  04/13/2016    two    SPINE SURGERY  January 2005    repair herniated disc    TOTAL KNEE ARTHROPLASTY Right 09/26/2019    Procedure: TOTAL KNEE ARTHROPLASTY;  Surgeon: Van Titus MD;  Location: [facility] MAIN OR;  Service: Orthopedics    TOTAL KNEE ARTHROPLASTY REVISION Right 04/26/2021    Procedure: RIGHT KNEE FEMORAL REVISION;  Surgeon: Van Titus MD;  Location: [facility] MAIN OR;  Service: Orthopedics;  Laterality: Right;    WISDOM TOOTH EXTRACTION         Family History   Problem Relation Age of Onset    Coronary artery disease Mother     Dementia Mother     Heart disease Mother         Heart attack w/2 stents    Heart attack Mother         had heart attack. Heart catheter -2 stents    Hypertension Father     Heart disease Father         Coronary heart disease    Stroke Father         Ischemic    Diabetes type II Father     Diabetes Father     Hyperlipidemia Father     Prostate cancer Brother 51    Alcohol abuse Maternal Grandfather     Rheum arthritis Paternal Grandmother     Arthritis Paternal Grandmother     Alcohol abuse  Paternal Grandfather     Stroke Paternal Grandfather         Ischemic    Rheum arthritis Paternal Grandfather     Diabetes type II Paternal Grandfather     Diabetes Paternal Grandfather     Hyperlipidemia Paternal Grandfather     Hypertension Paternal Grandfather     Heart disease Paternal Grandfather     Cancer Sister         appexdix    Other Sister         Appendiceal cancer    Cancer Brother         Prostate    No Known Problems Maternal Grandmother     Malig Hyperthermia Neg Hx        Social History     Socioeconomic History    Marital status:      Spouse name: Van    Years of education: College   Tobacco Use    Smoking status: Former     Current packs/day: 0.00     Types: Cigarettes     Start date: 1972     Quit date: 1977     Years since quittin.8    Smokeless tobacco: Never    Tobacco comments:     smoked less than 1 pack per week   Vaping Use    Vaping status: Never Used   Substance and Sexual Activity    Alcohol use: Yes     Alcohol/week: 3.0 standard drinks of alcohol     Types: 2 Glasses of wine, 1 Drinks containing 0.5 oz of alcohol per week     Comment: social drinker/weekends    Drug use: No    Sexual activity: Yes     Partners: Male     Birth control/protection: Post-menopausal           Objective   Physical Exam  Vitals and nursing note reviewed.   Constitutional:       Appearance: Normal appearance.   HENT:      Head: Normocephalic and atraumatic.      Right Ear: External ear normal.      Left Ear: External ear normal.      Nose: Nose normal.      Mouth/Throat:      Mouth: Mucous membranes are moist.      Pharynx: Oropharynx is clear.   Eyes:      Pupils: Pupils are equal, round, and reactive to light.   Cardiovascular:      Rate and Rhythm: Normal rate and regular rhythm.      Pulses: Normal pulses.      Heart sounds: Normal heart sounds. No murmur heard.     No friction rub. No gallop.   Pulmonary:      Effort: Pulmonary effort is normal.      Breath sounds: Normal breath  sounds. No wheezing, rhonchi or rales.   Abdominal:      General: Abdomen is flat. Bowel sounds are normal. There is no distension.      Palpations: Abdomen is soft.      Tenderness: There is no abdominal tenderness.   Musculoskeletal:         General: Swelling and tenderness present.      Right hand: Swelling and tenderness present. No deformity, lacerations or bony tenderness. Normal capillary refill.        Hands:       Cervical back: Normal range of motion and neck supple. No rigidity.   Lymphadenopathy:      Cervical: No cervical adenopathy.   Skin:     General: Skin is warm and dry.   Neurological:      General: No focal deficit present.      Mental Status: She is alert and oriented to person, place, and time.         Procedures           ED Course      XR Hand 3+ View Left    Result Date: 4/26/2024  Narrative: XR HAND 3+ VW LEFT-  Clinical: Fell with hand trauma  FINDINGS: No fracture or dislocation is demonstrated. Considerable first carpal metacarpal joint degeneration seen. No soft tissue gas no radiopaque foreign body.  CONCLUSION: No acute osseous or articular abnormality is demonstrated with special attention to the third fourth and fifth fingers.  This report was finalized on 4/26/2024 5:43 PM by Dr. Noam Zaman M.D on Workstation: [id number]                                          Medical Decision Making  Problems Addressed:  Contusion of left ring finger without damage to nail, initial encounter: complicated acute illness or injury    Amount and/or Complexity of Data Reviewed  Radiology: ordered.        Final diagnoses:   Contusion of left ring finger without damage to nail, initial encounter       ED Disposition  ED Disposition       ED Disposition   Discharge    Condition   Good    Comment   --               Alisa Morales MD  [address] [address]  [phone]    Schedule an appointment as soon as possible for a visit            Medication List      No changes  were made to your prescriptions during this visit.

## 2024-04-26 NOTE — TELEPHONE ENCOUNTER
"Reason for Disposition  • Large swelling or bruise    Additional Information  • Negative: [1] Major bleeding (e.g., spurting blood) AND [2] can't be stopped  • Negative: Wound looks infected  • Negative: Caused by animal bite  • Negative: Caused by human bite  • Negative: Amputated finger  • Negative: Skin is split open or gaping (or length > 1/2 inch or 12 mm)  • Negative: [1] Bleeding AND [2] won't stop after 10 minutes of direct pressure (using correct technique)  • Negative: [1] Dirt in the wound AND [2] not removed with 15 minutes of scrubbing  • Negative: High pressure injection injury (e.g., from grease gun or paint gun, usually work-related)  • Negative: Looks like a broken bone (e.g., crooked or deformed)  • Negative: Looks like a dislocated joint (e.g., crooked or deformed)  • Negative: [1] Fingernail is partially torn AND [2] from crush injury  (Exception: Torn nail from catching it on something.)  • Negative: [1] Cut AND [2] numbness (loss of sensation) of finger  • Negative: [1] Cut AND [2] finger joint can't be opened (straightened) or closed (bent) completely  • Negative: Sounds like a serious injury to the triager  • Negative: [1] SEVERE pain AND [2] not improved 2 hours after pain medicine/ice packs  • Negative: [1] MODERATE-SEVERE pain AND [2] blood present under a nail  • Negative: Fingernail is completely torn off (fingernail avulsion)  • Negative: Base of fingernail has popped out of the skin fold (cuticle)  • Negative: Suspicious history for the injury    Answer Assessment - Initial Assessment Questions  1. MECHANISM: \"How did the injury happen?\"       Fell and went  to catch herself and jammed her finder  2. ONSET: \"When did the injury happen?\" (Minutes or hours ago)       today  3. LOCATION: \"What part of the finger is injured?\" \"Is the nail damaged?\"       knuckle  4. APPEARANCE of the INJURY: \"What does the injury look like?\"       Swollen   5. SEVERITY: \"Can you use the hand normally?\"  " "\"Can you bend your fingers into a ball and then fully open them?\"      denies  6. SIZE: For cuts, bruises, or swelling, ask: \"How large is it?\" (e.g., inches or centimeters;  entire finger)       Swelling so much can't get ring off  7. PAIN: \"Is there pain?\" If Yes, ask: \"How bad is the pain?\"    (e.g., Scale 1-10; or mild, moderate, severe)   - NONE (0): no pain.   - MILD (1-3): doesn't interfere with normal activities.    - MODERATE (4-7): interferes with normal activities or awakens from sleep.   - SEVERE (8-10): excruciating pain, unable to hold a glass of water or bend finger even a little.      moderate  8. TETANUS: For any breaks in the skin, ask: \"When was the last tetanus booster?\"      na  9. OTHER SYMPTOMS: \"Do you have any other symptoms?\"      na  10. PREGNANCY: \"Is there any chance you are pregnant?\" \"When was your last menstrual period?\"        na    Protocols used: Finger Injury-ADULT-AH    "

## 2024-05-14 ENCOUNTER — OFFICE VISIT (OUTPATIENT)
Dept: INTERNAL MEDICINE | Facility: CLINIC | Age: 69
End: 2024-05-14
Payer: MEDICARE

## 2024-05-14 VITALS
HEIGHT: 65 IN | HEART RATE: 71 BPM | BODY MASS INDEX: 23.49 KG/M2 | WEIGHT: 141 LBS | DIASTOLIC BLOOD PRESSURE: 76 MMHG | OXYGEN SATURATION: 98 % | SYSTOLIC BLOOD PRESSURE: 110 MMHG

## 2024-05-14 DIAGNOSIS — M25.562 LEFT KNEE PAIN, UNSPECIFIED CHRONICITY: Primary | ICD-10-CM

## 2024-05-14 PROCEDURE — 1159F MED LIST DOCD IN RCRD: CPT | Performed by: INTERNAL MEDICINE

## 2024-05-14 PROCEDURE — 1160F RVW MEDS BY RX/DR IN RCRD: CPT | Performed by: INTERNAL MEDICINE

## 2024-05-14 PROCEDURE — 99213 OFFICE O/P EST LOW 20 MIN: CPT | Performed by: INTERNAL MEDICINE

## 2024-05-14 PROCEDURE — 1125F AMNT PAIN NOTED PAIN PRSNT: CPT | Performed by: INTERNAL MEDICINE

## 2024-05-14 NOTE — PROGRESS NOTES
Subjective     Debra S Sandifer is a 69 y.o. female who presents with   Chief Complaint   Patient presents with    Knee Pain       Knee Pain          Left knee pain.  Started last Friday after taking a strength training class.  Some swelling.  Hurts to walk on it.      Review of Systems   Respiratory: Negative.     Cardiovascular: Negative.        The following portions of the patient's history were reviewed and updated as appropriate: allergies, current medications and problem list.    Patient Active Problem List    Diagnosis Date Noted    Osteopenia of hip 05/24/2021     Note Last Updated: 11/19/2021     Boniva start 2021      DJD (degenerative joint disease) of knee 09/26/2019    History of coronary artery stent placement 06/18/2018    Prediabetes 10/17/2017    Coronary artery disease involving native coronary artery 11/23/2016     Note Last Updated: 11/23/2016 11/2016  PTCA of the first diagonal branch with a 2.5 x 12 mm trek Rx balloon.  PCI of the distal LAD with a 2.75 x 18 mm Xience Alpine drug-eluting stent  PCI of the mid LAD with a 3.0 x 28 mm Xience Alpine drug-eluting stent      Malignant neoplasm of upper-inner quadrant of left breast in female, estrogen receptor positive 04/27/2016     Note Last Updated: 10/14/2016     S/p lumpectomy and radiation in 2016      Mixed hyperlipidemia 02/10/2016       Current Outpatient Medications on File Prior to Visit   Medication Sig Dispense Refill    acetaminophen (TYLENOL) 325 MG tablet Take 1 tablet by mouth Every 6 (Six) Hours As Needed for Mild Pain.      aspirin 81 MG oral suspension Take 81 mL by mouth Daily.      atorvastatin (LIPITOR) 40 MG tablet Take 1 tablet by mouth every night at bedtime. 90 tablet 4    calcium carbonate-vitamin d 600-400 MG-UNIT per tablet Take 1 tablet by mouth 2 (Two) Times a Day.      carvedilol (COREG) 3.125 MG tablet Take 1 tablet by mouth Every 12 (Twelve) Hours. 180 tablet 4    cholecalciferol (VITAMIN D3) 1000 units tablet  "Take 1 tablet by mouth Every Morning.      ibandronate (BONIVA) 150 MG tablet Take 1 tablet by mouth Every 30 (Thirty) Days. 3 tablet 3    Probiotic Product (PROBIOTIC DAILY PO) Take 1 tablet by mouth Every Morning.       No current facility-administered medications on file prior to visit.       Objective     /76   Pulse 71   Ht 165.1 cm (65\")   Wt 64 kg (141 lb)   SpO2 98%   BMI 23.46 kg/m²     Physical Exam  Constitutional:       Appearance: She is well-developed.   HENT:      Head: Normocephalic and atraumatic.   Pulmonary:      Effort: Pulmonary effort is normal.   Musculoskeletal:      Left knee: Tenderness present over the medial joint line. No LCL laxity or MCL laxity.     Instability Tests: Anterior drawer test negative. Posterior drawer test negative. Medial Kelvin test negative and lateral Kelvin test negative.   Neurological:      Mental Status: She is alert and oriented to person, place, and time.   Psychiatric:         Behavior: Behavior normal.     X-rays of the left knee  performed today for following indication:   pain.  X-ray reveal NAD.  There is no available x-ray for comparison.  X-ray sent to radiology for official interpretation and findings.        Assessment & Plan   Diagnoses and all orders for this visit:    1. Left knee pain, unspecified chronicity (Primary)  -     XR Knee 1 or 2 View Left; Future  -     MRI Knee Left Without Contrast; Future        Discussion    Pain presents with left knee pain c/w strain.  I discussed with the patient a trial of conservative management with:   physical therapy and voltaren gel.  Let me know if not feeling better over the next several weeks or if there is any change in symptoms.  Patient would like an MRI to further evaluate.           Future Appointments   Date Time Provider Department Center   6/12/2024  8:40 AM [facility]   6/13/2024  4:00 PM [facility] 2 [facility]   12/9/2024  8:30 AM LAB CHAIR 1 CBC LAB " [facility] [place]   12/9/2024  9:00 AM Andrea Akhtar MD [facility] [place]   12/10/2024  8:00 AM [facility]   12/16/2024  8:15 AM Alisa Morales MD [facility]   2/13/2025 11:00 AM Sanjiv Dykes MD [facility]         Answers submitted by the patient for this visit:  Other (Submitted on 5/13/2024)  Please describe your symptoms.: Pain in left knee. Started on Friday May 10 after workout activity., Pain worsened as the weekend went on., I think something was going on with my knee prior to Friday's activity. But not enough to warrant a visit to see Dr Morales.  Have you had these symptoms before?: Yes  How long have you been having these symptoms?: Greater than 2 weeks  Please list any medications you are currently taking for this condition.: Tylenol  Please describe any probable cause for these symptoms. : Have felt some weakness and minor discomfort in my left knee for a few months., I think Friday's workout brought any knee issues to full fruition of what might be going on.  Primary Reason for Visit (Submitted on 5/13/2024)  What is the primary reason for your visit?: Other

## 2024-05-21 ENCOUNTER — TREATMENT (OUTPATIENT)
Dept: PHYSICAL THERAPY | Facility: CLINIC | Age: 69
End: 2024-05-21
Payer: MEDICARE

## 2024-05-21 DIAGNOSIS — M25.562 LEFT KNEE PAIN, UNSPECIFIED CHRONICITY: Primary | ICD-10-CM

## 2024-05-21 DIAGNOSIS — R68.89 ACTIVITY INTOLERANCE: ICD-10-CM

## 2024-05-21 DIAGNOSIS — M25.662 DECREASED RANGE OF MOTION OF LEFT KNEE: ICD-10-CM

## 2024-05-21 DIAGNOSIS — R29.898 LEG WEAKNESS, BILATERAL: ICD-10-CM

## 2024-05-21 PROCEDURE — 97530 THERAPEUTIC ACTIVITIES: CPT | Performed by: PHYSICAL THERAPIST

## 2024-05-21 PROCEDURE — 97110 THERAPEUTIC EXERCISES: CPT | Performed by: PHYSICAL THERAPIST

## 2024-05-21 PROCEDURE — 97162 PT EVAL MOD COMPLEX 30 MIN: CPT | Performed by: PHYSICAL THERAPIST

## 2024-05-21 PROCEDURE — 97112 NEUROMUSCULAR REEDUCATION: CPT | Performed by: PHYSICAL THERAPIST

## 2024-05-21 NOTE — PROGRESS NOTES
Physical Therapy Initial Evaluation and Plan of Care  [facility] [place]   [address], [address]  [place] [address]  P: [phone]  F: [phone]    Patient: Debra S Sandifer   : 1955  Diagnosis/ICD-10 Code:  Left knee pain, unspecified chronicity [M25.562]  Referring practitioner: Alisa Morales MD  Date of Initial Visit: 2024  Today's Date: 2024  Patient seen for 1 session         Visit Diagnoses:    ICD-10-CM ICD-9-CM   1. Left knee pain, unspecified chronicity  M25.562 719.46   2. Leg weakness, bilateral  R29.898 729.89   3. Decreased range of motion of left knee  M25.662 719.56   4. Activity intolerance  R68.89 780.99       PMHx Reviewed : 2024      Subjective Evaluation    History of Present Illness  Mechanism of injury: Hx:   Pt reports to clinic for IE of L knee pain. Pt reports she has been having nagging discomfort for the last 4-5 months. Pt can't recall any event that started her knee pain. Pt notes certain activity, like turning, where her knee will give out. Pt notes an increase in the last 3-4 months (2+ x per week).  Pt notes tripping while volunteering at [employer] about a month ago, she hurt her L finger but can't recall hurting her L knee.   Pt reports taking a kickboxing class 2 weeks ago and afterwards her knee started to hurt worse and worse as the day went on. Pt did not hear a pop or have any falls or any injury during the class.  Pt has been staying home and resting and hasn't worked out since. Pt went to [employer] graduation over weekend and had a lot of pain sitting for 2+ hours.     PMH includes:  - R TKA (, revision = )    Pt reported difficulties:  - self reported fxn status =  60/100 %  - squats/lunges   - house cleaning (taking laundry down stairs, has to go backwards and slide hamper down)  - get in/out of car  - crossing her L leg over R  - sleeping (2-3 hours undisturbed)  - walking (driveway distance  is painful)    Hobbies (impacted by dysfxn):  - exercise (swimming, TRX, strength training classes) 5 days/week.   - walking (3 days/week, 3 miles each time, hills)  - volunteering ([employer]        Patient Occupation: retired,  of life: excellent    Pain  Current pain ratin  At best pain ratin  At worst pain ratin  Location: medial side of knee  Quality: shooting, constant ache.  Relieving factors: ice, support, rest and medications  Aggravating factors: stairs, lifting, standing, ambulation, squatting and sleeping  Progression: no change    Social Support  Lives in: multiple-level home  Lives with: spouse    Diagnostic Tests  X-ray: abnormal (knee OA)  Abnormal MRI: scheduled for .    Patient Goals  Patient goals for therapy: decreased pain, increased strength, independence with ADLs/IADLs and return to sport/leisure activities (information about why her knee hurts)           Objective          Tenderness   Left Knee   Tenderness in the MCL (distal), MCL (proximal), medial joint line, medial patella, medial retinaculum and patellar tendon.     Active Range of Motion   Left Knee   Flexion: 108 (8/10)) degrees with pain  Extension: 0 degrees     Right Knee   Flexion: 110 degrees   Extension: 0 degrees     Patellar Mobility   Left Knee Hypomobile in the left medial, left lateral, left superior and left inferior patellar tendon(s).     Right Knee Hypomobile in the medial, lateral, superior and inferior patellar tendon(s).     Strength/Myotome Testing     Left Hip   Planes of Motion   Flexion: 4  Abduction: 5  Adduction: 4+  External rotation: 4+  Internal rotation: 4+    Right Hip   Planes of Motion   Flexion: 4+  Abduction: 5  Adduction: 4+  External rotation: 4+  Internal rotation: 4+    Left Knee   Flexion: 4+  Extension: 4+    Right Knee   Flexion: 5  Extension: 5    Left Ankle/Foot   Dorsiflexion: 5    Right Ankle/Foot   Dorsiflexion: 5    Tests     Left Knee   Positive  medial Kelvin, valgus stress test at 0 degrees and valgus stress test at 30 degrees.   Negative anterior drawer, posterior drawer, varus stress test at 0 degrees and varus stress test at 30 degrees.     Right Knee   Negative medial Kelvin, valgus stress test at 0 degrees, valgus stress test at 30 degrees, varus stress test at 0 degrees and varus stress test at 30 degrees.     Additional Tests Details  Pain w/ anterior drawer and posterior drawer testing on L resulting in limited testing due to pain sensitivity.     General Comments     Lumbar Comments  5xSTS= 15.26 sec (pain 4/10)    TUG = 10.43 (5/10)         Assessment & Plan       Assessment  Impairments: abnormal coordination, abnormal gait, abnormal or restricted ROM, activity intolerance, impaired balance, impaired physical strength, lacks appropriate home exercise program, pain with function and weight-bearing intolerance   Functional limitations: carrying objects, lifting, walking, pulling, pushing, uncomfortable because of pain, sitting, standing and unable to perform repetitive tasks   Assessment details: Pt presents to clinic for IE of L knee. Pt demonstrates expected clinical presentation of LE weakness, decreased ROM, pain and activity intolerance, gait/balance disturbances, and positive L medial Kelvin, valgus stress test(s) in correlation w/ potential injury of L medial mensicus. Pt will benefit from skilled PT services at this time to address found dysfxn in order to achieve outlined goals in POC for pt fxn mobility, safety, and return to PLOF.   Prognosis: good    Goals  Plan Goals: STG [achieve in 4 wks]   1. Pt will be compliant w/ HEP and attendance w/ scheduled therapy sessions.   2. Pt will demonstrate 4+/5 LE strength in order to improve gait, fxn mobility, and activity level.  3. Pt will increase knee ROM by 5-10* degrees for improved fxn mobility in completion of IADLs.   4. Pt will report decreased pain levels from 8/10 at worst to  5/10 or less for pt QoL and participation in progressing PT POC.    LTG [achieve in 8 wks]   1. Pt will demonstrate 5/5 LE strength in order to improve gait, fxn mobility, and activity level.  2. Pt will increase LEFS score from 26/80 to 52/80 or greater to demo minimal to no fxn limitations in order to improve pt QoL and return to PLOF .   3. Pt will reduce 5xSTS score from 15.26 sec to 11.4 sec or less in order to improve fxn strength w/ performance of IADLs and pt safety.   4. Pt will demo return to pain free participation in recreational activities, such as walking, w/ reduced pain from 8/10 to 4/10 or less to demo improved fxn status and return to PLOF.                     Plan  Therapy options: will be seen for skilled therapy services  Planned modality interventions: cryotherapy, dry needling, iontophoresis and TENS  Planned therapy interventions: abdominal trunk stabilization, balance/weight-bearing training, body mechanics training, fine motor coordination training, flexibility, functional ROM exercises, gait training, home exercise program, joint mobilization, therapeutic activities, stretching, strengthening, postural training, orthotic fitting/training, neuromuscular re-education and motor coordination training  Frequency: 2x week  Duration in weeks: 8  Treatment plan discussed with: patient  Plan details: Access Code: [email or web address]          Manual Therapy:     0     mins  99736;  Therapeutic Exercise:     20     mins  99365;     Neuromuscular Kenneth:       8    mins  54022;    Therapeutic Activity:      10     mins  78190;     Gait Trainin     mins  97205;     Ultrasound:      0     mins  71835;    Electrical Stimulation:     0     mins  92308 ( );  Dry Needling      0     mins self-pay  Traction      0     mins 46925  Canalith Repositioning    0     mins 76660      Timed Treatment:   38   mins   Total Treatment:     60   mins      PT: Jamal Andre, PT     License Number:  [id number]  Electronically signed by Jamal Andre, PT, 05/21/24, 2:14 PM EDT    Certification Period: 5/21/2024 thru 8/18/2024  I certify that the therapy services are furnished while this patient is under my care.  The services outlined above are required by this patient, and will be reviewed every 90 days.         Physician Signature:__________________________________________________    PHYSICIAN: Alisa Morales MD  NPI: [id number]                                      DATE:      Please sign in Epic or return via fax to [email or web address] . Thank you, [facility].

## 2024-05-23 ENCOUNTER — HOSPITAL ENCOUNTER (OUTPATIENT)
Dept: MRI IMAGING | Facility: HOSPITAL | Age: 69
Discharge: HOME | End: 2024-05-23
Admitting: INTERNAL MEDICINE
Payer: MEDICARE

## 2024-05-23 DIAGNOSIS — M25.562 LEFT KNEE PAIN, UNSPECIFIED CHRONICITY: ICD-10-CM

## 2024-05-23 PROCEDURE — 73721 MRI JNT OF LWR EXTRE W/O DYE: CPT

## 2024-05-24 ENCOUNTER — TELEPHONE (OUTPATIENT)
Dept: ORTHOPEDIC SURGERY | Facility: CLINIC | Age: 69
End: 2024-05-24
Payer: MEDICARE

## 2024-05-24 ENCOUNTER — TELEPHONE (OUTPATIENT)
Dept: INTERNAL MEDICINE | Facility: CLINIC | Age: 69
End: 2024-05-24
Payer: MEDICARE

## 2024-05-24 DIAGNOSIS — S82.142A CLOSED FRACTURE OF LEFT TIBIAL PLATEAU, INITIAL ENCOUNTER: Primary | ICD-10-CM

## 2024-05-24 NOTE — TELEPHONE ENCOUNTER
Received referral on patient :   From IRMAMANOLOMEGAN LANTIGUA  Diagnosis: S82.142A (ICD-10-CM) - Closed fracture of left tibial plateau, initial encounter Scheduling Status: Scheduling Review       Comments   REFERRING PROVIDER OFFICE CALLED TO SEE IF WE COULD GET PATIENT IN TODAY. UNABLE TO WT TO .   PLEASE ADVISE TONI IN DR MORALES -[phone]       -sent message to our providers in our office, unfortunately we were unable to get patient in today.     Per. Dr. Walter , he reviewed her MRI. It looks like she has arthritis and a stress reaction in the bone. This is not an urgent issue. Please reassure them that she will be fine and over the weekend she just needs to try to rest the knee is much as possible. It is okay for you to work her in with me next Wednesday at [place]. I will be happy to see her then and discuss options.        I called and spoke with patient and relayed his message above and she is scheduled to see Dr. Walter on Wednesday at 7:50am.   I also called Dr. Morales's office and spoke with Toni to let them know.

## 2024-05-24 NOTE — TELEPHONE ENCOUNTER
Spoke to Heidi Nicole at [facility] that states Dr. Walter has reviewed the patient's MRI. He said it looks like she has arthritis and a stress reaction in the bone. He states this is not an urgent issue and to please reassure them that she will be fine over the weekend. The patient just needs to try to rest the knee as much as possible. He said it is okay to work her in with him next Wednesday at [place] and he would be happy to discuss options.        Heidi called and spoke with patient and relayed Dr. Walter's message. She is scheduled to see Dr. Walter on Wednesday 05/29/2024 at 7:50 am.

## 2024-05-29 ENCOUNTER — OFFICE VISIT (OUTPATIENT)
Dept: ORTHOPEDIC SURGERY | Facility: CLINIC | Age: 69
End: 2024-05-29
Payer: MEDICARE

## 2024-05-29 ENCOUNTER — TELEPHONE (OUTPATIENT)
Dept: PHYSICAL THERAPY | Facility: CLINIC | Age: 69
End: 2024-05-29
Payer: MEDICARE

## 2024-05-29 VITALS — BODY MASS INDEX: 23.54 KG/M2 | TEMPERATURE: 98.7 F | HEIGHT: 65 IN | WEIGHT: 141.3 LBS

## 2024-05-29 DIAGNOSIS — M25.562 LEFT KNEE PAIN, UNSPECIFIED CHRONICITY: Primary | ICD-10-CM

## 2024-05-29 DIAGNOSIS — M25.562 CHRONIC PAIN OF LEFT KNEE: ICD-10-CM

## 2024-05-29 DIAGNOSIS — G89.29 CHRONIC PAIN OF LEFT KNEE: ICD-10-CM

## 2024-05-29 DIAGNOSIS — S83.242A ACUTE MENISCAL TEAR, MEDIAL, LEFT, INITIAL ENCOUNTER: ICD-10-CM

## 2024-05-29 NOTE — TELEPHONE ENCOUNTER
Caller: Sandifer, Debra S    Relationship: Self         What was the call regarding: [facility] GOING TO HAVE SURGERY

## 2024-05-29 NOTE — PROGRESS NOTES
Patient: Debra S Sandifer    YOB: 1955    Medical Record Number: [id number]    Chief Complaints:  Left knee pain    History of Present Illness:     69 y.o. female patient who presents for evaluation of the left knee.  The symptoms began about 6 months ago.  She reports the symptoms started with a sensation that the knee wanted to give out and buckle.  This has continued to happen intermittently over the past 6 months with the symptoms slowly getting worse.  About a month ago, she was doing a kickboxing class when she started to notice increased pain in the knee.  The pain has persisted.  Current pain is described as moderate, constant and aching. She reports associated clicking and popping.  Denies having noticed any alleviating factors.  She has just recently started PT.  Denies any other complaints or issues.  Allergies   Allergen Reactions    Codeine Rash    Oxycodone Nausea And Vomiting and Hallucinations    Hydrocodone-Acetaminophen Nausea And Vomiting    Latex Rash       Current Outpatient Medications:     acetaminophen (TYLENOL) 325 MG tablet, Take 1 tablet by mouth Every 6 (Six) Hours As Needed for Mild Pain., Disp: , Rfl:     aspirin 81 MG oral suspension, Take 81 mL by mouth Daily., Disp: , Rfl:     atorvastatin (LIPITOR) 40 MG tablet, Take 1 tablet by mouth every night at bedtime., Disp: 90 tablet, Rfl: 4    calcium carbonate-vitamin d 600-400 MG-UNIT per tablet, Take 1 tablet by mouth 2 (Two) Times a Day., Disp: , Rfl:     carvedilol (COREG) 3.125 MG tablet, Take 1 tablet by mouth Every 12 (Twelve) Hours., Disp: 180 tablet, Rfl: 4    cholecalciferol (VITAMIN D3) 1000 units tablet, Take 1 tablet by mouth Every Morning., Disp: , Rfl:     ibandronate (BONIVA) 150 MG tablet, Take 1 tablet by mouth Every 30 (Thirty) Days., Disp: 3 tablet, Rfl: 3    Probiotic Product (PROBIOTIC DAILY PO), Take 1 tablet by mouth Every Morning., Disp: , Rfl:     Past Medical History:   Diagnosis Date    Allergic  codeine/latex    Arthralgia of both hands     Arthritis     hand    Atypical chest pain     Breast cancer     Left    Coronary artery disease involving native coronary artery 11/23/2016    has stents    Dyspareunia     H/O bronchitis     H/O infectious mononucleosis     COLLEGE AGE    Hemorrhoid     High cholesterol     History of medical problems back surgery  2005    repair herniated disc    Hot flashes, menopausal     Hx of radiation therapy     JULY 2016    Hyperlipidemia     Hypertension     Myocardial infarction November 2016    2 stents    Polyp of sigmoid colon     Right knee pain     Stye     LEFT EYE    Urinary tract infection periodically    Vitamin D deficiency        Past Surgical History:   Procedure Laterality Date    BACK SURGERY  01/13/2005    Herniated Disk repair (Done by Dr Jurgen Reddy)    BREAST BIOPSY Left     BREAST LUMPECTOMY Left 2016    also biopsied and resected lymph nodes    BREAST LUMPECTOMY WITH SENTINEL NODE BIOPSY Left 04/13/2016    Procedure: LEFT BREAST MAMMO NEEDLE LOC LUMPECTOMY WITH LEFT AXILLA SENTINEL NODE BIOPSY;  Surgeon: Aminata Estrella MD;  Location: [facility] OR;  Service:     CARDIAC CATHETERIZATION N/A 11/15/2016    Procedure: Left Heart Cath;  Surgeon: Sanjiv Dykes MD;  Location: [facility] CATH INVASIVE LOCATION;  Service:     CARDIAC CATHETERIZATION N/A 11/15/2016    Procedure: Left ventriculography;  Surgeon: Sanjiv Dykes MD;  Location: [facility] CATH INVASIVE LOCATION;  Service:     CARDIAC SURGERY  11/15/2016    COLONOSCOPY  2014    CORONARY ANGIOPLASTY      CORONARY STENT PLACEMENT  11/15/2016    two    ENDOSCOPY N/A 11/11/2016    Procedure: ESOPHAGOGASTRODUODENOSCOPY WITH BIOPSY;  Surgeon: Mehdi Guardado MD;  Location: [facility] ENDOSCOPY;  Service:     JOINT MANIPULATION Right 10/28/2019    Procedure: RIGHT KNEE MANIPULATION;  Surgeon: Van Titus MD;  Location: [facility] OR;  Service: Orthopedics    JOINT REPLACEMENT  Right knee replacement  2019    Right knee replacement-2019,  Right knee Revision 2021    LUMBAR DISCECTOMY      LYMPH NODE BIOPSY  2016    two    SPINE SURGERY  2005    repair herniated disc    TOTAL KNEE ARTHROPLASTY Right 2019    Procedure: TOTAL KNEE ARTHROPLASTY;  Surgeon: Van Titus MD;  Location: [facility];  Service: Orthopedics    TOTAL KNEE ARTHROPLASTY REVISION Right 2021    Procedure: RIGHT KNEE FEMORAL REVISION;  Surgeon: Van Titus MD;  Location: [facility] OR;  Service: Orthopedics;  Laterality: Right;    WISDOM TOOTH EXTRACTION         Social History     Occupational History    Occupation: Retired      Employer: [employer]     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[place], [place], [place], [place]   Tobacco Use    Smoking status: Former     Current packs/day: 0.00     Types: Cigarettes     Start date: 1972     Quit date: 1977     Years since quittin.9    Smokeless tobacco: Never    Tobacco comments:     smoked less than 1 pack per week   Vaping Use    Vaping status: Never Used   Substance and Sexual Activity    Alcohol use: Yes     Alcohol/week: 3.0 standard drinks of alcohol     Types: 2 Glasses of wine, 1 Drinks containing 0.5 oz of alcohol per week     Comment: social drinker/weekends    Drug use: No    Sexual activity: Yes     Partners: Male     Birth control/protection: Post-menopausal      Social History     Social History Narrative    Enjoys working out, golf, walking, running, grandchildren's activities. Traveled to [place] and [place] in 2015 and [place] [facility] 2016       Family History   Problem Relation Age of Onset    Coronary artery disease Mother     Dementia Mother     Heart disease Mother         Heart attack w/2 stents    Heart attack Mother         had heart attack. Heart catheter -2 stents    Hypertension Father     Heart disease Father         Coronary heart disease    Stroke Father         Ischemic    Diabetes type II  "Father     Diabetes Father     Hyperlipidemia Father     Prostate cancer Brother 51    Cancer Brother         Colitis    Alcohol abuse Maternal Grandfather     Rheum arthritis Paternal Grandmother     Arthritis Paternal Grandmother     Alcohol abuse Paternal Grandfather     Stroke Paternal Grandfather         Ischemic    Rheum arthritis Paternal Grandfather     Diabetes type II Paternal Grandfather     Diabetes Paternal Grandfather     Hyperlipidemia Paternal Grandfather     Hypertension Paternal Grandfather     Heart disease Paternal Grandfather     Cancer Sister         appexdix    Other Sister         Appendiceal cancer    Cancer Brother         Prostate    No Known Problems Maternal Grandmother     Malig Hyperthermia Neg Hx        Review of Systems:      Constitutional: Denies fever, shaking or chills   Eyes: Denies change in visual acuity   HEENT: Denies nasal congestion or sore throat   Respiratory: Denies cough or shortness of breath   Cardiovascular: Denies chest pain or edema  Endocrine: Denies tremors, palpitations, intolerance of heat or cold, polyuria, polydipsia.  GI: Denies abdominal pain, nausea, vomiting, bloody stools or diarrhea  : Denies frequency, urgency, incontinence, retention, or nocturia.  Musculoskeletal: Denies numbness, tingling or loss of motor function except as above  Integument: Denies rash, lesion or ulceration   Neurologic: Denies headache or focal weakness, deficits  Heme: Denies spontaneous or excessive bleeding, epistaxis, hematuria, melena, fatigue, enlarged or tender lymph nodes.      All other pertinent positives and negatives as noted above in HPI.    Physical Exam:   69 y.o. female  Vitals:    05/29/24 0802   Temp: 98.7 °F (37.1 °C)   TempSrc: Temporal   Weight: 64.1 kg (141 lb 4.8 oz)   Height: 165.1 cm (65\")     General:  Patient is awake and alert.  Appears in no acute distress or discomfort.    Psych:  Affect and demeanor are appropriate.    Eyes:  Conjunctiva and " sclera appear grossly normal.  Eyes track well and EOM seem to be intact.    Ears:  No gross abnormalities.  Hearing adequate for the exam.    Cardiovascular:  Regular rate and rhythm.    Lungs:  Good chest expansion.  Breathing unlabored.    Spine:  Back appears grossly normal.  No palpable masses or adenopathy.  Good motion.  Straight leg raise and crossed straight leg raise maneuver are both negative for lower leg and/or knee pain.    Extremities:  Left knee is examined.  Skin is benign.  No obvious gross abnormalities.  No palpable masses or adenopathy.  Moderate tenderness noted over medial joint line.  Motion is full and symmetric to the contralateral side.  Moderate discomfort with hyperflexion.  Positive medial Kelvin's test.  No instability.  Strength is well preserved including hip flexion, knee extension, ankle and toe plantarflexion, ankle inversion and eversion.  Good sensory function throughout the leg and foot.  Palpable pulses.  Brisk capillary refill.  Good skin turgor.         Radiology:   Bilateral standing AP views, bilateral merchants views and a lateral view of the left knee are ordered by myself and reviewed to evaluate the patient's complaint.  No comparison films are immediately available.  The x-rays show mild degenerative changes with early osteophyte formation.  There are no acute abnormalities, lesions, masses, significant degenerative changes, malalignment or other concerning findings.    MRI of the left knee is reviewed along with the associated report.  I have independently interpreted the images.  My impression is that she has a complex tear of the medial meniscus involving the mid body.  It looks that there is a component that has displaced into the tibial gutter.  She has subchondral marrow edema at the lip of the plateau just adjacent to this tear.  She does have some patellofemoral chondromalacia but overall she does not appear to have tremendous arthritis in the  knee.    IMPRESSION:     1. Complex tear of the medial meniscus body and posterior horn with a  longitudinal horizontal oblique component extending to the  intra-articular surface and a small posterior meniscal body flap flipped  into the medial tibial gutter.  2. Focal acute or subacute nondisplaced subchondral insufficiency type  fracture at the posterior medial weightbearing portion of the medial  tibial plateau.  3. 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  4. Mild likely degenerative fraying of the lateral meniscus body inner  free margin.  5. Edema in the fat deep to the iliotibial band and lateral to the  lateral femoral condyle could be reactive or could reflect iliotibial  band friction syndrome.  6. Partially imaged small to moderate joint effusion with mild a few  synovial thickening, intra-articular debris, and mild nonspecific  thickening of the medial plica.    Assessment/Plan:  Left medial meniscal tear, tibial plateau insufficiency fracture    I showed her and her  the MRI.  We thoroughly discussed treatment options.  Regarding conservative treatment, we discussed appropriate activity modifications, anti-inflammatories, injections (including both corticosteroids and visco supplementation), and physical therapy.  We also discussed the alternative option of an arthroscopy.  I did explain the limited goals of arthroscopy with respect to addressing the meniscus tear versus the insufficiency fracture and her associated chondromalacia.  I explained that I do not expect she will need a knee replacement in the near future but that is certainly still a possibility if her arthritis is worse than anticipated.  She acknowledged understanding of this information.  Her  has been through 2 knee scopes.  She was well aware of this procedure.  She wants to move forward with the arthroscopy.     We will plan on proceeding with a left knee arthroscopy and partial  meniscectomy including possible chondroplasty and addressing any unforseen pathology as indicated.  I reviewed details of procedure with patient today and discussed all the risks, benefits, alternatives, and limitations of the procedure in laymen's terms with the risks including but not limited to:  neurovascular damage, bleeding, infection, hematoma, chronic pain, worsening of pain, chondrolysis, swelling, loss of motion, weakness, stiffness, instability, DVT, pulmonary embolus, death, stroke, complex regional pain syndrome, and need for additional procedures.  The patient verbalized understanding, and was given the opportunity to ask and have all questions answered today.  No guarantees were given regarding the results of surgery.       Vipul Walter MD    05/29/2024    CC to Alisa Morales MD

## 2024-05-31 ENCOUNTER — PATIENT ROUNDING (BHMG ONLY) (OUTPATIENT)
Dept: ORTHOPEDIC SURGERY | Facility: CLINIC | Age: 69
End: 2024-05-31
Payer: MEDICARE

## 2024-05-31 PROBLEM — S83.242A ACUTE MENISCAL TEAR, MEDIAL, LEFT, INITIAL ENCOUNTER: Status: ACTIVE | Noted: 2024-05-29

## 2024-05-31 NOTE — PROGRESS NOTES
A A-TEX Message has been sent to the patient for PATIENT ROUNDING with [facility]

## 2024-06-10 DIAGNOSIS — E78.2 MIXED HYPERLIPIDEMIA: Primary | ICD-10-CM

## 2024-06-10 DIAGNOSIS — R73.03 PREDIABETES: ICD-10-CM

## 2024-06-12 LAB
ALBUMIN SERPL-MCNC: 3.9 G/DL (ref 3.5–5.2)
ALBUMIN/GLOB SERPL: 1.6 G/DL
ALP SERPL-CCNC: 72 U/L (ref 39–117)
ALT SERPL-CCNC: 21 U/L (ref 1–33)
AST SERPL-CCNC: 31 U/L (ref 1–32)
BILIRUB SERPL-MCNC: 0.6 MG/DL (ref 0–1.2)
BUN SERPL-MCNC: 16 MG/DL (ref 8–23)
BUN/CREAT SERPL: 16.7 (ref 7–25)
CALCIUM SERPL-MCNC: 9.2 MG/DL (ref 8.6–10.5)
CHLORIDE SERPL-SCNC: 107 MMOL/L (ref 98–107)
CHOLEST SERPL-MCNC: 154 MG/DL (ref 0–200)
CO2 SERPL-SCNC: 26.4 MMOL/L (ref 22–29)
CREAT SERPL-MCNC: 0.96 MG/DL (ref 0.57–1)
EGFRCR SERPLBLD CKD-EPI 2021: 64.6 ML/MIN/1.73
GLOBULIN SER CALC-MCNC: 2.4 GM/DL
GLUCOSE SERPL-MCNC: 86 MG/DL (ref 65–99)
HBA1C MFR BLD: 5.9 % (ref 4.8–5.6)
HDLC SERPL-MCNC: 81 MG/DL (ref 40–60)
LDLC SERPL CALC-MCNC: 62 MG/DL (ref 0–100)
POTASSIUM SERPL-SCNC: 4.6 MMOL/L (ref 3.5–5.2)
PROT SERPL-MCNC: 6.3 G/DL (ref 6–8.5)
SODIUM SERPL-SCNC: 142 MMOL/L (ref 136–145)
TRIGL SERPL-MCNC: 49 MG/DL (ref 0–150)
VLDLC SERPL CALC-MCNC: 11 MG/DL (ref 5–40)

## 2024-06-13 ENCOUNTER — HOSPITAL ENCOUNTER (OUTPATIENT)
Dept: MAMMOGRAPHY | Facility: HOSPITAL | Age: 69
Discharge: HOME | End: 2024-06-13
Admitting: INTERNAL MEDICINE
Payer: MEDICARE

## 2024-06-13 DIAGNOSIS — Z12.31 SCREENING MAMMOGRAM FOR BREAST CANCER: ICD-10-CM

## 2024-06-13 PROCEDURE — 77063 BREAST TOMOSYNTHESIS BI: CPT

## 2024-06-13 PROCEDURE — 77067 SCR MAMMO BI INCL CAD: CPT

## 2024-06-25 ENCOUNTER — PRE-ADMISSION TESTING (OUTPATIENT)
Dept: PREADMISSION TESTING | Facility: HOSPITAL | Age: 69
End: 2024-06-25
Payer: MEDICARE

## 2024-06-25 VITALS
DIASTOLIC BLOOD PRESSURE: 70 MMHG | HEIGHT: 65 IN | BODY MASS INDEX: 24.21 KG/M2 | TEMPERATURE: 97.6 F | WEIGHT: 145.3 LBS | RESPIRATION RATE: 16 BRPM | HEART RATE: 63 BPM | OXYGEN SATURATION: 97 % | SYSTOLIC BLOOD PRESSURE: 117 MMHG

## 2024-06-25 LAB
ANION GAP SERPL CALCULATED.3IONS-SCNC: 5 MMOL/L (ref 5–15)
BUN SERPL-MCNC: 21 MG/DL (ref 8–23)
BUN/CREAT SERPL: 22.8 (ref 7–25)
CALCIUM SPEC-SCNC: 9.3 MG/DL (ref 8.6–10.5)
CHLORIDE SERPL-SCNC: 106 MMOL/L (ref 98–107)
CO2 SERPL-SCNC: 29 MMOL/L (ref 22–29)
CREAT SERPL-MCNC: 0.92 MG/DL (ref 0.57–1)
DEPRECATED RDW RBC AUTO: 42.5 FL (ref 37–54)
EGFRCR SERPLBLD CKD-EPI 2021: 68 ML/MIN/1.73
ERYTHROCYTE [DISTWIDTH] IN BLOOD BY AUTOMATED COUNT: 13 % (ref 12.3–15.4)
GLUCOSE SERPL-MCNC: 92 MG/DL (ref 65–99)
HCT VFR BLD AUTO: 39.2 % (ref 34–46.6)
HGB BLD-MCNC: 12.6 G/DL (ref 12–15.9)
MCH RBC QN AUTO: 29 PG (ref 26.6–33)
MCHC RBC AUTO-ENTMCNC: 32.1 G/DL (ref 31.5–35.7)
MCV RBC AUTO: 90.3 FL (ref 79–97)
PLATELET # BLD AUTO: 256 10*3/MM3 (ref 140–450)
PMV BLD AUTO: 9.7 FL (ref 6–12)
POTASSIUM SERPL-SCNC: 4.3 MMOL/L (ref 3.5–5.2)
RBC # BLD AUTO: 4.34 10*6/MM3 (ref 3.77–5.28)
SODIUM SERPL-SCNC: 140 MMOL/L (ref 136–145)
WBC NRBC COR # BLD AUTO: 6.04 10*3/MM3 (ref 3.4–10.8)

## 2024-06-25 PROCEDURE — 80048 BASIC METABOLIC PNL TOTAL CA: CPT

## 2024-06-25 PROCEDURE — 36415 COLL VENOUS BLD VENIPUNCTURE: CPT

## 2024-06-25 PROCEDURE — 85027 COMPLETE CBC AUTOMATED: CPT

## 2024-06-25 NOTE — DISCHARGE INSTRUCTIONS
Take the following medications the morning of surgery:    CARVEDILOL    If you are on prescription narcotic pain medication to control your pain you may also take that medication the morning of surgery.    General Instructions:  Do not eat solid food after midnight the night before surgery.  You may drink clear liquids day of surgery but must stop at least one hour before your hospital arrival time.  It is beneficial for you to have a clear drink that contains carbohydrates the day of surgery.  We suggest a 12 to 20 ounce bottle of Gatorade or Powerade for non-diabetic patients or a 12 to 20 ounce bottle of G2 or Powerade Zero for diabetic patients. (Pediatric patients, are not advised to drink a 12 to 20 ounce carbohydrate drink)    Clear liquids are liquids you can see through.  Nothing red in color.     Plain water                               Sports drinks  Sodas                                   Gelatin (Jell-O)  Fruit juices without pulp such as white grape juice and apple juice  Popsicles that contain no fruit or yogurt  Tea or coffee (no cream or milk added)  Gatorade / Powerade  G2 / Powerade Zero    Infants may have breast milk up to four hours before surgery.  Infants drinking formula may drink formula up to six hours before surgery.   Patients who avoid smoking, chewing tobacco and alcohol for 4 weeks prior to surgery have a reduced risk of post-operative complications.  Quit smoking as many days before surgery as you can.  Do not smoke, use chewing tobacco or drink alcohol the day of surgery.   If applicable bring your C-PAP/ BI-PAP machine in with you to preop day of surgery.  Bring any papers given to you in the doctor’s office.  Wear clean comfortable clothes.  Do not wear contact lenses, false eyelashes or make-up.  Bring a case for your glasses.   Bring crutches or walker if applicable.  Remove all piercings.  Leave jewelry and any other valuables at home.  Hair extensions with metal clips must be  removed prior to surgery.  The Pre-Admission Testing nurse will instruct you to bring medications if unable to obtain an accurate list in Pre-Admission Testing.        If you were given a blood bank ID arm band remember to bring it with you the day of surgery.    Preventing a Surgical Site Infection:  For 2 to 3 days before surgery, avoid shaving with a razor because the razor can irritate skin and make it easier to develop an infection.    Any areas of open skin can increase the risk of a post-operative wound infection by allowing bacteria to enter and travel throughout the body.  Notify your surgeon if you have any skin wounds / rashes even if it is not near the expected surgical site.  The area will need assessed to determine if surgery should be delayed until it is healed.  The night prior to surgery shower using a fresh bar of anti-bacterial soap (such as Dial) and clean washcloth.  Sleep in a clean bed with clean clothing.  Do not allow pets to sleep with you.  Shower on the morning of surgery using a fresh bar of anti-bacterial soap (such as Dial) and clean washcloth.  Dry with a clean towel and dress in clean clothing.  Ask your surgeon if you will be receiving antibiotics prior to surgery.  Make sure you, your family, and all healthcare providers clean their hands with soap and water or an alcohol based hand  before caring for you or your wound.    Day of surgery:  Your arrival time is approximately two hours before your scheduled surgery time.  Upon arrival, a Pre-op nurse and Anesthesiologist will review your health history, obtain vital signs, and answer questions you may have.  The only belongings needed at this time will be a list of your home medications and if applicable your C-PAP/BI-PAP machine.  A Pre-op nurse will start an IV and you may receive medication in preparation for surgery, including something to help you relax.     Please be aware that surgery does come with discomfort.  We  want to make every effort to control your discomfort so please discuss any uncontrolled symptoms with your nurse.   Your doctor will most likely have prescribed pain medications.      If you are going home after surgery you will receive individualized written care instructions before being discharged.  A responsible adult must drive you to and from the hospital on the day of your surgery and ideally stay with you through the night.   .  Discharge prescriptions can be filled by the hospital pharmacy during regular pharmacy hours.  If you are having surgery late in the day/evening your prescription may be e-prescribed to your pharmacy.  Please verify your pharmacy hours or chose a 24 hour pharmacy to avoid not having access to your prescription because your pharmacy has closed for the day.    If you are staying overnight following surgery, you will be transported to your hospital room following the recovery period.  [facility] has all private rooms.    If you have any questions please call Pre-Admission Testing at [phone].  Deductibles and co-payments are collected on the day of service. Please be prepared to pay the required co-pay, deductible or deposit on the day of service as defined by your plan.    Call your surgeon immediately if you experience any of the following symptoms:  Sore Throat  Shortness of Breath or difficulty breathing  Cough  Chills  Body soreness or muscle pain  Headache  Fever  New loss of taste or smell  Do not arrive for your surgery ill.  Your procedure will need to be rescheduled to another time.  You will need to call your physician before the day of surgery to avoid any unnecessary exposure to hospital staff as well as other patients.

## 2024-07-09 ENCOUNTER — ANESTHESIA EVENT (OUTPATIENT)
Dept: PERIOP | Facility: HOSPITAL | Age: 69
End: 2024-07-09
Payer: MEDICARE

## 2024-07-09 ENCOUNTER — HOSPITAL ENCOUNTER (OUTPATIENT)
Facility: HOSPITAL | Age: 69
Setting detail: HOSPITAL OUTPATIENT SURGERY
Discharge: HOME | End: 2024-07-09
Attending: ORTHOPAEDIC SURGERY | Admitting: ORTHOPAEDIC SURGERY
Payer: MEDICARE

## 2024-07-09 ENCOUNTER — ANESTHESIA (OUTPATIENT)
Dept: PERIOP | Facility: HOSPITAL | Age: 69
End: 2024-07-09
Payer: MEDICARE

## 2024-07-09 VITALS
HEART RATE: 57 BPM | SYSTOLIC BLOOD PRESSURE: 151 MMHG | OXYGEN SATURATION: 96 % | TEMPERATURE: 97.4 F | RESPIRATION RATE: 16 BRPM | DIASTOLIC BLOOD PRESSURE: 80 MMHG

## 2024-07-09 DIAGNOSIS — Z98.890 H/O ARTHROSCOPY OF LEFT KNEE: Primary | ICD-10-CM

## 2024-07-09 DIAGNOSIS — S83.242A ACUTE MENISCAL TEAR, MEDIAL, LEFT, INITIAL ENCOUNTER: ICD-10-CM

## 2024-07-09 PROCEDURE — 25010000002 CEFAZOLIN PER 500 MG: Performed by: ORTHOPAEDIC SURGERY

## 2024-07-09 PROCEDURE — 25010000002 EPINEPHRINE PER 0.1 MG: Performed by: ORTHOPAEDIC SURGERY

## 2024-07-09 PROCEDURE — 25810000003 LACTATED RINGERS PER 1000 ML: Performed by: ANESTHESIOLOGY

## 2024-07-09 PROCEDURE — 25010000002 ONDANSETRON PER 1 MG: Performed by: NURSE ANESTHETIST, CERTIFIED REGISTERED

## 2024-07-09 PROCEDURE — 29881 ARTHRS KNE SRG MNISECTMY M/L: CPT | Performed by: ORTHOPAEDIC SURGERY

## 2024-07-09 PROCEDURE — 25810000003 LACTATED RINGERS PER 1000 ML: Performed by: ORTHOPAEDIC SURGERY

## 2024-07-09 PROCEDURE — 25010000002 PROPOFOL 200 MG/20ML EMULSION: Performed by: NURSE ANESTHETIST, CERTIFIED REGISTERED

## 2024-07-09 PROCEDURE — 25010000002 HYDROMORPHONE PER 4 MG: Performed by: NURSE ANESTHETIST, CERTIFIED REGISTERED

## 2024-07-09 PROCEDURE — 25010000002 DEXAMETHASONE SODIUM PHOSPHATE 20 MG/5ML SOLUTION: Performed by: NURSE ANESTHETIST, CERTIFIED REGISTERED

## 2024-07-09 PROCEDURE — 25010000002 FENTANYL CITRATE (PF) 100 MCG/2ML SOLUTION: Performed by: NURSE ANESTHETIST, CERTIFIED REGISTERED

## 2024-07-09 RX ORDER — TRAMADOL HYDROCHLORIDE 50 MG/1
50 TABLET ORAL EVERY 6 HOURS PRN
Qty: 30 TABLET | Refills: 0 | Status: SHIPPED | OUTPATIENT
Start: 2024-07-09

## 2024-07-09 RX ORDER — ONDANSETRON 2 MG/ML
4 INJECTION INTRAMUSCULAR; INTRAVENOUS ONCE AS NEEDED
Status: DISCONTINUED | OUTPATIENT
Start: 2024-07-09 | End: 2024-07-09 | Stop reason: HOSPADM

## 2024-07-09 RX ORDER — SODIUM CHLORIDE 0.9 % (FLUSH) 0.9 %
3-10 SYRINGE (ML) INJECTION AS NEEDED
Status: DISCONTINUED | OUTPATIENT
Start: 2024-07-09 | End: 2024-07-09 | Stop reason: HOSPADM

## 2024-07-09 RX ORDER — HYDRALAZINE HYDROCHLORIDE 20 MG/ML
5 INJECTION INTRAMUSCULAR; INTRAVENOUS
Status: DISCONTINUED | OUTPATIENT
Start: 2024-07-09 | End: 2024-07-09 | Stop reason: HOSPADM

## 2024-07-09 RX ORDER — DROPERIDOL 2.5 MG/ML
0.62 INJECTION, SOLUTION INTRAMUSCULAR; INTRAVENOUS
Status: DISCONTINUED | OUTPATIENT
Start: 2024-07-09 | End: 2024-07-09 | Stop reason: HOSPADM

## 2024-07-09 RX ORDER — EPHEDRINE SULFATE 50 MG/ML
5 INJECTION, SOLUTION INTRAVENOUS ONCE AS NEEDED
Status: DISCONTINUED | OUTPATIENT
Start: 2024-07-09 | End: 2024-07-09 | Stop reason: HOSPADM

## 2024-07-09 RX ORDER — DEXAMETHASONE SODIUM PHOSPHATE 4 MG/ML
INJECTION, SOLUTION INTRA-ARTICULAR; INTRALESIONAL; INTRAMUSCULAR; INTRAVENOUS; SOFT TISSUE AS NEEDED
Status: DISCONTINUED | OUTPATIENT
Start: 2024-07-09 | End: 2024-07-09 | Stop reason: SURG

## 2024-07-09 RX ORDER — PROPOFOL 10 MG/ML
INJECTION, EMULSION INTRAVENOUS AS NEEDED
Status: DISCONTINUED | OUTPATIENT
Start: 2024-07-09 | End: 2024-07-09 | Stop reason: SURG

## 2024-07-09 RX ORDER — FENTANYL CITRATE 50 UG/ML
INJECTION, SOLUTION INTRAMUSCULAR; INTRAVENOUS AS NEEDED
Status: DISCONTINUED | OUTPATIENT
Start: 2024-07-09 | End: 2024-07-09 | Stop reason: SURG

## 2024-07-09 RX ORDER — FAMOTIDINE 10 MG/ML
20 INJECTION, SOLUTION INTRAVENOUS ONCE
Status: COMPLETED | OUTPATIENT
Start: 2024-07-09 | End: 2024-07-09

## 2024-07-09 RX ORDER — FENTANYL CITRATE 50 UG/ML
50 INJECTION, SOLUTION INTRAMUSCULAR; INTRAVENOUS ONCE AS NEEDED
Status: DISCONTINUED | OUTPATIENT
Start: 2024-07-09 | End: 2024-07-09 | Stop reason: HOSPADM

## 2024-07-09 RX ORDER — NALOXONE HCL 0.4 MG/ML
0.2 VIAL (ML) INJECTION AS NEEDED
Status: DISCONTINUED | OUTPATIENT
Start: 2024-07-09 | End: 2024-07-09 | Stop reason: HOSPADM

## 2024-07-09 RX ORDER — LIDOCAINE HYDROCHLORIDE 20 MG/ML
INJECTION, SOLUTION EPIDURAL; INFILTRATION; INTRACAUDAL; PERINEURAL AS NEEDED
Status: DISCONTINUED | OUTPATIENT
Start: 2024-07-09 | End: 2024-07-09 | Stop reason: SURG

## 2024-07-09 RX ORDER — SODIUM CHLORIDE 0.9 % (FLUSH) 0.9 %
3 SYRINGE (ML) INJECTION EVERY 12 HOURS SCHEDULED
Status: DISCONTINUED | OUTPATIENT
Start: 2024-07-09 | End: 2024-07-09 | Stop reason: HOSPADM

## 2024-07-09 RX ORDER — HYDROCODONE BITARTRATE AND ACETAMINOPHEN 7.5; 325 MG/1; MG/1
1 TABLET ORAL EVERY 4 HOURS PRN
Qty: 20 TABLET | Refills: 0 | Status: SHIPPED | OUTPATIENT
Start: 2024-07-09 | End: 2024-07-09 | Stop reason: HOSPADM

## 2024-07-09 RX ORDER — PROMETHAZINE HYDROCHLORIDE 25 MG/1
25 TABLET ORAL ONCE AS NEEDED
Status: DISCONTINUED | OUTPATIENT
Start: 2024-07-09 | End: 2024-07-09 | Stop reason: HOSPADM

## 2024-07-09 RX ORDER — MIDAZOLAM HYDROCHLORIDE 1 MG/ML
0.5 INJECTION INTRAMUSCULAR; INTRAVENOUS
Status: DISCONTINUED | OUTPATIENT
Start: 2024-07-09 | End: 2024-07-09 | Stop reason: HOSPADM

## 2024-07-09 RX ORDER — EPHEDRINE SULFATE 50 MG/ML
INJECTION INTRAVENOUS AS NEEDED
Status: DISCONTINUED | OUTPATIENT
Start: 2024-07-09 | End: 2024-07-09 | Stop reason: SURG

## 2024-07-09 RX ORDER — FENTANYL CITRATE 50 UG/ML
50 INJECTION, SOLUTION INTRAMUSCULAR; INTRAVENOUS
Status: DISCONTINUED | OUTPATIENT
Start: 2024-07-09 | End: 2024-07-09 | Stop reason: HOSPADM

## 2024-07-09 RX ORDER — PROMETHAZINE HYDROCHLORIDE 25 MG/1
25 SUPPOSITORY RECTAL ONCE AS NEEDED
Status: DISCONTINUED | OUTPATIENT
Start: 2024-07-09 | End: 2024-07-09 | Stop reason: HOSPADM

## 2024-07-09 RX ORDER — DOCUSATE SODIUM 100 MG/1
100 CAPSULE, LIQUID FILLED ORAL 2 TIMES DAILY
Qty: 60 CAPSULE | Refills: 0 | Status: SHIPPED | OUTPATIENT
Start: 2024-07-09

## 2024-07-09 RX ORDER — BUPIVACAINE HYDROCHLORIDE AND EPINEPHRINE 5; 5 MG/ML; UG/ML
INJECTION, SOLUTION EPIDURAL; INTRACAUDAL; PERINEURAL AS NEEDED
Status: DISCONTINUED | OUTPATIENT
Start: 2024-07-09 | End: 2024-07-09 | Stop reason: HOSPADM

## 2024-07-09 RX ORDER — IPRATROPIUM BROMIDE AND ALBUTEROL SULFATE 2.5; .5 MG/3ML; MG/3ML
3 SOLUTION RESPIRATORY (INHALATION) ONCE AS NEEDED
Status: DISCONTINUED | OUTPATIENT
Start: 2024-07-09 | End: 2024-07-09 | Stop reason: HOSPADM

## 2024-07-09 RX ORDER — FLUMAZENIL 0.1 MG/ML
0.2 INJECTION INTRAVENOUS AS NEEDED
Status: DISCONTINUED | OUTPATIENT
Start: 2024-07-09 | End: 2024-07-09 | Stop reason: HOSPADM

## 2024-07-09 RX ORDER — SODIUM CHLORIDE, SODIUM LACTATE, POTASSIUM CHLORIDE, CALCIUM CHLORIDE 600; 310; 30; 20 MG/100ML; MG/100ML; MG/100ML; MG/100ML
9 INJECTION, SOLUTION INTRAVENOUS CONTINUOUS
Status: DISCONTINUED | OUTPATIENT
Start: 2024-07-09 | End: 2024-07-09 | Stop reason: HOSPADM

## 2024-07-09 RX ORDER — SODIUM CHLORIDE, SODIUM LACTATE, POTASSIUM CHLORIDE, AND CALCIUM CHLORIDE .6; .31; .03; .02 G/100ML; G/100ML; G/100ML; G/100ML
IRRIGANT IRRIGATION AS NEEDED
Status: DISCONTINUED | OUTPATIENT
Start: 2024-07-09 | End: 2024-07-09 | Stop reason: HOSPADM

## 2024-07-09 RX ORDER — ONDANSETRON 2 MG/ML
INJECTION INTRAMUSCULAR; INTRAVENOUS AS NEEDED
Status: DISCONTINUED | OUTPATIENT
Start: 2024-07-09 | End: 2024-07-09 | Stop reason: SURG

## 2024-07-09 RX ORDER — TRAMADOL HYDROCHLORIDE 50 MG/1
50 TABLET ORAL ONCE AS NEEDED
Status: COMPLETED | OUTPATIENT
Start: 2024-07-09 | End: 2024-07-09

## 2024-07-09 RX ORDER — LIDOCAINE HYDROCHLORIDE 10 MG/ML
0.5 INJECTION, SOLUTION INFILTRATION; PERINEURAL ONCE AS NEEDED
Status: DISCONTINUED | OUTPATIENT
Start: 2024-07-09 | End: 2024-07-09 | Stop reason: HOSPADM

## 2024-07-09 RX ORDER — HYDROMORPHONE HYDROCHLORIDE 1 MG/ML
0.5 INJECTION, SOLUTION INTRAMUSCULAR; INTRAVENOUS; SUBCUTANEOUS
Status: DISCONTINUED | OUTPATIENT
Start: 2024-07-09 | End: 2024-07-09 | Stop reason: HOSPADM

## 2024-07-09 RX ORDER — LABETALOL HYDROCHLORIDE 5 MG/ML
5 INJECTION, SOLUTION INTRAVENOUS
Status: DISCONTINUED | OUTPATIENT
Start: 2024-07-09 | End: 2024-07-09 | Stop reason: HOSPADM

## 2024-07-09 RX ORDER — ONDANSETRON 4 MG/1
4 TABLET, FILM COATED ORAL EVERY 8 HOURS PRN
Qty: 30 TABLET | Refills: 0 | Status: SHIPPED | OUTPATIENT
Start: 2024-07-09

## 2024-07-09 RX ORDER — TRAMADOL HYDROCHLORIDE 50 MG/1
50 TABLET ORAL ONCE AS NEEDED
Status: DISCONTINUED | OUTPATIENT
Start: 2024-07-09 | End: 2024-07-09

## 2024-07-09 RX ORDER — DIPHENHYDRAMINE HYDROCHLORIDE 50 MG/ML
12.5 INJECTION INTRAMUSCULAR; INTRAVENOUS
Status: DISCONTINUED | OUTPATIENT
Start: 2024-07-09 | End: 2024-07-09 | Stop reason: HOSPADM

## 2024-07-09 RX ORDER — ACETAMINOPHEN 325 MG/1
650 TABLET ORAL ONCE AS NEEDED
Status: DISCONTINUED | OUTPATIENT
Start: 2024-07-09 | End: 2024-07-09 | Stop reason: HOSPADM

## 2024-07-09 RX ADMIN — FAMOTIDINE 20 MG: 10 INJECTION INTRAVENOUS at 12:50

## 2024-07-09 RX ADMIN — FENTANYL CITRATE 25 MCG: 50 INJECTION, SOLUTION INTRAMUSCULAR; INTRAVENOUS at 13:22

## 2024-07-09 RX ADMIN — FENTANYL CITRATE 25 MCG: 50 INJECTION, SOLUTION INTRAMUSCULAR; INTRAVENOUS at 13:19

## 2024-07-09 RX ADMIN — EPHEDRINE SULFATE 5 MG: 50 INJECTION INTRAVENOUS at 13:30

## 2024-07-09 RX ADMIN — LIDOCAINE HYDROCHLORIDE 70 MG: 20 INJECTION, SOLUTION EPIDURAL; INFILTRATION; INTRACAUDAL; PERINEURAL at 13:02

## 2024-07-09 RX ADMIN — SODIUM CHLORIDE, POTASSIUM CHLORIDE, SODIUM LACTATE AND CALCIUM CHLORIDE 9 ML/HR: 600; 310; 30; 20 INJECTION, SOLUTION INTRAVENOUS at 12:53

## 2024-07-09 RX ADMIN — SODIUM CHLORIDE 2 G: 900 INJECTION INTRAVENOUS at 12:50

## 2024-07-09 RX ADMIN — FENTANYL CITRATE 50 MCG: 50 INJECTION, SOLUTION INTRAMUSCULAR; INTRAVENOUS at 13:02

## 2024-07-09 RX ADMIN — ONDANSETRON 4 MG: 2 INJECTION INTRAMUSCULAR; INTRAVENOUS at 13:14

## 2024-07-09 RX ADMIN — TRAMADOL HYDROCHLORIDE 50 MG: 50 TABLET, COATED ORAL at 14:43

## 2024-07-09 RX ADMIN — EPHEDRINE SULFATE 5 MG: 50 INJECTION INTRAVENOUS at 13:10

## 2024-07-09 RX ADMIN — PROPOFOL 200 MG: 10 INJECTION, EMULSION INTRAVENOUS at 13:02

## 2024-07-09 RX ADMIN — HYDROMORPHONE HYDROCHLORIDE 0.5 MG: 1 INJECTION, SOLUTION INTRAMUSCULAR; INTRAVENOUS; SUBCUTANEOUS at 14:17

## 2024-07-09 RX ADMIN — DEXAMETHASONE SODIUM PHOSPHATE 8 MG: 4 INJECTION, SOLUTION INTRAMUSCULAR; INTRAVENOUS at 13:02

## 2024-07-09 NOTE — H&P
History & Physical       Patient: Debra S Sandifer    YOB: 1955    Medical Record Number: [id number]    Chief Complaints: Preop    History of Present Illness: 69 y.o. female presents today in anticipation of upcoming surgery.  Denies any changes to medical history.  Denies any changes to her symptomatology.    Allergies:   Allergies   Allergen Reactions    Codeine Rash    Oxycodone Nausea And Vomiting and Hallucinations    Hydrocodone-Acetaminophen Nausea And Vomiting    Latex Rash       Home Medications:    Current Facility-Administered Medications:     ceFAZolin 2000 mg IVPB in 100 mL NS (MBP), 2 g, Intravenous, Once, Vipul Walter MD    famotidine (PEPCID) injection 20 mg, 20 mg, Intravenous, Once, Ravindra Shaw MD    fentaNYL citrate (PF) (SUBLIMAZE) injection 50 mcg, 50 mcg, Intravenous, Once PRN, Ravindra Shaw MD    lactated ringers infusion, 9 mL/hr, Intravenous, Continuous, Ravindra Shaw MD    lidocaine (XYLOCAINE) 1 % injection 0.5 mL, 0.5 mL, Intradermal, Once PRN, Ravindra Shaw MD    midazolam (VERSED) injection 0.5 mg, 0.5 mg, Intravenous, Q5 Min PRN, Ravindra Shaw MD    sodium chloride 0.9 % flush 3 mL, 3 mL, Intravenous, Q12H, Ravindra Shaw MD    sodium chloride 0.9 % flush 3-10 mL, 3-10 mL, Intravenous, PRN, Ravindra Shaw MD    Past Medical History:   Diagnosis Date    Allergic codeine/latex    Arthralgia of both hands     Arthritis     hand    Atypical chest pain     Breast cancer     Left    Coronary artery disease involving native coronary artery 11/23/2016    has stents    Dyspareunia     H/O bronchitis     H/O infectious mononucleosis     COLLEGE AGE    Hemorrhoid     High cholesterol     History of medical problems back surgery  2005    repair herniated disc    Hot flashes, menopausal     Hx of radiation therapy     JULY 2016    Hyperlipidemia     Hypertension     Myocardial infarction November 2016    2 stents    Polyp of sigmoid colon     Right knee  pain     Stye     LEFT EYE    Urinary tract infection periodically    Vitamin D deficiency           Past Surgical History:   Procedure Laterality Date    BACK SURGERY  01/13/2005    Herniated Disk repair (Done by Dr Jurgen Reddy)    BREAST BIOPSY Left     BREAST LUMPECTOMY Left 2016    also biopsied and resected lymph nodes    BREAST LUMPECTOMY WITH SENTINEL NODE BIOPSY Left 04/13/2016    Procedure: LEFT BREAST MAMMO NEEDLE LOC LUMPECTOMY WITH LEFT AXILLA SENTINEL NODE BIOPSY;  Surgeon: Aminata Estrella MD;  Location: [facility] MAIN OR;  Service:     CARDIAC CATHETERIZATION N/A 11/15/2016    Procedure: Left Heart Cath;  Surgeon: Sanjiv Dykes MD;  [facility];  Service:     CARDIAC CATHETERIZATION N/A 11/15/2016    Procedure: Left ventriculography;  Surgeon: Sanjvi Dykes MD;  Location: [facility] CATH INVASIVE LOCATION;  Service:     CARDIAC SURGERY  11/15/2016    COLONOSCOPY  2014    CORONARY ANGIOPLASTY      CORONARY STENT PLACEMENT  11/15/2016    two    ENDOSCOPY N/A 11/11/2016    Procedure: ESOPHAGOGASTRODUODENOSCOPY WITH BIOPSY;  Surgeon: Mehdi Guardado MD;  Location: [facility] ENDOSCOPY;  Service:     JOINT MANIPULATION Right 10/28/2019    Procedure: RIGHT KNEE MANIPULATION;  Surgeon: Van Titus MD;  Location: [facility] MAIN OR;  Service: Orthopedics    JOINT REPLACEMENT  Right knee replacement 09/2019    Right knee replacement-09/2019,  Right knee Revision 04/2021    LUMBAR DISCECTOMY      LYMPH NODE BIOPSY  04/13/2016    two    SPINE SURGERY  January 2005    repair herniated disc    TOTAL KNEE ARTHROPLASTY Right 09/26/2019    Procedure: TOTAL KNEE ARTHROPLASTY;  Surgeon: Van Titus MD;  Location: [facility] MAIN OR;  Service: Orthopedics    TOTAL KNEE ARTHROPLASTY REVISION Right 04/26/2021    Procedure: RIGHT KNEE FEMORAL REVISION;  Surgeon: Van Titus MD;  Location: [facility] MAIN OR;  Service: Orthopedics;  Laterality: Right;    WISDOM TOOTH EXTRACTION             Social History     Occupational History    Occupation: Retired      Employer: [employer]     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[place], [place], [place], [place]   Tobacco Use    Smoking status: Former     Current packs/day: 0.00     Types: Cigarettes     Start date: 1972     Quit date: 1977     Years since quittin.0    Smokeless tobacco: Never    Tobacco comments:     smoked less than 1 pack per week   Vaping Use    Vaping status: Never Used   Substance and Sexual Activity    Alcohol use: Yes     Alcohol/week: 3.0 standard drinks of alcohol     Types: 2 Glasses of wine, 1 Drinks containing 0.5 oz of alcohol per week     Comment: social drinker/weekends    Drug use: No    Sexual activity: Yes     Partners: Male     Birth control/protection: Post-menopausal      Social History     Social History Narrative    Enjoys working out, golf, walking, running, grandchildren's activities. Traveled to [place] and [place] in 2015 and [place] [facility] 2016          Family History   Problem Relation Age of Onset    Coronary artery disease Mother     Dementia Mother     Heart disease Mother         Heart attack w/2 stents    Heart attack Mother         had heart attack. Heart catheter -2 stents    Hypertension Father     Heart disease Father         Coronary heart disease    Stroke Father         Ischemic    Diabetes type II Father     Diabetes Father     Hyperlipidemia Father     Prostate cancer Brother 51    Cancer Brother         Colitis    Alcohol abuse Maternal Grandfather     Rheum arthritis Paternal Grandmother     Arthritis Paternal Grandmother     Alcohol abuse Paternal Grandfather     Stroke Paternal Grandfather         Ischemic    Rheum arthritis Paternal Grandfather     Diabetes type II Paternal Grandfather     Diabetes Paternal Grandfather     Hyperlipidemia Paternal Grandfather     Hypertension Paternal Grandfather     Heart disease Paternal Grandfather     Cancer Sister          appexdix    Other Sister         Appendiceal cancer    Cancer Brother         Prostate    No Known Problems Maternal Grandmother     Malig Hyperthermia Neg Hx        Review of Systems:  A 14 point review of systems is reviewed with the patient.  Pertinent positives are listed above.  All others are negative.    Physical Exam: 69 y.o. female    Vitals:    07/09/24 1116 07/09/24 1125   BP: 111/63    BP Location: Right arm    Patient Position: Sitting    Pulse: 65    Resp:  16   Temp: 97.7 °F (36.5 °C)    TempSrc: Oral    SpO2: 97%        General:  Patient is awake and alert.  Appears in no acute distress or discomfort.    Psych:  Affect and demeanor are appropriate.    Eyes:  Conjunctiva and sclera appear grossly normal.  Eyes track well and EOM seem to be intact.    Ears:  No gross abnormalities.  Hearing adequate for the exam.    Cardiovascular:  Regular rate and rhythm.    Lungs:  Good chest expansion.  Breathing unlabored.    Lymph:  No palpable adenopathy about neck or axilla.    Extremity:  Skin benign and intact without evidence for swelling, masses or atrophy.  Exam otherwise deferred at this time.    Diagnostic Tests:  Lab Results   Component Value Date    GLUCOSE 92 06/25/2024    CALCIUM 9.3 06/25/2024     06/25/2024    K 4.3 06/25/2024    CO2 29.0 06/25/2024     06/25/2024    BUN 21 06/25/2024    CREATININE 0.92 06/25/2024    EGFRIFAFRI 77 11/12/2021    EGFRIFNONA 66 11/12/2021    BCR 22.8 06/25/2024    ANIONGAP 5.0 06/25/2024     Lab Results   Component Value Date    WBC 6.04 06/25/2024    HGB 12.6 06/25/2024    HCT 39.2 06/25/2024    MCV 90.3 06/25/2024     06/25/2024     Lab Results   Component Value Date    INR 1.13 (H) 04/30/2021    INR 1.10 04/13/2021    INR 0.97 09/10/2019    PROTIME 14.3 (H) 04/30/2021    PROTIME 14.1 04/13/2021    PROTIME 12.5 09/10/2019       Assessment:  Left medial meniscal tear with tibial insufficiency fracture    Plan: The patient denies any changes to  their symptomatology.  Will proceed with surgery as planned.  I again offered her a chance to ask any questions about the upcoming procedure. She stated that she had a good understanding of everything and had no questions.    Vipul Walter MD  07/09/24

## 2024-07-09 NOTE — BRIEF OP NOTE
KNEE ARTHROSCOPY  Progress Note    Debra S Sandifer  7/9/2024    Pre-op Diagnosis:   Acute meniscal tear, medial, left, initial encounter [S83.242A]       Post-Op Diagnosis Codes:     * Acute meniscal tear, medial, left, initial encounter [S83.242A]    Procedure/CPT® Codes:        Procedure(s):  Knee Arthroscopy with Meniscectomy              Surgeon(s):  Vipul Walter MD    Anesthesia: General    Staff:   Assistant: Patricia Silva CSA  Assistant: Patricia Silva CSA      Estimated Blood Loss: minimal    Urine Voided: * No values recorded between 7/9/2024 12:00 AM and 7/9/2024  1:12 PM *    Specimens:                None          Drains: * No LDAs found *    Findings: see dictation        Complications: none    Assistant: Patricia Silva CSA  was responsible for performing the following activities: Retraction and their skilled assistance was necessary for the success of this case.    Vipul Walter MD     Date: 7/9/2024  Time: 1333

## 2024-07-09 NOTE — ANESTHESIA PREPROCEDURE EVALUATION
Anesthesia Evaluation     NPO Solid Status: > 8 hours  NPO Liquid Status: > 8 hours           Airway   Mallampati: I  No difficulty expected  Dental      Pulmonary    Cardiovascular   Exercise tolerance: good (4-7 METS)    (+) hypertension, past MI , CAD, hyperlipidemia      Neuro/Psych  GI/Hepatic/Renal/Endo      Musculoskeletal     Abdominal    Substance History      OB/GYN          Other   arthritis,   history of cancer                Anesthesia Plan    ASA 3     general     intravenous induction     Anesthetic plan, risks, benefits, and alternatives have been provided, discussed and informed consent has been obtained with: patient.    CODE STATUS:

## 2024-07-09 NOTE — ANESTHESIA PROCEDURE NOTES
Airway  Urgency: elective    Date/Time: 7/9/2024 1:05 PM  Airway not difficult    General Information and Staff    Patient location during procedure: OR  Anesthesiologist: Ra Michaud DO  CRNA/CAA: Jyoti Valdez CRNA    Indications and Patient Condition  Indications for airway management: airway protection    Preoxygenated: yes  MILS not maintained throughout  Mask difficulty assessment: 0 - not attempted    Final Airway Details  Final airway type: supraglottic airway      Successful airway: unique  Size 4     Number of attempts at approach: 2  Assessment: lips, teeth, and gum same as pre-op and atraumatic intubation

## 2024-07-09 NOTE — ANESTHESIA POSTPROCEDURE EVALUATION
Patient: Debra S Sandifer    Procedure Summary       Date: 07/09/24 Room / Location:  [facility] [address] [place] [address] [facility]    Anesthesia Start: 1256 Anesthesia Stop: 1352    Procedure: Knee Arthroscopy with Meniscectomy (Left: Knee) Diagnosis:       Acute meniscal tear, medial, left, initial encounter      (Acute meniscal tear, medial, left, initial encounter [S83.242A])    Surgeons: Vipul Walter MD Provider: Ra Michaud DO    Anesthesia Type: general ASA Status: 3            Anesthesia Type: general    Vitals  Vitals Value Taken Time   /71 07/09/24 1430   Temp 36.3 °C (97.3 °F) 07/09/24 1345   Pulse 59 07/09/24 1435   Resp 18 07/09/24 1415   SpO2 94 % 07/09/24 1435   Vitals shown include unfiled device data.        Post Anesthesia Care and Evaluation    Patient location during evaluation: PACU  Patient participation: complete - patient participated  Level of consciousness: awake  Pain management: adequate    Airway patency: patent  Anesthetic complications: No anesthetic complications  PONV Status: none  Cardiovascular status: acceptable  Respiratory status: acceptable  Hydration status: acceptable

## 2024-07-09 NOTE — OP NOTE
Orthopaedic Operative Note    Facility: [facility]    Patient: Debra S Sandifer    Medical Record Number: [id number]    YOB: 1955    Dictating Surgeon: Vipul Walter M.D.*    Primary Care Physician: Alisa Morales MD    Date of Operation: 7/9/2024    Pre-Operative Diagnosis:  Left knee osteoarthritis with medial meniscal tear    Post-Operative Diagnosis:  Left knee osteoarthritis with medial meniscal tear    Procedure Performed:  Left knee arthroscopy with partial medial meniscectomy    Surgeon: Vipul Walter MD     Assistant: Patricia Silva whose assistance was critical for help with patient positioning, suctioning and irrigation, retraction, manipulation of the extremity for exposure, wound closure and application of the bandages.  Her assistance was critical to the success of this case.      Anesthesia: Gen. followed by local anesthesia    Complications: None.     Estimated Blood Loss: Less than 50 mL.     Specimens: * No orders in the log *    Implants: None    Brief Operative Indication:  Ms. Sandifer had a history of left knee pain and intermittent mechanical symptoms consistent with a meniscal tear.  The pre-operative MRI showed arthritis and a meniscal tear consistent with the clinical history.  Her arthritis was not considered to be bad enough to justify replacement at this point.  I did consider that she was a candidate for arthroscopy.  The risks, benefits, and alternatives to a partial meniscectomy were discussed in detail.  She acknowledged understanding of the information and consented to proceed.    Description of the procedure in detail:  The patient and operative site were identified in the preoperative holding area.  The surgical site was marked.  The patient was then taken to the operating room and placed in the supine position.  Adequate general anesthesia was administered.  The patient was repositioned on the operating table.    A timeout was taken and  preoperative antibiotics administered.  All bony prominences were carefully padded and protected.  The left lower extremity was prepped and draped in the standard sterile fashion.  I cleaned the extremity with an alcohol solution.  A Hibiclens scrub was performed and then the extremity was prepped with 2 ChloraPrep preps.  I allowed those to dry for 3 minutes before the draping procedure was carried out.    The extremity was exsanguinated with an Esmarch bandage and the tourniquet insufflated to 250 mmHg.  I began the procedure itself by fashioning a standard anterolateral portal.  The scope was inserted into the joint.  At this point, a diagnostic arthroscopy was performed.    The patellofemoral compartment demonstrated fairly advanced degenerative change.  She had grade III/IV chondromalacia involving the majority of the undersurface of the patella and about 30 to 40% of the trochlea.  No loose bodies or other pathology were noted in the suprapatellar pouch.  The medial and lateral gutters were free of loose bodies or displaced meniscal fragments.    I then entered the medial compartment.  A standard anteromedial portal was established using needle localization technique.  A probe was inserted.  Upon entering this compartment, it was immediately apparent that she had a large displaced flap of the medial meniscus.  This had displaced into the notch and it appeared that there was probably another segment that had flipped down below the meniscus and along the anteromedial face of the tibia.  These fragments were reduced back into the joint with a probe.  Next, a combination of a shaver and upbiter were used to remove the unstable portions of the meniscus, contouring that back to a stable rim.  The remainder of the meniscus was probed and confirmed to be stable.  She had diffuse fissuring and fibrillations over the medial femoral condyle involving the majority of the articular surface.   On the tibial side, she had  some low-grade chondromalacia.  No full-thickness chondral defects were noted on either side of the joint.    I then directed my attention to the notch.  The ACL and PCL were intact.  The ACL was stable when probed.    The leg was placed in a figure 4 position and the lateral compartment entered.  The articular cartilage of the compartment was well-preserved.  The lateral meniscus was intact and stable when probed.    At this point, final images were taken and saved.  I then directed my attention to closure.  I made one final pass through all 3 compartments as well as the medial and lateral gutters.  No further pathology was identified.  The instruments were withdrawn.  The wounds were copiously irrigated out with sterile saline and then closed in a layered fashion.  Both wounds were infiltrated with local anesthetic.  Sterile dressings were applied.  The tourniquet was deflated.  Ms. Sandifer was awakened and taken to the recovery room in good condition.    Vipul Walter MD  07/09/24

## 2024-07-17 ENCOUNTER — OFFICE VISIT (OUTPATIENT)
Dept: ORTHOPEDIC SURGERY | Facility: CLINIC | Age: 69
End: 2024-07-17
Payer: MEDICARE

## 2024-07-17 VITALS — TEMPERATURE: 98.2 F | BODY MASS INDEX: 23.82 KG/M2 | WEIGHT: 143 LBS | HEIGHT: 65 IN

## 2024-07-17 DIAGNOSIS — Z09 SURGERY FOLLOW-UP: Primary | ICD-10-CM

## 2024-07-17 NOTE — PROGRESS NOTES
Debra S Sandifer : 1955 MRN: [id number] DATE: 2024    CC: 1 week s/p left  knee scope     HPI:  Pt. returns to clinic today for follow up.  Denies any wound problems including redness, drainage.  No fevers, chills, sweats.  Mobilizing well.  No complaints.    Vitals:    24 0845   Temp: 98.2 °F (36.8 °C)     Exam:  Wounds appear well-approximated without any erythema or drainage.  Calf soft.  Negative Calli's sign.  Good motor and sensory function in foot.  Good pedal pulses.  Gait is mildly antalgic but otherwise reciprocal heel-toe.    Imaging   none    Impression:  1 week s/p left knee scope with medial meniscectomy    Plan:    1.  Sutures removed and replaced with steri-strips.  2.  Continue to progress activity gradually as tolerated.  3.  Continue to ice knee as needed, especially after activity.  4.  Follow up in 5 weeks.    Vipul Walter MD    2024     Answers submitted by the patient for this visit:  Other (Submitted on 7/10/2024)  Please describe your symptoms.: Post op visit with Dr Walter regarding Meniscus knee surgery on 2024.  Have you had these symptoms before?: No  How long have you been having these symptoms?: Greater than 2 weeks  Primary Reason for Visit (Submitted on 7/10/2024)  What is the primary reason for your visit?: Other

## 2024-07-31 ENCOUNTER — OFFICE (AMBULATORY)
Dept: URBAN - METROPOLITAN AREA CLINIC 76 | Facility: CLINIC | Age: 69
End: 2024-07-31
Payer: MEDICARE

## 2024-07-31 VITALS
WEIGHT: 144 LBS | SYSTOLIC BLOOD PRESSURE: 103 MMHG | DIASTOLIC BLOOD PRESSURE: 63 MMHG | HEART RATE: 65 BPM | HEIGHT: 65 IN

## 2024-07-31 DIAGNOSIS — Z86.010 PERSONAL HISTORY OF COLONIC POLYPS: ICD-10-CM

## 2024-07-31 DIAGNOSIS — Z09 ENCOUNTER FOR FOLLOW-UP EXAMINATION AFTER COMPLETED TREATMEN: ICD-10-CM

## 2024-07-31 PROCEDURE — 99203 OFFICE O/P NEW LOW 30 MIN: CPT | Performed by: INTERNAL MEDICINE

## 2024-08-13 ENCOUNTER — OFFICE VISIT (OUTPATIENT)
Dept: ORTHOPEDIC SURGERY | Facility: CLINIC | Age: 69
End: 2024-08-13
Payer: MEDICARE

## 2024-08-13 VITALS — BODY MASS INDEX: 24.34 KG/M2 | HEIGHT: 65 IN | WEIGHT: 146.1 LBS | TEMPERATURE: 97.5 F

## 2024-08-13 DIAGNOSIS — M19.071 PRIMARY OSTEOARTHRITIS OF RIGHT FOOT: Primary | ICD-10-CM

## 2024-08-13 PROCEDURE — 99214 OFFICE O/P EST MOD 30 MIN: CPT | Performed by: ORTHOPAEDIC SURGERY

## 2024-08-13 PROCEDURE — 1159F MED LIST DOCD IN RCRD: CPT | Performed by: ORTHOPAEDIC SURGERY

## 2024-08-13 PROCEDURE — 1160F RVW MEDS BY RX/DR IN RCRD: CPT | Performed by: ORTHOPAEDIC SURGERY

## 2024-08-13 RX ORDER — ALUMINUM ZIRCONIUM OCTACHLOROHYDREX GLY 16 G/100G
GEL TOPICAL
COMMUNITY

## 2024-08-13 RX ORDER — IBANDRONATE SODIUM 150 MG/1
TABLET, FILM COATED ORAL
COMMUNITY

## 2024-08-13 RX ORDER — ATORVASTATIN CALCIUM 40 MG/1
TABLET, FILM COATED ORAL
COMMUNITY

## 2024-08-13 RX ORDER — SENNOSIDES 8.6 MG
CAPSULE ORAL
COMMUNITY

## 2024-08-13 NOTE — PROGRESS NOTES
General Exam    Patient: Debra S Sandifer    YOB: 1955    Medical Record Number: [id number]    Chief Complaints: Right foot pain    History of Present Illness:     69 y.o. female patient who presents for patient states that a week ago she went to bed and woke up with some pain in her foot more laterally based.  She reports that it was worse during the day but then she rested that night iced it took Tylenol the next day was resolved.  She reports has been doing fine since.  She did have a knee surgery on July 9 and feels that maybe she was overcompensating some.  Denied any injuries.    Denies any numbness or tingling.  Denies any fevers, cough or shortness of breath.    Allergies:   Allergies   Allergen Reactions    Codeine Rash    Oxycodone Nausea And Vomiting and Hallucinations    Hydrocodone-Acetaminophen Nausea And Vomiting    Latex Rash       Home Medications:      Current Outpatient Medications:     acetaminophen (Tylenol 8 Hour) 650 MG 8 hr tablet, tablet, Disp: , Rfl:     aspirin 81 MG oral suspension, Take 81 mL by mouth Daily. HOLD PER MD INSTR, Disp: , Rfl:     atorvastatin (LIPITOR) 40 MG tablet, 90, Disp: , Rfl:     calcium carbonate-vitamin d 600-400 MG-UNIT per tablet, Take 1 tablet by mouth 2 (Two) Times a Day., Disp: , Rfl:     carvedilol (COREG) 3.125 MG tablet, Take 1 tablet by mouth Every 12 (Twelve) Hours., Disp: 180 tablet, Rfl: 4    cholecalciferol (VITAMIN D3) 1000 units tablet, Take 1 tablet by mouth Every Morning., Disp: , Rfl:     ibandronate (BONIVA) 150 MG tablet, 3, Disp: , Rfl:     Probiotic Product (Align) 4 MG capsule, capsule, Disp: , Rfl:     Past Medical History:   Diagnosis Date    Allergic codeine/latex    Ankle sprain 1971    Arthralgia of both hands     Arthritis     hand    Arthritis of back January 2005    Had surgery repair for herniated disc. Surgeon said he addressed arthritis noted in my back at that time.    Arthritis of neck 2006    Atypical chest pain      Breast cancer     Left    Coronary artery disease involving native coronary artery 11/23/2016    has stents    Dyspareunia     H/O bronchitis     H/O infectious mononucleosis     COLLEGE AGE    Hemorrhoid     High cholesterol     History of medical problems back surgery  2005    repair herniated disc    Hot flashes, menopausal     Hx of radiation therapy     JULY 2016    Hyperlipidemia     Hypertension     Knee swelling May 2017    Right knee replacement on 9/2019    Lumbosacral disc disease January 2005    Herniated disc surgery    Myocardial infarction 11/2016    2 stents    Polyp of sigmoid colon     Right knee pain     Stress fracture May 23, 2024    stress fracture/tibia per MRI    Stye     LEFT EYE    Tear of meniscus of knee May 23, 2024    Meniscus tear per MRI    Urinary tract infection periodically    Vitamin D deficiency        Past Surgical History:   Procedure Laterality Date    BACK SURGERY  01/13/2005    Herniated Disk repair (Done by Dr Jurgen Reddy)    BREAST BIOPSY Left     BREAST LUMPECTOMY Left 2016    also biopsied and resected lymph nodes    BREAST LUMPECTOMY WITH SENTINEL NODE BIOPSY Left 04/13/2016    Procedure: LEFT BREAST MAMMO NEEDLE LOC LUMPECTOMY WITH LEFT AXILLA SENTINEL NODE BIOPSY;  Surgeon: Aminata Estrella MD;  Location: [facility] MAIN OR;  Service:     CARDIAC CATHETERIZATION N/A 11/15/2016    Procedure: Left Heart Cath;  Surgeon: Sanjiv Dykes MD;  Location: [facility] CATH INVASIVE LOCATION;  Service:     CARDIAC CATHETERIZATION N/A 11/15/2016    Procedure: Left ventriculography;  Surgeon: Sanjiv Dykes MD;  Location: [facility] CATH INVASIVE LOCATION;  Service:     CARDIAC SURGERY  11/15/2016    COLONOSCOPY  2014    CORONARY ANGIOPLASTY      CORONARY STENT PLACEMENT  11/15/2016    two    ENDOSCOPY N/A 11/11/2016    Procedure: ESOPHAGOGASTRODUODENOSCOPY WITH BIOPSY;  Surgeon: Mehdi Guardado MD;  Location: [facility] ENDOSCOPY;  Service:     JOINT MANIPULATION Right 10/28/2019     Procedure: RIGHT KNEE MANIPULATION;  Surgeon: Van Titus MD;  Location: [facility] OR;  Service: Orthopedics    JOINT REPLACEMENT  Right knee replacement 2019    Right knee replacement-2019,  Right knee Revision 2021    KNEE ARTHROSCOPY Left 2024    Procedure: Knee Arthroscopy with Meniscectomy;  Surgeon: Vipul Walter MD;  Location: [facility];  Service: Orthopedics;  Laterality: Left;    LUMBAR DISCECTOMY      LYMPH NODE BIOPSY  2016    two    SPINE SURGERY  2005    repair herniated disc    TOTAL KNEE ARTHROPLASTY Right 2019    Procedure: TOTAL KNEE ARTHROPLASTY;  Surgeon: Van Titus MD;  Location: [facility] OR;  Service: Orthopedics    TOTAL KNEE ARTHROPLASTY REVISION Right 2021    Procedure: RIGHT KNEE FEMORAL REVISION;  Surgeon: Van Titus MD;  Location: [facility] OR;  Service: Orthopedics;  Laterality: Right;    TRIGGER POINT INJECTION  2017    left hand thumb    WISDOM TOOTH EXTRACTION         Social History     Occupational History    Occupation: Retired      Employer: [employer]     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[place], [place], [place], [place]   Tobacco Use    Smoking status: Former     Current packs/day: 0.00     Types: Cigarettes     Start date: 1972     Quit date: 1977     Years since quittin.1    Smokeless tobacco: Never    Tobacco comments:     smoked less than 1 pack per week   Vaping Use    Vaping status: Never Used   Substance and Sexual Activity    Alcohol use: Yes     Alcohol/week: 4.0 standard drinks of alcohol     Types: 3 Glasses of wine, 1 Drinks containing 0.5 oz of alcohol per week     Comment: social drinker/weekends    Drug use: Never    Sexual activity: Yes     Partners: Male     Birth control/protection: Post-menopausal      Social History     Social History Narrative    Enjoys working out, golf, walking, running, grandchildren's activities. Traveled to [place] and [place]  "in 07/2015 and [place] 03/2016       Family History   Problem Relation Age of Onset    Coronary artery disease Mother     Dementia Mother     Heart disease Mother         Heart attack w/2 stents    Heart attack Mother         had heart attack. Heart catheter -2 stents    Hypertension Father     Heart disease Father         Coronary heart disease    Stroke Father         Ischemic    Diabetes type II Father     Diabetes Father     Hyperlipidemia Father     Prostate cancer Brother 51    Cancer Brother         Colitis/Prostate    Alcohol abuse Maternal Grandfather     Rheum arthritis Paternal Grandmother     Arthritis Paternal Grandmother     Alcohol abuse Paternal Grandfather     Stroke Paternal Grandfather         Ischemic    Rheum arthritis Paternal Grandfather     Diabetes type II Paternal Grandfather     Diabetes Paternal Grandfather     Hyperlipidemia Paternal Grandfather     Hypertension Paternal Grandfather     Heart disease Paternal Grandfather     Cancer Sister         Appendiceal cancer    Other Sister         Appendiceal cancer    Cancer Brother         Prostate    No Known Problems Maternal Grandmother     Malig Hyperthermia Neg Hx        Review of Systems:      Constitutional: Denies fever, shaking or chills         All other pertinent positives and negatives as noted above in HPI.    Physical Exam: 69 y.o. female    Vitals:    08/13/24 0911   Temp: 97.5 °F (36.4 °C)   TempSrc: Temporal   Weight: 66.3 kg (146 lb 1.6 oz)   Height: 165.1 cm (65\")       General:  Patient is awake and alert.  Appears in no acute distress or discomfort.      Musculoskeletal/Extremities:    Right foot examined no tenderness palpation.  Motor and sensory tact distally.         Radiology:       3 views right foot AP, lateral and oblique taken reviewed to evaluate the patient's complaint/s.    Imaging demonstrate some degenerative changes of the midfoot.  No acute fractures noted.     No imaging for " comparison.    Assessment: Right foot osteoarthritis, pain      Plan:      I discussed the finds the patient she does have some arthritic changes in the foot that may have just been exacerbated.  Symptoms have since resolved.  Encouraged that if symptoms recur to rest, ice, Tylenol anti-inflammatories if symptoms do not resolve then return for evaluation.           We will plan for follow up as needed.    All questions were answered.  Patient understands and agrees with the plan.    Randell Richardson MD    08/13/2024    CC to Alisa Morales MD       Answers submitted by the patient for this visit:  Other (Submitted on 8/7/2024)  Please describe your symptoms.: Right foot pain., Painful to walk on.  Started on Tuesday August 6., Have had symptoms before (a year ago), but not to the point it hurt to walk.  Have you had these symptoms before?: Yes  How long have you been having these symptoms?: 1-4 days  Please list any medications you are currently taking for this condition.: Started taking Tylenol on August 7.  Please describe any probable cause for these symptoms. : Had left knee meniscus tear surgery on July 9, 2024. (Dr. Walter), Thinking that after I was cleared to start walking, I might have unknowingly overcompensated on right leg (foot) when I walked. , Prior pain in foot occurred when I would wear flip flops.  Primary Reason for Visit (Submitted on 8/7/2024)  What is the primary reason for your visit?: Other

## 2024-08-21 ENCOUNTER — OFFICE VISIT (OUTPATIENT)
Dept: ORTHOPEDIC SURGERY | Facility: CLINIC | Age: 69
End: 2024-08-21
Payer: MEDICARE

## 2024-08-21 VITALS — TEMPERATURE: 98.6 F | HEIGHT: 65 IN | WEIGHT: 147 LBS | BODY MASS INDEX: 24.49 KG/M2

## 2024-08-21 DIAGNOSIS — Z09 SURGERY FOLLOW-UP: Primary | ICD-10-CM

## 2024-08-21 PROCEDURE — 99024 POSTOP FOLLOW-UP VISIT: CPT | Performed by: NURSE PRACTITIONER

## 2024-08-21 PROCEDURE — 1159F MED LIST DOCD IN RCRD: CPT | Performed by: NURSE PRACTITIONER

## 2024-08-21 PROCEDURE — 1160F RVW MEDS BY RX/DR IN RCRD: CPT | Performed by: NURSE PRACTITIONER

## 2024-08-21 NOTE — PROGRESS NOTES
Debra S Sandifer : 1955 MRN: [id number] DATE: 2024    CC: 6 weeks s/p left knee scope with partial medial meniscectomy    HPI: Patient returns to clinic today for follow up.  Reports some soreness but knee is progressing well overall.  Denies any concerns or problems.    Vitals:    24 1045   Temp: 98.6 °F (37 °C)       Exam:  Wounds appear well healed.  Calf soft.  Negative Calli's sign.  Full knee motion.  Good motor and sensory function in foot.  Good pedal pulses.  Gait appears normal.    Imaging   none    Impression:  6 weeks s/p left knee scope with partial medial meniscectomy    Plan:    1.  Doing well overall.  2.  Have encouraged patient that residual soreness, swelling may persist for several months.  3.  Recommended icing, anti-inflammatories as needed for occasional soreness.  4.  Will release to follow up as needed going forward--encourage the patient to call in lingering soreness persists or develops any new symptoms.    CLEMENTE Root    2024     Answers submitted by the patient for this visit:  Other (Submitted on 2024)  Please describe your symptoms.: Follow up appt for  torn meniscus surgery.  Have you had these symptoms before?: No  How long have you been having these symptoms?: 1-4 days  Please list any medications you are currently taking for this condition.: None  Please describe any probable cause for these symptoms. : Worn out knee.  Primary Reason for Visit (Submitted on 2024)  What is the primary reason for your visit?: Other

## 2024-08-30 VITALS
RESPIRATION RATE: 22 BRPM | OXYGEN SATURATION: 99 % | DIASTOLIC BLOOD PRESSURE: 71 MMHG | RESPIRATION RATE: 20 BRPM | HEART RATE: 62 BPM | HEART RATE: 71 BPM | SYSTOLIC BLOOD PRESSURE: 122 MMHG | DIASTOLIC BLOOD PRESSURE: 69 MMHG | SYSTOLIC BLOOD PRESSURE: 144 MMHG | RESPIRATION RATE: 21 BRPM | HEART RATE: 73 BPM | SYSTOLIC BLOOD PRESSURE: 120 MMHG | RESPIRATION RATE: 9 BRPM | HEART RATE: 68 BPM | SYSTOLIC BLOOD PRESSURE: 122 MMHG | DIASTOLIC BLOOD PRESSURE: 67 MMHG | DIASTOLIC BLOOD PRESSURE: 73 MMHG | DIASTOLIC BLOOD PRESSURE: 59 MMHG | HEART RATE: 72 BPM | TEMPERATURE: 96.8 F | DIASTOLIC BLOOD PRESSURE: 64 MMHG | DIASTOLIC BLOOD PRESSURE: 61 MMHG | HEART RATE: 72 BPM | SYSTOLIC BLOOD PRESSURE: 108 MMHG | DIASTOLIC BLOOD PRESSURE: 64 MMHG | DIASTOLIC BLOOD PRESSURE: 67 MMHG | DIASTOLIC BLOOD PRESSURE: 69 MMHG | DIASTOLIC BLOOD PRESSURE: 67 MMHG | SYSTOLIC BLOOD PRESSURE: 127 MMHG | SYSTOLIC BLOOD PRESSURE: 103 MMHG | DIASTOLIC BLOOD PRESSURE: 63 MMHG | OXYGEN SATURATION: 99 % | HEART RATE: 68 BPM | HEART RATE: 68 BPM | HEIGHT: 65 IN | DIASTOLIC BLOOD PRESSURE: 61 MMHG | DIASTOLIC BLOOD PRESSURE: 59 MMHG | WEIGHT: 145 LBS | HEART RATE: 73 BPM | SYSTOLIC BLOOD PRESSURE: 113 MMHG | HEART RATE: 73 BPM | SYSTOLIC BLOOD PRESSURE: 121 MMHG | RESPIRATION RATE: 27 BRPM | SYSTOLIC BLOOD PRESSURE: 119 MMHG | SYSTOLIC BLOOD PRESSURE: 122 MMHG | SYSTOLIC BLOOD PRESSURE: 119 MMHG | HEART RATE: 77 BPM | DIASTOLIC BLOOD PRESSURE: 59 MMHG | SYSTOLIC BLOOD PRESSURE: 113 MMHG | DIASTOLIC BLOOD PRESSURE: 61 MMHG | WEIGHT: 145 LBS | SYSTOLIC BLOOD PRESSURE: 119 MMHG | TEMPERATURE: 96.8 F | RESPIRATION RATE: 23 BRPM | HEART RATE: 66 BPM | RESPIRATION RATE: 22 BRPM | SYSTOLIC BLOOD PRESSURE: 103 MMHG | HEIGHT: 65 IN | SYSTOLIC BLOOD PRESSURE: 108 MMHG | SYSTOLIC BLOOD PRESSURE: 119 MMHG | RESPIRATION RATE: 16 BRPM | SYSTOLIC BLOOD PRESSURE: 113 MMHG | RESPIRATION RATE: 27 BRPM | SYSTOLIC BLOOD PRESSURE: 144 MMHG | DIASTOLIC BLOOD PRESSURE: 73 MMHG | TEMPERATURE: 98.1 F | SYSTOLIC BLOOD PRESSURE: 122 MMHG | RESPIRATION RATE: 20 BRPM | DIASTOLIC BLOOD PRESSURE: 71 MMHG | DIASTOLIC BLOOD PRESSURE: 72 MMHG | RESPIRATION RATE: 22 BRPM | HEART RATE: 62 BPM | HEIGHT: 65 IN | HEART RATE: 72 BPM | HEART RATE: 71 BPM | RESPIRATION RATE: 9 BRPM | HEART RATE: 62 BPM | DIASTOLIC BLOOD PRESSURE: 72 MMHG | DIASTOLIC BLOOD PRESSURE: 63 MMHG | RESPIRATION RATE: 16 BRPM | RESPIRATION RATE: 9 BRPM | SYSTOLIC BLOOD PRESSURE: 127 MMHG | SYSTOLIC BLOOD PRESSURE: 113 MMHG | SYSTOLIC BLOOD PRESSURE: 144 MMHG | TEMPERATURE: 98.1 F | SYSTOLIC BLOOD PRESSURE: 144 MMHG | RESPIRATION RATE: 27 BRPM | DIASTOLIC BLOOD PRESSURE: 69 MMHG | HEART RATE: 68 BPM | TEMPERATURE: 98.1 F | DIASTOLIC BLOOD PRESSURE: 72 MMHG | SYSTOLIC BLOOD PRESSURE: 127 MMHG | HEART RATE: 71 BPM | WEIGHT: 145 LBS | TEMPERATURE: 98.1 F | RESPIRATION RATE: 27 BRPM | HEART RATE: 72 BPM | DIASTOLIC BLOOD PRESSURE: 71 MMHG | RESPIRATION RATE: 22 BRPM | DIASTOLIC BLOOD PRESSURE: 61 MMHG | RESPIRATION RATE: 23 BRPM | DIASTOLIC BLOOD PRESSURE: 73 MMHG | SYSTOLIC BLOOD PRESSURE: 119 MMHG | HEART RATE: 71 BPM | RESPIRATION RATE: 21 BRPM | DIASTOLIC BLOOD PRESSURE: 71 MMHG | OXYGEN SATURATION: 100 % | HEART RATE: 62 BPM | SYSTOLIC BLOOD PRESSURE: 113 MMHG | SYSTOLIC BLOOD PRESSURE: 103 MMHG | SYSTOLIC BLOOD PRESSURE: 121 MMHG | DIASTOLIC BLOOD PRESSURE: 72 MMHG | RESPIRATION RATE: 20 BRPM | DIASTOLIC BLOOD PRESSURE: 69 MMHG | OXYGEN SATURATION: 99 % | HEART RATE: 77 BPM | DIASTOLIC BLOOD PRESSURE: 64 MMHG | HEART RATE: 73 BPM | DIASTOLIC BLOOD PRESSURE: 67 MMHG | DIASTOLIC BLOOD PRESSURE: 67 MMHG | HEART RATE: 73 BPM | DIASTOLIC BLOOD PRESSURE: 69 MMHG | RESPIRATION RATE: 23 BRPM | HEART RATE: 68 BPM | RESPIRATION RATE: 9 BRPM | SYSTOLIC BLOOD PRESSURE: 108 MMHG | SYSTOLIC BLOOD PRESSURE: 122 MMHG | DIASTOLIC BLOOD PRESSURE: 59 MMHG | RESPIRATION RATE: 21 BRPM | SYSTOLIC BLOOD PRESSURE: 127 MMHG | RESPIRATION RATE: 16 BRPM | HEART RATE: 77 BPM | HEART RATE: 71 BPM | RESPIRATION RATE: 20 BRPM | SYSTOLIC BLOOD PRESSURE: 120 MMHG | SYSTOLIC BLOOD PRESSURE: 121 MMHG | HEART RATE: 72 BPM | SYSTOLIC BLOOD PRESSURE: 108 MMHG | DIASTOLIC BLOOD PRESSURE: 61 MMHG | HEART RATE: 71 BPM | RESPIRATION RATE: 9 BRPM | DIASTOLIC BLOOD PRESSURE: 73 MMHG | HEIGHT: 65 IN | HEART RATE: 66 BPM | DIASTOLIC BLOOD PRESSURE: 63 MMHG | RESPIRATION RATE: 22 BRPM | DIASTOLIC BLOOD PRESSURE: 72 MMHG | SYSTOLIC BLOOD PRESSURE: 108 MMHG | DIASTOLIC BLOOD PRESSURE: 64 MMHG | SYSTOLIC BLOOD PRESSURE: 108 MMHG | DIASTOLIC BLOOD PRESSURE: 59 MMHG | DIASTOLIC BLOOD PRESSURE: 63 MMHG | TEMPERATURE: 98.1 F | SYSTOLIC BLOOD PRESSURE: 121 MMHG | DIASTOLIC BLOOD PRESSURE: 61 MMHG | HEART RATE: 66 BPM | SYSTOLIC BLOOD PRESSURE: 121 MMHG | SYSTOLIC BLOOD PRESSURE: 127 MMHG | WEIGHT: 145 LBS | DIASTOLIC BLOOD PRESSURE: 69 MMHG | DIASTOLIC BLOOD PRESSURE: 73 MMHG | OXYGEN SATURATION: 100 % | RESPIRATION RATE: 21 BRPM | SYSTOLIC BLOOD PRESSURE: 144 MMHG | HEIGHT: 65 IN | SYSTOLIC BLOOD PRESSURE: 119 MMHG | HEART RATE: 77 BPM | DIASTOLIC BLOOD PRESSURE: 64 MMHG | SYSTOLIC BLOOD PRESSURE: 120 MMHG | TEMPERATURE: 98.1 F | HEART RATE: 66 BPM | DIASTOLIC BLOOD PRESSURE: 59 MMHG | TEMPERATURE: 98.1 F | DIASTOLIC BLOOD PRESSURE: 64 MMHG | RESPIRATION RATE: 23 BRPM | HEART RATE: 62 BPM | TEMPERATURE: 96.8 F | HEART RATE: 77 BPM | SYSTOLIC BLOOD PRESSURE: 103 MMHG | SYSTOLIC BLOOD PRESSURE: 144 MMHG | DIASTOLIC BLOOD PRESSURE: 73 MMHG | TEMPERATURE: 96.8 F | DIASTOLIC BLOOD PRESSURE: 61 MMHG | TEMPERATURE: 96.8 F | RESPIRATION RATE: 16 BRPM | HEART RATE: 62 BPM | SYSTOLIC BLOOD PRESSURE: 120 MMHG | RESPIRATION RATE: 22 BRPM | SYSTOLIC BLOOD PRESSURE: 120 MMHG | SYSTOLIC BLOOD PRESSURE: 127 MMHG | SYSTOLIC BLOOD PRESSURE: 113 MMHG | RESPIRATION RATE: 27 BRPM | HEIGHT: 65 IN | DIASTOLIC BLOOD PRESSURE: 71 MMHG | RESPIRATION RATE: 27 BRPM | SYSTOLIC BLOOD PRESSURE: 113 MMHG | SYSTOLIC BLOOD PRESSURE: 121 MMHG | SYSTOLIC BLOOD PRESSURE: 103 MMHG | OXYGEN SATURATION: 99 % | RESPIRATION RATE: 23 BRPM | HEART RATE: 73 BPM | OXYGEN SATURATION: 100 % | HEIGHT: 65 IN | DIASTOLIC BLOOD PRESSURE: 71 MMHG | DIASTOLIC BLOOD PRESSURE: 63 MMHG | RESPIRATION RATE: 9 BRPM | SYSTOLIC BLOOD PRESSURE: 120 MMHG | HEART RATE: 62 BPM | OXYGEN SATURATION: 99 % | DIASTOLIC BLOOD PRESSURE: 67 MMHG | HEART RATE: 66 BPM | HEART RATE: 68 BPM | TEMPERATURE: 96.8 F | DIASTOLIC BLOOD PRESSURE: 72 MMHG | DIASTOLIC BLOOD PRESSURE: 64 MMHG | WEIGHT: 145 LBS | HEART RATE: 68 BPM | SYSTOLIC BLOOD PRESSURE: 119 MMHG | DIASTOLIC BLOOD PRESSURE: 63 MMHG | RESPIRATION RATE: 16 BRPM | RESPIRATION RATE: 21 BRPM | RESPIRATION RATE: 23 BRPM | OXYGEN SATURATION: 100 % | RESPIRATION RATE: 20 BRPM | OXYGEN SATURATION: 99 % | OXYGEN SATURATION: 100 % | OXYGEN SATURATION: 100 % | HEART RATE: 72 BPM | HEART RATE: 72 BPM | HEART RATE: 66 BPM | HEART RATE: 73 BPM | DIASTOLIC BLOOD PRESSURE: 59 MMHG | SYSTOLIC BLOOD PRESSURE: 121 MMHG | WEIGHT: 145 LBS | OXYGEN SATURATION: 100 % | DIASTOLIC BLOOD PRESSURE: 67 MMHG | DIASTOLIC BLOOD PRESSURE: 73 MMHG | HEART RATE: 66 BPM | SYSTOLIC BLOOD PRESSURE: 122 MMHG | RESPIRATION RATE: 20 BRPM | RESPIRATION RATE: 20 BRPM | HEART RATE: 71 BPM | DIASTOLIC BLOOD PRESSURE: 69 MMHG | TEMPERATURE: 96.8 F | SYSTOLIC BLOOD PRESSURE: 103 MMHG | RESPIRATION RATE: 22 BRPM | SYSTOLIC BLOOD PRESSURE: 108 MMHG | RESPIRATION RATE: 16 BRPM | RESPIRATION RATE: 21 BRPM | OXYGEN SATURATION: 99 % | DIASTOLIC BLOOD PRESSURE: 63 MMHG | RESPIRATION RATE: 27 BRPM | HEART RATE: 77 BPM | SYSTOLIC BLOOD PRESSURE: 127 MMHG | SYSTOLIC BLOOD PRESSURE: 144 MMHG | WEIGHT: 145 LBS | RESPIRATION RATE: 9 BRPM | HEART RATE: 77 BPM | SYSTOLIC BLOOD PRESSURE: 122 MMHG | RESPIRATION RATE: 16 BRPM | RESPIRATION RATE: 23 BRPM | SYSTOLIC BLOOD PRESSURE: 120 MMHG | SYSTOLIC BLOOD PRESSURE: 103 MMHG | DIASTOLIC BLOOD PRESSURE: 72 MMHG | RESPIRATION RATE: 21 BRPM | DIASTOLIC BLOOD PRESSURE: 71 MMHG

## 2024-09-05 ENCOUNTER — OFFICE (AMBULATORY)
Age: 69
End: 2024-09-05

## 2024-09-05 ENCOUNTER — AMBULATORY SURGICAL CENTER (AMBULATORY)
Age: 69
End: 2024-09-05

## 2024-09-05 ENCOUNTER — OFFICE (AMBULATORY)
Dept: URBAN - METROPOLITAN AREA PATHOLOGY 4 | Facility: PATHOLOGY | Age: 69
End: 2024-09-05

## 2024-09-05 ENCOUNTER — AMBULATORY SURGICAL CENTER (AMBULATORY)
Dept: URBAN - METROPOLITAN AREA SURGERY 17 | Facility: SURGERY | Age: 69
End: 2024-09-05

## 2024-09-05 DIAGNOSIS — Z86.010 PERSONAL HISTORY OF COLON POLYPS: ICD-10-CM

## 2024-09-05 DIAGNOSIS — D12.0 BENIGN NEOPLASM OF CECUM: ICD-10-CM

## 2024-09-05 DIAGNOSIS — Z09 ENCOUNTER FOR FOLLOW-UP EXAMINATION AFTER COMPLETED TREATMEN: ICD-10-CM

## 2024-09-05 PROBLEM — K63.5 POLYP OF COLON: Status: ACTIVE | Noted: 2024-09-05

## 2024-09-05 LAB
GI HISTOLOGY: A. CECUM: (no result)
GI HISTOLOGY: PDF REPORT: (no result)

## 2024-09-05 PROCEDURE — 45385 COLONOSCOPY W/LESION REMOVAL: CPT | Mod: PT | Performed by: INTERNAL MEDICINE

## 2024-09-05 PROCEDURE — 88305 TISSUE EXAM BY PATHOLOGIST: CPT | Performed by: PATHOLOGY

## 2024-11-11 ENCOUNTER — LAB (OUTPATIENT)
Dept: OTHER | Facility: HOSPITAL | Age: 69
End: 2024-11-11
Payer: MEDICARE

## 2024-11-11 ENCOUNTER — OFFICE VISIT (OUTPATIENT)
Dept: ONCOLOGY | Facility: CLINIC | Age: 69
End: 2024-11-11
Payer: MEDICARE

## 2024-11-11 VITALS
HEART RATE: 82 BPM | SYSTOLIC BLOOD PRESSURE: 110 MMHG | TEMPERATURE: 98.6 F | RESPIRATION RATE: 16 BRPM | HEIGHT: 65 IN | DIASTOLIC BLOOD PRESSURE: 68 MMHG | WEIGHT: 145 LBS | OXYGEN SATURATION: 98 % | BODY MASS INDEX: 24.16 KG/M2

## 2024-11-11 DIAGNOSIS — Z17.0 MALIGNANT NEOPLASM OF UPPER-INNER QUADRANT OF LEFT BREAST IN FEMALE, ESTROGEN RECEPTOR POSITIVE: Primary | ICD-10-CM

## 2024-11-11 DIAGNOSIS — Z09 ENCOUNTER FOR FOLLOW-UP EXAMINATION AFTER COMPLETED TREATMENT FOR CONDITIONS OTHER THAN MALIGNANT NEOPLASM: ICD-10-CM

## 2024-11-11 DIAGNOSIS — Z12.31 ENCOUNTER FOR SCREENING MAMMOGRAM FOR MALIGNANT NEOPLASM OF BREAST: ICD-10-CM

## 2024-11-11 DIAGNOSIS — C50.212 MALIGNANT NEOPLASM OF UPPER-INNER QUADRANT OF LEFT BREAST IN FEMALE, ESTROGEN RECEPTOR POSITIVE: Primary | ICD-10-CM

## 2024-11-11 LAB
ALBUMIN SERPL-MCNC: 3.9 G/DL (ref 3.5–5.2)
ALBUMIN/GLOB SERPL: 1.3 G/DL
ALP SERPL-CCNC: 92 U/L (ref 39–117)
ALT SERPL W P-5'-P-CCNC: 24 U/L (ref 1–33)
ANION GAP SERPL CALCULATED.3IONS-SCNC: 5.8 MMOL/L (ref 5–15)
AST SERPL-CCNC: 31 U/L (ref 1–32)
BASOPHILS # BLD AUTO: 0.07 10*3/MM3 (ref 0–0.2)
BASOPHILS NFR BLD AUTO: 1 % (ref 0–1.5)
BILIRUB SERPL-MCNC: 0.5 MG/DL (ref 0–1.2)
BUN SERPL-MCNC: 17 MG/DL (ref 8–23)
BUN/CREAT SERPL: 18.7 (ref 7–25)
CALCIUM SPEC-SCNC: 9.4 MG/DL (ref 8.6–10.5)
CHLORIDE SERPL-SCNC: 106 MMOL/L (ref 98–107)
CO2 SERPL-SCNC: 28.2 MMOL/L (ref 22–29)
CREAT SERPL-MCNC: 0.91 MG/DL (ref 0.57–1)
DEPRECATED RDW RBC AUTO: 45.7 FL (ref 37–54)
EGFRCR SERPLBLD CKD-EPI 2021: 68.9 ML/MIN/1.73
EOSINOPHIL # BLD AUTO: 0.15 10*3/MM3 (ref 0–0.4)
EOSINOPHIL NFR BLD AUTO: 2.2 % (ref 0.3–6.2)
ERYTHROCYTE [DISTWIDTH] IN BLOOD BY AUTOMATED COUNT: 13.8 % (ref 12.3–15.4)
GLOBULIN UR ELPH-MCNC: 3.1 GM/DL
GLUCOSE SERPL-MCNC: 98 MG/DL (ref 65–99)
HCT VFR BLD AUTO: 40.3 % (ref 34–46.6)
HGB BLD-MCNC: 12.6 G/DL (ref 12–15.9)
IMM GRANULOCYTES # BLD AUTO: 0.04 10*3/MM3 (ref 0–0.05)
IMM GRANULOCYTES NFR BLD AUTO: 0.6 % (ref 0–0.5)
LYMPHOCYTES # BLD AUTO: 1.62 10*3/MM3 (ref 0.7–3.1)
LYMPHOCYTES NFR BLD AUTO: 23.9 % (ref 19.6–45.3)
MCH RBC QN AUTO: 28.2 PG (ref 26.6–33)
MCHC RBC AUTO-ENTMCNC: 31.3 G/DL (ref 31.5–35.7)
MCV RBC AUTO: 90.2 FL (ref 79–97)
MONOCYTES # BLD AUTO: 0.67 10*3/MM3 (ref 0.1–0.9)
MONOCYTES NFR BLD AUTO: 9.9 % (ref 5–12)
NEUTROPHILS NFR BLD AUTO: 4.24 10*3/MM3 (ref 1.7–7)
NEUTROPHILS NFR BLD AUTO: 62.4 % (ref 42.7–76)
NRBC BLD AUTO-RTO: 0 /100 WBC (ref 0–0.2)
PLATELET # BLD AUTO: 308 10*3/MM3 (ref 140–450)
PMV BLD AUTO: 9.7 FL (ref 6–12)
POTASSIUM SERPL-SCNC: 5 MMOL/L (ref 3.5–5.2)
PROT SERPL-MCNC: 7 G/DL (ref 6–8.5)
RBC # BLD AUTO: 4.47 10*6/MM3 (ref 3.77–5.28)
SODIUM SERPL-SCNC: 140 MMOL/L (ref 136–145)
WBC NRBC COR # BLD AUTO: 6.79 10*3/MM3 (ref 3.4–10.8)

## 2024-11-11 PROCEDURE — 36415 COLL VENOUS BLD VENIPUNCTURE: CPT

## 2024-11-11 PROCEDURE — 85025 COMPLETE CBC W/AUTO DIFF WBC: CPT | Performed by: INTERNAL MEDICINE

## 2024-11-11 PROCEDURE — 80053 COMPREHEN METABOLIC PANEL: CPT | Performed by: INTERNAL MEDICINE

## 2024-11-11 NOTE — PROGRESS NOTES
Subjective:     Reason for follow up:   1. Stage 1 (T1bN0), left breast invasive ductal carcinoma, ER+ (90%), MA+, [id number] negative   * status post lumpectomy with SLNB    * status post radiation therapy   * Arimidex started 8/2016 - changed to Aromasin due to arthalgias     History of Present Ilness: Debra S Sandifer presents for follow-up of breast cancer.  She completed 5 years of hormonal blockade and because of the breast cancer index showing no benefit from extended therapy she stopped treatment 3 years ago.    She is here for follow-up and is doing much better.  She is exercising and keeping her weight under control.  She is up-to-date with colonoscopies    Mammogram in June 2024 was benign\  she will continue on her Boniva -her bone density in May 2023 shows significant increase in bone density but if she is doing better next year we will stop her Boniva    Past Medical   Past Medical History:   Diagnosis Date    Allergic codeine/latex    Ankle sprain 1971    Arthralgia of both hands     Arthritis     hand    Arthritis of back January 2005    Had surgery repair for herniated disc. Surgeon said he addressed arthritis noted in my back at that time.    Arthritis of neck 2006    Atypical chest pain     Breast cancer     Left    Coronary artery disease involving native coronary artery 11/23/2016    has stents    Dyspareunia     H/O bronchitis     H/O infectious mononucleosis     COLLEGE AGE    Hemorrhoid     High cholesterol     History of medical problems back surgery  2005    repair herniated disc    Hot flashes, menopausal     Hx of radiation therapy     JULY 2016    Hyperlipidemia     Hypertension     Knee swelling May 2017    Right knee replacement on 9/2019    Lumbosacral disc disease January 2005    Herniated disc surgery    Myocardial infarction 11/2016    2 stents    Polyp of sigmoid colon     Right knee pain     Stress fracture May 23, 2024    stress fracture/tibia per MRI    Stye     LEFT EYE    Tear  of meniscus of knee May 23, 2024    Meniscus tear per MRI    Urinary tract infection periodically    Vitamin D deficiency      Patient Active Problem List   Diagnosis    Mixed hyperlipidemia    Malignant neoplasm of upper-inner quadrant of left breast in female, estrogen receptor positive    Coronary artery disease involving native coronary artery    Prediabetes    History of coronary artery stent placement    DJD (degenerative joint disease) of knee    Osteopenia of hip    Acute meniscal tear, medial, left, initial encounter     Oncologic history    Her arthralgias are better than they were on Arimidex but they are still present. Her hot flashes are present and tolerable.     We did a breast cancer index because of her tolerance issues and her worsening bone density and this showed a low risk of late recurrence and a low risk of response to extended endocrine blockade and therefore I think it is safe to go off her Aromasin  Social History   Social History     Socioeconomic History    Marital status:      Spouse name: Van    Years of education: College   Tobacco Use    Smoking status: Former     Current packs/day: 0.00     Types: Cigarettes     Start date: 1972     Quit date: 1977     Years since quittin.3    Smokeless tobacco: Never    Tobacco comments:     smoked less than 1 pack per week   Vaping Use    Vaping status: Never Used   Substance and Sexual Activity    Alcohol use: Yes     Alcohol/week: 4.0 standard drinks of alcohol     Types: 3 Glasses of wine, 1 Drinks containing 0.5 oz of alcohol per week     Comment: social drinker/weekends    Drug use: Never    Sexual activity: Yes     Partners: Male     Birth control/protection: Post-menopausal      Family History  Family History   Problem Relation Age of Onset    Coronary artery disease Mother     Dementia Mother     Heart disease Mother         Heart attack w/2 stents    Heart attack Mother         had heart attack. Heart catheter  -2 stents    Hypertension Father     Heart disease Father         Coronary heart disease    Stroke Father         Ischemic    Diabetes type II Father     Diabetes Father     Hyperlipidemia Father     Prostate cancer Brother 51    Cancer Brother         Colitis/Prostate    Alcohol abuse Maternal Grandfather     Rheum arthritis Paternal Grandmother     Arthritis Paternal Grandmother     Alcohol abuse Paternal Grandfather     Stroke Paternal Grandfather         Ischemic    Rheum arthritis Paternal Grandfather     Diabetes type II Paternal Grandfather     Diabetes Paternal Grandfather     Hyperlipidemia Paternal Grandfather     Hypertension Paternal Grandfather     Heart disease Paternal Grandfather     Cancer Sister         Appendiceal cancer    Other Sister         Appendiceal cancer    Cancer Brother         Prostate    No Known Problems Maternal Grandmother     Malig Hyperthermia Neg Hx      Allergies  Allergies   Allergen Reactions    Codeine Rash    Oxycodone Nausea And Vomiting and Hallucinations    Hydrocodone-Acetaminophen Nausea And Vomiting    Latex Rash       Medications: The current medication list was reviewed in the EMR.    Review of Systems  Review of Systems   Constitutional:  Negative for activity change, appetite change, chills, diaphoresis, fatigue (11/12/2018), fever and unexpected weight change.   Eyes: Negative.    Respiratory: Negative.  Negative for cough, chest tightness and shortness of breath.    Cardiovascular: Negative.  Negative for chest pain, palpitations and leg swelling.   Gastrointestinal: Negative.  Negative for abdominal pain, blood in stool, constipation, diarrhea, nausea and vomiting.   Genitourinary: Negative.    Musculoskeletal:  Negative for arthralgias, gait problem (rigth knee better had replacement 11/11/19), joint swelling (stable ) and myalgias.   Neurological:  Negative for dizziness, light-headedness, numbness and headaches.   Hematological:  Negative for adenopathy.  "Does not bruise/bleed easily (stable since going off medication 5/6/19).   Psychiatric/Behavioral: Negative.  Negative for confusion. The patient is not nervous/anxious.    All other systems reviewed and are negative.      Objective:     Vitals:    11/11/24 1036   BP: 110/68   Pulse: 82   Resp: 16   Temp: 98.6 °F (37 °C)   TempSrc: Oral   SpO2: 98%   Weight: 65.8 kg (145 lb)   Height: 165.1 cm (65\")   PainSc: 0-No pain           11/11/2024    10:37 AM   Current Status   ECOG score 0   GENERAL:  Well-developed, well-nourished female in no acute distress.   SKIN:  Warm, dry without rashes, purpura or petechiae.  HENT: Hearing: normal, Lips normal. Dentition: good  LYMPHATICS:  No cervical, supraclavicular, axillary adenopathy.  RESPIRATORY:  Lungs clear to percussion and auscultation. Good airflow. Normal respiratory effort- promimnet T4 vertbral spine  BREASTS: Left breast smaller than right breast, bilateral breast exam without palpable masses, skin changes or nipple discharge-  CARDIAC:  Regular rate and rhythm without murmurs, rubs or gallops. Normal S1,S2. Edema -  No  GI: Soft, nontender, non-distended, no hernia present  PSYCHIATRIC:  Normal affect and mood.  Oriented to person/place/time.  MSK: Gait: Normal; No clubbing, cyanosis. No tenderness or swelling in left knee, right knee-    I have reexamined the patient and the results are consistent with the previously documented exam. Andrea Akhtar MD     Labs and Imaging  Lab Results   Component Value Date    WBC 6.79 11/11/2024    HGB 12.6 11/11/2024    HCT 40.3 11/11/2024    MCV 90.2 11/11/2024     11/11/2024     Labs from 10/2017 reviewed.     Breast imaging: Mammogram 2/2018  CONCLUSION: Probable benign mammogram with small area of focal  asymmetric density at site of previous lumpectomy in upper inner left  breast and best seen in the CC projection. A short-term follow-up  left-sided mammogram in 6 months is recommended.      BI-RADS CATEGORY " 3: Probably benign. Short interval follow-up suggested.    MAMMO SCREENING DIGITAL TOMOSYNTHESIS BILATERAL W CAD-     CONCLUSION: There is no evidence for malignancy nor significant change  in either breast. BI-RADS Category 2, benign.     Recommendation: Monthly self-examination and annual mammography.     This report was finalized on 2/12/2019               HISTORY: 64 year-old asymptomatic female     MPRESSION:  1. There is no evidence for malignancy or significant change in either  breast. Routine followup mammography is recommended.     BI-RADS category 1: Negative.     This report was finalized on 2/17/2020 9:11 AM by Dr. Nikita Davenport M.D.         Assessment/Plan     Assessment:   1. Stage 1 (T1bN0), left breast invasive ductal carcinoma, ER+ (90%), MI+, [id number] negative   * status post lumpectomy with SLNB 4/13/16   * Oncotype Dx 20 (low intermediate). No chemotherapy indicated.   * status post radiation therapy   * Arimidex started June 2016. Change to Aromasin due to arthralgias 5/2017. Tolerating well.    * Mammo normal 2/2021               *Breast cancer index shows low risk of recurrence after 5 years and low risk of benefit from extended hormonal therapy Aromasin stopped 11/21               *Doing well off treatment in 11/22               *Doing well off treatment in 12/23                * Doing well as of 11/24-year 8    2. Hot flashes. Tolerable. Stable.   3. Joint arthralgias. 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 Present but improved with Aromasin compared to AI  4. Coronary artery disease with stents  5.  Mild anemia due to knee replacement in 3/21  6.  Worsening osteopenia in the hips-Boniva added in 6/21  7.  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   2.  Continue Boniva 150 mg p.o. monthly with calcium and vitamin D  3.  patient was instructed on the importance of physical activity, healthy diet, and self breast exams.   Patient will continue calcium and vitamin D supplementation.    Follow-up in 12 months. I asked the patient to call for any questions, concerns, or new symptoms.

## 2024-11-15 ENCOUNTER — TELEPHONE (OUTPATIENT)
Dept: ONCOLOGY | Facility: CLINIC | Age: 69
End: 2024-11-15
Payer: MEDICARE

## 2024-11-15 NOTE — TELEPHONE ENCOUNTER
Caller: Sandifer, Debra S    Relationship: Self    Best call back number: [phone]    What is the best time to reach you: ANYTIME    Who are you requesting to speak with (clinical staff, provider,  specific staff member): CLINICAL    What was the call regarding: PT REQUESTING A CB REGARDING LAB RESULTS

## 2024-11-19 ENCOUNTER — OFFICE (AMBULATORY)
Dept: URBAN - METROPOLITAN AREA CLINIC 76 | Facility: CLINIC | Age: 69
End: 2024-11-19
Payer: COMMERCIAL

## 2024-11-19 ENCOUNTER — OFFICE (AMBULATORY)
Age: 69
End: 2024-11-19
Payer: COMMERCIAL

## 2024-11-19 VITALS
DIASTOLIC BLOOD PRESSURE: 63 MMHG | SYSTOLIC BLOOD PRESSURE: 94 MMHG | SYSTOLIC BLOOD PRESSURE: 94 MMHG | HEART RATE: 60 BPM | DIASTOLIC BLOOD PRESSURE: 63 MMHG | HEIGHT: 65 IN | HEART RATE: 60 BPM | HEART RATE: 60 BPM | WEIGHT: 145 LBS | DIASTOLIC BLOOD PRESSURE: 63 MMHG | HEART RATE: 60 BPM | HEIGHT: 65 IN | SYSTOLIC BLOOD PRESSURE: 94 MMHG | DIASTOLIC BLOOD PRESSURE: 63 MMHG | SYSTOLIC BLOOD PRESSURE: 94 MMHG | HEIGHT: 65 IN | HEIGHT: 65 IN | SYSTOLIC BLOOD PRESSURE: 94 MMHG | WEIGHT: 145 LBS | HEIGHT: 65 IN | WEIGHT: 145 LBS | WEIGHT: 145 LBS | DIASTOLIC BLOOD PRESSURE: 63 MMHG | SYSTOLIC BLOOD PRESSURE: 94 MMHG | SYSTOLIC BLOOD PRESSURE: 94 MMHG | WEIGHT: 145 LBS | HEART RATE: 60 BPM | HEIGHT: 65 IN | DIASTOLIC BLOOD PRESSURE: 63 MMHG | HEIGHT: 65 IN | DIASTOLIC BLOOD PRESSURE: 63 MMHG | WEIGHT: 145 LBS | WEIGHT: 145 LBS | HEART RATE: 60 BPM | HEART RATE: 60 BPM

## 2024-11-19 DIAGNOSIS — Z86.0101 PERSONAL HISTORY OF ADENOMATOUS AND SERRATED COLON POLYPS: ICD-10-CM

## 2024-11-19 DIAGNOSIS — K64.9 UNSPECIFIED HEMORRHOIDS: ICD-10-CM

## 2024-11-19 DIAGNOSIS — R13.10 DYSPHAGIA, UNSPECIFIED: ICD-10-CM

## 2024-11-19 PROBLEM — K63.5 POLYP OF COLON: Status: ACTIVE | Noted: 2024-09-05

## 2024-11-19 PROCEDURE — 99213 OFFICE O/P EST LOW 20 MIN: CPT | Performed by: INTERNAL MEDICINE

## 2024-12-05 DIAGNOSIS — E78.2 MIXED HYPERLIPIDEMIA: Primary | ICD-10-CM

## 2024-12-05 DIAGNOSIS — R73.03 PREDIABETES: ICD-10-CM

## 2024-12-10 LAB
ALBUMIN SERPL-MCNC: 3.9 G/DL (ref 3.5–5.2)
ALBUMIN/GLOB SERPL: 1.5 G/DL
ALP SERPL-CCNC: 79 U/L (ref 39–117)
ALT SERPL-CCNC: 25 U/L (ref 1–33)
APPEARANCE UR: CLEAR
AST SERPL-CCNC: 34 U/L (ref 1–32)
BACTERIA #/AREA URNS HPF: NORMAL /HPF
BASOPHILS # BLD AUTO: 0.04 10*3/MM3 (ref 0–0.2)
BASOPHILS NFR BLD AUTO: 0.8 % (ref 0–1.5)
BILIRUB SERPL-MCNC: 0.7 MG/DL (ref 0–1.2)
BILIRUB UR QL STRIP: NEGATIVE
BUN SERPL-MCNC: 15 MG/DL (ref 8–23)
BUN/CREAT SERPL: 17.2 (ref 7–25)
CALCIUM SERPL-MCNC: 9.1 MG/DL (ref 8.6–10.5)
CASTS URNS MICRO: NORMAL
CHLORIDE SERPL-SCNC: 106 MMOL/L (ref 98–107)
CHOLEST SERPL-MCNC: 153 MG/DL (ref 0–200)
CO2 SERPL-SCNC: 27.1 MMOL/L (ref 22–29)
COLOR UR: YELLOW
CREAT SERPL-MCNC: 0.87 MG/DL (ref 0.57–1)
EGFRCR SERPLBLD CKD-EPI 2021: 72.7 ML/MIN/1.73
EOSINOPHIL # BLD AUTO: 0.17 10*3/MM3 (ref 0–0.4)
EOSINOPHIL NFR BLD AUTO: 3.5 % (ref 0.3–6.2)
EPI CELLS #/AREA URNS HPF: NORMAL /HPF
ERYTHROCYTE [DISTWIDTH] IN BLOOD BY AUTOMATED COUNT: 13.1 % (ref 12.3–15.4)
GLOBULIN SER CALC-MCNC: 2.6 GM/DL
GLUCOSE SERPL-MCNC: 83 MG/DL (ref 65–99)
GLUCOSE UR QL STRIP: NEGATIVE
HBA1C MFR BLD: 5.8 % (ref 4.8–5.6)
HCT VFR BLD AUTO: 40 % (ref 34–46.6)
HDLC SERPL-MCNC: 76 MG/DL (ref 40–60)
HGB BLD-MCNC: 12.4 G/DL (ref 12–15.9)
HGB UR QL STRIP: ABNORMAL
IMM GRANULOCYTES # BLD AUTO: 0.01 10*3/MM3 (ref 0–0.05)
IMM GRANULOCYTES NFR BLD AUTO: 0.2 % (ref 0–0.5)
KETONES UR QL STRIP: NEGATIVE
LDLC SERPL CALC-MCNC: 64 MG/DL (ref 0–100)
LEUKOCYTE ESTERASE UR QL STRIP: NEGATIVE
LYMPHOCYTES # BLD AUTO: 1.48 10*3/MM3 (ref 0.7–3.1)
LYMPHOCYTES NFR BLD AUTO: 30.6 % (ref 19.6–45.3)
MCH RBC QN AUTO: 28.1 PG (ref 26.6–33)
MCHC RBC AUTO-ENTMCNC: 31 G/DL (ref 31.5–35.7)
MCV RBC AUTO: 90.5 FL (ref 79–97)
MONOCYTES # BLD AUTO: 0.47 10*3/MM3 (ref 0.1–0.9)
MONOCYTES NFR BLD AUTO: 9.7 % (ref 5–12)
NEUTROPHILS # BLD AUTO: 2.66 10*3/MM3 (ref 1.7–7)
NEUTROPHILS NFR BLD AUTO: 55.2 % (ref 42.7–76)
NITRITE UR QL STRIP: NEGATIVE
NRBC BLD AUTO-RTO: 0 /100 WBC (ref 0–0.2)
PH UR STRIP: 5.5 [PH] (ref 5–8)
PLATELET # BLD AUTO: 276 10*3/MM3 (ref 140–450)
POTASSIUM SERPL-SCNC: 4.6 MMOL/L (ref 3.5–5.2)
PROT SERPL-MCNC: 6.5 G/DL (ref 6–8.5)
PROT UR QL STRIP: NEGATIVE
RBC # BLD AUTO: 4.42 10*6/MM3 (ref 3.77–5.28)
RBC #/AREA URNS HPF: NORMAL /HPF
SODIUM SERPL-SCNC: 141 MMOL/L (ref 136–145)
SP GR UR STRIP: 1.02 (ref 1–1.03)
TRIGL SERPL-MCNC: 65 MG/DL (ref 0–150)
TSH SERPL DL<=0.005 MIU/L-ACNC: 2.22 UIU/ML (ref 0.27–4.2)
UROBILINOGEN UR STRIP-MCNC: ABNORMAL MG/DL
VLDLC SERPL CALC-MCNC: 13 MG/DL (ref 5–40)
WBC # BLD AUTO: 4.83 10*3/MM3 (ref 3.4–10.8)
WBC #/AREA URNS HPF: NORMAL /HPF

## 2024-12-16 ENCOUNTER — OFFICE VISIT (OUTPATIENT)
Dept: INTERNAL MEDICINE | Facility: CLINIC | Age: 69
End: 2024-12-16
Payer: MEDICARE

## 2024-12-16 VITALS
WEIGHT: 141 LBS | OXYGEN SATURATION: 98 % | HEIGHT: 65 IN | BODY MASS INDEX: 23.49 KG/M2 | HEART RATE: 63 BPM | SYSTOLIC BLOOD PRESSURE: 110 MMHG | DIASTOLIC BLOOD PRESSURE: 64 MMHG

## 2024-12-16 DIAGNOSIS — Z00.00 MEDICARE ANNUAL WELLNESS VISIT, SUBSEQUENT: Primary | ICD-10-CM

## 2024-12-16 DIAGNOSIS — E78.2 MIXED HYPERLIPIDEMIA: ICD-10-CM

## 2024-12-16 DIAGNOSIS — R73.03 PREDIABETES: ICD-10-CM

## 2024-12-16 PROCEDURE — 99214 OFFICE O/P EST MOD 30 MIN: CPT | Performed by: INTERNAL MEDICINE

## 2024-12-16 PROCEDURE — G0439 PPPS, SUBSEQ VISIT: HCPCS | Performed by: INTERNAL MEDICINE

## 2024-12-16 PROCEDURE — 1159F MED LIST DOCD IN RCRD: CPT | Performed by: INTERNAL MEDICINE

## 2024-12-16 PROCEDURE — 1160F RVW MEDS BY RX/DR IN RCRD: CPT | Performed by: INTERNAL MEDICINE

## 2024-12-16 PROCEDURE — 1126F AMNT PAIN NOTED NONE PRSNT: CPT | Performed by: INTERNAL MEDICINE

## 2024-12-16 NOTE — PATIENT INSTRUCTIONS
Medicare Wellness  Personal Prevention Plan of Service     Date of Office Visit:    Encounter Provider:  Alisa Morales MD  Place of Service:  [facility] PRIMARY CARE  Patient Name: Debra S Sandifer  :  1955    As part of the Medicare Wellness portion of your visit today, we are providing you with this personalized preventive plan of services (PPPS). This plan is based upon recommendations of the [place] Preventive Services Task Force (USPSTF) and the Advisory Committee on Immunization Practices (ACIP).    This lists the preventive care services that should be considered, and provides dates of when you are due. Items listed as completed are up-to-date and do not require any further intervention.    Health Maintenance   Topic Date Due    ANNUAL WELLNESS VISIT  2024    DXA SCAN  2025    LIPID PANEL  12/10/2025    MAMMOGRAM  2026    TDAP/TD VACCINES (3 - Td or Tdap) 2027    COLORECTAL CANCER SCREENING  2029    COVID-19 Vaccine  Completed    INFLUENZA VACCINE  Completed    Pneumococcal Vaccine 65+  Completed    ZOSTER VACCINE  Completed    HEPATITIS C SCREENING  Addressed       No orders of the defined types were placed in this encounter.      No follow-ups on file.

## 2024-12-16 NOTE — ASSESSMENT & PLAN NOTE
HLD.   Control is at goal.  The patient is advised to continue current dosage of atorvastatin.    Prediabetes.  Control is stable.  We discussed diet.  I recommend a diet high in fruits, vegetables, whole grains, lean meats, nuts and beans.  I recommend limiting red meat, full fat dairy, eggs and processed white carbohydrates.  I recommend aerobic exercise at least 3 days per week.

## 2024-12-16 NOTE — PROGRESS NOTES
Subjective   The ABCs of the Annual Wellness Visit  Medicare Wellness Visit      Debra S Sandifer is a 69 y.o. patient who presents for a Medicare Wellness Visit.    The following portions of the patient's history were reviewed and   updated as appropriate: allergies, current medications, past family history, past medical history, past social history, past surgical history, and problem list.    Compared to one year ago, the patient's physical   health is the same.  Compared to one year ago, the patient's mental   health is the same.    Recent Hospitalizations:  This patient has had a [facility] admission record on file within the last 365 days.  Current Medical Providers:  Patient Care Team:  Alisa Morales MD as PCP - General  Aminata Estrella MD as Surgeon (Breast Surgery)  Mechelle Garcia MD as Consulting Physician (Hematology and Oncology)  Sheila Wahl MD as Consulting Physician (Radiation Oncology)  Aminata Estrella MD as Referring Physician (Breast Surgery)  Andrea Akhtar MD as Consulting Physician (Hematology and Oncology)  Sanjiv Dykes MD as Consulting Physician (Cardiology)    Outpatient Medications Prior to Visit   Medication Sig Dispense Refill    acetaminophen (Tylenol 8 Hour) 650 MG 8 hr tablet tablet      aspirin 81 MG oral suspension Take 81 mL by mouth Daily. HOLD PER MD INSTR      atorvastatin (LIPITOR) 40 MG tablet 90      calcium carbonate-vitamin d 600-400 MG-UNIT per tablet Take 1 tablet by mouth 2 (Two) Times a Day.      carvedilol (COREG) 3.125 MG tablet Take 1 tablet by mouth Every 12 (Twelve) Hours. 180 tablet 4    cholecalciferol (VITAMIN D3) 1000 units tablet Take 1 tablet by mouth Every Morning.      ibandronate (BONIVA) 150 MG tablet 3      Probiotic Product (Align) 4 MG capsule capsule       No facility-administered medications prior to visit.     No opioid medication identified on active medication list. I have reviewed chart for other  "potential  high risk medication/s and harmful drug interactions in the elderly.      Aspirin is on active medication list. Aspirin use is indicated based on review of current medical condition/s. Pros and cons of this therapy have been discussed today. Benefits of this medication outweigh potential harm.  Patient has been encouraged to continue taking this medication.  .      Patient Active Problem List   Diagnosis    Mixed hyperlipidemia    Malignant neoplasm of upper-inner quadrant of left breast in female, estrogen receptor positive    Coronary artery disease involving native coronary artery    Prediabetes    History of coronary artery stent placement    DJD (degenerative joint disease) of knee    Osteopenia of hip    Acute meniscal tear, medial, left, initial encounter     Advance Care Planning Advance Directive is on file.  ACP discussion was held with the patient during this visit. Patient has an advance directive in EMR which is still valid.             Objective   Vitals:    24 0807   BP: 110/64   Pulse: 63   SpO2: 98%   Weight: 64 kg (141 lb)   Height: 165.1 cm (65\")       Estimated body mass index is 23.46 kg/m² as calculated from the following:    Height as of this encounter: 165.1 cm (65\").    Weight as of this encounter: 64 kg (141 lb).    BMI is within normal parameters. No other follow-up for BMI required.       Does the patient have evidence of cognitive impairment? No  Lab Results   Component Value Date    CHLPL 153 12/10/2024    TRIG 65 12/10/2024    HDL 76 (H) 12/10/2024    LDL 64 12/10/2024    VLDL 13 12/10/2024    HGBA1C 5.80 (H) 12/10/2024                                                                                                Health  Risk Assessment    Smoking Status:  Social History     Tobacco Use   Smoking Status Former    Current packs/day: 0.00    Types: Cigarettes    Start date: 1972    Quit date: 1977    Years since quittin.4   Smokeless Tobacco Never   Tobacco " Comments    smoked less than 1 pack per week     Alcohol Consumption:  Social History     Substance and Sexual Activity   Alcohol Use Yes    Alcohol/week: 4.0 standard drinks of alcohol    Types: 3 Glasses of wine, 1 Drinks containing 0.5 oz of alcohol per week    Comment: social drinker/weekends       Fall Risk Screen  RITESH Fall Risk Assessment was completed, and patient is at LOW risk for falls.Assessment completed on:2024    Depression Screening   Little interest or pleasure in doing things? Not at all   Feeling down, depressed, or hopeless? Not at all   PHQ-2 Total Score 0      Health Habits and Functional and Cognitive Screenin/10/2024     8:51 PM   Functional & Cognitive Status   Do you have difficulty preparing food and eating? No    Do you have difficulty bathing yourself, getting dressed or grooming yourself? No    Do you have difficulty using the toilet? No    Do you have difficulty moving around from place to place? No    Do you have trouble with steps or getting out of a bed or a chair? No    Current Diet Well Balanced Diet    Dental Exam Up to date    Eye Exam Up to date    Exercise (times per week) 6 times per week    Current Exercises Include Aerobics;Cardiovascular Workout;Gardening    Do you need help using the phone?  No    Are you deaf or do you have serious difficulty hearing?  No    Do you need help to go to places out of walking distance? No    Do you need help shopping? No    Do you need help preparing meals?  No    Do you need help with housework?  No    Do you need help with laundry? No    Do you need help taking your medications? No    Do you need help managing money? No    Do you ever drive or ride in a car without wearing a seat belt? No    Have you felt unusual stress, anger or loneliness in the last month? No    Who do you live with? Spouse    If you need help, do you have trouble finding someone available to you? No    Have you been bothered in the last four weeks by  "sexual problems? Yes    Do you have difficulty concentrating, remembering or making decisions? No        Patient-reported           Age-appropriate Screening Schedule:  Refer to the list below for future screening recommendations based on patient's age, sex and/or medical conditions. Orders for these recommended tests are listed in the plan section. The patient has been provided with a written plan.    Health Maintenance List  Health Maintenance   Topic Date Due    ANNUAL WELLNESS VISIT  12/11/2024    DXA SCAN  05/17/2025    LIPID PANEL  12/10/2025    MAMMOGRAM  06/13/2026    TDAP/TD VACCINES (3 - Td or Tdap) 02/22/2027    COLORECTAL CANCER SCREENING  09/05/2029    COVID-19 Vaccine  Completed    INFLUENZA VACCINE  Completed    Pneumococcal Vaccine 65+  Completed    ZOSTER VACCINE  Completed    HEPATITIS C SCREENING  Addressed                                                                                                                                                CMS Preventative Services Quick Reference  Risk Factors Identified During Encounter  None Identified    The above risks/problems have been discussed with the patient.  Pertinent information has been shared with the patient in the After Visit Summary.  An After Visit Summary and PPPS were made available to the patient.    Follow Up:   Next Medicare Wellness visit to be scheduled in 1 year.         Additional E&M Note during same encounter follows:  Patient has additional, significant, and separately identifiable condition(s)/problem(s) that require work above and beyond the Medicare Wellness Visit     Chief Complaint  Medicare Wellness-subsequent    Subjective   HPI    HLD.  LDL is at goal.    Prediabetes.  She is stable.      Review of Systems   Respiratory: Negative.     Cardiovascular: Negative.               Objective   Vital Signs:  /64   Pulse 63   Ht 165.1 cm (65\")   Wt 64 kg (141 lb)   SpO2 98%   BMI 23.46 kg/m²   Physical " Exam  Constitutional:       Appearance: She is well-developed.   HENT:      Head: Normocephalic and atraumatic.      Right Ear: Hearing, tympanic membrane and external ear normal.      Left Ear: Hearing, tympanic membrane and external ear normal.      Nose: Nose normal.   Neck:      Thyroid: No thyromegaly.   Cardiovascular:      Rate and Rhythm: Normal rate and regular rhythm.      Heart sounds: Normal heart sounds. No murmur heard.  Pulmonary:      Effort: Pulmonary effort is normal.      Breath sounds: Normal breath sounds.   Abdominal:      General: There is no distension.      Palpations: Abdomen is soft.      Tenderness: There is no abdominal tenderness.   Musculoskeletal:      Cervical back: Neck supple.   Lymphadenopathy:      Cervical: No cervical adenopathy.   Skin:     General: Skin is warm and dry.   Neurological:      Mental Status: She is alert and oriented to person, place, and time.   Psychiatric:         Speech: Speech normal.         Behavior: Behavior normal.         Thought Content: Thought content normal.         Judgment: Judgment normal.         The following data was reviewed by: Alisa Morales MD on 12/16/2024:    Common labs          6/25/2024    08:32 11/11/2024    10:34 12/10/2024    08:12   Common Labs   Glucose 92  98  83    BUN 21  17  15    Creatinine 0.92  0.91  0.87    Sodium 140  140  141    Potassium 4.3  5.0  4.6    Chloride 106  106  106    Calcium 9.3  9.4  9.1    Total Protein   6.5    Albumin  3.9  3.9    Total Bilirubin  0.5  0.7    Alkaline Phosphatase  92  79    AST (SGOT)  31  34    ALT (SGPT)  24  25    WBC 6.04  6.79  4.83    Hemoglobin 12.6  12.6  12.4    Hematocrit 39.2  40.3  40.0    Platelets 256  308  276    Total Cholesterol   153    Triglycerides   65    HDL Cholesterol   76    LDL Cholesterol    64    Hemoglobin A1C   5.80              Assessment and Plan Additional age appropriate preventative wellness advice topics were discussed during today's preventative  wellness exam(some topics already addressed during AWV portion of the note above):    Physical Activity: Advised cardiovascular activity 150 minutes per week as tolerated. (example brisk walk for 30 minutes, 5 days a week).           Medicare annual wellness visit, subsequent         Mixed hyperlipidemia            Prediabetes       HLD.   Control is at goal.  The patient is advised to continue current dosage of atorvastatin.    Prediabetes.  Control is stable.  We discussed diet.  I recommend a diet high in fruits, vegetables, whole grains, lean meats, nuts and beans.  I recommend limiting red meat, full fat dairy, eggs and processed white carbohydrates.  I recommend aerobic exercise at least 3 days per week.               Follow Up   No follow-ups on file.  Patient was given instructions and counseling regarding her condition or for health maintenance advice. Please see specific information pulled into the AVS if appropriate.

## 2025-01-29 ENCOUNTER — TELEPHONE (OUTPATIENT)
Dept: CARDIOLOGY | Facility: CLINIC | Age: 70
End: 2025-01-29

## 2025-01-29 NOTE — TELEPHONE ENCOUNTER
Caller: Sandifer, Debra S    Relationship to patient: Self    Best call back number: [phone]    Patient is needing: PATIENT RESCHEDULED WITH [facility] ON 7.10.25. DOES NOT WISH TO SEE APRN . REQUESTING SOONER DATE BUT OUT OF TOWN FEB 15TH-23RD AND MARCH 11TH-25TH.

## 2025-02-03 NOTE — TELEPHONE ENCOUNTER
Caller: Sandifer, Debra S    Relationship: Self    Best call back number: [phone]      What was the call regarding: PATIENT CALLED BACK, BEEN OVER 48 HOURS AND HASN'T HEARD BACK, PATIENT HASN'T SEEN DR PHAM IN TWO YEARS AND WANTS TO BE SEEN BY THE DR. PLEASE CALL AND ADVISE.

## 2025-02-27 ENCOUNTER — OFFICE VISIT (OUTPATIENT)
Age: 70
End: 2025-02-27
Payer: MEDICARE

## 2025-02-27 VITALS
BODY MASS INDEX: 24.49 KG/M2 | WEIGHT: 147 LBS | HEART RATE: 69 BPM | SYSTOLIC BLOOD PRESSURE: 108 MMHG | DIASTOLIC BLOOD PRESSURE: 60 MMHG | HEIGHT: 65 IN

## 2025-02-27 DIAGNOSIS — I25.10 CORONARY ARTERY DISEASE INVOLVING NATIVE CORONARY ARTERY OF NATIVE HEART WITHOUT ANGINA PECTORIS: Primary | ICD-10-CM

## 2025-02-27 DIAGNOSIS — E78.2 MIXED HYPERLIPIDEMIA: ICD-10-CM

## 2025-02-27 PROCEDURE — 99214 OFFICE O/P EST MOD 30 MIN: CPT | Performed by: INTERNAL MEDICINE

## 2025-02-27 PROCEDURE — 93000 ELECTROCARDIOGRAM COMPLETE: CPT | Performed by: INTERNAL MEDICINE

## 2025-02-27 NOTE — PROGRESS NOTES
Date of Office Visit: 25    Encounter Provider: Sanjiv Dykes MD  Place of Service: [facility] CARDIOLOGY  Patient Name: Debra S Sandifer  :1955    Chief Complaint   Patient presents with    Coronary Artery Disease    Follow-up     1 year       HPI: 69 y.o. female with a medical history of coronary artery disease, essential hypertension and hyperlipidemia.  In 2016, she underwent PCI of the mid and distal LAD and angioplasty of the diagonal.  At the time, she had mildly reduced LV systolic function.  Her LV function has since recovered.  She also has history of breast cancer (status post lumpectomy).  She has been doing well as of late.  She denies any chest pain or dyspnea on exertion.  Her blood pressure is 108/60.      Past Medical History:   Diagnosis Date    Abnormal ECG 2016    Allergic codeine/latex    Ankle sprain 1971    Arthralgia of both hands     Arthritis     hand    Arthritis of back 2005    Had surgery repair for herniated disc. Surgeon said he addressed arthritis noted in my back at that time.    Arthritis of neck     Atypical chest pain     Breast cancer     Left    Coronary artery disease involving native coronary artery 2016    has stents    Dyspareunia     H/O bronchitis     H/O infectious mononucleosis     COLLEGE AGE    Hemorrhoid     High cholesterol     History of medical problems back surgery      repair herniated disc    Hot flashes, menopausal     Hx of radiation therapy     2016    Hyperlipidemia     Hypertension     Knee swelling May 2017    Right knee replacement on 2019    Lumbosacral disc disease 2005    Herniated disc surgery    Myocardial infarction 2016    2 stents    Polyp of sigmoid colon     Right knee pain     Stress fracture May 23, 2024    stress fracture/tibia per MRI    Stye     LEFT EYE    Tear of meniscus of knee May 23, 2024    Meniscus tear per MRI    Urinary tract  infection periodically    Vitamin D deficiency        Past Surgical History:   Procedure Laterality Date    BACK SURGERY  01/13/2005    Herniated Disk repair (Done by Dr Jurgen Reddy)    BREAST BIOPSY Left     BREAST LUMPECTOMY Left 2016    also biopsied and resected lymph nodes    BREAST LUMPECTOMY WITH SENTINEL NODE BIOPSY Left 04/13/2016    Procedure: LEFT BREAST MAMMO NEEDLE LOC LUMPECTOMY WITH LEFT AXILLA SENTINEL NODE BIOPSY;  Surgeon: Aminata Estrella MD;  Location: [facility] MAIN OR;  Service:     CARDIAC CATHETERIZATION N/A 11/15/2016    Procedure: Left Heart Cath;  Surgeon: Sanjiv Dykes MD;  [facility];  Service:     CARDIAC CATHETERIZATION N/A 11/15/2016    Procedure: Left ventriculography;  Surgeon: Sanjiv Dykes MD;  Location: [facility] CATH INVASIVE LOCATION;  Service:     CARDIAC SURGERY  11/15/2016    COLONOSCOPY  2014    CORONARY ANGIOPLASTY      CORONARY STENT PLACEMENT  11/15/2016    two    ENDOSCOPY N/A 11/11/2016    Procedure: ESOPHAGOGASTRODUODENOSCOPY WITH BIOPSY;  Surgeon: Mehdi Guardado MD;  Location: [facility] ENDOSCOPY;  Service:     JOINT MANIPULATION Right 10/28/2019    Procedure: RIGHT KNEE MANIPULATION;  Surgeon: Van Titus MD;  Location: [facility] MAIN OR;  Service: Orthopedics    JOINT REPLACEMENT  Right knee replacement 09/2019    Right knee replacement-09/2019,  Right knee Revision 04/2021    KNEE ARTHROSCOPY Left 07/09/2024    Procedure: Knee Arthroscopy with Meniscectomy;  Surgeon: Vipul Walter MD;  Location: [facility] OR OSC;  Service: Orthopedics;  Laterality: Left;    LUMBAR DISCECTOMY      LYMPH NODE BIOPSY  04/13/2016    two    SPINE SURGERY  January 2005    repair herniated disc    TOTAL KNEE ARTHROPLASTY Right 09/26/2019    Procedure: TOTAL KNEE ARTHROPLASTY;  Surgeon: Van Titus MD;  Location: [facility] MAIN OR;  Service: Orthopedics    TOTAL KNEE ARTHROPLASTY REVISION Right 04/26/2021    Procedure: RIGHT KNEE FEMORAL  REVISION;  Surgeon: Van Titus MD;  Location: [facility] OR;  Service: Orthopedics;  Laterality: Right;    TRIGGER POINT INJECTION  2017    left hand thumb    WISDOM TOOTH EXTRACTION         Social History     Socioeconomic History    Marital status:      Spouse name: Van    Years of education: College   Tobacco Use    Smoking status: Former     Current packs/day: 0.00     Types: Cigarettes     Start date: 1972     Quit date: 1977     Years since quittin.6    Smokeless tobacco: Never    Tobacco comments:     smoked less than 1 pack per week   Vaping Use    Vaping status: Never Used   Substance and Sexual Activity    Alcohol use: Yes     Alcohol/week: 4.0 standard drinks of alcohol     Types: 3 Glasses of wine, 1 Drinks containing 0.5 oz of alcohol per week     Comment: social drinker/weekends    Drug use: Never    Sexual activity: Yes     Partners: Male     Birth control/protection: Post-menopausal       Family History   Problem Relation Age of Onset    Coronary artery disease Mother     Dementia Mother     Heart disease Mother         Heart attack w/2 stents    Heart attack Mother         had heart attack. Heart catheter -2 stents    Hypertension Father     Heart disease Father         Coronary heart disease    Stroke Father         Ischemic    Diabetes type II Father     Diabetes Father     Hyperlipidemia Father     Heart failure Father     Prostate cancer Brother 51    Cancer Brother         Colitis/Prostate    Alcohol abuse Maternal Grandfather     Rheum arthritis Paternal Grandmother     Arthritis Paternal Grandmother     Alcohol abuse Paternal Grandfather     Stroke Paternal Grandfather         Ischemic    Rheum arthritis Paternal Grandfather     Diabetes type II Paternal Grandfather     Diabetes Paternal Grandfather     Hyperlipidemia Paternal Grandfather     Hypertension Paternal Grandfather     Heart disease Paternal Grandfather     Cancer Sister         Appendiceal  cancer    Cancer Brother         Prostate    No Known Problems Maternal Grandmother     Heart disease Brother         10/2024 heart attack    Malig Hyperthermia Neg Hx        Review of Systems   Constitutional: Negative. Negative for fever and malaise/fatigue.   HENT:  Negative for nosebleeds and sore throat.    Eyes:  Negative for blurred vision and double vision.   Cardiovascular: Negative.  Negative for chest pain, claudication, dyspnea on exertion, leg swelling, orthopnea, palpitations, paroxysmal nocturnal dyspnea and syncope.   Respiratory: Negative.  Negative for cough, shortness of breath and snoring.    Endocrine: Negative for cold intolerance, heat intolerance and polydipsia.   Hematologic/Lymphatic: Negative for bleeding problem.   Skin:  Negative for itching, poor wound healing and rash.   Musculoskeletal:  Negative for falls, joint pain, joint swelling, muscle weakness and myalgias.   Gastrointestinal:  Negative for abdominal pain, melena, nausea and vomiting.   Neurological:  Negative for dizziness, light-headedness, loss of balance, seizures, vertigo and weakness.   Psychiatric/Behavioral:  Negative for altered mental status and depression.        Allergies   Allergen Reactions    Codeine Rash    Oxycodone Nausea And Vomiting and Hallucinations    Hydrocodone-Acetaminophen Nausea And Vomiting    Latex Rash         Current Outpatient Medications:     acetaminophen (Tylenol 8 Hour) 650 MG 8 hr tablet, tablet, Disp: , Rfl:     aspirin 81 MG oral suspension, Take 81 mL by mouth Daily. HOLD PER MD INSTR, Disp: , Rfl:     atorvastatin (LIPITOR) 40 MG tablet, 90, Disp: , Rfl:     calcium carbonate-vitamin d 600-400 MG-UNIT per tablet, Take 1 tablet by mouth 2 (Two) Times a Day., Disp: , Rfl:     carvedilol (COREG) 3.125 MG tablet, Take 1 tablet by mouth Every 12 (Twelve) Hours., Disp: 180 tablet, Rfl: 4    cholecalciferol (VITAMIN D3) 1000 units tablet, Take 1 tablet by mouth Every Morning., Disp: , Rfl:      "ibandronate (BONIVA) 150 MG tablet, 3, Disp: , Rfl:     Probiotic Product (Align) 4 MG capsule, capsule, Disp: , Rfl:       Objective:     Vitals:    02/27/25 1400   BP: 108/60   Pulse: 69   Weight: 66.7 kg (147 lb)   Height: 165.1 cm (65\")     Body mass index is 24.46 kg/m².    PHYSICAL EXAM:    Constitutional:       Appearance: Well-developed.   Eyes:      General: No scleral icterus.     Conjunctiva/sclera: Conjunctivae normal.   HENT:      Head: Normocephalic and atraumatic.   Neck:      Thyroid: No thyromegaly.      Vascular: Normal carotid pulses. No carotid bruit, hepatojugular reflux or JVD.      Trachea: No tracheal deviation.   Pulmonary:      Effort: Pulmonary effort is normal. No respiratory distress.      Breath sounds: Normal breath sounds. No decreased breath sounds. No wheezing. No rhonchi. No rales.   Chest:      Chest wall: Not tender to palpatation.   Cardiovascular:      Normal rate. Regular rhythm.      Murmurs: There is no murmur.      No gallop.  No click. No rub.   Pulses:     Intact distal pulses.      Carotid: 2+ bilaterally.     Radial: 2+ bilaterally.     Femoral: 2+ bilaterally.     Dorsalis pedis: 2+ bilaterally.     Posterior tibial: 2+ bilaterally.  Edema:     Peripheral edema absent.   Abdominal:      General: Bowel sounds are normal. There is no distension.      Palpations: Abdomen is soft.      Tenderness: There is no abdominal tenderness.   Musculoskeletal:         General: No deformity.      Cervical back: Normal range of motion and neck supple. Skin:     Findings: No erythema or rash.   Neurological:      Mental Status: Alert and oriented to person, place, and time.      Sensory: No sensory deficit.   Psychiatric:         Behavior: Behavior normal.           ECG 12 Lead    Date/Time: 2/27/2025 2:36 PM  Performed by: Sanjiv Dykes MD    Authorized by: Sanjiv Dykes MD  Comparison: compared with previous ECG from 2/8/2024  Similar to previous ECG  Rhythm: sinus " rhythm  Rate: normal  QRS axis: left    Clinical impression: non-specific ECG  Comments: Anteroseptal infarct            Assessment:      Plan:       1.  Coronary artery disease.  She denies any symptoms of angina.  Continue aspirin and atorvastatin.  -Continue carvedilol 3.125 mg p.o. twice daily.  Tolerating well    2.  Hyperlipidemia.  Lipid panel reviewed from December 2024.  Last LDL was 64 with an HDL of 76.  Excellent control.

## 2025-03-03 ENCOUNTER — OFFICE VISIT (OUTPATIENT)
Dept: INTERNAL MEDICINE | Facility: CLINIC | Age: 70
End: 2025-03-03
Payer: MEDICARE

## 2025-03-03 VITALS
HEIGHT: 65 IN | DIASTOLIC BLOOD PRESSURE: 60 MMHG | HEART RATE: 67 BPM | WEIGHT: 144 LBS | OXYGEN SATURATION: 98 % | SYSTOLIC BLOOD PRESSURE: 102 MMHG | BODY MASS INDEX: 23.99 KG/M2

## 2025-03-03 DIAGNOSIS — S69.91XA HAND TRAUMA, RIGHT, INITIAL ENCOUNTER: ICD-10-CM

## 2025-03-03 DIAGNOSIS — M79.644 FINGER PAIN, RIGHT: Primary | ICD-10-CM

## 2025-03-03 PROCEDURE — 1126F AMNT PAIN NOTED NONE PRSNT: CPT | Performed by: INTERNAL MEDICINE

## 2025-03-03 PROCEDURE — 99213 OFFICE O/P EST LOW 20 MIN: CPT | Performed by: INTERNAL MEDICINE

## 2025-03-03 PROCEDURE — 1160F RVW MEDS BY RX/DR IN RCRD: CPT | Performed by: INTERNAL MEDICINE

## 2025-03-03 PROCEDURE — 1159F MED LIST DOCD IN RCRD: CPT | Performed by: INTERNAL MEDICINE

## 2025-03-03 NOTE — PROGRESS NOTES
Subjective     Debra S Sandifer is a 69 y.o. female who presents with   Chief Complaint   Patient presents with    Right fourth finger       History of Present Illness     Hit fourth right finger between two dumbbell five days ago.  Pain and swelling resulted.  Decreased ROM.  Better now.      Review of Systems   Constitutional:  Negative for chills, diaphoresis, fatigue and fever.   HENT:  Negative for congestion and sore throat.    Respiratory:  Negative for cough.    Cardiovascular:  Negative for chest pain.   Gastrointestinal:  Negative for abdominal pain, nausea and vomiting.   Genitourinary:  Negative for dysuria.   Musculoskeletal:  Negative for myalgias and neck pain.   Skin:  Negative for rash.   Neurological:  Negative for weakness, numbness and headaches.       The following portions of the patient's history were reviewed and updated as appropriate: allergies, current medications and problem list.    Patient Active Problem List    Diagnosis Date Noted    Acute meniscal tear, medial, left, initial encounter 05/29/2024    Osteopenia of hip 05/24/2021     Note Last Updated: 11/19/2021     Boniva start 2021      DJD (degenerative joint disease) of knee 09/26/2019    History of coronary artery stent placement 06/18/2018    Prediabetes 10/17/2017    Coronary artery disease involving native coronary artery 11/23/2016     Note Last Updated: 11/23/2016 11/2016  PTCA of the first diagonal branch with a 2.5 x 12 mm trek Rx balloon.  PCI of the distal LAD with a 2.75 x 18 mm Xience Alpine drug-eluting stent  PCI of the mid LAD with a 3.0 x 28 mm Xience Alpine drug-eluting stent      Malignant neoplasm of upper-inner quadrant of left breast in female, estrogen receptor positive 04/27/2016     Note Last Updated: 10/14/2016     S/p lumpectomy and radiation in 2016      Mixed hyperlipidemia 02/10/2016       Current Outpatient Medications on File Prior to Visit   Medication Sig Dispense Refill    acetaminophen  "(Tylenol 8 Hour) 650 MG 8 hr tablet tablet      aspirin 81 MG oral suspension Take 81 mL by mouth Daily. HOLD PER MD EM      atorvastatin (LIPITOR) 40 MG tablet 90      calcium carbonate-vitamin d 600-400 MG-UNIT per tablet Take 1 tablet by mouth 2 (Two) Times a Day.      carvedilol (COREG) 3.125 MG tablet Take 1 tablet by mouth Every 12 (Twelve) Hours. 180 tablet 4    cholecalciferol (VITAMIN D3) 1000 units tablet Take 1 tablet by mouth Every Morning.      ibandronate (BONIVA) 150 MG tablet 3      Probiotic Product (Align) 4 MG capsule capsule       No current facility-administered medications on file prior to visit.       Objective     /60   Pulse 67   Ht 165.1 cm (65\")   Wt 65.3 kg (144 lb)   SpO2 98%   BMI 23.96 kg/m²     Physical Exam  Constitutional:       Appearance: She is well-developed.   HENT:      Head: Normocephalic and atraumatic.   Pulmonary:      Effort: Pulmonary effort is normal.   Musculoskeletal:      Right hand: Swelling and tenderness present. Decreased range of motion.      Comments: Fourth finger with swelling, ecchymosis, tenderness.  Decreased ROM.     Neurological:      Mental Status: She is alert and oriented to person, place, and time.   Psychiatric:         Behavior: Behavior normal.     X-rays of the right hand  performed today for following indication:   pain.  X-ray reveal nad.  There is no available x-ray for comparison.  X-ray sent to radiology for official interpretation and findings.        Assessment & Plan   Diagnoses and all orders for this visit:    1. Finger pain, right (Primary)  -     XR Finger 2+ View Right (In Office)    2. Hand trauma, right, initial encounter  -     XR Finger 2+ View Right (In Office)        Discussion    Patient presents with trauma to the right fourth finger.  No evidence of fracture on x-ray.  I discussed with the patient a trial of conservative management with:   tylenol, rest, and ice.  Let me know if not feeling better over the next " week or if there is any change in symptoms.         Future Appointments   Date Time Provider Department Center   6/13/2025 10:30 AM [facility] D[facility]A 1  [facility]   6/13/2025 11:00 AM [facility] MAMM 2  [facility]MO [facility]   6/17/2025  8:30 AM [facility]   11/10/2025 10:20 AM LAB CHAIR [facility] [place]   11/10/2025 10:40 AM Andrea Akhtar MD [facility] [place]   12/22/2025  8:10 AM [facility]   12/30/2025  8:15 AM Alisa Morales MD [facility]   3/4/2026 11:40 AM Sanjiv Dykes MD [id number] None

## 2025-03-04 DIAGNOSIS — M79.644 FINGER PAIN, RIGHT: ICD-10-CM

## 2025-03-04 DIAGNOSIS — S69.91XA HAND TRAUMA, RIGHT, INITIAL ENCOUNTER: ICD-10-CM

## 2025-03-04 DIAGNOSIS — S62.664A CLOSED NONDISPLACED FRACTURE OF DISTAL PHALANX OF RIGHT RING FINGER, INITIAL ENCOUNTER: Primary | ICD-10-CM

## 2025-04-21 RX ORDER — ATORVASTATIN CALCIUM 40 MG/1
40 TABLET, FILM COATED ORAL
Qty: 90 TABLET | Refills: 4 | Status: SHIPPED | OUTPATIENT
Start: 2025-04-21

## 2025-05-01 RX ORDER — CARVEDILOL 3.12 MG/1
3.12 TABLET ORAL EVERY 12 HOURS
Qty: 180 TABLET | Refills: 1 | Status: SHIPPED | OUTPATIENT
Start: 2025-05-01

## 2025-05-01 RX ORDER — IBANDRONATE SODIUM 150 MG/1
150 TABLET, FILM COATED ORAL
Qty: 3 TABLET | Refills: 3 | Status: SHIPPED | OUTPATIENT
Start: 2025-05-01

## 2025-05-27 ENCOUNTER — TELEPHONE (OUTPATIENT)
Dept: ONCOLOGY | Facility: CLINIC | Age: 70
End: 2025-05-27
Payer: MEDICARE

## 2025-05-27 NOTE — TELEPHONE ENCOUNTER
Caller: Sandifer, Debra S    Relationship: Self    Best call back number: [phone]    What was the call regarding: PATIENT NEEDS TO KNOW THE EXACT DATE SHE STOPPED TAKING THE AROMASIN, WAS SUPPOSE TO TAKE FOR 5 YEARS.  POSSIBLY SOMETIME IN 2021, WHEN DR. LEONARDO TOLD HER SHE COULD STOP THE MEDICATION.     Is it okay if the provider responds through [facility]: YES

## 2025-06-13 ENCOUNTER — HOSPITAL ENCOUNTER (OUTPATIENT)
Dept: BONE DENSITY | Facility: HOSPITAL | Age: 70
Discharge: HOME | End: 2025-06-13
Payer: MEDICARE

## 2025-06-13 ENCOUNTER — HOSPITAL ENCOUNTER (OUTPATIENT)
Dept: MAMMOGRAPHY | Facility: HOSPITAL | Age: 70
Discharge: HOME | End: 2025-06-13
Payer: MEDICARE

## 2025-06-13 DIAGNOSIS — Z17.0 MALIGNANT NEOPLASM OF UPPER-INNER QUADRANT OF LEFT BREAST IN FEMALE, ESTROGEN RECEPTOR POSITIVE: ICD-10-CM

## 2025-06-13 DIAGNOSIS — Z09 ENCOUNTER FOR FOLLOW-UP EXAMINATION AFTER COMPLETED TREATMENT FOR CONDITIONS OTHER THAN MALIGNANT NEOPLASM: ICD-10-CM

## 2025-06-13 DIAGNOSIS — C50.212 MALIGNANT NEOPLASM OF UPPER-INNER QUADRANT OF LEFT BREAST IN FEMALE, ESTROGEN RECEPTOR POSITIVE: ICD-10-CM

## 2025-06-13 DIAGNOSIS — Z12.31 ENCOUNTER FOR SCREENING MAMMOGRAM FOR MALIGNANT NEOPLASM OF BREAST: ICD-10-CM

## 2025-06-13 PROCEDURE — 77067 SCR MAMMO BI INCL CAD: CPT

## 2025-06-13 PROCEDURE — 77063 BREAST TOMOSYNTHESIS BI: CPT

## 2025-06-13 PROCEDURE — 77080 DXA BONE DENSITY AXIAL: CPT

## 2025-06-16 DIAGNOSIS — E78.2 MIXED HYPERLIPIDEMIA: Primary | ICD-10-CM

## 2025-06-16 DIAGNOSIS — R73.03 PREDIABETES: ICD-10-CM

## 2025-06-17 LAB
ALBUMIN SERPL-MCNC: 3.8 G/DL (ref 3.5–5.2)
ALBUMIN/GLOB SERPL: 1.6 G/DL
ALP SERPL-CCNC: 79 U/L (ref 39–117)
ALT SERPL-CCNC: 23 U/L (ref 1–33)
AST SERPL-CCNC: 34 U/L (ref 1–32)
BILIRUB SERPL-MCNC: 0.6 MG/DL (ref 0–1.2)
BUN SERPL-MCNC: 12 MG/DL (ref 8–23)
BUN/CREAT SERPL: 13.6 (ref 7–25)
CALCIUM SERPL-MCNC: 9.2 MG/DL (ref 8.6–10.5)
CHLORIDE SERPL-SCNC: 106 MMOL/L (ref 98–107)
CHOLEST SERPL-MCNC: 159 MG/DL (ref 0–200)
CO2 SERPL-SCNC: 25.6 MMOL/L (ref 22–29)
CREAT SERPL-MCNC: 0.88 MG/DL (ref 0.57–1)
EGFRCR SERPLBLD CKD-EPI 2021: 71.2 ML/MIN/1.73
GLOBULIN SER CALC-MCNC: 2.4 GM/DL
GLUCOSE SERPL-MCNC: 94 MG/DL (ref 65–99)
HBA1C MFR BLD: 6 % (ref 4.8–5.6)
HDLC SERPL-MCNC: 81 MG/DL (ref 40–60)
LDLC SERPL CALC-MCNC: 66 MG/DL (ref 0–100)
POTASSIUM SERPL-SCNC: 4.4 MMOL/L (ref 3.5–5.2)
PROT SERPL-MCNC: 6.2 G/DL (ref 6–8.5)
SODIUM SERPL-SCNC: 140 MMOL/L (ref 136–145)
TRIGL SERPL-MCNC: 61 MG/DL (ref 0–150)
VLDLC SERPL CALC-MCNC: 12 MG/DL (ref 5–40)

## 2025-08-26 ENCOUNTER — TRANSCRIBE ORDERS (OUTPATIENT)
Dept: ADMINISTRATIVE | Facility: HOSPITAL | Age: 70
End: 2025-08-26
Payer: MEDICARE

## 2025-08-26 DIAGNOSIS — Z00.00 WELLNESS EXAMINATION: Primary | ICD-10-CM

## (undated) DEVICE — PK KN TOTL 40

## (undated) DEVICE — NEEDLE, QUINCKE, 20GX3.5": Brand: MEDLINE

## (undated) DEVICE — KT DRN EVAC WND PVC PCH WTROC RND 10F400

## (undated) DEVICE — UNDERCAST PADDING: Brand: DEROYAL

## (undated) DEVICE — SKIN PREP TRAY W/CHG: Brand: MEDLINE INDUSTRIES, INC.

## (undated) DEVICE — GAUZE,SPONGE,FLUFF,6"X6.75",STRL,10/TRAY: Brand: MEDLINE

## (undated) DEVICE — SOL ISO/ALC 70PCT 4OZ

## (undated) DEVICE — GLV SURG SENSICARE W/ALOE PF LF 9 STRL

## (undated) DEVICE — ANTIBACTERIAL UNDYED BRAIDED (POLYGLACTIN 910), SYNTHETIC ABSORBABLE SUTURE: Brand: COATED VICRYL

## (undated) DEVICE — 2108 SERIES SAGITTAL BLADE, NO OFFSET  (12.4 X 1.19 X 82.1MM)

## (undated) DEVICE — GLV SURG PREMIERPRO ORTHO LTX PF SZ8.5 BRN

## (undated) DEVICE — SOL NACL 0.9PCT 1000ML

## (undated) DEVICE — TRAP FLD MINIVAC MEGADYNE 100ML

## (undated) DEVICE — STPLR SKIN VISISTAT WD 35CT

## (undated) DEVICE — NDL HYPO PRECISIONGLIDE REG 25G 1 1/2

## (undated) DEVICE — DECANT BG O JET

## (undated) DEVICE — PENCL E/S ULTRAVAC TELESCP NOSE HOLSTR 10FT

## (undated) DEVICE — GENESIS TROCHLEAR PIN 1/8 X 3: Brand: GENESIS

## (undated) DEVICE — NDL SPINE 20G 3 1/2 YEL STRL 1P/U

## (undated) DEVICE — BLD SHVE RESEC COOLCT SABRE CRV 4MM 13CM

## (undated) DEVICE — GLV SURG SIGNATURE ESSENTIAL PF LTX SZ8.5

## (undated) DEVICE — SYR LUERLOK 20CC BX/50

## (undated) DEVICE — PK ARTHSCP 40

## (undated) DEVICE — DRSNG ADAPTIC 3X8

## (undated) DEVICE — GOWN,NON-REINFORCED,SIRUS,SET IN SLV,XXL: Brand: MEDLINE

## (undated) DEVICE — STERILE PATIENT PROTECTIVE PAD FOR IMP® KNEE POSITIONERS & COHESIVE WRAP (10 / CASE): Brand: DE MAYO KNEE POSITIONER®

## (undated) DEVICE — BANDAGE,GAUZE,BULKEE II,4.5"X4.1YD,STRL: Brand: MEDLINE

## (undated) DEVICE — APPL CHLORAPREP HI/LITE 26ML ORNG

## (undated) DEVICE — DUAL CUT SAGITTAL BLADE

## (undated) DEVICE — GAUZE,SPONGE,FLUFF,6"X6.75",STRL,5/TRAY: Brand: MEDLINE

## (undated) DEVICE — SPNG GZ WOVN 4X4IN 12PLY 10/BX STRL

## (undated) DEVICE — GLV SURG PREMIERPRO ORTHO LTX PF SZ9 BRN

## (undated) DEVICE — GLV SURG TRIUMPH CLASSIC PF LTX 8.5 STRL

## (undated) DEVICE — ZIP 16 SURGICAL SKIN CLOSURE DEVICE, PSA: Brand: ZIP 16 SURGICAL SKIN CLOSURE DEVICE

## (undated) DEVICE — BNDG ESMARK STRL 6INX12FT LF

## (undated) DEVICE — GLV SURG BIOGEL LTX PF 8 1/2

## (undated) DEVICE — PREP SOL POVIDONE/IODINE BT 4OZ

## (undated) DEVICE — BLD DISSCT COOL CUT SJ CRVD 4MM 13CM

## (undated) DEVICE — APPL DURAPREP IODOPHOR APL 26ML

## (undated) DEVICE — DRSNG WND GZ CURAD OIL EMULSION 3X3IN STRL

## (undated) DEVICE — ZIP 24 SURGICAL SKIN CLOSURE DEVICE, PSA: Brand: ZIP 24 SURGICAL SKIN CLOSURE DEVICE

## (undated) DEVICE — PATIENT RETURN ELECTRODE, SINGLE-USE, CONTACT QUALITY MONITORING, ADULT, WITH 9FT CORD, FOR PATIENTS WEIGING OVER 33LBS. (15KG): Brand: MEGADYNE

## (undated) DEVICE — TUBING, SUCTION, 1/4" X 20', STRAIGHT: Brand: MEDLINE INDUSTRIES, INC.

## (undated) DEVICE — BNDG ELAS ELITE V/CLOSE 6IN 5YD LF STRL

## (undated) DEVICE — 450 ML BOTTLE OF 0.05% CHLORHEXIDINE GLUCONATE IN 99.95% STERILE WATER FOR IRRIGATION, USP AND APPLICATOR.: Brand: IRRISEPT ANTIMICROBIAL WOUND LAVAGE

## (undated) DEVICE — SUT ETHLN 4/0 PS2 PLSTC 1667G

## (undated) DEVICE — DRSNG WND GZ CURAD OIL EMULSION 3X8IN LF STRL 1PK

## (undated) DEVICE — BNDG CURITY ADHS 1 1/2X1 1/2IN

## (undated) DEVICE — SOL BETADINE 4OZ

## (undated) DEVICE — DISPOSABLE TOURNIQUET CUFF SINGLE BLADDER, SINGLE PORT AND QUICK CONNECT CONNECTOR: Brand: COLOR CUFF

## (undated) DEVICE — GLV SURG SIGNATURE ESSENTIAL PF LTX SZ8

## (undated) DEVICE — TUBING SET, GRAVITY, 4-SPIKE